# Patient Record
Sex: FEMALE | Race: WHITE | NOT HISPANIC OR LATINO | Employment: OTHER | URBAN - METROPOLITAN AREA
[De-identification: names, ages, dates, MRNs, and addresses within clinical notes are randomized per-mention and may not be internally consistent; named-entity substitution may affect disease eponyms.]

---

## 2023-04-04 LAB
ANION GAP IN SER/PLAS: 13 MMOL/L (ref 10–20)
CALCIUM (MG/DL) IN SER/PLAS: 9.9 MG/DL (ref 8.6–10.6)
CARBON DIOXIDE, TOTAL (MMOL/L) IN SER/PLAS: 28 MMOL/L (ref 21–32)
CHLORIDE (MMOL/L) IN SER/PLAS: 101 MMOL/L (ref 98–107)
CREATININE (MG/DL) IN SER/PLAS: 1.16 MG/DL (ref 0.5–1.05)
ERYTHROCYTE DISTRIBUTION WIDTH (RATIO) BY AUTOMATED COUNT: 12.4 % (ref 11.5–14.5)
ERYTHROCYTE MEAN CORPUSCULAR HEMOGLOBIN CONCENTRATION (G/DL) BY AUTOMATED: 34.9 G/DL (ref 32–36)
ERYTHROCYTE MEAN CORPUSCULAR VOLUME (FL) BY AUTOMATED COUNT: 96 FL (ref 80–100)
ERYTHROCYTES (10*6/UL) IN BLOOD BY AUTOMATED COUNT: 4.14 X10E12/L (ref 4–5.2)
GFR FEMALE: 49 ML/MIN/1.73M2
GLUCOSE (MG/DL) IN SER/PLAS: 100 MG/DL (ref 74–99)
HEMATOCRIT (%) IN BLOOD BY AUTOMATED COUNT: 39.8 % (ref 36–46)
HEMOGLOBIN (G/DL) IN BLOOD: 13.9 G/DL (ref 12–16)
INR IN PPP BY COAGULATION ASSAY: 2 (ref 0.9–1.1)
LEUKOCYTES (10*3/UL) IN BLOOD BY AUTOMATED COUNT: 7.4 X10E9/L (ref 4.4–11.3)
NRBC (PER 100 WBCS) BY AUTOMATED COUNT: 0 /100 WBC (ref 0–0)
PLATELETS (10*3/UL) IN BLOOD AUTOMATED COUNT: 216 X10E9/L (ref 150–450)
POTASSIUM (MMOL/L) IN SER/PLAS: 4.2 MMOL/L (ref 3.5–5.3)
PROTHROMBIN TIME (PT) IN PPP BY COAGULATION ASSAY: 23 SEC (ref 9.8–13.4)
SODIUM (MMOL/L) IN SER/PLAS: 138 MMOL/L (ref 136–145)
UREA NITROGEN (MG/DL) IN SER/PLAS: 27 MG/DL (ref 6–23)

## 2023-04-10 ENCOUNTER — HOSPITAL ENCOUNTER (OUTPATIENT)
Dept: DATA CONVERSION | Facility: HOSPITAL | Age: 78
End: 2023-04-10
Attending: INTERNAL MEDICINE | Admitting: INTERNAL MEDICINE
Payer: MEDICARE

## 2023-04-10 DIAGNOSIS — I48.91 UNSPECIFIED ATRIAL FIBRILLATION (MULTI): ICD-10-CM

## 2023-04-10 DIAGNOSIS — I48.4 ATYPICAL ATRIAL FLUTTER (MULTI): ICD-10-CM

## 2023-04-10 DIAGNOSIS — I10 ESSENTIAL (PRIMARY) HYPERTENSION: ICD-10-CM

## 2023-04-10 DIAGNOSIS — Z79.01 LONG TERM (CURRENT) USE OF ANTICOAGULANTS: ICD-10-CM

## 2023-04-10 DIAGNOSIS — F41.9 ANXIETY DISORDER, UNSPECIFIED: ICD-10-CM

## 2023-05-16 LAB
ANION GAP IN SER/PLAS: 13 MMOL/L (ref 10–20)
CALCIUM (MG/DL) IN SER/PLAS: 9.8 MG/DL (ref 8.6–10.6)
CARBON DIOXIDE, TOTAL (MMOL/L) IN SER/PLAS: 31 MMOL/L (ref 21–32)
CHLORIDE (MMOL/L) IN SER/PLAS: 95 MMOL/L (ref 98–107)
CREATININE (MG/DL) IN SER/PLAS: 1.55 MG/DL (ref 0.5–1.05)
GFR FEMALE: 35 ML/MIN/1.73M2
GLUCOSE (MG/DL) IN SER/PLAS: 105 MG/DL (ref 74–99)
POTASSIUM (MMOL/L) IN SER/PLAS: 3.8 MMOL/L (ref 3.5–5.3)
SODIUM (MMOL/L) IN SER/PLAS: 135 MMOL/L (ref 136–145)
UREA NITROGEN (MG/DL) IN SER/PLAS: 29 MG/DL (ref 6–23)

## 2023-06-01 LAB
ANION GAP IN SER/PLAS: 13 MMOL/L (ref 10–20)
CALCIUM (MG/DL) IN SER/PLAS: 9.5 MG/DL (ref 8.6–10.3)
CARBON DIOXIDE, TOTAL (MMOL/L) IN SER/PLAS: 28 MMOL/L (ref 21–32)
CHLORIDE (MMOL/L) IN SER/PLAS: 96 MMOL/L (ref 98–107)
CREATININE (MG/DL) IN SER/PLAS: 1.12 MG/DL (ref 0.5–1.05)
GFR FEMALE: 51 ML/MIN/1.73M2
GLUCOSE (MG/DL) IN SER/PLAS: 96 MG/DL (ref 74–99)
POTASSIUM (MMOL/L) IN SER/PLAS: 3.9 MMOL/L (ref 3.5–5.3)
SODIUM (MMOL/L) IN SER/PLAS: 133 MMOL/L (ref 136–145)
UREA NITROGEN (MG/DL) IN SER/PLAS: 21 MG/DL (ref 6–23)

## 2023-06-27 LAB
ANION GAP IN SER/PLAS: 14 MMOL/L (ref 10–20)
CALCIUM (MG/DL) IN SER/PLAS: 9.6 MG/DL (ref 8.6–10.6)
CARBON DIOXIDE, TOTAL (MMOL/L) IN SER/PLAS: 27 MMOL/L (ref 21–32)
CHLORIDE (MMOL/L) IN SER/PLAS: 94 MMOL/L (ref 98–107)
CREATININE (MG/DL) IN SER/PLAS: 1.17 MG/DL (ref 0.5–1.05)
ERYTHROCYTE DISTRIBUTION WIDTH (RATIO) BY AUTOMATED COUNT: 12.3 % (ref 11.5–14.5)
ERYTHROCYTE MEAN CORPUSCULAR HEMOGLOBIN CONCENTRATION (G/DL) BY AUTOMATED: 35 G/DL (ref 32–36)
ERYTHROCYTE MEAN CORPUSCULAR VOLUME (FL) BY AUTOMATED COUNT: 95 FL (ref 80–100)
ERYTHROCYTES (10*6/UL) IN BLOOD BY AUTOMATED COUNT: 4.19 X10E12/L (ref 4–5.2)
GFR FEMALE: 49 ML/MIN/1.73M2
GLUCOSE (MG/DL) IN SER/PLAS: 75 MG/DL (ref 74–99)
HEMATOCRIT (%) IN BLOOD BY AUTOMATED COUNT: 40 % (ref 36–46)
HEMOGLOBIN (G/DL) IN BLOOD: 14 G/DL (ref 12–16)
INR IN PPP BY COAGULATION ASSAY: 2.6 (ref 0.9–1.1)
LEUKOCYTES (10*3/UL) IN BLOOD BY AUTOMATED COUNT: 6.8 X10E9/L (ref 4.4–11.3)
NRBC (PER 100 WBCS) BY AUTOMATED COUNT: 0 /100 WBC (ref 0–0)
PLATELETS (10*3/UL) IN BLOOD AUTOMATED COUNT: 185 X10E9/L (ref 150–450)
POTASSIUM (MMOL/L) IN SER/PLAS: 4.3 MMOL/L (ref 3.5–5.3)
PROTHROMBIN TIME (PT) IN PPP BY COAGULATION ASSAY: 29.5 SEC (ref 9.8–12.8)
SODIUM (MMOL/L) IN SER/PLAS: 131 MMOL/L (ref 136–145)
UREA NITROGEN (MG/DL) IN SER/PLAS: 24 MG/DL (ref 6–23)

## 2023-07-07 ENCOUNTER — HOSPITAL ENCOUNTER (OUTPATIENT)
Dept: DATA CONVERSION | Facility: HOSPITAL | Age: 78
End: 2023-07-07
Attending: INTERNAL MEDICINE | Admitting: INTERNAL MEDICINE
Payer: MEDICARE

## 2023-07-07 DIAGNOSIS — Z98.1 ARTHRODESIS STATUS: ICD-10-CM

## 2023-07-07 DIAGNOSIS — Z87.891 PERSONAL HISTORY OF NICOTINE DEPENDENCE: ICD-10-CM

## 2023-07-07 DIAGNOSIS — E78.5 HYPERLIPIDEMIA, UNSPECIFIED: ICD-10-CM

## 2023-07-07 DIAGNOSIS — I48.91 UNSPECIFIED ATRIAL FIBRILLATION (MULTI): ICD-10-CM

## 2023-07-07 DIAGNOSIS — I10 ESSENTIAL (PRIMARY) HYPERTENSION: ICD-10-CM

## 2023-07-07 DIAGNOSIS — I48.20 CHRONIC ATRIAL FIBRILLATION, UNSPECIFIED (MULTI): ICD-10-CM

## 2023-07-07 DIAGNOSIS — Z79.01 LONG TERM (CURRENT) USE OF ANTICOAGULANTS: ICD-10-CM

## 2023-07-07 DIAGNOSIS — I48.92 UNSPECIFIED ATRIAL FLUTTER (MULTI): ICD-10-CM

## 2023-07-12 LAB
ATRIAL RATE: 59 BPM
Q ONSET: 226 MS
QRS COUNT: 10 BEATS
QRS DURATION: 90 MS
QT INTERVAL: 430 MS
QTC CALCULATION(BAZETT): 430 MS
QTC FREDERICIA: 430 MS
R AXIS: 5 DEGREES
T AXIS: -69 DEGREES
T OFFSET: 441 MS
VENTRICULAR RATE: 60 BPM

## 2023-07-21 LAB
POC ACTIVATED CLOTTING TIME HIGH RANGE: 362 SECONDS (ref 96–152)
POC ACTIVATED CLOTTING TIME HIGH RANGE: 404 SECONDS (ref 96–152)
POC ACTIVATED CLOTTING TIME HIGH RANGE: 407 SECONDS (ref 96–152)

## 2023-08-09 ENCOUNTER — HOSPITAL ENCOUNTER (OUTPATIENT)
Dept: DATA CONVERSION | Facility: HOSPITAL | Age: 78
End: 2023-08-09
Attending: OTOLARYNGOLOGY | Admitting: OTOLARYNGOLOGY
Payer: MEDICARE

## 2023-08-09 DIAGNOSIS — R49.0 DYSPHONIA: ICD-10-CM

## 2023-08-09 DIAGNOSIS — J38.1 POLYP OF VOCAL CORD AND LARYNX: ICD-10-CM

## 2023-08-18 LAB
COMPLETE PATHOLOGY REPORT: NORMAL
CONVERTED CLINICAL DIAGNOSIS-HISTORY: NORMAL
CONVERTED FINAL DIAGNOSIS: NORMAL
CONVERTED FINAL REPORT PDF LINK TO COPY AND PASTE: NORMAL
CONVERTED GROSS DESCRIPTION: NORMAL

## 2023-08-30 PROBLEM — J38.1 POLYP OF VOCAL CORD: Status: ACTIVE | Noted: 2023-04-04

## 2023-08-30 PROBLEM — I10 HYPERTENSION: Status: ACTIVE | Noted: 2023-08-30

## 2023-08-30 PROBLEM — F17.210 CIGARETTE NICOTINE DEPENDENCE WITHOUT COMPLICATION: Status: ACTIVE | Noted: 2023-08-30

## 2023-08-30 PROBLEM — R49.0 CHRONIC HOARSENESS: Status: ACTIVE | Noted: 2023-04-04

## 2023-08-30 PROBLEM — L82.1 OTHER SEBORRHEIC KERATOSIS: Status: ACTIVE | Noted: 2023-08-08

## 2023-08-30 PROBLEM — M48.00 SPINAL STENOSIS: Status: ACTIVE | Noted: 2023-08-30

## 2023-08-30 PROBLEM — D22.72 MELANOCYTIC NEVI OF LEFT LOWER LIMB, INCLUDING HIP: Status: ACTIVE | Noted: 2023-08-08

## 2023-08-30 PROBLEM — D22.5 MELANOCYTIC NEVI OF TRUNK: Status: ACTIVE | Noted: 2023-08-08

## 2023-08-30 PROBLEM — E78.00 HYPERCHOLESTEROLEMIA: Status: ACTIVE | Noted: 2023-08-30

## 2023-08-30 PROBLEM — D18.01 HEMANGIOMA OF SKIN AND SUBCUTANEOUS TISSUE: Status: ACTIVE | Noted: 2023-08-08

## 2023-08-30 PROBLEM — L30.9 ECZEMA: Status: ACTIVE | Noted: 2023-08-30

## 2023-08-30 PROBLEM — L91.8 SKIN TAGS, MULTIPLE ACQUIRED: Status: ACTIVE | Noted: 2023-08-30

## 2023-08-30 PROBLEM — R63.4 WEIGHT LOSS, UNINTENTIONAL: Status: ACTIVE | Noted: 2023-08-30

## 2023-08-30 PROBLEM — L81.4 OTHER MELANIN HYPERPIGMENTATION: Status: ACTIVE | Noted: 2023-08-08

## 2023-08-30 PROBLEM — D48.5 NEOPLASM OF UNCERTAIN BEHAVIOR OF SKIN: Status: ACTIVE | Noted: 2023-08-08

## 2023-08-30 PROBLEM — D22.71 MELANOCYTIC NEVI OF RIGHT LOWER LIMB, INCLUDING HIP: Status: ACTIVE | Noted: 2023-08-08

## 2023-08-30 RX ORDER — HYDROXYZINE HYDROCHLORIDE 25 MG/1
1 TABLET, FILM COATED ORAL AS NEEDED
COMMUNITY

## 2023-08-30 RX ORDER — GABAPENTIN 100 MG/1
CAPSULE ORAL
COMMUNITY
Start: 2023-07-23 | End: 2023-10-06 | Stop reason: ALTCHOICE

## 2023-08-30 RX ORDER — LISINOPRIL 20 MG/1
20 TABLET ORAL DAILY
COMMUNITY
End: 2024-01-11

## 2023-08-30 RX ORDER — MELOXICAM 15 MG/1
1 TABLET ORAL DAILY
COMMUNITY
Start: 2023-07-27 | End: 2023-11-27 | Stop reason: SDUPTHER

## 2023-08-30 RX ORDER — FUROSEMIDE 20 MG/1
1 TABLET ORAL DAILY
COMMUNITY
Start: 2023-05-12 | End: 2023-10-06 | Stop reason: ALTCHOICE

## 2023-08-30 RX ORDER — SIMVASTATIN 20 MG/1
TABLET, FILM COATED ORAL
COMMUNITY
End: 2023-10-06 | Stop reason: ALTCHOICE

## 2023-08-30 RX ORDER — PANTOPRAZOLE SODIUM 40 MG/1
1 TABLET, DELAYED RELEASE ORAL DAILY
COMMUNITY
Start: 2023-07-08 | End: 2023-10-06 | Stop reason: ALTCHOICE

## 2023-08-30 RX ORDER — RIVAROXABAN 20 MG/1
1 TABLET, FILM COATED ORAL DAILY
COMMUNITY
End: 2023-10-02 | Stop reason: DRUGHIGH

## 2023-08-30 RX ORDER — GABAPENTIN 300 MG/1
1 CAPSULE ORAL 3 TIMES DAILY
COMMUNITY
Start: 2023-07-27 | End: 2024-04-15 | Stop reason: SDUPTHER

## 2023-08-30 RX ORDER — TRIAMTERENE/HYDROCHLOROTHIAZID 37.5-25 MG
TABLET ORAL
COMMUNITY
End: 2023-12-28

## 2023-08-30 RX ORDER — FUROSEMIDE 40 MG/1
1 TABLET ORAL DAILY
COMMUNITY
Start: 2023-04-14 | End: 2023-10-06 | Stop reason: ALTCHOICE

## 2023-08-30 RX ORDER — PHENYLPROPANOLAMINE/CLEMASTINE 75-1.34MG
TABLET, EXTENDED RELEASE ORAL
COMMUNITY
End: 2023-10-06 | Stop reason: ALTCHOICE

## 2023-08-30 RX ORDER — AMLODIPINE BESYLATE 5 MG/1
1 TABLET ORAL DAILY
COMMUNITY
End: 2023-12-28

## 2023-08-30 RX ORDER — LORATADINE 10 MG/1
TABLET ORAL
COMMUNITY
End: 2023-10-06 | Stop reason: ALTCHOICE

## 2023-08-30 RX ORDER — FLECAINIDE ACETATE 50 MG/1
1 TABLET ORAL 2 TIMES DAILY
COMMUNITY
Start: 2023-06-27 | End: 2023-10-06 | Stop reason: SINTOL

## 2023-08-30 RX ORDER — CLOBETASOL PROPIONATE 0.5 MG/G
1 EMULSION TOPICAL 2 TIMES DAILY
COMMUNITY
End: 2023-10-06 | Stop reason: ALTCHOICE

## 2023-09-14 NOTE — H&P
History of Present Illness:   History Present Illness:  Reason for surgery: AFL   HPI:    1 77 year old with HTN, Anxiety disorder , AFL - (Index dx Feb 23, 2023) found on EKG at PCP office. Pt was started on Xarelto 20mg g. For two months she has  felt fatigued and short of breath. She felt something is not right. Presented for flutter ablation     Allergies:        Allergies:  ·  No Known Allergies :     Home Medication Review:   Home Medications Reviewed: yes     Impression/Procedure:   ·  Impression and Planned Procedure: AFL ablation       ERAS (Enhanced Recovery After Surgery):  ·  ERAS Patient: no       Physical Exam by System:    Respiratory/Thorax: Patent airways, CTAB, normal  breath sounds with good chest expansion, thorax symmetric   Cardiovascular: Regular, rate and rhythm, no murmurs,  2+ equal pulses of the extremities, normal S 1and S 2     Airway/Sedation Assessment:  ·  Emotional Status calm   ·  Neurologic alert & oriented x 3   ·  Respiratory clear to auscultation   · Pulses present: Pedal Left, Pedal Right, Radial Left, Radial Right     ·  Mouth Opening OK yes   ·  Neck Flexibility OK yes   ·  Loose Teeth no   ·  Oropharyngeal Classification Class II   ·  ASA PS Classification ASA III   ·  Sedation Plan moderate sedation     Consent:   COVID-19 Consent:  ·  COVID-19 Risk Consent Surgeon has reviewed key risks related to the risk of madalyn COVID-19 and if they contract COVID-19 what the risks are.     Coronavirus Attestation of Need for Surgery:  ·  COVID-19 Surgery / Procedure Attestation: Select all criteria that apply Risk of metastasis or progression of staging if delayed     Attestation:   Note Completion:  I am a:  Resident/Fellow   Attending Attestation I saw and evaluated the patient.  I personally obtained the key and critical portions of the history and physical exam or was physically present for key and  critical portions performed by the resident/fellow. I reviewed the  resident/fellow?s documentation and discussed the patient with the resident/fellow.  I agree with the resident/fellow?s medical decision making as documented in the note.     I personally evaluated the patient on 10-Apr-2023         Electronic Signatures:  Porter Contreras)  (Signed 10-Apr-2023 15:14)   Authored: Note Completion   Co-Signer: History of Present Illness, Allergies, Home Medication Review, Impression/Procedure, ERAS, Physical Exam, Consent  Karrie De La Rosa (Resident))  (Signed 10-Apr-2023 08:42)   Authored: History of Present Illness, Allergies, Home  Medication Review, Impression/Procedure, ERAS, Physical Exam, Consent      Last Updated: 10-Apr-2023 15:14 by Porter Contreras)

## 2023-09-29 VITALS — WEIGHT: 114.42 LBS | HEIGHT: 63 IN | BODY MASS INDEX: 20.27 KG/M2

## 2023-09-30 NOTE — H&P
History of Present Illness:   History Present Illness:  Reason for surgery: atrial fibrillation   HPI:    Lynsey Forman is a 77 year-old with HTN, Anxiety disorder, AFL - (Index dx Feb 23, 2023) found on EKG at PCP Feb 2023, underwent AFL ablation with CTI 4/10/23. She was subsequently  found to have atrial fibrillation shortly thereafter. She presents today for RFA for her atrial fibrillation. She is currently on flecainide 50mg twice a day and xarelto (C2V 4).     Allergies:        Allergies:  ·  No Known Allergies :     Home Medication Review:   Home Medications Reviewed: yes     Impression/Procedure:   ·  Impression and Planned Procedure: atrial fibrillation - RFA       ERAS (Enhanced Recovery After Surgery):  ·  ERAS Patient: no       Physical Exam by System:    Respiratory/Thorax: Patent airways, CTAB, normal  breath sounds with good chest expansion, thorax symmetric   Cardiovascular: Regular, rate and rhythm, no murmurs,  2+ equal pulses of the extremities, normal S 1and S 2     Consent:   COVID-19 Consent:  ·  COVID-19 Risk Consent Surgeon has reviewed key risks related to the risk of madalyn COVID-19 and if they contract COVID-19 what the risks are.     Attestation:   Note Completion:  I am a:  Resident/Fellow   Attending Attestation I saw and evaluated the patient.  I personally obtained the key and critical portions of the history and physical exam or was physically present for key and  critical portions performed by the resident/fellow. I reviewed the resident/fellow?s documentation and discussed the patient with the resident/fellow.  I agree with the resident/fellow?s medical decision making as documented in the note.   I personally evaluated the patient on 07-Jul-2023         Electronic Signatures:  Porter Contreras)  (Signed 07-Jul-2023 17:44)   Co-Signer: History of Present Illness, Allergies, Home  Medication Review, Impression/Procedure, ERAS, Physical Exam, Consent, Note  Completion  James Bolton (Fellow))  (Signed 07-Jul-2023 07:33)   Authored: History of Present Illness, Allergies, Home  Medication Review, Impression/Procedure, ERAS, Physical Exam, Consent, Note Completion      Last Updated: 07-Jul-2023 17:44 by Porter Contreras)

## 2023-09-30 NOTE — H&P
History of Present Illness:   History Present Illness:  Reason for surgery: Moriah's edema   HPI:    This is a patient with a history of Moriah's edema     Allergies:        Allergies:  ·  No Known Allergies :     Home Medication Review:   Home Medications Reviewed: yes     Impression/Procedure:   ·  Impression and Planned Procedure: MDL, CO2 laser, bronchoscopy, steroid injection       ERAS (Enhanced Recovery After Surgery):  ·  ERAS Patient: no       Physical Exam by System:    Respiratory/Thorax: chest rise symmetric   Cardiovascular: RRR     Consent:   COVID-19 Consent:  ·  COVID-19 Risk Consent Surgeon has reviewed key risks related to the risk of madalyn COVID-19 and if they contract COVID-19 what the risks are.     Attestation:   Note Completion:  I am a:  Resident/Fellow   Attending Attestation I saw and evaluated the patient.  I personally obtained the key and critical portions of the history and physical exam or was physically present for key and  critical portions performed by the resident/fellow. I reviewed the resident/fellow?s documentation and discussed the patient with the resident/fellow.  I agree with the resident/fellow?s medical decision making as documented in the note.     I personally evaluated the patient on 09-Aug-2023   Comments/ Additional Findings    Patient and I discussed the risks, benefits and alternatives to surgery and all questions answered.  Cleared to OR.          Electronic Signatures:  Louie Garcias)  (Signed 10-Aug-2023 17:42)   Authored: Note Completion   Co-Signer: History of Present Illness, Allergies, Home Medication Review, Impression/Procedure, ERAS, Physical Exam, Consent, Note Completion  Sandra Kim (Resident))  (Signed 09-Aug-2023 07:31)   Authored: History of Present Illness, Allergies, Home  Medication Review, Impression/Procedure, ERAS, Physical Exam, Consent, Note Completion      Last Updated: 10-Aug-2023 17:42 by Louie Garcias)

## 2023-10-01 NOTE — OP NOTE
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 Dysphonia  Surgeon: MD Dieter  Resident/Fellow/Other Assistant: MD Julio    Procedure:  1. Microdirect laryngoscopy   2. Bronchoscopy, rigid   3. Microdirect laryngoscopy with microflap excision  4. Microdirect laryngoscopy with CO2 laser resection, bilateral  5. Microdirect laryngoscopy vocal cord injection, bilateral   Estimated Blood Loss: 1cc  Findings: See note   Specimens(s) Collected: yes,  sent         Operative Report:   INDICATIONS:    Moriah's edema     We discussed the risks and benefits to include, but not be limited to, hoarseness which may be permanent, swallow change which may require secondary intervention, bleeding; infection; damage to surrounding structures including the teeth, gums, lips, tongue;  laser fire/burn; taste change; medical complications; and risks of anesthesia.  Their questions are answered and they sign written consent.    Findings and treatment include: Nodular, thickened region along the right vocal cord removed with microflap. Bilateral Moriah's edema addressed with CO2 laser resection. Left anterior mucosal sparing to prevent scar formation/web.     DESCRIPTION OF PROCEDURE:    The patient was taken to the operating room and placed in the supine position on the operating room table.  A time out procedure was performed confirming patient and site. Following satisfactory induction of general anesthesia, the upper teeth were covered  with a moldable tooth guard. The laryngoscope was advanced until the vocal cords were visualized and suspended.  The microscope was moved in position, and utilized for remainder of the procedure.  Appropriate eye and face protections was applied to the  patient and the operating room staff.  The oxygen level was also at or below 30% for the laser portions of the  procedure.      Under microscopic guidance, the right vocal cord was injected with preservative free saline along the scar. Using microscissors the nodular lesion of the right vocal cord was excised with a microflap technique using blunt and sharp instruments to include  microscissors and flap elevators. Next, the CO2 laser in line of sight mode via the microscope was used to treat the affected areas in a linear fashion from the vocal process to the presumed anterior commissure.     Next, the bilateral vocal cords were injected with a total of 1cc of 10mg/cc decadron. The zero-degree bronchoscope was then used to visualize the subglottis and trachea to the level of the marisabel, which was free of any concerning findings.     The patient tolerated this well.  The patient was then turned back to Anesthesia for extubation and returned to the recovery room in satisfactory  condition.  Sponge, needle, instrument counts were reported as correct.  Estimated blood loss was minimal.  I was present and participated in all aspects of procedure.                        Attestation:   Note Completion:  Attending Attestation I was present for the entire procedure    I am a: Resident/Fellow         Electronic Signatures:  Louie Garcias)  (Signed 11-Aug-2023 06:33)   Authored: Post-Operative Note, Chart Review, Note Completion   Co-Signer: Post-Operative Note, Chart Review, Note Completion  Sandra Kim (Resident))  (Signed 09-Aug-2023 17:16)   Authored: Post-Operative Note, Chart Review, Note Completion      Last Updated: 11-Aug-2023 06:33 by Louie Garcias)

## 2023-10-02 ENCOUNTER — PHARMACY VISIT (OUTPATIENT)
Dept: PHARMACY | Facility: CLINIC | Age: 78
End: 2023-10-02
Payer: COMMERCIAL

## 2023-10-02 ENCOUNTER — TELEMEDICINE CLINICAL SUPPORT (OUTPATIENT)
Dept: PHARMACY | Facility: HOSPITAL | Age: 78
End: 2023-10-02
Payer: MEDICARE

## 2023-10-02 DIAGNOSIS — I48.91 ATRIAL FIBRILLATION, UNSPECIFIED TYPE (MULTI): Primary | ICD-10-CM

## 2023-10-02 NOTE — Clinical Note
Fabian Seaman! I talked with Ms. Dickson. Looks like they just needed a PA for the Xarelto. Should be $0. I sent prescription to [facility] to make sure there are no  issues.

## 2023-10-02 NOTE — PROGRESS NOTES
"Pharmacist Clinic: Anticoagulation Management  Lynsey Forman was referred to the Clinical Pharmacy Team for her anticoagulation management.    Referring Provider:  Urszula Lebron   _______________________________________________________________________  PHARMACY ASSESSMENT    Affordability/Accessibility: Patient has been having cost issues with her Xarelto prescription. Recently was changed to 15 mg. According to my test claims, patient needed a PA. PA was approved 9/28/2023. May need updated annually.   Adherence/Organization: Patient is taking medication as appropriate.  Adverse Effects: None    MEDICATION RECONCILIATION  Added:  - None  Changed:  - None  Removed:  - None    RELEVANT LAB RESULTS  Lab Results   Component Value Date    BILITOT 0.6 02/06/2023    CALCIUM 9.6 06/27/2023    CO2 27 06/27/2023    CL 94 (L) 06/27/2023    CREATININE 1.17 (H) 06/27/2023    GLUCOSE 75 06/27/2023    ALKPHOS 65 02/06/2023    K 4.3 06/27/2023    PROT 7.2 02/06/2023     (L) 06/27/2023    AST 24 02/06/2023    ALT 16 02/06/2023    BUN 24 (H) 06/27/2023    ANIONGAP 14 06/27/2023    ALBUMIN 4.5 02/06/2023    GFRF 49 (A) 06/27/2023     Lab Results   Component Value Date    TRIG 106 10/31/2022    CHOL 213 (H) 10/31/2022    .9 10/31/2022     No results found for: \"BMCBC\", \"CBCDIF\"       DRUG INTERACTIONS  - No  _______________________________________________________________________  ANTICOAGULATION ASSESSMENT     CURRENT PHARMACOTHERAPY:   -Xarelto 15 mg (recently decreased due to kindey function)   - 6/2023 -SCR 1.17  - Weight 51.9 kg  - Age 78 yo   - CrCl 34 mL/min    HISTORICAL PHARMACOTHERAPY:   - Patient has a significant cardio history including a flutter, a fib (diagnosed 2/2023), CAD, hypercholesterolemia, HTN, and CKD III. Patient was diagnosed with a fib in 2/2023. Since starting on Xarelto, she has no specific side effects to report. She has been having cost issues. She is hesitant about being a blood thinner " due to the high risk nature of it. We talked in detail about why being on a blood thinner is important and how to prevent any issues.     PERTINANT MEDICAL HISTORY:  a flutter, a fib (diagnosed 2/2023), CAD, hypercholesterolemia, HTN, and CKD III  _______________________________________________________________________  PATIENT EDUCATION/GOALS  - Counseled patient on MOA, expectations, duration of therapy, contraindications, administration, and monitoring parameters  - Counseled patient of side effects that are indicative of bleeding such as dark tarry stool, unexplainable bruising, or vomiting up a coffee ground like substance  ____________________________________________________  RECOMMENDATIONS/PLAN  1. CONTINUE Xarelto 15 mg every day as prescribed.   2. We will try to get financial assistance through  PAP aid. Application was entered 10/2.   3. Continue to work with physical therapy to increase strength and mobility. Keep all providers up to date with your status of being on a blood thinner, if you decide to seek invasive options.     Patient is being screen for  Patient Assistance Program (PAP).    Patient verbally reports monthly or yearly income which is less than 400% federal poverty level    Application for program has been submitted for the following medications: Xarelto 15 QD    Prescription Insurance: Yes  Members of Household: 1  Files Taxes: No    Patient will be email financial information to pharmacist directly at [email or web address].    Patient aware this process may take up to 6 weeks.     If approved medication must be filled through [facility] pharmacy and mailed to patient.        Next Cardiology Appointment: 4/2/2024   Clinical Pharmacist follow up: 1/3/2024 @ 1 pm   Type of Encounter: Nathan Bellamy, Luz    Verbal consent to manage patient's drug therapy was obtained from the patient . They were informed they may decline to participate or withdraw from  participation in pharmacy services at any time.    Continue all meds under the continuation of care with the referring provider and clinical pharmacy team.

## 2023-10-03 ENCOUNTER — TREATMENT (OUTPATIENT)
Dept: PHYSICAL THERAPY | Facility: CLINIC | Age: 78
End: 2023-10-03
Payer: MEDICARE

## 2023-10-03 DIAGNOSIS — M54.41 CHRONIC BILATERAL LOW BACK PAIN WITH BILATERAL SCIATICA: ICD-10-CM

## 2023-10-03 DIAGNOSIS — M48.061 SPINAL STENOSIS OF LUMBAR REGION, UNSPECIFIED WHETHER NEUROGENIC CLAUDICATION PRESENT: Primary | ICD-10-CM

## 2023-10-03 DIAGNOSIS — M54.42 CHRONIC BILATERAL LOW BACK PAIN WITH BILATERAL SCIATICA: ICD-10-CM

## 2023-10-03 DIAGNOSIS — G89.29 CHRONIC BILATERAL LOW BACK PAIN WITH BILATERAL SCIATICA: ICD-10-CM

## 2023-10-03 PROCEDURE — 97110 THERAPEUTIC EXERCISES: CPT | Mod: GP | Performed by: PHYSICAL THERAPIST

## 2023-10-03 NOTE — PROGRESS NOTES
Physical Therapy Daily Treatment Note    Patient Name: Lynsey Forman  MRN Number: [id number]  Initial PT Examination Date: 8/16/23  Referring Clinician: Dr. Bartholomew  Reason for Visit: Low back pain, bilateral LE pain    Today's Date: 10/11/2023  Time Calculation  Start Time: 0217  Stop Time: 0301  Time Calculation (min): 44 min      Insurance  Visit Number: 4  Approved Number of Visits: Medical necessity/ medicare  Certification Period: 8/16 - 11/8/23    Precautions  Low fall risk    Problem List  1. Spinal stenosis of lumbar region, unspecified whether neurogenic claudication present        2. Chronic bilateral low back pain with bilateral sciatica          Subjective  Has a spinal MRI in a couple weeks. She has been more active this week. Doing some gardening yesterday and today, she was sore but able to do it. She says that her legs have actually been better. Strength in her legs is better, she feels difference from the exercises. The pain in her legs has stayed the same, comes and goes, takes gabapentin 3 times as day. Leg pain has not been worse in the last week.   She does not know how long she can walk.     Treatment  Therapeutic Exercise   - attempted lumbar extension with increase in symptoms   - clamshells with theraband resistence   - seated hamstring slump stretch    - standing lumbar flexion   - CKC hip abduction   - lateral side stepping    Assessment  Subjective reduction in referred bilateral pain, despite this she continues to have levels of pain which are limiting her ability to walk longer distances.    Plan  Continue strengthening and flexion exercises.    Home Program  Access Code: [email or web address]  URL: [email or web address]  Date: 09/26/2023  Prepared by: Kenan Alcaraz    Exercises  - Supine Double Knee to Chest  - 1 x daily - 7 x weekly - 2 sets - 10-20 reps  - Supine Lower Trunk Rotation  - 1 x daily - 7 x weekly - 2 sets - 10-20 reps  - Clam with Resistance  - 1 x daily - 7 x  weekly - 2 sets - 10-20 reps  - Seated Hamstring Stretch  - 1 x daily - 7 x weekly - 5 reps - 20 sec hold  - Standing Forward Trunk Flexion  - 3 x daily - 7 x weekly - 2 sets - 20 reps  - Standing Hip Abduction with Counter Support  - 1 x daily - 7 x weekly - 2 sets - 10-20 reps  - Side Stepping with Counter Support  - 1 x daily - 7 x weekly - 2 sets - 1 min hold    Care Plan  Encounter Problems       Encounter Problems (Active)       PT Problem       PT Goal 1       Expected End:  11/08/23       1) Full return to prior level of function to allow continued home independent capability.   2) Reduce low back/ BLE pain 5/10 maximum in the last week for improvements in quality of life and sleep.   3) Reduced CLARA outcome measure score to less than or equal to 18% for meaningful and clinical improvements in patients condition.   4) Pain free spinal extension to denote reduction in spinal dysfunction.  5) Increase bilateral hip strength testing greater than or equal to 4/5 for improvements in low back/ pelvic stability when standing and walking for extended periods of time.

## 2023-10-06 ENCOUNTER — OFFICE VISIT (OUTPATIENT)
Dept: CARDIOLOGY | Facility: CLINIC | Age: 78
End: 2023-10-06
Payer: MEDICARE

## 2023-10-06 ENCOUNTER — TELEPHONE (OUTPATIENT)
Dept: PHARMACY | Facility: HOSPITAL | Age: 78
End: 2023-10-06
Payer: MEDICARE

## 2023-10-06 ENCOUNTER — PHARMACY VISIT (OUTPATIENT)
Dept: PHARMACY | Facility: CLINIC | Age: 78
End: 2023-10-06
Payer: COMMERCIAL

## 2023-10-06 VITALS
OXYGEN SATURATION: 96 % | HEIGHT: 63 IN | BODY MASS INDEX: 20.34 KG/M2 | WEIGHT: 114.8 LBS | SYSTOLIC BLOOD PRESSURE: 113 MMHG | HEART RATE: 57 BPM | DIASTOLIC BLOOD PRESSURE: 62 MMHG

## 2023-10-06 DIAGNOSIS — I48.91 ATRIAL FIBRILLATION, UNSPECIFIED TYPE (MULTI): Primary | ICD-10-CM

## 2023-10-06 PROCEDURE — 3074F SYST BP LT 130 MM HG: CPT | Performed by: NURSE PRACTITIONER

## 2023-10-06 PROCEDURE — 1160F RVW MEDS BY RX/DR IN RCRD: CPT | Performed by: NURSE PRACTITIONER

## 2023-10-06 PROCEDURE — 1125F AMNT PAIN NOTED PAIN PRSNT: CPT | Performed by: NURSE PRACTITIONER

## 2023-10-06 PROCEDURE — 3078F DIAST BP <80 MM HG: CPT | Performed by: NURSE PRACTITIONER

## 2023-10-06 PROCEDURE — 1159F MED LIST DOCD IN RCRD: CPT | Performed by: NURSE PRACTITIONER

## 2023-10-06 PROCEDURE — 99214 OFFICE O/P EST MOD 30 MIN: CPT | Mod: PO | Performed by: NURSE PRACTITIONER

## 2023-10-06 PROCEDURE — RXMED WILLOW AMBULATORY MEDICATION CHARGE

## 2023-10-06 PROCEDURE — 99214 OFFICE O/P EST MOD 30 MIN: CPT | Performed by: NURSE PRACTITIONER

## 2023-10-06 RX ORDER — ATORVASTATIN CALCIUM 40 MG/1
40 TABLET, FILM COATED ORAL DAILY
COMMUNITY
End: 2023-12-19 | Stop reason: SDUPTHER

## 2023-10-06 ASSESSMENT — ENCOUNTER SYMPTOMS: OCCASIONAL FEELINGS OF UNSTEADINESS: 1

## 2023-10-06 ASSESSMENT — PAIN SCALES - GENERAL: PAINLEVEL: 4

## 2023-10-06 NOTE — TELEPHONE ENCOUNTER
Patient Assistance Program Approval:     We are pleased to inform you that your application for assistance has been approved.     This approval is valid through October 6, 2024  as long as the following criteria continue to be satisfied:     Your medication (Xarelto) remains covered under your current insurance plan.   Your prescriber does not discontinue therapy.   You do not seek reimbursement from any other private or government-funded programs for the  medication.    Under this program, the pharmacy will first bill your insurance plan for your indemnified specified medication. The [employer] Assistance Fund will then offset your copay balance, so that your out-of pocket expense for your specialty medication will be $0.00.    Calista Bellamy, PharmD    3 mon follow up scheduled with pharmacy on 1/3 @ 1 pm (virtual)

## 2023-10-06 NOTE — PROGRESS NOTES
Subjective   Lynsey Forman is a 77 y.o. female.    Chief Complaint:  Atrial Fibrillation and Atrial Flutter    Lynsey Forman is a 77 year-old with    1. HTN  2. Anxiety disorder  3. AFL - (Index dx Feb 23, 2023) found on EKG at PCP office. Pt was started on Eliquis 5 mg. For two months she has felt fatigued and short of breath. She felt something is not right.    Feb 2023: She was fairly symptomatic with atrial flutter. CHADS VASC score of 4 and she is anticoagulated. After options discussed, ablation will be done.     April 10, 2023: Successful ablation for typical atrial flutter with confirmed bidirectional block at the CTI, done by Dr. Contreras.     TESTING:      -ECHO: TTE (Feb 2023) LVEF 60-65%. Mildly dilated atria.     Now s/p Afib RFA with Dr. Contreras 7/7/2023  Patient was in junctional rhythm post procedure, she was observed for > 3 hours post procedure and she started having intermittent NSR. Flecainide was stopped  ECG 8/4/2023 SB HR 53 bpm  ECG 10/6/2023 SB HR 58 bpm with occasionally PVCs    TODAY Patient presents for 3 month follow up post afib ablation. She denies any cardiac symptoms.  She has some PVCs on her ECG today, but denies any palpitations.  She has established with Dr. Mcdonald for general cardiology and has some testing coming up.          Review of Systems   Constitutional: Negative for diaphoresis, fever and malaise/fatigue.   HENT:  Negative for congestion and sore throat.    Eyes:  Negative for blurred vision and double vision.   Cardiovascular:  Negative for chest pain, dyspnea on exertion, irregular heartbeat, leg swelling, near-syncope, orthopnea, palpitations, paroxysmal nocturnal dyspnea and syncope.   Respiratory:  Negative for cough, hemoptysis, shortness of breath, snoring and sputum production.    Hematologic/Lymphatic: Negative for bleeding problem.   Skin:  Negative for rash.   Musculoskeletal:  Negative for falls, joint pain and myalgias.   Gastrointestinal:  Negative for  abdominal pain, diarrhea, nausea and vomiting.   Neurological:  Negative for dizziness, headaches, light-headedness and weakness.   All other systems reviewed and are negative.      Objective   Constitutional:       Appearance: Healthy appearance. Not in distress.   Eyes:      Conjunctiva/sclera: Conjunctivae normal.      Pupils: Pupils are equal, round, and reactive to light.   Pulmonary:      Effort: Pulmonary effort is normal.      Breath sounds: Normal breath sounds. No wheezing. No rhonchi. No rales.   Cardiovascular:      PMI at left midclavicular line. Normal rate. Regular rhythm. Normal S1. Normal S2.       Murmurs: There is no murmur.   Pulses:     Intact distal pulses.   Edema:     Peripheral edema absent.   Abdominal:      General: Bowel sounds are normal.      Palpations: Abdomen is soft.      Tenderness: There is no abdominal tenderness.   Musculoskeletal: Normal range of motion.         General: No tenderness. Skin:     General: Skin is warm and dry.   Neurological:      General: No focal deficit present.      Mental Status: Alert and oriented to person, place and time.           Assessment/Plan   The encounter diagnosis was Atrial fibrillation, unspecified type (CMS/HCC).  Patient doing well 3 months post Afib Ablation.  She is noted to have PVCs but denies any symptoms.  We discussed lifestyle modifications that can help maintain normal sinus rhythm such as exercise, weight loss, managing hypertension, treating sleep apnea, and minimizing alcohol intake.  All questions asked and answered.    I offered her a holter monitor to further assess her PVC burden, she declined today.  She is asymptomatic and has some upcoming testing with Dr. Mcdonald.  If her cardiac MRI shows a reduced EF, we will recommend a monitor to make sure it is not from an increased PVC burden.    Continue current medications, CHADSVASC =4, patient would likely benefit from indefinite AC  Follow up with general cardiology as  scheduled  Follow up with EP at needed: I will review her test results when they are back

## 2023-10-09 ASSESSMENT — ENCOUNTER SYMPTOMS
DYSPNEA ON EXERTION: 0
SNORING: 0
SPUTUM PRODUCTION: 0
COUGH: 0
NAUSEA: 0
LIGHT-HEADEDNESS: 0
SYNCOPE: 0
PALPITATIONS: 0
MYALGIAS: 0
VOMITING: 0
BLURRED VISION: 0
SHORTNESS OF BREATH: 0
FEVER: 0
HEADACHES: 0
HEMOPTYSIS: 0
DIARRHEA: 0
ORTHOPNEA: 0
DOUBLE VISION: 0
WEAKNESS: 0
IRREGULAR HEARTBEAT: 0
PND: 0
DIAPHORESIS: 0
ABDOMINAL PAIN: 0
DIZZINESS: 0
FALLS: 0
SORE THROAT: 0
NEAR-SYNCOPE: 0

## 2023-10-11 ENCOUNTER — APPOINTMENT (OUTPATIENT)
Dept: PRIMARY CARE | Facility: CLINIC | Age: 78
End: 2023-10-11
Payer: MEDICARE

## 2023-10-11 PROBLEM — M54.41 CHRONIC BILATERAL LOW BACK PAIN WITH BILATERAL SCIATICA: Status: ACTIVE | Noted: 2023-10-11

## 2023-10-11 PROBLEM — M54.42 CHRONIC BILATERAL LOW BACK PAIN WITH BILATERAL SCIATICA: Status: ACTIVE | Noted: 2023-10-11

## 2023-10-11 PROBLEM — G89.29 CHRONIC BILATERAL LOW BACK PAIN WITH BILATERAL SCIATICA: Status: ACTIVE | Noted: 2023-10-11

## 2023-10-13 ENCOUNTER — APPOINTMENT (OUTPATIENT)
Dept: RADIOLOGY | Facility: HOSPITAL | Age: 78
End: 2023-10-13
Payer: MEDICARE

## 2023-10-19 ENCOUNTER — ANCILLARY PROCEDURE (OUTPATIENT)
Dept: RADIOLOGY | Facility: CLINIC | Age: 78
End: 2023-10-19
Payer: MEDICARE

## 2023-10-19 PROCEDURE — 72148 MRI LUMBAR SPINE W/O DYE: CPT | Mod: MA

## 2023-10-25 ENCOUNTER — DOCUMENTATION (OUTPATIENT)
Dept: ORTHOPEDICS | Facility: HOSPITAL | Age: 78
End: 2023-10-25

## 2023-10-25 ENCOUNTER — OFFICE VISIT (OUTPATIENT)
Dept: ORTHOPEDIC SURGERY | Facility: CLINIC | Age: 78
End: 2023-10-25
Payer: MEDICARE

## 2023-10-25 ENCOUNTER — TREATMENT (OUTPATIENT)
Dept: PHYSICAL THERAPY | Facility: CLINIC | Age: 78
End: 2023-10-25
Payer: MEDICARE

## 2023-10-25 DIAGNOSIS — M54.42 CHRONIC BILATERAL LOW BACK PAIN WITH BILATERAL SCIATICA: Primary | ICD-10-CM

## 2023-10-25 DIAGNOSIS — G89.29 CHRONIC BILATERAL LOW BACK PAIN WITH BILATERAL SCIATICA: Primary | ICD-10-CM

## 2023-10-25 DIAGNOSIS — M48.061 DEGENERATIVE LUMBAR SPINAL STENOSIS: Primary | ICD-10-CM

## 2023-10-25 DIAGNOSIS — M54.41 CHRONIC BILATERAL LOW BACK PAIN WITH BILATERAL SCIATICA: Primary | ICD-10-CM

## 2023-10-25 PROCEDURE — 1125F AMNT PAIN NOTED PAIN PRSNT: CPT | Performed by: ORTHOPAEDIC SURGERY

## 2023-10-25 PROCEDURE — 97110 THERAPEUTIC EXERCISES: CPT | Mod: GP | Performed by: PHYSICAL THERAPIST

## 2023-10-25 PROCEDURE — 1159F MED LIST DOCD IN RCRD: CPT | Performed by: ORTHOPAEDIC SURGERY

## 2023-10-25 PROCEDURE — 99205 OFFICE O/P NEW HI 60 MIN: CPT | Performed by: ORTHOPAEDIC SURGERY

## 2023-10-25 PROCEDURE — 1160F RVW MEDS BY RX/DR IN RCRD: CPT | Performed by: ORTHOPAEDIC SURGERY

## 2023-10-25 NOTE — LETTER
October 25, 2023     Windy Bartholomew MD  [address] [place] [facility], [address]  [facility] [address]    Patient: Lynsey Forman   YOB: 1945   Date of Visit: 10/25/2023       Dear Dr. Windy Bartholomew MD:    Thank you for referring Lynsey Forman to me for evaluation. Below are my notes for this consultation.  If you have questions, please do not hesitate to call me. I look forward to following your patient along with you.       Sincerely,     Alexsander Lugo MD      CC: No Recipients  ______________________________________________________________________________________    Lynsey is a very pleasant 77-year-old woman referred by Dr. Bartholomew.    She has had a 1 year history of low back and rather severe bilateral buttock and leg pain.  Her symptoms are consistent with neurogenic claudication.  Her ability to stand and walk distances is quite compromised.    Over the last year she has had cardiac issues that started with atrial flutter and subsequently she developed atrial fibrillation.  She did undergo a procedure recently that converted her to normal rhythm.  She is on Xarelto.    She smokes half a pack of cigarettes a day.    Family, social, and medical histories are obtained and reviewed.    30-point, patient-recorded Review of Systems is personally obtained and reviewed. Inclusive is no history of weight loss, change in appetite, recent change in activity level, change in bowel or bladder habits, fevers, chills, malaise, or night pain.    On exam very pleasant woman no acute distress.  Slow deliberate gait.  Limited flexion.  Painless motion both hips.    Her strength is intact in the lower extremities with the exception of weakness in the right ankle dorsiflexion, 4/5.  No hyperreflexia.    Plain films show a degenerative spondylolisthesis at L4-5 with a very degenerative disc space.    Her MRI shows quite severe stenosis at L4-5.  No severe stenosis  elsewhere.    Impression: She is quite symptomatic with lumbar radiculopathy and claudication.  We have discussed the nature of her situation at length.  Certainly, the degree of stenosis that she has would warrant consideration of surgery, as would her symptoms.    However, with her smoking, she would not be a candidate for elective surgery.  I would strongly recommend smoking cessation.    As well, with her recent cardiac procedure in July and the use of Xarelto, I would suspect that she would need to be on that anticoagulation uninterrupted.  However to clarify this, I have suggested that she touch base with her cardiologist.    As well, she really has not done any formal therapy at this point so I would suggest in the interim that she begin a physical therapy program and continue it for at least 4 weeks.  I told her of the importance of documenting the physical therapy visits.    If she remains symptomatic, and if she is able to become nicotine free, and if she is able to come off of Xarelto, I do think she would be a candidate for surgery in the form of a posterior decompression and fusion with a TLIF.  I think surgery would help significantly with her radicular symptoms.    I think epidural steroid injections would be of limited benefit and as well with the Xarelto, that too would need to be stopped.    She has a good understanding of this.    She will begin a therapy program and work on smoking cessation.  She will give me an update in 4 to 6 weeks.    ** Dictated with voice recognition software and not immediately reviewed for errors in grammar and/or spelling **

## 2023-10-25 NOTE — PROGRESS NOTES
Physical Therapy Daily Treatment Note    Patient Name: Lynsey Forman  MRN Number: [id number]  Initial PT Examination Date: 8/16/23  Referring Clinician: Dr. Bartholomew  Reason for Visit: Low Back Pain, Bilateral LE Pain    Today's Date: 10/25/2023  Time Calculation  Start Time: 0209  Stop Time: 0247  Time Calculation (min): 38 min      Insurance  Visit Number: 5  Approved Number of Visits: Medical necessity/ medicare  Certification Period: 8/16/23 - 11/8/23    Precautions  Low fall risk    Problem List  1. Chronic bilateral low back pain with bilateral sciatica            Subjective  Good days and not so good days. She has been blowing leaves for a few days. She can do things but just not for very long. She has been doing well with the exercises at home. She has been doing the exercises one time a day. She has been doing the bending forward exercises, avoiding extension. Twisting with a rake seems to aggravate her back. Still having numbness in her toes, numbness in her feet too. She was seen by Dr. Lugo, recommenced spinal fusion.     Treatment  Therapeutic Exercise   - CKC hip abduction x20   - CKC hip abduction x20, red band around knees   - lateral side stepping, red band around knees 2x1 min   - attempted banded trunk rotation, however reproduces bilateral LE symptoms   - side lying trunk rotation 10x10 sec each side   - updated/ printed HEP    Assessment  Mechanical back pain continues, especially with standing trunk rotation, improvements in trunk rotation following side lying trunk rotation.    Plan  Consider supine 90/90 hamstring/ nerve stretching on next session.    Home Program  Access Code: [email or web address]  URL: [email or web address]  Date: 09/26/2023  Prepared by: Kenan Alcaraz    Exercises  - Supine Double Knee to Chest  - 1 x daily - 7 x weekly - 2 sets - 10-20 reps  - Supine Lower Trunk Rotation  - 1 x daily - 7 x weekly - 2 sets - 10-20 reps  - Clam with Resistance  - 1 x daily - 7 x  weekly - 2 sets - 10-20 reps  - Seated Hamstring Stretch  - 1 x daily - 7 x weekly - 5 reps - 20 sec hold  - Standing Forward Trunk Flexion  - 3 x daily - 7 x weekly - 2 sets - 20 reps  - Standing Hip Abduction with Counter Support  - 1 x daily - 7 x weekly - 2 sets - 10-20 reps  - Side Stepping with Counter Support  - 1 x daily - 7 x weekly - 2 sets - 1 min hold  -----------------------------------------------------------------------------------------------------  Access Code: [email or web address]  URL: [email or web address]  Date: 10/25/2023  Prepared by: Kenan Alcaraz    Exercises  - Standing Forward Trunk Flexion  - 3 x daily - 7 x weekly - 2 sets - 20 reps  - Supine Double Knee to Chest  - 1 x daily - 7 x weekly - 2 sets - 10-20 reps  - Supine Lower Trunk Rotation  - 1 x daily - 7 x weekly - 2 sets - 10-20 reps  - Clam with Resistance  - 1 x daily - 7 x weekly - 2 sets - 10-20 reps  - Sidelying Thoracic Lumbar Rotation  - 1 x daily - 7 x weekly - 10 reps - 10 sec hold  - Seated Hamstring Stretch  - 1 x daily - 7 x weekly - 5 reps - 20 sec hold  - Side Stepping with Resistance at Thighs  - 1 x daily - 7 x weekly - 2 sets - 10-20 reps  - Hip Abduction with Resistance Loop  - 1 x daily - 7 x weekly - 2 sets - 10-20 reps    Care Plan  Active       PT Problem       PT Goal 1       Expected End:  11/08/23       1) Full return to prior level of function to allow continued home independent capability.   2) Reduce low back/ BLE pain 5/10 maximum in the last week for improvements in quality of life and sleep.   3) Reduced CLARA outcome measure score to less than or equal to 18% for meaningful and clinical improvements in patients condition.   4) Pain free spinal extension to denote reduction in spinal dysfunction.  5) Increase bilateral hip strength testing greater than or equal to 4/5 for improvements in low back/ pelvic stability when standing and walking for extended periods of time.

## 2023-10-26 NOTE — PROGRESS NOTES
Lynsey is a very pleasant 77-year-old woman referred by Dr. Bartholomew.    She has had a 1 year history of low back and rather severe bilateral buttock and leg pain.  Her symptoms are consistent with neurogenic claudication.  Her ability to stand and walk distances is quite compromised.    Over the last year she has had cardiac issues that started with atrial flutter and subsequently she developed atrial fibrillation.  She did undergo a procedure recently that converted her to normal rhythm.  She is on Xarelto.    She smokes half a pack of cigarettes a day.    Family, social, and medical histories are obtained and reviewed.    30-point, patient-recorded Review of Systems is personally obtained and reviewed. Inclusive is no history of weight loss, change in appetite, recent change in activity level, change in bowel or bladder habits, fevers, chills, malaise, or night pain.    On exam very pleasant woman no acute distress.  Slow deliberate gait.  Limited flexion.  Painless motion both hips.    Her strength is intact in the lower extremities with the exception of weakness in the right ankle dorsiflexion, 4/5.  No hyperreflexia.    Plain films show a degenerative spondylolisthesis at L4-5 with a very degenerative disc space.    Her MRI shows quite severe stenosis at L4-5.  No severe stenosis elsewhere.    Impression: She is quite symptomatic with lumbar radiculopathy and claudication.  We have discussed the nature of her situation at length.  Certainly, the degree of stenosis that she has would warrant consideration of surgery, as would her symptoms.    However, with her smoking, she would not be a candidate for elective surgery.  I would strongly recommend smoking cessation.    As well, with her recent cardiac procedure in July and the use of Xarelto, I would suspect that she would need to be on that anticoagulation uninterrupted.  However to clarify this, I have suggested that she touch base with her cardiologist.    As  well, she really has not done any formal therapy at this point so I would suggest in the interim that she begin a physical therapy program and continue it for at least 4 weeks.  I told her of the importance of documenting the physical therapy visits.    If she remains symptomatic, and if she is able to become nicotine free, and if she is able to come off of Xarelto, I do think she would be a candidate for surgery in the form of a posterior decompression and fusion with a TLIF.  I think surgery would help significantly with her radicular symptoms.    I think epidural steroid injections would be of limited benefit and as well with the Xarelto, that too would need to be stopped.    She has a good understanding of this.    She will begin a therapy program and work on smoking cessation.  She will give me an update in 4 to 6 weeks.    ** Dictated with voice recognition software and not immediately reviewed for errors in grammar and/or spelling **

## 2023-10-26 NOTE — PROGRESS NOTES
Dear Aissatou,    Thank you for the opportunity of seeing Lynsey Forman.    As you know, she has become quite symptomatic with lumbar radiculopathy and neurogenic claudication.  She does have quite severe stenosis at L4-5 along with a spondylolisthesis.    Ultimately, I do think she would be a candidate for surgery.  I would like her to try a short course of physical therapy as well as work on becoming nicotine free.  Ultimately if she becomes nicotine free I think she would be a candidate for surgery in the form of a decompression and instrumented fusion.    She is also going to check to see if she is able to come off of pharmacologic anticoagulation at some point in the near future, should surgery be considered.    Thanks again for the opportunity of seeing this very pleasant woman.    Please don't hesitate to contact me at any time, via e-mail [email or web address] or cell phone [phone].      Sincerely,        Alexsander Lugo M.D.  Chief, Spine Section  Professor & Thomas Barajas M.D. Endowed Chair   Department of Orthopedic Surgery  [place], [place]

## 2023-10-30 ENCOUNTER — TELEPHONE (OUTPATIENT)
Dept: PRIMARY CARE | Facility: CLINIC | Age: 78
End: 2023-10-30
Payer: MEDICARE

## 2023-10-30 DIAGNOSIS — M48.061 SPINAL STENOSIS AT L4-L5 LEVEL: Primary | ICD-10-CM

## 2023-11-01 ENCOUNTER — TREATMENT (OUTPATIENT)
Dept: PHYSICAL THERAPY | Facility: CLINIC | Age: 78
End: 2023-11-01
Payer: MEDICARE

## 2023-11-01 ENCOUNTER — HOSPITAL ENCOUNTER (OUTPATIENT)
Dept: RADIOLOGY | Facility: HOSPITAL | Age: 78
Discharge: HOME | End: 2023-11-01
Payer: MEDICARE

## 2023-11-01 DIAGNOSIS — R06.02 BREATH SHORTNESS: Primary | ICD-10-CM

## 2023-11-01 DIAGNOSIS — I25.10 ATHEROSCLEROSIS OF NATIVE CORONARY ARTERY OF NATIVE HEART WITHOUT ANGINA PECTORIS: ICD-10-CM

## 2023-11-01 DIAGNOSIS — R06.02 SHORTNESS OF BREATH: Primary | ICD-10-CM

## 2023-11-01 DIAGNOSIS — M54.42 CHRONIC BILATERAL LOW BACK PAIN WITH BILATERAL SCIATICA: Primary | ICD-10-CM

## 2023-11-01 DIAGNOSIS — G89.29 CHRONIC BILATERAL LOW BACK PAIN WITH BILATERAL SCIATICA: Primary | ICD-10-CM

## 2023-11-01 DIAGNOSIS — M54.41 CHRONIC BILATERAL LOW BACK PAIN WITH BILATERAL SCIATICA: Primary | ICD-10-CM

## 2023-11-01 DIAGNOSIS — R06.02 SHORTNESS OF BREATH: ICD-10-CM

## 2023-11-01 DIAGNOSIS — I25.10 ATHEROSCLEROTIC HEART DISEASE OF NATIVE CORONARY ARTERY WITHOUT ANGINA PECTORIS: ICD-10-CM

## 2023-11-01 PROCEDURE — 97110 THERAPEUTIC EXERCISES: CPT | Mod: GP | Performed by: PHYSICAL THERAPIST

## 2023-11-01 PROCEDURE — 75563 CARD MRI W/STRESS IMG & DYE: CPT

## 2023-11-01 PROCEDURE — 75563 CARD MRI W/STRESS IMG & DYE: CPT | Performed by: INTERNAL MEDICINE

## 2023-11-01 PROCEDURE — 2500000004 HC RX 250 GENERAL PHARMACY W/ HCPCS (ALT 636 FOR OP/ED): Performed by: INTERNAL MEDICINE

## 2023-11-01 PROCEDURE — A9575 INJ GADOTERATE MEGLUMI 0.1ML: HCPCS | Performed by: INTERNAL MEDICINE

## 2023-11-01 PROCEDURE — 75565 CARD MRI VELOC FLOW MAPPING: CPT | Performed by: INTERNAL MEDICINE

## 2023-11-01 PROCEDURE — 2550000001 HC RX 255 CONTRASTS: Performed by: INTERNAL MEDICINE

## 2023-11-01 PROCEDURE — 93010 ELECTROCARDIOGRAM REPORT: CPT | Performed by: INTERNAL MEDICINE

## 2023-11-01 RX ORDER — AMINOPHYLLINE 25 MG/ML
100 INJECTION, SOLUTION INTRAVENOUS ONCE
Status: COMPLETED | OUTPATIENT
Start: 2023-11-01 | End: 2023-11-01

## 2023-11-01 RX ORDER — GADOTERATE MEGLUMINE 376.9 MG/ML
17 INJECTION INTRAVENOUS
Status: COMPLETED | OUTPATIENT
Start: 2023-11-01 | End: 2023-11-01

## 2023-11-01 RX ORDER — REGADENOSON 0.08 MG/ML
0.4 INJECTION, SOLUTION INTRAVENOUS ONCE
Status: COMPLETED | OUTPATIENT
Start: 2023-11-01 | End: 2023-11-01

## 2023-11-01 RX ADMIN — GADOTERATE MEGLUMINE 17 ML: 376.9 INJECTION INTRAVENOUS at 08:49

## 2023-11-01 RX ADMIN — REGADENOSON 0.4 MG: 0.08 INJECTION, SOLUTION INTRAVENOUS at 08:52

## 2023-11-01 RX ADMIN — AMINOPHYLLINE 100 MG: 25 INJECTION, SOLUTION INTRAVENOUS at 08:55

## 2023-11-01 NOTE — PROGRESS NOTES
Physical Therapy Daily Treatment Note    Patient Name: Lynsey Forman  MRN Number: [id number]  Initial PT Examination Date: 8/16/23  Referring Clinician: Dr. Bartholomew  Reason for Visit: Low Back Pain, Bilateral LE Pain    Today's Date: 11/1/2023  Time Calculation  Start Time: 0253  Stop Time: 0333  Time Calculation (min): 40 min      Insurance  Visit Number: 5  Approved Number of Visits: Medical necessity/ medicare  Certification Period: 8/16/23 - 11/8/23    Precautions  Low fall risk    Problem List  1. Chronic bilateral low back pain with bilateral sciatica          Subjective  She has been up since 5:30 today, she had a stress test this morning. She does no know where she pulled some but the pain in her nerve running down her left leg. She did not do the band exercises, it lasted only a few days but she had too much pain. Does not like to lay on her floor to do the trunk rotation because she gets cold while doing it. Left leg pain started the day after her last session, tried to do them but it did not last.     Treatment  Therapeutic Exercise   - seated open book modification or she can lay on her bed and do the side lying trunk rotation   - supine 90/90 hamstring/ nerve floss 10x10 sec   - CKC hip abduction x10, red band around knees   - lateral side stepping, red band around knees 2x10 seconds    - clamshells x20 each side, red   - updated/ printed HEP   - re-enforced the importance of reducing repetitions to reduce soreness/ pain response following exercise    Assessment  She was performing too many repetitions and too frequently at home, resulting in increase pain, program adjusted to accommodate.     Plan  Plan for progress note on her next session.    Home Program  Access Code: [email or web address]  URL: [email or web address]  Date: 09/26/2023  Prepared by: Kenan Alcaraz    Exercises  - Supine Double Knee to Chest  - 1 x daily - 7 x weekly - 2 sets - 10-20 reps  - Supine Lower Trunk Rotation  - 1 x  daily - 7 x weekly - 2 sets - 10-20 reps  - Clam with Resistance  - 1 x daily - 7 x weekly - 2 sets - 10-20 reps  - Seated Hamstring Stretch  - 1 x daily - 7 x weekly - 5 reps - 20 sec hold  - Standing Forward Trunk Flexion  - 3 x daily - 7 x weekly - 2 sets - 20 reps  - Standing Hip Abduction with Counter Support  - 1 x daily - 7 x weekly - 2 sets - 10-20 reps  - Side Stepping with Counter Support  - 1 x daily - 7 x weekly - 2 sets - 1 min hold  -----------------------------------------------------------------------------------------------------  Access Code: [email or web address]  URL: [email or web address]  Date: 10/25/2023  Prepared by: Kenan Alcaraz    Exercises  - Standing Forward Trunk Flexion  - 3 x daily - 7 x weekly - 2 sets - 20 reps  - Supine Double Knee to Chest  - 1 x daily - 7 x weekly - 2 sets - 10-20 reps  - Supine Lower Trunk Rotation  - 1 x daily - 7 x weekly - 2 sets - 10-20 reps  - Clam with Resistance  - 1 x daily - 7 x weekly - 2 sets - 10-20 reps  - Sidelying Thoracic Lumbar Rotation  - 1 x daily - 7 x weekly - 10 reps - 10 sec hold  - Seated Hamstring Stretch  - 1 x daily - 7 x weekly - 5 reps - 20 sec hold  - Side Stepping with Resistance at Thighs  - 1 x daily - 7 x weekly - 2 sets - 10-20 reps  - Hip Abduction with Resistance Loop  - 1 x daily - 7 x weekly - 2 sets - 10-20 reps    Care Plan  Active       PT Problem       PT Goal 1       Expected End:  11/08/23       1) Full return to prior level of function to allow continued home independent capability.   2) Reduce low back/ BLE pain 5/10 maximum in the last week for improvements in quality of life and sleep.   3) Reduced CLARA outcome measure score to less than or equal to 18% for meaningful and clinical improvements in patients condition.   4) Pain free spinal extension to denote reduction in spinal dysfunction.  5) Increase bilateral hip strength testing greater than or equal to 4/5 for improvements in low back/ pelvic  stability when standing and walking for extended periods of time.

## 2023-11-02 ENCOUNTER — HOSPITAL ENCOUNTER (OUTPATIENT)
Dept: CARDIOLOGY | Facility: HOSPITAL | Age: 78
End: 2023-11-02
Payer: MEDICARE

## 2023-11-02 PROCEDURE — 93005 ELECTROCARDIOGRAM TRACING: CPT

## 2023-11-07 LAB
ATRIAL RATE: 49 BPM
P AXIS: 60 DEGREES
P OFFSET: 201 MS
P ONSET: 129 MS
PR INTERVAL: 202 MS
Q ONSET: 230 MS
QRS COUNT: 8 BEATS
QRS DURATION: 90 MS
QT INTERVAL: 448 MS
QTC CALCULATION(BAZETT): 404 MS
QTC FREDERICIA: 418 MS
R AXIS: -16 DEGREES
T AXIS: 20 DEGREES
T OFFSET: 454 MS
VENTRICULAR RATE: 49 BPM

## 2023-11-08 ENCOUNTER — TREATMENT (OUTPATIENT)
Dept: PHYSICAL THERAPY | Facility: CLINIC | Age: 78
End: 2023-11-08
Payer: MEDICARE

## 2023-11-08 DIAGNOSIS — M54.41 CHRONIC BILATERAL LOW BACK PAIN WITH BILATERAL SCIATICA: Primary | ICD-10-CM

## 2023-11-08 DIAGNOSIS — G89.29 CHRONIC BILATERAL LOW BACK PAIN WITH BILATERAL SCIATICA: Primary | ICD-10-CM

## 2023-11-08 DIAGNOSIS — M54.42 CHRONIC BILATERAL LOW BACK PAIN WITH BILATERAL SCIATICA: Primary | ICD-10-CM

## 2023-11-08 PROCEDURE — 97110 THERAPEUTIC EXERCISES: CPT | Mod: GP | Performed by: PHYSICAL THERAPIST

## 2023-11-08 NOTE — PROGRESS NOTES
Physical Therapy Daily Treatment Note    Patient Name: Lynsey Forman  MRN Number: [id number]  Initial PT Examination Date: 8/16/23  Referring Clinician: Dr. Bartholomew  Reason for Visit: Low Back Pain, Bilateral LE Pain    Today's Date: 11/8/2023  Time Calculation  Start Time: 0115  Stop Time: 0152  Time Calculation (min): 37 min      Insurance  Visit Number: 6  Approved Number of Visits: Medical necessity/ medicare  Certification Period: 8/16/23 - 11/8/23    Precautions  Low fall risk    Problem List  1. Chronic bilateral low back pain with bilateral sciatica            Subjective  Back is pretty good. Her left leg was bothering her earlier in the day. Able to resume all of her band exercises, now that she reduced the repetitions.     Treatment  Therapeutic Exercise   - side lying trunk rotation   - supine 90/90 hamstring/ nerve floss   - tubing trunk rotation 2x10 each side   - issued red tubing   - trunk flexion x10   - double knee to chest   - CKC hip abduction x10, red band around knees   - lateral side stepping, red band around knees    Assessment  Reaching a point in which issued program is successfully controlling back pain and reducing pain once it is present.     Plan  Plan for progress note on her next session and likely discharge.    Home Program  Access Code: [email or web address]  URL: [email or web address]  Date: 09/26/2023  Prepared by: Kenan Alcaraz    Exercises  - Supine Double Knee to Chest  - 1 x daily - 7 x weekly - 2 sets - 10-20 reps  - Supine Lower Trunk Rotation  - 1 x daily - 7 x weekly - 2 sets - 10-20 reps  - Clam with Resistance  - 1 x daily - 7 x weekly - 2 sets - 10-20 reps  - Seated Hamstring Stretch  - 1 x daily - 7 x weekly - 5 reps - 20 sec hold  - Standing Forward Trunk Flexion  - 3 x daily - 7 x weekly - 2 sets - 20 reps  - Standing Hip Abduction with Counter Support  - 1 x daily - 7 x weekly - 2 sets - 10-20 reps  - Side Stepping with Counter Support  - 1 x daily - 7 x  weekly - 2 sets - 1 min hold  -----------------------------------------------------------------------------------------------------  Access Code: [email or web address]  URL: [email or web address]  Date: 10/25/2023  Prepared by: Kenan Alcaraz    Exercises  - Standing Forward Trunk Flexion  - 3 x daily - 7 x weekly - 2 sets - 20 reps  - Supine Double Knee to Chest  - 1 x daily - 7 x weekly - 2 sets - 10-20 reps  - Supine Lower Trunk Rotation  - 1 x daily - 7 x weekly - 2 sets - 10-20 reps  - Clam with Resistance  - 1 x daily - 7 x weekly - 2 sets - 10-20 reps  - Sidelying Thoracic Lumbar Rotation  - 1 x daily - 7 x weekly - 10 reps - 10 sec hold  - Seated Hamstring Stretch  - 1 x daily - 7 x weekly - 5 reps - 20 sec hold  - Side Stepping with Resistance at Thighs  - 1 x daily - 7 x weekly - 2 sets - 10-20 reps  - Hip Abduction with Resistance Loop  - 1 x daily - 7 x weekly - 2 sets - 10-20 reps  ------------------------------------------------------------------------  - verbally added tubing trunk rotation to home program on 11/8/23    Care Plan  Active       PT Problem       PT Goal 1       Expected End:  11/08/23       1) Full return to prior level of function to allow continued home independent capability.   2) Reduce low back/ BLE pain 5/10 maximum in the last week for improvements in quality of life and sleep.   3) Reduced CLARA outcome measure score to less than or equal to 18% for meaningful and clinical improvements in patients condition.   4) Pain free spinal extension to denote reduction in spinal dysfunction.  5) Increase bilateral hip strength testing greater than or equal to 4/5 for improvements in low back/ pelvic stability when standing and walking for extended periods of time.

## 2023-11-27 DIAGNOSIS — D22.71 MELANOCYTIC NEVI OF RIGHT LOWER LIMB, INCLUDING HIP: Primary | ICD-10-CM

## 2023-11-27 RX ORDER — MELOXICAM 15 MG/1
15 TABLET ORAL DAILY
Qty: 30 TABLET | Refills: 2 | Status: SHIPPED | OUTPATIENT
Start: 2023-11-27 | End: 2024-01-29 | Stop reason: SDUPTHER

## 2023-11-28 ENCOUNTER — TELEMEDICINE (OUTPATIENT)
Dept: NEUROSURGERY | Facility: CLINIC | Age: 78
End: 2023-11-28
Payer: MEDICARE

## 2023-11-28 ENCOUNTER — APPOINTMENT (OUTPATIENT)
Dept: NEUROSURGERY | Facility: CLINIC | Age: 78
End: 2023-11-28
Payer: MEDICARE

## 2023-11-28 DIAGNOSIS — M43.16 SPONDYLOLISTHESIS AT L4-L5 LEVEL: Primary | ICD-10-CM

## 2023-11-28 DIAGNOSIS — M48.062 LUMBAR STENOSIS WITH NEUROGENIC CLAUDICATION: ICD-10-CM

## 2023-11-28 PROCEDURE — 99212 OFFICE O/P EST SF 10 MIN: CPT | Mod: 95 | Performed by: NEUROLOGICAL SURGERY

## 2023-11-28 PROCEDURE — 99202 OFFICE O/P NEW SF 15 MIN: CPT | Performed by: NEUROLOGICAL SURGERY

## 2023-11-28 NOTE — PROGRESS NOTES
This is a telehealth visit that was performed with the originating site at PATIENT LOCATION of Home .  The today's televisit was conducted with audio communication only secondary to the inclement weather with hazardous condition for patients to physically come to the clinic.  Consent to participate in audio visit was obtained      Lynsey Forman is a 77 y.o. female who has had prior history of multiple cervical spine surgeries in [place] from which she did very well.  She is seeing me today for her lower back and lower extremity symptoms that are suggestive of neurogenic claudication.  She feels that her legs are weak and she is having extreme difficulty walking.  Her imaging was reviewed by me that consist of an MRI done in October and also some lumbar spine x-rays done few months back both of which shows presence of grade 1 degenerative L4-5 spondylolisthesis with severe spinal canal stenosis.    I discussed with her that if her symptoms are severe enough and she has not been benefiting from physical therapy that she has been trying for 2 months she may be a good candidate for surgery.    I believe she would benefit from seeing me in person and we will see her in the next few days or so to discuss further treatment options.    It was a pleasure to participate in Ms. Forman clinical care. All questions were answered to her satisfaction and she explained understanding of the further treatment plan.

## 2023-11-29 ENCOUNTER — TREATMENT (OUTPATIENT)
Dept: PHYSICAL THERAPY | Facility: CLINIC | Age: 78
End: 2023-11-29
Payer: MEDICARE

## 2023-11-29 DIAGNOSIS — M54.41 CHRONIC BILATERAL LOW BACK PAIN WITH BILATERAL SCIATICA: Primary | ICD-10-CM

## 2023-11-29 DIAGNOSIS — M54.42 CHRONIC BILATERAL LOW BACK PAIN WITH BILATERAL SCIATICA: Primary | ICD-10-CM

## 2023-11-29 DIAGNOSIS — G89.29 CHRONIC BILATERAL LOW BACK PAIN WITH BILATERAL SCIATICA: Primary | ICD-10-CM

## 2023-11-29 PROCEDURE — 97110 THERAPEUTIC EXERCISES: CPT | Mod: GP | Performed by: PHYSICAL THERAPIST

## 2023-11-29 NOTE — PROGRESS NOTES
Physical Therapy Daily Treatment Note    Patient Name: Lynsey Forman  MRN Number: [id number]  Initial PT Examination Date: 8/16/23  Referring Clinician: Dr. Bartholomew  Reason for Visit: Low Back Pain, Bilateral LE Pain    Today's Date: 11/30/2023  Time Calculation  Start Time: 0334  Stop Time: 0402  Time Calculation (min): 28 min      Insurance  Visit Number: 7  Approved Number of Visits: Medical necessity/ medicare  Certification Period: 8/16/23 - 11/8/23    Precautions  Low fall risk    Problem List  1. Chronic bilateral low back pain with bilateral sciatica          Subjective  Got a lot of of therapy. She is still doing all of the exercises but had to take a break last week because of how much pain she had while preparing for thanksgiving. Stronger through her trunk and her legs. She will have a neurosurgery visit next week, to discuss minimally invasive surgery. The pain in her back just affects her quality of life so much that she cannot continue to live with it. She does not want to have spinal fusion surgery.    7/10 maximum low back pain in the last week.    Objective:  CLARA = 28%    Repeated lumbar extension x20, without pain    Hip MMT   Left  Right  Comments  Flexion   4+/5  4+/5  Abduction  4/5  4/5    Treatment  Therapeutic Exercise   - updated objective measures for progress note   - discussed home exercise program progressions and exercises   - reprinted home exercises.    Assessment  Back disability score improved since starting PT, reached a plateau, she has had considerable improvements in bilateral hip strength and pain free lumbar movement. She has been issued home exercise program which she is sound and consistent at performing. Discharge PT to neurosurgery for consult on surgical intervention.     Plan  Discharge to [facility].    Home Program  Access Code: [email or web address]  URL: [email or web address]  Date: 09/26/2023  Prepared by: Kenan Alcaraz    Exercises  - Supine Double Knee to  Chest  - 1 x daily - 7 x weekly - 2 sets - 10-20 reps  - Supine Lower Trunk Rotation  - 1 x daily - 7 x weekly - 2 sets - 10-20 reps  - Clam with Resistance  - 1 x daily - 7 x weekly - 2 sets - 10-20 reps  - Seated Hamstring Stretch  - 1 x daily - 7 x weekly - 5 reps - 20 sec hold  - Standing Forward Trunk Flexion  - 3 x daily - 7 x weekly - 2 sets - 20 reps  - Standing Hip Abduction with Counter Support  - 1 x daily - 7 x weekly - 2 sets - 10-20 reps  - Side Stepping with Counter Support  - 1 x daily - 7 x weekly - 2 sets - 1 min hold  -----------------------------------------------------------------------------------------------------  Access Code: [email or web address]  URL: [email or web address]  Date: 10/25/2023  Prepared by: Kenan Alcaraz    Exercises  - Standing Forward Trunk Flexion  - 3 x daily - 7 x weekly - 2 sets - 20 reps  - Supine Double Knee to Chest  - 1 x daily - 7 x weekly - 2 sets - 10-20 reps  - Supine Lower Trunk Rotation  - 1 x daily - 7 x weekly - 2 sets - 10-20 reps  - Clam with Resistance  - 1 x daily - 7 x weekly - 2 sets - 10-20 reps  - Sidelying Thoracic Lumbar Rotation  - 1 x daily - 7 x weekly - 10 reps - 10 sec hold  - Seated Hamstring Stretch  - 1 x daily - 7 x weekly - 5 reps - 20 sec hold  - Side Stepping with Resistance at Thighs  - 1 x daily - 7 x weekly - 2 sets - 10-20 reps  - Hip Abduction with Resistance Loop  - 1 x daily - 7 x weekly - 2 sets - 10-20 reps  ------------------------------------------------------------------------  - verbally added tubing trunk rotation to home program on 11/8/23    Care Plan  Active       PT Problem       PT Goal 1       Expected End:  11/08/23       1) Full return to prior level of function to allow continued home independent capability.   2) Reduce low back/ BLE pain 5/10 maximum in the last week for improvements in quality of life and sleep.   3) Reduced CLARA outcome measure score to less than or equal to 18% for meaningful and  clinical improvements in patients condition.   4) Pain free spinal extension to denote reduction in spinal dysfunction.  5) Increase bilateral hip strength testing greater than or equal to 4/5 for improvements in low back/ pelvic stability when standing and walking for extended periods of time.

## 2023-12-05 ENCOUNTER — HOSPITAL ENCOUNTER (OUTPATIENT)
Dept: VASCULAR MEDICINE | Facility: HOSPITAL | Age: 78
Discharge: HOME | End: 2023-12-05
Payer: MEDICARE

## 2023-12-05 ENCOUNTER — OFFICE VISIT (OUTPATIENT)
Dept: NEUROSURGERY | Facility: CLINIC | Age: 78
End: 2023-12-05
Payer: MEDICARE

## 2023-12-05 VITALS
BODY MASS INDEX: 20.38 KG/M2 | HEART RATE: 53 BPM | HEIGHT: 63 IN | DIASTOLIC BLOOD PRESSURE: 69 MMHG | SYSTOLIC BLOOD PRESSURE: 158 MMHG | WEIGHT: 115 LBS | TEMPERATURE: 92.5 F

## 2023-12-05 DIAGNOSIS — I65.23 OCCLUSION AND STENOSIS OF BILATERAL CAROTID ARTERIES: ICD-10-CM

## 2023-12-05 DIAGNOSIS — I35.0 NONRHEUMATIC AORTIC (VALVE) STENOSIS: ICD-10-CM

## 2023-12-05 DIAGNOSIS — M43.16 SPONDYLOLISTHESIS AT L4-L5 LEVEL: Primary | ICD-10-CM

## 2023-12-05 DIAGNOSIS — M48.062 LUMBAR STENOSIS WITH NEUROGENIC CLAUDICATION: ICD-10-CM

## 2023-12-05 DIAGNOSIS — M48.061 SPINAL STENOSIS AT L4-L5 LEVEL: ICD-10-CM

## 2023-12-05 PROCEDURE — 93880 EXTRACRANIAL BILAT STUDY: CPT | Performed by: SURGERY

## 2023-12-05 PROCEDURE — 3077F SYST BP >= 140 MM HG: CPT | Performed by: NEUROLOGICAL SURGERY

## 2023-12-05 PROCEDURE — 99215 OFFICE O/P EST HI 40 MIN: CPT | Performed by: NEUROLOGICAL SURGERY

## 2023-12-05 PROCEDURE — 1159F MED LIST DOCD IN RCRD: CPT | Performed by: NEUROLOGICAL SURGERY

## 2023-12-05 PROCEDURE — 99215 OFFICE O/P EST HI 40 MIN: CPT | Mod: GC | Performed by: NEUROLOGICAL SURGERY

## 2023-12-05 PROCEDURE — 93880 EXTRACRANIAL BILAT STUDY: CPT

## 2023-12-05 PROCEDURE — 1160F RVW MEDS BY RX/DR IN RCRD: CPT | Performed by: NEUROLOGICAL SURGERY

## 2023-12-05 PROCEDURE — 3078F DIAST BP <80 MM HG: CPT | Performed by: NEUROLOGICAL SURGERY

## 2023-12-05 PROCEDURE — 1125F AMNT PAIN NOTED PAIN PRSNT: CPT | Performed by: NEUROLOGICAL SURGERY

## 2023-12-05 ASSESSMENT — PAIN SCALES - GENERAL: PAINLEVEL: 7

## 2023-12-05 NOTE — PROGRESS NOTES
It was a pleasure to see Ms. Forman at the Neurosurgery Spine Clinic at [facility].     She is a really nice 77 y.o. female  who presents to us with complaints lower back pain radiates to both legs started about  2  years  ago, and have been gradually worsening since that time.  The symptoms started after spinal stenosis.    The pain is 7 /10. The pain is described as aching and burning and occurs all day.  Symptoms are exacerbated by nothing in particular. Factors which relieve the pain taking gabapentin       Numbness and/or tingling - YES numbness both feet    Weakness : YES both legs    Bladder/Bowel symptoms - YES bowel    The patient has tried medications (eg: gabapentin, NSAIDS and narcotics ) : Yes  PT : Yes    Date: 2 months  Pain Management with ESIs/selective nerve blocks  - NO    she is a NON-SMOKER and NON-DIABETIC    History of Depression : NO    PRIOR SPINE SURGERY: YES    Denies use of Aspirin, Coumadin, or Plavix or any other anticoagulants YES    NARCOTICS for pain : NO    Part of this patient’s history is from personal review of the patient’s previous charts.      No past medical history on file.        Current Outpatient Medications:     amLODIPine (Norvasc) 5 mg tablet, Take 1 tablet (5 mg) by mouth once daily., Disp: , Rfl:     atorvastatin (Lipitor) 40 mg tablet, Take 1 tablet (40 mg) by mouth once daily., Disp: , Rfl:     gabapentin (Neurontin) 300 mg capsule, Take 1 capsule (300 mg) by mouth 2 times a day., Disp: , Rfl:     hydrOXYzine HCL (Atarax) 25 mg tablet, Take 1 tablet (25 mg) by mouth 2 times a day as needed for anxiety., Disp: , Rfl:     lisinopril 20 mg tablet, , Disp: , Rfl:     meloxicam (Mobic) 15 mg tablet, Take 1 tablet (15 mg) by mouth once daily., Disp: 30 tablet, Rfl: 2    rivaroxaban (Xarelto) 15 mg tablet, Take 1 tablet (15 mg) by mouth once daily in the evening. Take with food., Disp: 90 tablet, Rfl: 3    triamterene-hydrochlorothiazid (Maxzide-25) 37.5-25 mg  tablet, , Disp: , Rfl:       Review of Systems :   Constitutional: No fever or chills  Respiratory: No shortness of breath or wheezing  Musculoskeletal: see HPI above   Neurologic: See HPI above        EXAM:   Vitals:    12/05/23 1559   BP: 158/69   Pulse: 53   Temp: 33.6 °C (92.5 °F)   General: Well developed, awake/alert/oriented x3, no distress, alert and cooperative    Skin: Warm and dry, no lesions, no rashes    ENMT: Mucous membranes moist, no apparent injury, no lesions seen    Head/Neck: Neck Supple, no apparent injury    Respiratory/Thorax: Normal breath sounds with good chest expansion, thorax symmetric    Cardiovascular: No pitting edema, no JVD    Motor Strength: 5/5 Throughout all extremities    Muscle Bulk: Normal and symmetric in all extremities    Posture:  -- Cervical: Normal  -- Thoracic: Normal  -- Lumbar : Normal  Paraspinal muscle spasm/tenderness absent.    Sensation: SILT      IMAGING:   MRI of the lumbar spine done on 10/19/2023 was personally reviewed and shows severe L4-5 central canal stenosis with severe left L4-5 foraminal stenosis with degenerative grade 1 spondylolisthesis.    Upright x-ray shows presence of degenerative grade 1 L4-5 spondylolisthesis with evidence of disc degeneration and collapse in about 8 mm of slippage of L4 over L5.    ASSESSMENT AND PLAN:    Lynsey Forman is a really nice 77 y.o. female who presents to us with almost 2 years symptoms of low back with the significant lower extremity symptoms in the form of numbness tingling and pain and difficulty walking suggestive of neurogenic claudication and radiculopathy.    I have reviewed imaging and diagnosis with the patient, discussed the natural history of the disease and both non-operative and operative treatments available and rationale vs risks for both.     The patient's clinical symptoms correlates well with the radiological findings.    She has been having significant functional impairment with decreased  ability to perform her ADL secondary to pain and/or weakness    The pain has been debilitating on a daily basis with a score of more than 5 on scale of 0 - 10.    The patient’s clinical symptoms correlates well with the radiological findings. Patient has been having significant functional impairment with decreased ability to perform her normal activities of daily living. They have tried treatment options including medications (NSAIDs/narcotics/muscle relaxants/membrane stabilizers), formal physical therapy, and injections.    The patient's mFI-5 score is Robust - 0     Considering the degree of pain and disability secondary to nerve root compression from stenosis and spondylolisthesis and scoliosis, surgery at this point is indicated and is medically necessary to improve the quality of life and day to day functioning.    Discussed with the patient the various surgical approaches such as     Given the presence of grade 1 L4-5 spondylolisthesis with about 8 mm of slippage of L4 over L5 along with presence of severe central canal stenosis and severe left L4-5 foraminal stenosis that would require resection of the facet for decompression in the setting of spondylolisthesis I recommended surgery in the form of a minimally invasive L4-5 transforaminal lumbar interbody fusion with a titanium cage placement using local bone autograft and BMP and L4-5 instrumentation.     I have explained the surgical procedure in detail with expected duration and extent of recovery along risks of surgery that include, but is not limited to bleeding, infection, blood vessel injury or damage,loss of sensation, loss of bladder, bowel or sexual function, nerve injury/damage resulting in weakness/paralysis, malunion, nonunion, CSF leak, brachial plexus injury, peripheral vision blindness, injury to the abdominal contents, failure of implants/fusion, failure to relieve symptoms, recurrent disease, adjacent segment disease, need to reoperate for  any reason and general anesthesia reaction such as stroke, coma, heart attack, delirium, confusion, death as well as worsening of preexisted medical conditions and any other unforeseen complication related to unrelated to the spinal procedure per se.    A Shared decision was made to proceed with surgery after involving the patient in the treatment decision-making process.  The patient clearly expressed understanding of possible risks and benefits of surgery and the realistic expectations of surgery along with the fact that the goal of the surgery is to improve the  overall functioning and quality of life by improvement of the current level of function,  possible improvement and/or prevent progression of preexisting neurological deficits and not necessarily 100 % pain relief. There is no guarantee that the prexisiting deficits will improve definitely after surgery. The option of continued non-operative management was very clearly discussed as well with its associated risks and benefits and the patient was clearly provided that option.     It was a pleasure to participate in Ms. Forman clinical care. All questions were answered to her satisfaction and she explained understanding of the further treatment plan.     All questions were answered and the patient left satisfied with the surgical plan moving forward.  We will schedule her for surgery in the next few weeks or so.    Julius Richey MD, [facility]   of Neurological Surgery  [employer]  Attending Surgeon  Director - Minimally Invasive Spine Surgery  [place], [place]      Some of this note was completed using Dragon voice recognition technology and sometimes the software misinterprets words. This may include unintended errors with respect to translation of words, typographical errors or grammar errors which may not have been identified prior to finalization of the chart note.  Please take this into account when reading this note

## 2023-12-07 ENCOUNTER — APPOINTMENT (OUTPATIENT)
Dept: NEUROSURGERY | Facility: CLINIC | Age: 78
End: 2023-12-07
Payer: MEDICARE

## 2023-12-11 ENCOUNTER — LAB (OUTPATIENT)
Dept: LAB | Facility: LAB | Age: 78
End: 2023-12-11
Payer: MEDICARE

## 2023-12-11 DIAGNOSIS — E78.00 PURE HYPERCHOLESTEROLEMIA, UNSPECIFIED: Primary | ICD-10-CM

## 2023-12-11 LAB
CHOLEST SERPL-MCNC: 178 MG/DL (ref 0–199)
CHOLESTEROL/HDL RATIO: 1.7
HDLC SERPL-MCNC: 102.5 MG/DL
LDLC SERPL CALC-MCNC: 65 MG/DL
NON HDL CHOLESTEROL: 76 MG/DL (ref 0–149)
TRIGL SERPL-MCNC: 51 MG/DL (ref 0–149)
VLDL: 10 MG/DL (ref 0–40)

## 2023-12-11 PROCEDURE — 80061 LIPID PANEL: CPT

## 2023-12-11 PROCEDURE — 36415 COLL VENOUS BLD VENIPUNCTURE: CPT

## 2023-12-13 ENCOUNTER — HOSPITAL ENCOUNTER (OUTPATIENT)
Facility: HOSPITAL | Age: 78
Setting detail: SURGERY ADMIT
End: 2023-12-13
Attending: NEUROLOGICAL SURGERY | Admitting: NEUROLOGICAL SURGERY
Payer: MEDICARE

## 2023-12-13 PROBLEM — M48.062 LUMBAR STENOSIS WITH NEUROGENIC CLAUDICATION: Status: ACTIVE | Noted: 2023-12-05

## 2023-12-13 PROBLEM — M43.16 SPONDYLOLISTHESIS AT L4-L5 LEVEL: Status: ACTIVE | Noted: 2023-12-05

## 2023-12-19 DIAGNOSIS — I25.10 CORONARY ARTERY DISEASE INVOLVING NATIVE CORONARY ARTERY OF NATIVE HEART WITHOUT ANGINA PECTORIS: ICD-10-CM

## 2023-12-19 DIAGNOSIS — E78.00 HYPERCHOLESTEROLEMIA: ICD-10-CM

## 2023-12-19 DIAGNOSIS — I65.23 CAROTID STENOSIS, BILATERAL: Primary | ICD-10-CM

## 2023-12-19 RX ORDER — ATORVASTATIN CALCIUM 80 MG/1
80 TABLET, FILM COATED ORAL DAILY
Qty: 90 TABLET | Refills: 3 | Status: SHIPPED | OUTPATIENT
Start: 2023-12-19 | End: 2024-12-18

## 2023-12-27 DIAGNOSIS — I10 PRIMARY HYPERTENSION: ICD-10-CM

## 2023-12-28 RX ORDER — TRIAMTERENE/HYDROCHLOROTHIAZID 37.5-25 MG
1 TABLET ORAL DAILY
Qty: 90 TABLET | Refills: 0 | Status: SHIPPED | OUTPATIENT
Start: 2023-12-28 | End: 2024-02-28

## 2023-12-28 RX ORDER — AMLODIPINE BESYLATE 5 MG/1
5 TABLET ORAL DAILY
Qty: 90 TABLET | Refills: 0 | Status: SHIPPED | OUTPATIENT
Start: 2023-12-28 | End: 2024-02-28

## 2024-01-01 PROCEDURE — RXMED WILLOW AMBULATORY MEDICATION CHARGE

## 2024-01-03 ENCOUNTER — TELEMEDICINE (OUTPATIENT)
Dept: PHARMACY | Facility: HOSPITAL | Age: 79
End: 2024-01-03
Payer: MEDICARE

## 2024-01-03 DIAGNOSIS — I48.91 ATRIAL FIBRILLATION, UNSPECIFIED TYPE (MULTI): Primary | ICD-10-CM

## 2024-01-03 NOTE — PROGRESS NOTES
"Pharmacist Clinic: Anticoagulation Management  Lynsey Forman was referred to the Clinical Pharmacy Team for her anticoagulation management.    Referring Provider:  Urszula Lebron    Last Appointment w/ Pharmacist: 10/2/2023  Pharmacist Name: Calista Bellamy, PharmD  _______________________________________________________________________  PHARMACY ASSESSMENT    Review of Past Appointment:   - Patient's Xarelto recently decreased to 15 mg due to kidney function  - Patient was approved for [facility] financial assistance   - Patient reports she is comliant with Xarelto and never forgets a dose.      RELEVANT LAB RESULTS  Lab Results   Component Value Date    BILITOT 0.6 02/06/2023    CALCIUM 9.6 06/27/2023    CO2 27 06/27/2023    CL 94 (L) 06/27/2023    CREATININE 1.17 (H) 06/27/2023    GLUCOSE 75 06/27/2023    ALKPHOS 65 02/06/2023    K 4.3 06/27/2023    PROT 7.2 02/06/2023     (L) 06/27/2023    AST 24 02/06/2023    ALT 16 02/06/2023    BUN 24 (H) 06/27/2023    ANIONGAP 14 06/27/2023    ALBUMIN 4.5 02/06/2023    GFRF 49 (A) 06/27/2023     Lab Results   Component Value Date    TRIG 51 12/11/2023    CHOL 178 12/11/2023    LDLCALC 65 12/11/2023    .5 12/11/2023     No results found for: \"BMCBC\", \"CBCDIF\"   _______________________________________________________________________  ANTICOAGULATION ASSESSMENT    The ASCVD Risk score (Anat DK, et al., 2019) failed to calculate for the following reasons:    The valid HDL cholesterol range is 20 to 100 mg/dL    The smoking status is invalid    DIAGNOSIS: treatment of nonvalvular atrial fibrilliation stroke and systemic embolism  - Patient is projected to be on anticoagulation Yes, Xarelto  - [id number] Score: [4] (only included if diagnosis is atrial fibrillation)   Age: [<65 (0)] [65-74 (+1)] [> 75 (+2)]: 2  Sex: [Male/Female (+1)]: 1  CHF history: [No/Yes(+1)]: 0  Hypertension history: [No/Yes(+1)]: 1  Stroke/TIA/thromboembolism history: [No/Yes(+2)]: 0  Vascular " disease history (prior MI, peripheral artery disease, aortic plaque): [No/Yes(+1)]: 0  Diabetes history: [No/Yes(+1)]: 0    CURRENT PHARMACOTHERAPY:   - Xarelto 15 mg once daily  - 79 yo  - 52.2 kg  - Scr: 1.17  - eGFR: 49    UPDATE ON PHARMACOTHERAPY:   Affordability/Accessibility: Patient is receiving Xarelto through  [employer] financial assistance program   Adherence/Organization: Patient reports she is compliant with Xarelto  Adverse Effects: None  Recent Hospitalizations: Patient stated she is having surgery on 2/2/2024  Recent Falls/Trauma: None  Changes in Tobacco or Alcohol Intake: N/a    DISCUSSION/NOTES:  - Patient denies side effects associated with Xarelto such as bruising, dark/black stools, abnormal bleeding  - Patient stated she is having surgery on 2/2/2024  - Patient does not miss a dose of her Xarelto  _______________________________________________________________________  PATIENT EDUCATION/GOALS  - Counseled patient on MOA, expectations, duration of therapy, contraindications, administration, and monitoring parameters  - Counseled patient of side effects that are indicative of bleeding such as dark tarry stool, unexplainable bruising, or vomiting up a coffee ground like substance  - Answered all patient questions and concerns  _______________________________________________________________________  RECOMMENDATIONS/PLAN  1. Continue Xarelto 15 mg once daily    Next Cardiology Appointment: 4/2/2024  Clinical Pharmacist follow up: 4/3/2024  VAF/Application Expiration: Yes    Date: 10/6/2024  Type of Encounter: Diana ParishD  PGY-1 Pharmacy Resident    Verbal consent to manage patient's drug therapy was obtained from the patient . They were informed they may decline to participate or withdraw from participation in pharmacy services at any time.    Continue all meds under the continuation of care with the referring provider and clinical pharmacy team.

## 2024-01-03 NOTE — Clinical Note
Ed Chao! My resident completed a 3 month follow up regarding this patient's Xarelto. No issues to report. Will continue to follow.

## 2024-01-04 ENCOUNTER — PHARMACY VISIT (OUTPATIENT)
Dept: PHARMACY | Facility: CLINIC | Age: 79
End: 2024-01-04
Payer: COMMERCIAL

## 2024-01-08 ENCOUNTER — OFFICE VISIT (OUTPATIENT)
Dept: PRIMARY CARE | Facility: CLINIC | Age: 79
End: 2024-01-08
Payer: MEDICARE

## 2024-01-08 VITALS
HEIGHT: 63 IN | WEIGHT: 117.5 LBS | TEMPERATURE: 96.2 F | BODY MASS INDEX: 20.82 KG/M2 | OXYGEN SATURATION: 95 % | HEART RATE: 57 BPM | SYSTOLIC BLOOD PRESSURE: 128 MMHG | DIASTOLIC BLOOD PRESSURE: 56 MMHG | RESPIRATION RATE: 16 BRPM

## 2024-01-08 DIAGNOSIS — K64.4 EXTERNAL HEMORRHOID: ICD-10-CM

## 2024-01-08 DIAGNOSIS — I48.92 ATRIAL FLUTTER, UNSPECIFIED TYPE (MULTI): ICD-10-CM

## 2024-01-08 DIAGNOSIS — I10 BENIGN ESSENTIAL HYPERTENSION: Primary | ICD-10-CM

## 2024-01-08 DIAGNOSIS — L72.0 CYST OF SKIN AND SUBCUTANEOUS TISSUE: ICD-10-CM

## 2024-01-08 DIAGNOSIS — F17.210 CIGARETTE NICOTINE DEPENDENCE WITHOUT COMPLICATION: ICD-10-CM

## 2024-01-08 DIAGNOSIS — Z12.31 VISIT FOR SCREENING MAMMOGRAM: ICD-10-CM

## 2024-01-08 PROBLEM — R00.0 TACHYCARDIA: Status: RESOLVED | Noted: 2024-01-08 | Resolved: 2024-01-08

## 2024-01-08 PROBLEM — I25.10 ARTERIOSCLEROSIS OF CORONARY ARTERY: Status: ACTIVE | Noted: 2024-01-08

## 2024-01-08 PROBLEM — M51.36 DEGENERATION OF INTERVERTEBRAL DISC OF LUMBAR REGION: Status: ACTIVE | Noted: 2023-06-27

## 2024-01-08 PROBLEM — M51.369 DEGENERATION OF INTERVERTEBRAL DISC OF LUMBAR REGION: Status: ACTIVE | Noted: 2023-06-27

## 2024-01-08 PROBLEM — J38.1 POLYPOID CORDITIS: Status: ACTIVE | Noted: 2024-01-08

## 2024-01-08 PROBLEM — R63.4 WEIGHT LOSS, UNINTENTIONAL: Status: RESOLVED | Noted: 2023-08-30 | Resolved: 2024-01-08

## 2024-01-08 PROBLEM — F10.90 ALCOHOL USE, UNSPECIFIED, UNCOMPLICATED: Status: RESOLVED | Noted: 2022-12-20 | Resolved: 2024-01-08

## 2024-01-08 PROBLEM — R94.4 DECREASED GLOMERULAR FILTRATION RATE (GFR): Status: RESOLVED | Noted: 2022-12-20 | Resolved: 2024-01-08

## 2024-01-08 PROBLEM — Z98.1 HISTORY OF ARTHRODESIS: Status: RESOLVED | Noted: 2023-07-07 | Resolved: 2024-01-08

## 2024-01-08 PROBLEM — I65.23 BILATERAL CAROTID ARTERY OCCLUSION: Status: ACTIVE | Noted: 2024-01-08

## 2024-01-08 PROBLEM — N28.9 ABNORMAL KIDNEY FUNCTION: Status: RESOLVED | Noted: 2024-01-08 | Resolved: 2024-01-08

## 2024-01-08 PROBLEM — K63.5 POLYP OF COLON: Status: RESOLVED | Noted: 2024-01-08 | Resolved: 2024-01-08

## 2024-01-08 PROBLEM — I35.0 NONRHEUMATIC AORTIC VALVE STENOSIS: Status: ACTIVE | Noted: 2024-01-08

## 2024-01-08 PROBLEM — K21.9 GASTROESOPHAGEAL REFLUX DISEASE: Status: ACTIVE | Noted: 2024-01-08

## 2024-01-08 PROBLEM — F19.21 HISTORY OF SUBSTANCE DEPENDENCE (MULTI): Status: RESOLVED | Noted: 2023-07-07 | Resolved: 2024-01-08

## 2024-01-08 PROBLEM — R06.02 SHORTNESS OF BREATH: Status: RESOLVED | Noted: 2024-01-08 | Resolved: 2024-01-08

## 2024-01-08 PROBLEM — R49.0 DYSPHONIA: Status: ACTIVE | Noted: 2023-08-30

## 2024-01-08 PROCEDURE — 1159F MED LIST DOCD IN RCRD: CPT | Performed by: INTERNAL MEDICINE

## 2024-01-08 PROCEDURE — 1125F AMNT PAIN NOTED PAIN PRSNT: CPT | Performed by: INTERNAL MEDICINE

## 2024-01-08 PROCEDURE — 3078F DIAST BP <80 MM HG: CPT | Performed by: INTERNAL MEDICINE

## 2024-01-08 PROCEDURE — 1160F RVW MEDS BY RX/DR IN RCRD: CPT | Performed by: INTERNAL MEDICINE

## 2024-01-08 PROCEDURE — 3074F SYST BP LT 130 MM HG: CPT | Performed by: INTERNAL MEDICINE

## 2024-01-08 PROCEDURE — 99214 OFFICE O/P EST MOD 30 MIN: CPT | Performed by: INTERNAL MEDICINE

## 2024-01-08 PROCEDURE — 1036F TOBACCO NON-USER: CPT | Performed by: INTERNAL MEDICINE

## 2024-01-08 ASSESSMENT — ENCOUNTER SYMPTOMS: BACK PAIN: 1

## 2024-01-08 ASSESSMENT — PAIN SCALES - GENERAL: PAINLEVEL: 4

## 2024-01-08 NOTE — ASSESSMENT & PLAN NOTE
Hypertension : controlled blood pressure and continues same medications and educate a low salt diet do not exceed 200 mg per serving. Keep monitor the blood pressure at home.

## 2024-01-08 NOTE — PROGRESS NOTES
"Subjective   Patient ID: Lynsey Forman is a 78 y.o. female who presents for Back Pain.    Follow up visit.  She has hypertension hypercholesterolemia, history of atrial flutter, takes Xarelto.  Former smoker.  Recently found to have bilateral carotid artery stenosis 70% his appointment with vascular surgeon.  Has chronic low back pain has seen neurosurgeon in consult who recommended lumbar spine surgery for lumbar spine canal stenosis radiculopathy.  She takes gabapentin 300 mg 3 times a day, gives her some relief.  Pain scale is about 5.  She feels better if sitting or laying in bed.  Complains of a cyst left posterior arm, she has it for maybe 20 years.  Has seen dermatologist who recommended removal.  Did not follow-up appointment.    Back Pain         Review of Systems   Musculoskeletal:  Positive for back pain.   Skin:         Cyst lt. arm   All other systems reviewed and are negative.      Objective   /56 (BP Location: Left arm, Patient Position: Sitting)   Pulse 57   Temp 35.7 °C (96.2 °F) (Temporal)   Resp 16   Ht 1.61 m (5' 3.39\")   Wt 53.3 kg (117 lb 8.1 oz)   SpO2 95%   BMI 20.56 kg/m²     Physical Exam  Constitutional:       Appearance: Normal appearance.   HENT:      Head: Normocephalic and atraumatic.      Right Ear: External ear normal.      Left Ear: External ear normal.      Mouth/Throat:      Mouth: Mucous membranes are moist.      Pharynx: Oropharynx is clear.   Neck:      Vascular: No carotid bruit.   Cardiovascular:      Rate and Rhythm: Normal rate and regular rhythm.      Heart sounds: No murmur heard.     No gallop.   Pulmonary:      Effort: Pulmonary effort is normal. No respiratory distress.      Breath sounds: No wheezing or rales.   Abdominal:      General: Abdomen is flat.      Palpations: Abdomen is soft.      Hernia: No hernia is present.   Musculoskeletal:         General: No swelling, tenderness, deformity or signs of injury.      Cervical back: No rigidity or " tenderness.      Right lower leg: No edema.      Comments: Positive straight leg raising at 30 degrees bilateral lower extremities   Lymphadenopathy:      Cervical: No cervical adenopathy.   Skin:     Coloration: Skin is not jaundiced or pale.      Findings: No bruising, erythema, lesion or rash.      Comments:  Indurated cyst posterior left arm with  telangiectases, 2 x 2 cm round   Neurological:      General: No focal deficit present.      Mental Status: She is oriented to person, place, and time.      Motor: No weakness.      Coordination: Coordination normal.   Psychiatric:         Mood and Affect: Mood normal.         Behavior: Behavior normal.         Assessment/Plan   Problem List Items Addressed This Visit             ICD-10-CM    Cigarette nicotine dependence without complication F17.210     Have low  intensity CT scan of the chest.         Relevant Orders    CT lung screening low dose    Atrial flutter (CMS/HCC) I48.92     Sinus rhythm today, continue Xarelto.         Benign essential hypertension - Primary I10     Hypertension : controlled blood pressure and continues same medications and educate a low salt diet do not exceed 200 mg per serving. Keep monitor the blood pressure at home.           Cyst of skin and subcutaneous tissue L72.0     See dermatologist  in consult for possible removal.         External hemorrhoid K64.4     Avoid constipation.  Apply Tucks pads 2 or 3 times a day.  Use Preparation H over-the-counter.          Other Visit Diagnoses         Codes    Visit for screening mammogram     Z12.31    Relevant Orders    BI mammo bilateral screening tomosynthesis

## 2024-01-10 DIAGNOSIS — I10 PRIMARY HYPERTENSION: Primary | ICD-10-CM

## 2024-01-11 RX ORDER — LISINOPRIL 20 MG/1
20 TABLET ORAL DAILY
Qty: 90 TABLET | Refills: 1 | Status: SHIPPED | OUTPATIENT
Start: 2024-01-11 | End: 2024-06-04

## 2024-01-16 DIAGNOSIS — M48.061 SPINAL STENOSIS OF LUMBAR REGION, UNSPECIFIED WHETHER NEUROGENIC CLAUDICATION PRESENT: ICD-10-CM

## 2024-01-22 ENCOUNTER — OFFICE VISIT (OUTPATIENT)
Dept: VASCULAR SURGERY | Facility: HOSPITAL | Age: 79
End: 2024-01-22
Payer: MEDICARE

## 2024-01-22 VITALS
HEART RATE: 60 BPM | HEIGHT: 63 IN | WEIGHT: 118.7 LBS | BODY MASS INDEX: 21.03 KG/M2 | DIASTOLIC BLOOD PRESSURE: 50 MMHG | SYSTOLIC BLOOD PRESSURE: 176 MMHG

## 2024-01-22 DIAGNOSIS — I65.23 CAROTID STENOSIS, BILATERAL: ICD-10-CM

## 2024-01-22 PROCEDURE — 3078F DIAST BP <80 MM HG: CPT | Performed by: SURGERY

## 2024-01-22 PROCEDURE — 3077F SYST BP >= 140 MM HG: CPT | Performed by: SURGERY

## 2024-01-22 PROCEDURE — 1160F RVW MEDS BY RX/DR IN RCRD: CPT | Performed by: SURGERY

## 2024-01-22 PROCEDURE — 99214 OFFICE O/P EST MOD 30 MIN: CPT | Performed by: SURGERY

## 2024-01-22 PROCEDURE — 99204 OFFICE O/P NEW MOD 45 MIN: CPT | Performed by: SURGERY

## 2024-01-22 PROCEDURE — 1159F MED LIST DOCD IN RCRD: CPT | Performed by: SURGERY

## 2024-01-22 PROCEDURE — 1036F TOBACCO NON-USER: CPT | Performed by: SURGERY

## 2024-01-22 PROCEDURE — 1126F AMNT PAIN NOTED NONE PRSNT: CPT | Performed by: SURGERY

## 2024-01-22 RX ORDER — ASPIRIN 81 MG/1
81 TABLET ORAL DAILY
Qty: 30 TABLET | Refills: 11 | Status: SHIPPED | OUTPATIENT
Start: 2024-01-22 | End: 2025-01-21

## 2024-01-22 ASSESSMENT — ENCOUNTER SYMPTOMS
DEPRESSION: 0
OCCASIONAL FEELINGS OF UNSTEADINESS: 0
LOSS OF SENSATION IN FEET: 0

## 2024-01-22 ASSESSMENT — PAIN SCALES - GENERAL: PAINLEVEL: 0-NO PAIN

## 2024-01-22 NOTE — PROGRESS NOTES
"Subjective   Lynsey Forman is a pleasant 78 y.o. female presenting to clinic for initial evaluation of bilateral carotid artery stenosis. She has been undergoing a preoperative work-up for a lumbar spinal surgery and got a carotid duplex done during that evaluation. A carotid duplex from 12/5/2023 showed severe bilateral carotid stenosis. She is asymptomatic and reports no history of TIA, amaurosis fugax, or stroke. She is a former smoker, quit 3 months ago.     Ms. Lynsey Forman has a past medical history of Abnormal kidney function (01/08/2024), Alcohol use, unspecified, uncomplicated (12/20/2022), Chronic cough (11/24/2009), Diverticulosis (12/21/2016), History of arthrodesis (07/07/2023), History of substance dependence (CMS/[facility]) (07/07/2023), Shortness of breath (01/08/2024), Sinus bradycardia (01/04/2013), and Tachycardia (01/08/2024).     Ms. Lynsey Forman has a past surgical history that includes Other surgical history (10/31/2022); Other surgical history (10/31/2022); and Other surgical history (10/31/2022).     Social History     Tobacco Use    Smoking status: Former     Types: Cigarettes    Smokeless tobacco: Never   Substance Use Topics    Alcohol use: Not on file             Objective   Last Recorded Vitals  Blood pressure 176/50, pulse 60, height 1.6 m (5' 3\"), weight 53.8 kg (118 lb 11.2 oz).    Physical Exam  Vitals and nursing note reviewed.   Constitutional:       General: She is not in acute distress.     Appearance: Normal appearance.   HENT:      Head: Normocephalic and atraumatic.      Nose: No rhinorrhea.      Mouth/Throat:      Mouth: Mucous membranes are moist.   Eyes:      General: No scleral icterus.     Conjunctiva/sclera: Conjunctivae normal.   Cardiovascular:      Rate and Rhythm: Normal rate.      Pulses:           Carotid pulses are 2+ on the right side and 2+ on the left side.       Radial pulses are 2+ on the right side and 2+ on the left side.   Pulmonary:      Effort: Pulmonary " effort is normal. No respiratory distress.      Breath sounds: No wheezing.   Abdominal:      General: Abdomen is flat. There is no distension.      Palpations: Abdomen is soft.   Musculoskeletal:         General: Normal range of motion.      Cervical back: Normal range of motion and neck supple.   Skin:     General: Skin is warm.      Capillary Refill: Capillary refill takes less than 2 seconds.   Neurological:      General: No focal deficit present.      Mental Status: She is alert and oriented to person, place, and time.   Psychiatric:         Mood and Affect: Mood normal.         Relevant Results  Medications:  Current Outpatient Medications   Medication Instructions    amLODIPine (NORVASC) 5 mg, oral, Daily    atorvastatin (LIPITOR) 80 mg, oral, Daily    gabapentin (Neurontin) 300 mg capsule 1 capsule, oral, 3 times daily    hydrOXYzine HCL (Atarax) 25 mg tablet 1 tablet, oral, 2 times daily PRN    lisinopril 20 mg, oral, Daily    meloxicam (MOBIC) 15 mg, oral, Daily    rivaroxaban (Xarelto) 15 mg tablet Take 1 tablet (15 mg) by mouth once daily in the evening. Take with food.    triamterene-hydrochlorothiazid (Maxzide-25) 37.5-25 mg tablet 1 tablet, oral, Daily        Labs:  Lab Results   Component Value Date     (L) 06/27/2023    K 4.3 06/27/2023    CL 94 (L) 06/27/2023    BUN 24 (H) 06/27/2023    CREATININE 1.17 (H) 06/27/2023      Lab Results   Component Value Date    WBC 6.8 06/27/2023    HGB 14.0 06/27/2023    HCT 40.0 06/27/2023        Imaging:  Bilateral Carotid Duplex 12/5/2023   Right                         Left    PSV      EDV                 PSV      EDV  71 cm/s             CCA P    51 cm/s  52 cm/s             CCA D    32 cm/s  418 cm/s 118 cm/s   ICA P    359 cm/s 106 cm/s  85 cm/s  25 cm/s    ICA M    51 cm/s  14 cm/s  91 cm/s  25 cm/s    ICA D    66 cm/s  22 cm/s  195 cm/s             ECA     30 cm/s  44 cm/s  14 cm/s  Vertebral  58 cm/s  11 cm/s  126 cm/s          Subclavian 135 cm/s                    Right Left  ICA/CCA Ratio  8.0  11.2       Assessment/Plan   Lynsey Forman is a pleasant 78 y.o. female with asymptomatic severe bilateral carotid artery stenosis based on duplex from 12/5/2023.     Plan:  CTA of the neck and then return to clinic to discuss results  Continue statin and Xarelto. Add ASA 81 mg  Return to clinic in 2-3 weeks after CTA neck         Patient seen and discussed with the attending, Dr. Blackman.    Steve Wu MD  Vascular Surgery Fellow  Available on Secure Chat

## 2024-01-29 DIAGNOSIS — D22.71 MELANOCYTIC NEVI OF RIGHT LOWER LIMB, INCLUDING HIP: ICD-10-CM

## 2024-01-29 RX ORDER — MELOXICAM 15 MG/1
15 TABLET ORAL DAILY
Qty: 90 TABLET | Refills: 1 | Status: SHIPPED | OUTPATIENT
Start: 2024-01-29 | End: 2024-02-27

## 2024-02-05 ENCOUNTER — OFFICE VISIT (OUTPATIENT)
Dept: DERMATOLOGY | Facility: CLINIC | Age: 79
End: 2024-02-05
Payer: MEDICARE

## 2024-02-05 DIAGNOSIS — D48.9 NEOPLASM OF UNCERTAIN BEHAVIOR: ICD-10-CM

## 2024-02-05 PROCEDURE — 1159F MED LIST DOCD IN RCRD: CPT | Performed by: STUDENT IN AN ORGANIZED HEALTH CARE EDUCATION/TRAINING PROGRAM

## 2024-02-05 PROCEDURE — 99213 OFFICE O/P EST LOW 20 MIN: CPT | Performed by: STUDENT IN AN ORGANIZED HEALTH CARE EDUCATION/TRAINING PROGRAM

## 2024-02-05 PROCEDURE — 1160F RVW MEDS BY RX/DR IN RCRD: CPT | Performed by: STUDENT IN AN ORGANIZED HEALTH CARE EDUCATION/TRAINING PROGRAM

## 2024-02-05 PROCEDURE — 1126F AMNT PAIN NOTED NONE PRSNT: CPT | Performed by: STUDENT IN AN ORGANIZED HEALTH CARE EDUCATION/TRAINING PROGRAM

## 2024-02-05 PROCEDURE — 1036F TOBACCO NON-USER: CPT | Performed by: STUDENT IN AN ORGANIZED HEALTH CARE EDUCATION/TRAINING PROGRAM

## 2024-02-05 NOTE — PROGRESS NOTES
Excision Consult Note    Date of Surgery: 2/5/2024  Surgeon:  Marcello Hennessy MD  Office Location: [address]   [facility] [address]  [facility] [address]  Dept: [phone]  Dept Fax: [phone]   Referring Provider: Lula Chacon MD  [address] [place] [address]   [facility] [address]  [place] [address]       Subjective   Lynsey Forman is a 78 y.o. female who presents for the following: evaluation for neoplasm of uncertain behavior of skin on the left upper arm posterior.     According to the patient, the lesion has been present for approximately than 20 years but the lesion is now painful.  The lesion has not been treated previously.    The patient does not have a pacemaker / defibrillator.  The patient does not have a heart valve / joint replacement.  The patient is on blood thinners.    The following portions of the chart were reviewed this encounter and updated as appropriate:       Review of Systems:  No other skin or systemic complaints other than what is documented elsewhere in the note.    Medical History:  Clinically relevant history including significant past medical history, review of systems, medications and allergies was reviewed and is documented in Epic.    Objective   Well appearing patient in no apparent distress; mood and affect are within normal limits.  Vital signs: See record.  Noted on the left upper posterior arm is a 5 x 5 cm subcutaneous nodule.   The patient confirmed the identified site.    Discussion:    The possible diagnoses were explained. Although the lesion is likely benign, the patient requests removal of the lesion because of the symptoms it is causing (painful). Excision was discussed with the patient. The risks, benefits and potential adverse effects were reviewed. Discussion included but was not limited to the cure rate, relative cost, wound care requirements, activity restrictions, likely scar outcome and time to heal were reviewed.  Alternative options including monitoring the lesion were discussed. The patient would like to take some time to decide whether to proceed with excision.

## 2024-02-07 ENCOUNTER — HOSPITAL ENCOUNTER (OUTPATIENT)
Dept: RADIOLOGY | Facility: HOSPITAL | Age: 79
Discharge: HOME | End: 2024-02-07
Payer: MEDICARE

## 2024-02-07 ENCOUNTER — HOSPITAL ENCOUNTER (OUTPATIENT)
Dept: RADIOLOGY | Facility: CLINIC | Age: 79
Discharge: HOME | End: 2024-02-07
Payer: MEDICARE

## 2024-02-07 VITALS — HEIGHT: 63 IN | WEIGHT: 118.61 LBS | BODY MASS INDEX: 21.02 KG/M2

## 2024-02-07 DIAGNOSIS — F17.210 CIGARETTE NICOTINE DEPENDENCE WITHOUT COMPLICATION: ICD-10-CM

## 2024-02-07 DIAGNOSIS — Z12.31 VISIT FOR SCREENING MAMMOGRAM: ICD-10-CM

## 2024-02-07 PROCEDURE — 71271 CT THORAX LUNG CANCER SCR C-: CPT

## 2024-02-07 PROCEDURE — 77067 SCR MAMMO BI INCL CAD: CPT

## 2024-02-07 PROCEDURE — 77063 BREAST TOMOSYNTHESIS BI: CPT | Performed by: STUDENT IN AN ORGANIZED HEALTH CARE EDUCATION/TRAINING PROGRAM

## 2024-02-07 PROCEDURE — 77067 SCR MAMMO BI INCL CAD: CPT | Performed by: STUDENT IN AN ORGANIZED HEALTH CARE EDUCATION/TRAINING PROGRAM

## 2024-02-08 ENCOUNTER — HOSPITAL ENCOUNTER (OUTPATIENT)
Dept: RADIOLOGY | Facility: HOSPITAL | Age: 79
Discharge: HOME | End: 2024-02-08
Payer: MEDICARE

## 2024-02-08 DIAGNOSIS — I65.23 CAROTID STENOSIS, BILATERAL: ICD-10-CM

## 2024-02-08 LAB
CREAT SERPL-MCNC: 0.8 MG/DL (ref 0.6–1.3)
GFR SERPL CREATININE-BSD FRML MDRD: 82 ML/MIN/1.73M*2

## 2024-02-08 PROCEDURE — 70498 CT ANGIOGRAPHY NECK: CPT | Performed by: STUDENT IN AN ORGANIZED HEALTH CARE EDUCATION/TRAINING PROGRAM

## 2024-02-08 PROCEDURE — 2550000001 HC RX 255 CONTRASTS: Performed by: SURGERY

## 2024-02-08 PROCEDURE — 70498 CT ANGIOGRAPHY NECK: CPT

## 2024-02-08 PROCEDURE — 82565 ASSAY OF CREATININE: CPT

## 2024-02-08 RX ADMIN — IOHEXOL 75 ML: 350 INJECTION, SOLUTION INTRAVENOUS at 13:45

## 2024-02-12 ENCOUNTER — OFFICE VISIT (OUTPATIENT)
Dept: VASCULAR SURGERY | Facility: HOSPITAL | Age: 79
End: 2024-02-12
Payer: MEDICARE

## 2024-02-12 VITALS
WEIGHT: 117 LBS | HEIGHT: 63 IN | HEART RATE: 63 BPM | BODY MASS INDEX: 20.73 KG/M2 | SYSTOLIC BLOOD PRESSURE: 138 MMHG | OXYGEN SATURATION: 97 % | DIASTOLIC BLOOD PRESSURE: 58 MMHG

## 2024-02-12 DIAGNOSIS — I67.1 CEREBRAL ARTERIAL ANEURYSM (HHS-HCC): Primary | ICD-10-CM

## 2024-02-12 DIAGNOSIS — I67.1 MIDDLE CEREBRAL ARTERY ANEURYSM (HHS-HCC): Primary | ICD-10-CM

## 2024-02-12 PROCEDURE — 1036F TOBACCO NON-USER: CPT | Performed by: SURGERY

## 2024-02-12 PROCEDURE — 3078F DIAST BP <80 MM HG: CPT | Performed by: SURGERY

## 2024-02-12 PROCEDURE — 99215 OFFICE O/P EST HI 40 MIN: CPT | Performed by: SURGERY

## 2024-02-12 PROCEDURE — 1160F RVW MEDS BY RX/DR IN RCRD: CPT | Performed by: SURGERY

## 2024-02-12 PROCEDURE — 1159F MED LIST DOCD IN RCRD: CPT | Performed by: SURGERY

## 2024-02-12 PROCEDURE — 1126F AMNT PAIN NOTED NONE PRSNT: CPT | Performed by: SURGERY

## 2024-02-12 PROCEDURE — 3075F SYST BP GE 130 - 139MM HG: CPT | Performed by: SURGERY

## 2024-02-12 ASSESSMENT — ENCOUNTER SYMPTOMS
DEPRESSION: 1
LOSS OF SENSATION IN FEET: 1
OCCASIONAL FEELINGS OF UNSTEADINESS: 1

## 2024-02-12 ASSESSMENT — PATIENT HEALTH QUESTIONNAIRE - PHQ9
10. IF YOU CHECKED OFF ANY PROBLEMS, HOW DIFFICULT HAVE THESE PROBLEMS MADE IT FOR YOU TO DO YOUR WORK, TAKE CARE OF THINGS AT HOME, OR GET ALONG WITH OTHER PEOPLE: NOT DIFFICULT AT ALL
2. FEELING DOWN, DEPRESSED OR HOPELESS: SEVERAL DAYS
SUM OF ALL RESPONSES TO PHQ9 QUESTIONS 1 & 2: 1
1. LITTLE INTEREST OR PLEASURE IN DOING THINGS: NOT AT ALL

## 2024-02-12 NOTE — PROGRESS NOTES
Vascular Surgery Consult/Clinic Note    CC: Follow up    HPI:  Lynsey Forman is 78 y.o. female with extensive PMH including but not limited to:  Abnormal kidney function (01/08/2024), Alcohol use, unspecified, uncomplicated (12/20/2022), Chronic cough (11/24/2009), Diverticulosis (12/21/2016), History of arthrodesis (07/07/2023), History of substance dependence (CMS/HCC) (07/07/2023), Shortness of breath (01/08/2024), Sinus bradycardia (01/04/2013), and Tachycardia (01/08/2024).      She was referred for B ICA stenosis where she was undergoing a preoperative work-up for a lumbar spinal surgery and got a carotid duplex done during that evaluation. A carotid duplex from 12/5/2023 showed severe bilateral carotid stenosis. She is asymptomatic and reports no history of TIA, amaurosis fugax, or stroke. She is a former smoker, quit 3 months ago.      She had CTA neck for a follow up visit.       Meds:   Current Outpatient Medications on File Prior to Visit   Medication Sig Dispense Refill    amLODIPine (Norvasc) 5 mg tablet TAKE 1 TABLET BY MOUTH DAILY 90 tablet 0    aspirin 81 mg EC tablet Take 1 tablet (81 mg) by mouth once daily. 30 tablet 11    atorvastatin (Lipitor) 80 mg tablet Take 1 tablet (80 mg) by mouth once daily. 90 tablet 3    gabapentin (Neurontin) 300 mg capsule Take 1 capsule (300 mg) by mouth 3 times a day.      hydrOXYzine HCL (Atarax) 25 mg tablet Take 1 tablet (25 mg) by mouth 2 times a day as needed for anxiety.      lisinopril 20 mg tablet TAKE 1 TABLET BY MOUTH DAILY 90 tablet 1    meloxicam (Mobic) 15 mg tablet Take 1 tablet (15 mg) by mouth once daily. 90 tablet 1    rivaroxaban (Xarelto) 15 mg tablet Take 1 tablet (15 mg) by mouth once daily in the evening. Take with food. 90 tablet 3    triamterene-hydrochlorothiazid (Maxzide-25) 37.5-25 mg tablet TAKE 1 TABLET BY MOUTH DAILY 90 tablet 0     No current facility-administered medications on file prior to visit.        Allergies:   Allergies    Allergen Reactions    Codeine Anxiety, Itching, Unknown and Other     Denies itching, hive, shortness of breath       SH:    Social Determinants of Health     Tobacco Use: Medium Risk (1/22/2024)    Patient History     Smoking Tobacco Use: Former     Smokeless Tobacco Use: Never     Passive Exposure: Not on file   Alcohol Use: Not on file   Financial Resource Strain: Not on file   Food Insecurity: Not on file   Transportation Needs: Not on file   Physical Activity: Not on file   Stress: Not on file   Social Connections: Not on file   Intimate Partner Violence: Not on file   Depression: Not at risk (8/4/2023)    PHQ-2     PHQ-2 Score: 0   Housing Stability: Not on file   Utilities: Not on file   Digital Equity: Not on file        FH:  Family History   Problem Relation Name Age of Onset    Heart failure Mother      Dementia Mother      Hypertension Mother            Objective:  Vitals:  Vitals:    02/12/24 1142   BP: 138/58   Pulse: 63   SpO2: 97%        Exam:  Gen: in NAD  GI: Soft, ND/NT  Ext:  BUE and BLE with 5/5 motor str.     Imaging:  CTA neck (2/8/2024) independently reviewed.   B ICA with >90% stenosis.   R MCA aneurysm approximately 7 mm.     Carotid duplex (12/5/2023) independently reviewed.   B ICA with >70% stenosis.   B vert with antegrade flow.       Assessment & Plan:  Lynsey Forman is 78 y.o. female with B ICA stenosis and R MCA aneurysm.    - Discussed at length regarding CTA findings with the patient.    - Urgent referral to neurosurgery placed for R MCA aneurysm.   - Will need to be addressed prior to carotid intervention.    - Return to clinic after evaluation with neurosurgery regarding MCA aneurysm.       Shaquille Blackman MD, PhD  Clinical   [employer] [facility] [employer]  Co-Director, Aortic Center  [facility] [place]

## 2024-02-21 ENCOUNTER — APPOINTMENT (OUTPATIENT)
Dept: NEUROSURGERY | Facility: HOSPITAL | Age: 79
End: 2024-02-21
Payer: MEDICARE

## 2024-02-22 ENCOUNTER — LAB (OUTPATIENT)
Dept: LAB | Facility: LAB | Age: 79
End: 2024-02-22
Payer: MEDICARE

## 2024-02-22 ENCOUNTER — OFFICE VISIT (OUTPATIENT)
Dept: NEUROSURGERY | Facility: CLINIC | Age: 79
End: 2024-02-22
Payer: MEDICARE

## 2024-02-22 VITALS
TEMPERATURE: 96.3 F | BODY MASS INDEX: 20.73 KG/M2 | HEART RATE: 51 BPM | HEIGHT: 63 IN | WEIGHT: 117 LBS | DIASTOLIC BLOOD PRESSURE: 75 MMHG | SYSTOLIC BLOOD PRESSURE: 135 MMHG

## 2024-02-22 DIAGNOSIS — Z51.81 ENCOUNTER FOR MONITORING ASPIRIN THERAPY: ICD-10-CM

## 2024-02-22 DIAGNOSIS — I67.1 CEREBRAL ARTERIAL ANEURYSM (HHS-HCC): ICD-10-CM

## 2024-02-22 DIAGNOSIS — I67.1 MIDDLE CEREBRAL ARTERY ANEURYSM (HHS-HCC): ICD-10-CM

## 2024-02-22 DIAGNOSIS — Z79.82 ENCOUNTER FOR MONITORING ASPIRIN THERAPY: ICD-10-CM

## 2024-02-22 LAB
ALBUMIN SERPL BCP-MCNC: 4.6 G/DL (ref 3.4–5)
ANION GAP SERPL CALC-SCNC: 13 MMOL/L (ref 10–20)
APTT PPP: 44 SECONDS (ref 27–38)
BUN SERPL-MCNC: 25 MG/DL (ref 6–23)
CALCIUM SERPL-MCNC: 9.5 MG/DL (ref 8.6–10.3)
CHLORIDE SERPL-SCNC: 97 MMOL/L (ref 98–107)
CO2 SERPL-SCNC: 29 MMOL/L (ref 21–32)
CREAT SERPL-MCNC: 1.24 MG/DL (ref 0.5–1.05)
EGFRCR SERPLBLD CKD-EPI 2021: 45 ML/MIN/1.73M*2
GLUCOSE SERPL-MCNC: 91 MG/DL (ref 74–99)
INR PPP: 2.4 (ref 0.9–1.1)
PHOSPHATE SERPL-MCNC: 3.7 MG/DL (ref 2.5–4.9)
POTASSIUM SERPL-SCNC: 5.2 MMOL/L (ref 3.5–5.3)
PROTHROMBIN TIME: 27.7 SECONDS (ref 9.8–12.8)
SODIUM SERPL-SCNC: 134 MMOL/L (ref 136–145)

## 2024-02-22 PROCEDURE — 85610 PROTHROMBIN TIME: CPT

## 2024-02-22 PROCEDURE — 3078F DIAST BP <80 MM HG: CPT | Performed by: NEUROLOGICAL SURGERY

## 2024-02-22 PROCEDURE — 99214 OFFICE O/P EST MOD 30 MIN: CPT | Performed by: NEUROLOGICAL SURGERY

## 2024-02-22 PROCEDURE — 1160F RVW MEDS BY RX/DR IN RCRD: CPT | Performed by: NEUROLOGICAL SURGERY

## 2024-02-22 PROCEDURE — 1036F TOBACCO NON-USER: CPT | Performed by: NEUROLOGICAL SURGERY

## 2024-02-22 PROCEDURE — 85730 THROMBOPLASTIN TIME PARTIAL: CPT

## 2024-02-22 PROCEDURE — 1125F AMNT PAIN NOTED PAIN PRSNT: CPT | Performed by: NEUROLOGICAL SURGERY

## 2024-02-22 PROCEDURE — 1159F MED LIST DOCD IN RCRD: CPT | Performed by: NEUROLOGICAL SURGERY

## 2024-02-22 PROCEDURE — 3075F SYST BP GE 130 - 139MM HG: CPT | Performed by: NEUROLOGICAL SURGERY

## 2024-02-22 PROCEDURE — 36415 COLL VENOUS BLD VENIPUNCTURE: CPT

## 2024-02-22 PROCEDURE — 80069 RENAL FUNCTION PANEL: CPT

## 2024-02-22 ASSESSMENT — PAIN SCALES - GENERAL: PAINLEVEL: 7

## 2024-02-22 NOTE — PROGRESS NOTES
"[facility]  Neurosurgery    History of Present Illness      Lynsey Forman is a 78-year-old female with a PMH significant for atrial flutter on Xarelto, HTN, former tobacco use, who was undergoing evaluation for preoperative workup of lumbar spinal surgery. Patient with neurogenic claudication and spondylolisthesis with severe canal stenosis following with Dr. Richey. She underwent carotid duplex on 12/05/23 that was significant for severe bilateral carotid stenosis. Patient was referred to vascular surgery Dr. Hennessy who ordered a CTA of the neck which noted a R MCA aneurysm. Patient was referred to cerebrovascular neurosurgery and presents to clinic for evaluation.   She reports that she has seen her prior neurosurgeon in [place], Dr. Perez, who would entertain a lumbar decompression without fusion, but is awaiting clearance as regards her blood thinners and other vascular findings.      She denies any headaches or other neurological symptoms.  Denies any history of stroke.    No family history of brain aneurysms or stroke.  She was a prior smoker, quit 3 months ago.  She has a prior history of cervical surgery.    She has had 2 ablation procedures for atrial flutter/fib, and is still on blood thinners as noted above.    Her primary difficulty is related to back pain and difficulty ambulating due to her lumbar issues.    H/o HTN, on BP med and statin      Objective      Vitals:   /75   Pulse 51   Temp 35.7 °C (96.3 °F)   Ht 1.6 m (5' 3\")   Wt 53.1 kg (117 lb)   BMI 20.73 kg/m²         Physical Exam:  Patient is awake and alert.  Speech is fluent.  She has full extraocular movements visual fields are grossly intact.  She has no significant drift.  She has mild intention tremor.  She walks with a sl bent posture.      Relevant Results:  I reviewed her neck CTA, which is heavily calcified carotid bifurcations bilaterally.  It is difficult to discern the degree of stenosis given the dense " calcification but it appears to be severe.  Her imaging does not cover all her head, but a right MCA aneurysm measuring approximately 6 mm is evident.  It appears morphologically to be fairly uniform.  No other vascular abnormalities are evident on this limited imaging.  She has had vascular ultrasound indicating greater than 70% bilateral carotid stenosis.      Assessment & Plan      Diagnosis:  Lynsey was seen today for new patient visit.  Diagnoses and all orders for this visit:  Cerebral arterial aneurysm  -     Referral to Neurosurgery  Middle cerebral artery aneurysm  -     Referral to Neurosurgery        Provider Impressions:    This is a 78-year-old presenting with a incidental finding of a right middle cerebral artery aneurysm.  With current information regarding location and size, her phases score is 2 with a approximately 0.4% 5-year projected hemorrhage risk.  However I recommended that she undergo a catheter angiogram to fully characterize its size and morphology, and to evaluate the severity of her carotid stenoses.  Her risk for lumbar surgery may be elevated in the context of severe carotid stenosis.  The aneurysm itself, if findings continue to suggest that it is a relatively low risk category, would not necessarily preclude carotid revascularization if this were felt necessary.  However definitive recommendations cannot be made without further information at this time.     She is amenable to proceeding with catheter angiogram we will plan to schedule this.  Will be discussing her case at our multidisciplinary neurovascular conference thereafter.    Total time for this visit was 45 minutes        Medical History     Past Medical History:   Diagnosis Date    Abnormal kidney function 01/08/2024    Alcohol use, unspecified, uncomplicated 12/20/2022    Chronic cough 11/24/2009    Formatting of this note might be different from the original. Overview: No change with stopping lisinopril, no change with  Advair, consensus of ENT, pulmonary, here is LP reflux, has associated past-nasal drip that does not respond to Mucinex or Flonase, 2012 has large nasal polyp L Formatting of this note might be different from the original. No change with stopping lisinopril, no change with Adv    Diverticulosis 12/21/2016    History of arthrodesis 07/07/2023    History of substance dependence (CMS/[facility]) 07/07/2023    Shortness of breath 01/08/2024    Sinus bradycardia 01/04/2013    Formatting of this note might be different from the original. Overview: Asymptomatic, noticed on exam Formatting of this note might be different from the original. Asymptomatic, noticed on exam    Tachycardia 01/08/2024     Past Surgical History:   Procedure Laterality Date    OTHER SURGICAL HISTORY  10/31/2022    Cervical surgery    OTHER SURGICAL HISTORY  10/31/2022    Oral surgery    OTHER SURGICAL HISTORY  10/31/2022    Anterior cruciate ligament repair     Social History     Tobacco Use    Smoking status: Former     Types: Cigarettes    Smokeless tobacco: Never   Substance Use Topics    Alcohol use: Yes     Alcohol/week: 4.0 standard drinks of alcohol     Types: 1 Glasses of wine, 3 Shots of liquor per week     Comment: daily    Drug use: Yes     Frequency: 7.0 times per week     Types: Marijuana     Comment: smikes marijuana for pain daily     Family History   Problem Relation Name Age of Onset    Heart failure Mother      Dementia Mother      Hypertension Mother       Allergies   Allergen Reactions    Codeine Anxiety, Itching, Unknown and Other     Denies itching, hive, shortness of breath     Current Outpatient Medications   Medication Instructions    amLODIPine (NORVASC) 5 mg, oral, Daily    aspirin 81 mg, oral, Daily    atorvastatin (LIPITOR) 80 mg, oral, Daily    gabapentin (Neurontin) 300 mg capsule 1 capsule, oral, 3 times daily    hydrOXYzine HCL (Atarax) 25 mg tablet 1 tablet, oral, 2 times daily PRN    lisinopril 20 mg, oral, Daily     meloxicam (MOBIC) 15 mg, oral, Daily    rivaroxaban (Xarelto) 15 mg tablet Take 1 tablet (15 mg) by mouth once daily in the evening. Take with food.    triamterene-hydrochlorothiazid (Maxzide-25) 37.5-25 mg tablet 1 tablet, oral, Daily

## 2024-02-27 DIAGNOSIS — D22.71 MELANOCYTIC NEVI OF RIGHT LOWER LIMB, INCLUDING HIP: ICD-10-CM

## 2024-02-27 RX ORDER — MELOXICAM 15 MG/1
15 TABLET ORAL DAILY
Qty: 30 TABLET | Refills: 1 | Status: SHIPPED | OUTPATIENT
Start: 2024-02-27 | End: 2024-04-26 | Stop reason: SDUPTHER

## 2024-02-28 ENCOUNTER — LAB (OUTPATIENT)
Dept: LAB | Facility: LAB | Age: 79
End: 2024-02-28
Payer: MEDICARE

## 2024-02-28 DIAGNOSIS — I67.1 MIDDLE CEREBRAL ARTERY ANEURYSM (HHS-HCC): ICD-10-CM

## 2024-02-28 DIAGNOSIS — I10 PRIMARY HYPERTENSION: ICD-10-CM

## 2024-02-28 DIAGNOSIS — I67.1 CEREBRAL ARTERIAL ANEURYSM (HHS-HCC): ICD-10-CM

## 2024-02-28 LAB
ERYTHROCYTE [DISTWIDTH] IN BLOOD BY AUTOMATED COUNT: 13.8 % (ref 11.5–14.5)
HCT VFR BLD AUTO: 38 % (ref 36–46)
HGB BLD-MCNC: 13 G/DL (ref 12–16)
MCH RBC QN AUTO: 33.4 PG (ref 26–34)
MCHC RBC AUTO-ENTMCNC: 34.2 G/DL (ref 32–36)
MCV RBC AUTO: 98 FL (ref 80–100)
NRBC BLD-RTO: 0 /100 WBCS (ref 0–0)
PLATELET # BLD AUTO: 198 X10*3/UL (ref 150–450)
RBC # BLD AUTO: 3.89 X10*6/UL (ref 4–5.2)
WBC # BLD AUTO: 6.3 X10*3/UL (ref 4.4–11.3)

## 2024-02-28 PROCEDURE — 85027 COMPLETE CBC AUTOMATED: CPT

## 2024-02-28 PROCEDURE — 36415 COLL VENOUS BLD VENIPUNCTURE: CPT

## 2024-02-28 RX ORDER — TRIAMTERENE/HYDROCHLOROTHIAZID 37.5-25 MG
1 TABLET ORAL DAILY
Qty: 90 TABLET | Refills: 1 | Status: SHIPPED | OUTPATIENT
Start: 2024-02-28

## 2024-02-28 RX ORDER — AMLODIPINE BESYLATE 5 MG/1
5 TABLET ORAL DAILY
Qty: 90 TABLET | Refills: 1 | Status: SHIPPED | OUTPATIENT
Start: 2024-02-28

## 2024-03-08 ENCOUNTER — HOSPITAL ENCOUNTER (OUTPATIENT)
Dept: RADIOLOGY | Facility: HOSPITAL | Age: 79
Discharge: HOME | End: 2024-03-08
Payer: MEDICARE

## 2024-03-08 VITALS
TEMPERATURE: 96.8 F | HEART RATE: 45 BPM | BODY MASS INDEX: 20.73 KG/M2 | RESPIRATION RATE: 16 BRPM | DIASTOLIC BLOOD PRESSURE: 60 MMHG | WEIGHT: 117 LBS | OXYGEN SATURATION: 94 % | SYSTOLIC BLOOD PRESSURE: 124 MMHG | HEIGHT: 63 IN

## 2024-03-08 DIAGNOSIS — I67.1 MIDDLE CEREBRAL ARTERY ANEURYSM (HHS-HCC): ICD-10-CM

## 2024-03-08 DIAGNOSIS — I67.1 CEREBRAL ARTERIAL ANEURYSM (HHS-HCC): ICD-10-CM

## 2024-03-08 PROCEDURE — 99152 MOD SED SAME PHYS/QHP 5/>YRS: CPT | Performed by: STUDENT IN AN ORGANIZED HEALTH CARE EDUCATION/TRAINING PROGRAM

## 2024-03-08 PROCEDURE — 36223 PLACE CATH CAROTID/INOM ART: CPT | Mod: 50 | Performed by: RADIOLOGY

## 2024-03-08 PROCEDURE — 99153 MOD SED SAME PHYS/QHP EA: CPT | Performed by: STUDENT IN AN ORGANIZED HEALTH CARE EDUCATION/TRAINING PROGRAM

## 2024-03-08 PROCEDURE — 36226 PLACE CATH VERTEBRAL ART: CPT | Performed by: RADIOLOGY

## 2024-03-08 PROCEDURE — 2780000003 HC OR 278 NO HCPCS: Performed by: STUDENT IN AN ORGANIZED HEALTH CARE EDUCATION/TRAINING PROGRAM

## 2024-03-08 PROCEDURE — 2550000001 HC RX 255 CONTRASTS: Performed by: RADIOLOGY

## 2024-03-08 PROCEDURE — 36224 PLACE CATH CAROTD ART: CPT | Performed by: RADIOLOGY

## 2024-03-08 PROCEDURE — C1769 GUIDE WIRE: HCPCS | Performed by: STUDENT IN AN ORGANIZED HEALTH CARE EDUCATION/TRAINING PROGRAM

## 2024-03-08 PROCEDURE — 2720000007 HC OR 272 NO HCPCS: Performed by: STUDENT IN AN ORGANIZED HEALTH CARE EDUCATION/TRAINING PROGRAM

## 2024-03-08 PROCEDURE — C1760 CLOSURE DEV, VASC: HCPCS | Performed by: STUDENT IN AN ORGANIZED HEALTH CARE EDUCATION/TRAINING PROGRAM

## 2024-03-08 PROCEDURE — 99153 MOD SED SAME PHYS/QHP EA: CPT | Performed by: RADIOLOGY

## 2024-03-08 PROCEDURE — 2500000004 HC RX 250 GENERAL PHARMACY W/ HCPCS (ALT 636 FOR OP/ED): Performed by: RADIOLOGY

## 2024-03-08 PROCEDURE — 99152 MOD SED SAME PHYS/QHP 5/>YRS: CPT | Performed by: RADIOLOGY

## 2024-03-08 PROCEDURE — 2500000004 HC RX 250 GENERAL PHARMACY W/ HCPCS (ALT 636 FOR OP/ED): Performed by: STUDENT IN AN ORGANIZED HEALTH CARE EDUCATION/TRAINING PROGRAM

## 2024-03-08 PROCEDURE — 36223 PLACE CATH CAROTID/INOM ART: CPT | Performed by: RADIOLOGY

## 2024-03-08 PROCEDURE — 76377 3D RENDER W/INTRP POSTPROCES: CPT | Performed by: RADIOLOGY

## 2024-03-08 PROCEDURE — 36222 PLACE CATH CAROTID/INOM ART: CPT | Mod: 50 | Performed by: RADIOLOGY

## 2024-03-08 RX ORDER — MIDAZOLAM HYDROCHLORIDE 1 MG/ML
INJECTION INTRAMUSCULAR; INTRAVENOUS
Status: COMPLETED | OUTPATIENT
Start: 2024-03-08 | End: 2024-03-08

## 2024-03-08 RX ORDER — FENTANYL CITRATE 50 UG/ML
INJECTION, SOLUTION INTRAMUSCULAR; INTRAVENOUS
Status: COMPLETED | OUTPATIENT
Start: 2024-03-08 | End: 2024-03-08

## 2024-03-08 RX ADMIN — MIDAZOLAM HYDROCHLORIDE 1 MG: 1 INJECTION, SOLUTION INTRAMUSCULAR; INTRAVENOUS at 11:05

## 2024-03-08 RX ADMIN — FENTANYL CITRATE 25 MCG: 50 INJECTION, SOLUTION INTRAMUSCULAR; INTRAVENOUS at 11:45

## 2024-03-08 RX ADMIN — FENTANYL CITRATE 25 MCG: 50 INJECTION, SOLUTION INTRAMUSCULAR; INTRAVENOUS at 11:04

## 2024-03-08 RX ADMIN — MIDAZOLAM HYDROCHLORIDE 1 MG: 1 INJECTION, SOLUTION INTRAMUSCULAR; INTRAVENOUS at 10:38

## 2024-03-08 RX ADMIN — IOHEXOL 100 ML: 350 INJECTION, SOLUTION INTRAVENOUS at 10:55

## 2024-03-08 RX ADMIN — MIDAZOLAM HYDROCHLORIDE 0.5 MG: 1 INJECTION, SOLUTION INTRAMUSCULAR; INTRAVENOUS at 11:45

## 2024-03-08 RX ADMIN — FENTANYL CITRATE 25 MCG: 50 INJECTION, SOLUTION INTRAMUSCULAR; INTRAVENOUS at 10:38

## 2024-03-08 ASSESSMENT — PAIN SCALES - GENERAL
PAINLEVEL_OUTOF10: 0 - NO PAIN
PAINLEVEL_OUTOF10: 7
PAINLEVEL_OUTOF10: 0 - NO PAIN
PAINLEVEL_OUTOF10: 7
PAINLEVEL_OUTOF10: 0 - NO PAIN
PAINLEVEL_OUTOF10: 7
PAINLEVEL_OUTOF10: 0 - NO PAIN
PAINLEVEL_OUTOF10: 7

## 2024-03-08 ASSESSMENT — PAIN - FUNCTIONAL ASSESSMENT
PAIN_FUNCTIONAL_ASSESSMENT: 0-10

## 2024-03-08 NOTE — PROCEDURES
Pre-Procedure Verification and Time Out:  Procedure Location: procedure area  HUDDLE - Pre-procedure Verification:  completed  TIME OUT - Final Verification:  completed immediately prior to procedure start  DEBRIEF: completed    Complications:  None; patient tolerated the procedure well.     Disposition: rPCU  Condition: stable  Specimens Collected: No specimens collected    General Information:   Anesthesia/ sedation: Non-Anesthesia  Indication(s)/Pre - Procedure Diagnoses: R MCA aneurysm  Post-Procedure Diagnosis: >80% stenosis of bilateral ICAs, R MCA aneurysm  Procedure Name: Diagnostic Cerebral Angiogram  Procedure performed by: Vale  Assistant(s): Santiago  Estimated Blood Loss (mL): 15  Specimen: no  Informed Consent: consent obtained and in chart    Patient was prepped and draped in sterile fashion  Sterile prep: Chlorhexidine   Positioning: Supine  Medications given during procedure: 8ml topical lidocaine, 1.5mg versed, 75 mcg fentanyl    Access: 5 Fr Sheath in R CFA  Closure: Mynx  Vessels Injected: R CFA, R CCA +3d, L CCA, L vert,   Moderate Sedation Time: 75 mins  Findings: >80% stenosis of bilateral ICAs, 6.6 mm x 3.1 mm x 4.4 mm R MCA aneurysm. Full report to follow in PACS dictation

## 2024-03-08 NOTE — PRE-PROCEDURE NOTE
Pre-Procedure H&P     Provider Assessment:  Diagnosis/Reason for Procedure: R MCA aneurysm, bilateral ICA stenosis  Procedure: Diagnostic Cerebral Angiogram  Medications Reviewed:   yes   Prophylatic Antibiotics Needed:   no    Neuro status: A&Ox3, moving all extremities full strength   Mouth Opening OK: yes   Neck Flexibility OK: yes   Sedation Plan: moderate sedation   COVID-19 Risk Consent:  Surgeon has reviewed key risks related to the risk of madalyn COVID-19 and if they contract COVID-19 what the risks are.

## 2024-03-08 NOTE — DISCHARGE INSTRUCTIONS
Discharge information    Ok to remove dressing on 3/9/24 at 11am.  Ok to shower on 3/9/24, no baths, jacuzzis, or swimming. Do not get submerged in a body of water (leads to increased risk of infection.)  Re-apply a new band-aid daily for 5 days or until scab formed.   Healing is when a scab is created and falls off, usually within 5-10 days.     Look for signs and symptoms of infection:  Including: redness, swelling, discharge such as pus, or odor from site.    Look for bleeding from site:  If bleeding occurs hold pressure for 5 minutes with paper towel, check site if still bleeding hold for 5 more minutes  If site continues to bleed after 10 minutes call 911.    You received moderate sedation:  - Do not drive a car, or operate any machinery or power tools of any kind.  - Do not drink any alcoholic drinks.  - Do not take any over the counter medications that may cause drowsiness.  - Do not make any important decisions or sign any legal documents.  - You need to have a responsible adult accompany you home.  - You may resume your normal diet.  - We strongly suggest that a responsible adult be with you for the rest of the day and also during the night. This is for your protection and safety.     For questions related to your procedure:  Please call [phone] between the hours of 7:00am-5:00pm Monday through Friday.  Please call [phone] for Resident after 5:00pm on weeknights and on weekends and holidays.     In the event of an emergency call 911 or go to your nearest emergency room.

## 2024-03-12 ENCOUNTER — APPOINTMENT (OUTPATIENT)
Dept: NEUROSURGERY | Facility: CLINIC | Age: 79
End: 2024-03-12
Payer: MEDICARE

## 2024-03-13 ENCOUNTER — APPOINTMENT (OUTPATIENT)
Dept: NEUROSURGERY | Facility: HOSPITAL | Age: 79
End: 2024-03-13
Payer: MEDICARE

## 2024-03-20 ENCOUNTER — MULTIDISCIPLINARY MEETING (OUTPATIENT)
Dept: NEUROSURGERY | Facility: HOSPITAL | Age: 79
End: 2024-03-20
Payer: MEDICARE

## 2024-03-21 ENCOUNTER — LAB (OUTPATIENT)
Dept: LAB | Facility: LAB | Age: 79
End: 2024-03-21
Payer: MEDICARE

## 2024-03-21 ENCOUNTER — TELEPHONE (OUTPATIENT)
Dept: NEUROSURGERY | Facility: HOSPITAL | Age: 79
End: 2024-03-21

## 2024-03-21 ENCOUNTER — OFFICE VISIT (OUTPATIENT)
Dept: OTOLARYNGOLOGY | Facility: CLINIC | Age: 79
End: 2024-03-21
Payer: MEDICARE

## 2024-03-21 VITALS — BODY MASS INDEX: 20.75 KG/M2 | WEIGHT: 117.1 LBS | HEIGHT: 63 IN | TEMPERATURE: 96.6 F

## 2024-03-21 DIAGNOSIS — R49.8 OTHER VOICE AND RESONANCE DISORDERS: ICD-10-CM

## 2024-03-21 DIAGNOSIS — J38.1 REINKE'S EDEMA OF VOCAL FOLDS: Primary | ICD-10-CM

## 2024-03-21 DIAGNOSIS — I25.10 CORONARY ARTERY DISEASE INVOLVING NATIVE CORONARY ARTERY OF NATIVE HEART WITHOUT ANGINA PECTORIS: ICD-10-CM

## 2024-03-21 DIAGNOSIS — E78.00 HYPERCHOLESTEROLEMIA: ICD-10-CM

## 2024-03-21 DIAGNOSIS — J38.3: ICD-10-CM

## 2024-03-21 DIAGNOSIS — I65.23 CAROTID STENOSIS, BILATERAL: ICD-10-CM

## 2024-03-21 DIAGNOSIS — R49.8 VOICE FATIGUE: ICD-10-CM

## 2024-03-21 DIAGNOSIS — J38.7 LEUKOPLAKIA OF LARYNX: ICD-10-CM

## 2024-03-21 DIAGNOSIS — J38.4 LARYNX EDEMA: ICD-10-CM

## 2024-03-21 LAB
CHOLEST SERPL-MCNC: 182 MG/DL (ref 0–199)
CHOLESTEROL/HDL RATIO: 1.7
HDLC SERPL-MCNC: 108.7 MG/DL
LDLC SERPL CALC-MCNC: 61 MG/DL
NON HDL CHOLESTEROL: 73 MG/DL (ref 0–149)
TRIGL SERPL-MCNC: 60 MG/DL (ref 0–149)
VLDL: 12 MG/DL (ref 0–40)

## 2024-03-21 PROCEDURE — 36415 COLL VENOUS BLD VENIPUNCTURE: CPT

## 2024-03-21 PROCEDURE — 1160F RVW MEDS BY RX/DR IN RCRD: CPT | Performed by: OTOLARYNGOLOGY

## 2024-03-21 PROCEDURE — 99214 OFFICE O/P EST MOD 30 MIN: CPT | Performed by: OTOLARYNGOLOGY

## 2024-03-21 PROCEDURE — 1159F MED LIST DOCD IN RCRD: CPT | Performed by: OTOLARYNGOLOGY

## 2024-03-21 PROCEDURE — 31579 LARYNGOSCOPY TELESCOPIC: CPT | Performed by: OTOLARYNGOLOGY

## 2024-03-21 PROCEDURE — 80061 LIPID PANEL: CPT

## 2024-03-21 PROCEDURE — 1036F TOBACCO NON-USER: CPT | Performed by: OTOLARYNGOLOGY

## 2024-03-21 ASSESSMENT — PATIENT HEALTH QUESTIONNAIRE - PHQ9
1. LITTLE INTEREST OR PLEASURE IN DOING THINGS: NOT AT ALL
SUM OF ALL RESPONSES TO PHQ9 QUESTIONS 1 AND 2: 0
2. FEELING DOWN, DEPRESSED OR HOPELESS: NOT AT ALL

## 2024-03-21 NOTE — PROGRESS NOTES
ASSESSMENT AND PLAN:   Lynsey Forman is a 78 y.o. female with a history of voice changes that occurred with treatment of Moriah's edema/scarring. She has an excellent quality to her voice and is not bothered by anything. Her friends report her voice is much better.     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 She is smoking marijuana frequently. No cigarettes.     We discussed findings on exam are recommended a follow up in one year, or sooner if she has recurrence of changes prior to that.        Reason For Consult  No chief complaint on file.       HISTORY OF PRESENT ILLNESS:  Lynsey Forman is a 78 y.o. female presenting for a follow up visit with history of dysphonia and Moriah's edema of bilateral vocal cords presenting for a post op visit, s/p Moriah's edema surgery performed on 08/09/23. Pathology showed low grade dysplasia on her pathology specimens that were sent. No invasive cancer seen.    The patient reports improvement with her voice.  No new concerns regarding voice, airway, or swallow. She is still smoking marijuana.       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     Today's exam to include stroboscopy showed leukoplakia on right ventricle not causing disruption in vibratory wave. Her pitch 180-300hz.      We discussed findings and relative risk of this changing to a SCCa. Discussed tobacco cessation.      I would like to see the patient back in 6 months or sooner if her voice changes.      Past Medical History  She has a past medical history of Abnormal kidney function (01/08/2024), Alcohol use, unspecified, uncomplicated (12/20/2022), Chronic cough (11/24/2009), Diverticulosis (12/21/2016), History of arthrodesis (07/07/2023), History of substance dependence (CMS/[facility]) (07/07/2023), Shortness of breath  (01/08/2024), Sinus bradycardia (01/04/2013), and Tachycardia (01/08/2024). Surgical History  She has a past surgical history that includes Other surgical history (10/31/2022); Other surgical history (10/31/2022); and Other surgical history (10/31/2022).   Social History  She reports that she has quit smoking. Her smoking use included cigarettes. She has never used smokeless tobacco. She reports current alcohol use of about 4.0 standard drinks of alcohol per week. She reports current drug use. Frequency: 7.00 times per week. Drug: Marijuana. Allergies  Codeine     Family History  Family History   Problem Relation Name Age of Onset    Heart failure Mother      Dementia Mother      Hypertension Mother         Review of Systems  All 10 systems were reviewed and negative except for above.      Last Recorded Vitals  There were no vitals taken for this visit.    Physical Exam  ENT Physical Exam  Constitutional  Appearance: patient appears well-developed and well-nourished,  Head and Face  Appearance: head appears normal and face appears normal;  Ear  Auricles: right auricle normal; left auricle normal;  Nose  External Nose: nares patent bilaterally;  Oral Cavity/Oropharynx  Lips: normal;  Neck  Neck: neck normal; neck palpation normal;  Respiratory  Inspection: no retractions visible;  Cardiovascular  Inspection: no peripheral edema present;  Neurovestibular  Mental Status: alert and oriented;  Psychiatric: mood normal;  Cranial Nerves: cranial nerves intact;             Procedures     Flexible Laryngoscopy w/ Videostroboscopy    VOICE AND SPEECH CHARACTERISTICS:  Normal spoken speech, (+) mild dysphonia, (+) mild roughness, no breathiness, no asthenia, (+) mild strain.      Intelligibility: normal.   Resonance: balanced.   Vocal Loudness: normal.   Breath Support: normal.    PROCEDURE:    Indications: voice change  PROCEDURE NOTE: FLEXIBLE LARYNGOSCOPY WITH STROBOSCOPY  I recommended a flexible laryngoscopy with  stroboscopy based on PE findings, and/or concern for mucosal wave details based upon history and/or for issues associated with hyperreflexic gag on mirror exam concerning for pathology. Risks, benefits, and alternatives were explained. The patient wishes to proceed and gives verbal consent.   Patient is seated in the exam chair. After adequate topical anesthesia, I advance the flexible endoscope. The examination included evaluation of the su, vallecula, base of tongue, pyriforms, post-cricoid area, larynx and immediate subglottis.  Findings : assessment of the nasopharynx, base of tongue/vallecula, pyriform sinuses, post-cricoid area and pharyngeal walls was without lesion or mass, pharyngeal wall contraction is normal and symmetric, and no pooling of secretions  TVC edema: 1 (mild)  Gross Arytenoid Movement: symmetric.  Arytenoid Height: normal.   Supraglottic Tension: lateral.  Symmetry: asymmetry.   Amplitude: reduced: right.  Phase Closure: in-phase.  Mucosal Wave Lateral Excursion/Secondary Wave: Right Vocal Cord: mild restriction - Wave moved more than ¼, less than ½ the width of the vocal fold.  Periodicity: normal.  Closure: closed.      Time Spent  Prep time on day of patient encounter: 10 minutes  Time spent directly with patient, family or caregiver: 15 minutes  Additional Time Spent on Patient Care Activities/Discussion with SLP re care plan: 5 minutes  Documentation Time: 10 minutes  Other Time Spent: 0 minutes  Total: 40 minutes       Scribe Attestation  By signing my name below, Bibi FITZGERALD , Scribe attest that this documentation has been prepared under the direction and in the presence of Louie Garcias MD.

## 2024-03-21 NOTE — TELEPHONE ENCOUNTER
Patient called the office today because she had an angio done on 3/8/24 and wanted to discuss a plan and pain she is currently experiencing. I let the patient know her case was discussed in CV conference yesterday afternoon and she would need to come in to discuss results with . She is experiencing lumbar pain currently, unrelated to the angio site and is anxious to have spinal surgery in [place] with . The patient had another DrOlamide appointment this afternoon so she is going to call me tomorrow to finish our conversation. I will have the  call her tomorrow as well to schedule her FUV with .    Yadira Heart R.N.

## 2024-03-22 NOTE — PROGRESS NOTES
Cerebrovascular Conference    03/20/2024     Case Review:  h/o a flutter on xarelto, HTN, tobacco use, lumbar spinal stenosis, incidental R MCA aneurysm on carotid stenosis workup, DSA >80% stenosis of BL ICAs, 6.6mm R MCA aneurysm.        Recommendations:   To follow up in clinic with Dr. Gonzalez to discuss possible treatment of carotid stenosis while observing aneurysm; or observation for both cerebral aneurysm and carotid stenosis.       Cerebrovascular conference is a multidisciplinary conferences attended by neurosurgery, neuro-radiology, APPs, and Rns.

## 2024-03-25 ENCOUNTER — OFFICE VISIT (OUTPATIENT)
Dept: VASCULAR SURGERY | Facility: HOSPITAL | Age: 79
End: 2024-03-25
Payer: MEDICARE

## 2024-03-25 VITALS
OXYGEN SATURATION: 93 % | HEART RATE: 57 BPM | DIASTOLIC BLOOD PRESSURE: 67 MMHG | SYSTOLIC BLOOD PRESSURE: 147 MMHG | BODY MASS INDEX: 21.21 KG/M2 | HEIGHT: 63 IN | WEIGHT: 119.7 LBS

## 2024-03-25 DIAGNOSIS — I65.23 CAROTID STENOSIS, BILATERAL: ICD-10-CM

## 2024-03-25 DIAGNOSIS — I67.1 CEREBRAL ARTERIAL ANEURYSM (HHS-HCC): Primary | ICD-10-CM

## 2024-03-25 PROBLEM — J38.7 LEUKOPLAKIA OF LARYNX: Status: ACTIVE | Noted: 2024-03-25

## 2024-03-25 PROBLEM — J38.4 EDEMA OF LARYNX: Status: ACTIVE | Noted: 2024-03-25

## 2024-03-25 PROBLEM — R49.8 VOCAL FATIGUE: Status: ACTIVE | Noted: 2024-03-25

## 2024-03-25 PROCEDURE — 99214 OFFICE O/P EST MOD 30 MIN: CPT | Performed by: SURGERY

## 2024-03-25 PROCEDURE — 1159F MED LIST DOCD IN RCRD: CPT | Performed by: SURGERY

## 2024-03-25 PROCEDURE — 3077F SYST BP >= 140 MM HG: CPT | Performed by: SURGERY

## 2024-03-25 PROCEDURE — 1160F RVW MEDS BY RX/DR IN RCRD: CPT | Performed by: SURGERY

## 2024-03-25 PROCEDURE — 3078F DIAST BP <80 MM HG: CPT | Performed by: SURGERY

## 2024-03-25 PROCEDURE — 1036F TOBACCO NON-USER: CPT | Performed by: SURGERY

## 2024-03-25 ASSESSMENT — PATIENT HEALTH QUESTIONNAIRE - PHQ9
2. FEELING DOWN, DEPRESSED OR HOPELESS: NOT AT ALL
SUM OF ALL RESPONSES TO PHQ9 QUESTIONS 1 AND 2: 0
1. LITTLE INTEREST OR PLEASURE IN DOING THINGS: NOT AT ALL

## 2024-03-25 ASSESSMENT — COLUMBIA-SUICIDE SEVERITY RATING SCALE - C-SSRS
1. IN THE PAST MONTH, HAVE YOU WISHED YOU WERE DEAD OR WISHED YOU COULD GO TO SLEEP AND NOT WAKE UP?: NO
2. HAVE YOU ACTUALLY HAD ANY THOUGHTS OF KILLING YOURSELF?: NO
6. HAVE YOU EVER DONE ANYTHING, STARTED TO DO ANYTHING, OR PREPARED TO DO ANYTHING TO END YOUR LIFE?: NO

## 2024-03-25 ASSESSMENT — ENCOUNTER SYMPTOMS
NEUROLOGICAL NEGATIVE: 1
BACK PAIN: 1
PSYCHIATRIC NEGATIVE: 1
EYES NEGATIVE: 1
RESPIRATORY NEGATIVE: 1
GASTROINTESTINAL NEGATIVE: 1
CONSTITUTIONAL NEGATIVE: 1
CARDIOVASCULAR NEGATIVE: 1

## 2024-03-25 NOTE — PROGRESS NOTES
"Vascular Surgery Consult/Clinic Note    HPI:  78F hx bilaterally asymptomatic >80% (by cerebral angiogram) CA stenosis and 6mm R MCA aneurysm.     Last seen by Dr. Blackman on 2/12/24. In the setting of the newly discovered R MCA aneurysm, plan was to have her seen NSGY regarding it to see if it needed to be fixed prior to carotid intervention. Hasn't seen NSGY yet, but has had cerebral angiogram on 3/8/2024 that demonstrated >80% stenosis of bilaterally ICA and the aforementioned 6mm R MCA aneurysm. She has not yet seen NSGY in clinic to discuss what is to be done regarding the MCA aneurysm.     Having a lot of back pain. Saw surgeon in [place]. They are okay with proceeding to treat back pain surgically if  vascular surgery (for carotids) and NSGY (for R MCA aneurysm) \"are okay with it.\"    She would like to have the back surgery done first before any other intervention because her back pain is severe.    No amaurosis fugax or unilateral weakness/numbness.    No other concerns at this time.    Meds:   Current Outpatient Medications on File Prior to Visit   Medication Sig Dispense Refill    amLODIPine (Norvasc) 5 mg tablet TAKE 1 TABLET BY MOUTH DAILY 90 tablet 1    aspirin 81 mg EC tablet Take 1 tablet (81 mg) by mouth once daily. 30 tablet 11    atorvastatin (Lipitor) 80 mg tablet Take 1 tablet (80 mg) by mouth once daily. 90 tablet 3    gabapentin (Neurontin) 300 mg capsule Take 1 capsule (300 mg) by mouth 3 times a day.      hydrOXYzine HCL (Atarax) 25 mg tablet Take 1 tablet (25 mg) by mouth 2 times a day as needed for anxiety.      lisinopril 20 mg tablet TAKE 1 TABLET BY MOUTH DAILY 90 tablet 1    meloxicam (Mobic) 15 mg tablet TAKE 1 TABLET(15 MG) BY MOUTH EVERY DAY 30 tablet 1    rivaroxaban (Xarelto) 15 mg tablet Take 1 tablet (15 mg) by mouth once daily in the evening. Take with food. 90 tablet 3    triamterene-hydrochlorothiazid (Maxzide-25) 37.5-25 mg tablet TAKE 1 TABLET BY MOUTH DAILY 90 tablet 1 "     No current facility-administered medications on file prior to visit.      Allergies:   Allergies   Allergen Reactions    Codeine Anxiety, Itching, Unknown and Other     Denies itching, hive, shortness of breath     SH:    Social Determinants of Health     Tobacco Use: Medium Risk (3/21/2024)    Patient History     Smoking Tobacco Use: Former     Smokeless Tobacco Use: Never     Passive Exposure: Not on file   Alcohol Use: Not on file   Financial Resource Strain: Not on file   Food Insecurity: Not on file   Transportation Needs: Not on file   Physical Activity: Not on file   Stress: Not on file   Social Connections: Not on file   Intimate Partner Violence: Not on file   Depression: Not at risk (3/25/2024)    PHQ-2     PHQ-2 Score: 0   Housing Stability: Not on file   Utilities: Not on file   Digital Equity: Not on file      FH:  Family History   Problem Relation Name Age of Onset    Heart failure Mother      Dementia Mother      Hypertension Mother        ROS:  Review of Systems   Constitutional: Negative.    HENT: Negative.     Eyes: Negative.    Respiratory: Negative.     Cardiovascular: Negative.    Gastrointestinal: Negative.    Genitourinary: Negative.    Musculoskeletal:  Positive for back pain.   Skin: Negative.    Neurological: Negative.    Psychiatric/Behavioral: Negative.        Objective:  Vitals:  Vitals:    03/25/24 1403   BP: 147/67   Pulse: 57   SpO2: 93%        Exam:  Gen: in NAD. Occasionally tearful.  GI: Soft, ND/NT  Card: RR on palp of radial pulse  Pulm: Nonlabored breathing on room air.  Ext: MAEx4. Motor and sensory intact. Palpable radial pulses.  Neuro: No focal deficits.    Imaging:  3/8/2024 Neuro IR angiogrma  - b/l carotid artery stenosis, >80%  - 6mm R MCA aneurysm    Assessment & Plan:  78F hx bilaterally asymptomatic >80% (by cerebral angiogram) CA stenosis and 6mm R MCA aneurysm.     - Remains asymptomatic.  - Seeing NSGY tomorrow (Dr. Gibbons)  - There is risk associated with her  proceeding with spine surgery w/ b/l CA stenosis; Dr. Gibbons more appropriate to make recommendation.     Plan:  - Will defer to Dr. Gibbons of NSGY regarding carotid intervention and whether okay to undergo spinal surgery given cerebral aneurysm and carotid artery stenosis.  - Recommend continuing Aspirin if able.    Patient seen and discussed with attending, Dr. Blackman.    Joel Craig MD  Vascular Surgery, PGY3

## 2024-03-26 ENCOUNTER — OFFICE VISIT (OUTPATIENT)
Dept: NEUROSURGERY | Facility: HOSPITAL | Age: 79
End: 2024-03-26
Payer: MEDICARE

## 2024-03-26 ENCOUNTER — APPOINTMENT (OUTPATIENT)
Dept: NEUROSURGERY | Facility: HOSPITAL | Age: 79
End: 2024-03-26
Payer: MEDICARE

## 2024-03-26 VITALS
BODY MASS INDEX: 21.21 KG/M2 | DIASTOLIC BLOOD PRESSURE: 78 MMHG | HEART RATE: 51 BPM | TEMPERATURE: 97.5 F | SYSTOLIC BLOOD PRESSURE: 135 MMHG | RESPIRATION RATE: 16 BRPM | HEIGHT: 63 IN | WEIGHT: 119.71 LBS

## 2024-03-26 DIAGNOSIS — I65.23 BILATERAL CAROTID ARTERY OCCLUSION: ICD-10-CM

## 2024-03-26 DIAGNOSIS — I67.1 MIDDLE CEREBRAL ARTERY ANEURYSM (HHS-HCC): Primary | ICD-10-CM

## 2024-03-26 PROCEDURE — 1036F TOBACCO NON-USER: CPT | Performed by: NEUROLOGICAL SURGERY

## 2024-03-26 PROCEDURE — 99215 OFFICE O/P EST HI 40 MIN: CPT | Performed by: NEUROLOGICAL SURGERY

## 2024-03-26 PROCEDURE — 1160F RVW MEDS BY RX/DR IN RCRD: CPT | Performed by: NEUROLOGICAL SURGERY

## 2024-03-26 PROCEDURE — 3075F SYST BP GE 130 - 139MM HG: CPT | Performed by: NEUROLOGICAL SURGERY

## 2024-03-26 PROCEDURE — 3078F DIAST BP <80 MM HG: CPT | Performed by: NEUROLOGICAL SURGERY

## 2024-03-26 PROCEDURE — 1159F MED LIST DOCD IN RCRD: CPT | Performed by: NEUROLOGICAL SURGERY

## 2024-03-26 NOTE — PROGRESS NOTES
"[facility]  Neurosurgery    History of Present Illness      Lynsey Forman is a 78-year-old female with a PMH significant for atrial flutter on Xarelto (s/p ablation x 2), HTN, former tobacco use, lumbar stenosis, who was found to have bilateral carotid stenosis and incidental R MCA aneurysm upon workup for spinal surgery. Patient was evaluate in clinic back in February where she was recommended for formal diagnostic evaluation by angiogram which she completed 03/08/24. DSA with >50% stenosis of bilateral ICAs and a 6.6mm R MCA aneurysm. She was last seen by vascular surgery on 03/35 who recommended her to continue ASA and defer clearance for spinal surgery to neurosurgery. Patient presents to clinic for FUV to discuss angiogram results and clearance for spinal surgery.     She  remains severely debilitated by her back pain.  She was seen by vascular surgery in follow-up, who deferred discussion of the risks of proceeding with her lumbar surgery to our visit.    Her imaging was reviewed at our multidisciplinary neurovascular conference.  Angiogram demonstrated bilateral severe stenosis greater than 80% with calcifications, and a 6.6 mm right ICA aneurysm without any specific high risk features.    Her Errol's history of note includes Xarelto for A-fib, and a history of prior anterior cervical surgeries.          Objective      Vitals:   /78   Pulse 51   Temp 36.4 °C (97.5 °F)   Resp 16   Ht 1.6 m (5' 3\")   Wt 54.3 kg (119 lb 11.4 oz)   BMI 21.21 kg/m²         Physical Exam:    Patient is awake and alert.  Speech fluent.  HARMON         Relevant Results:    Reviewed her angiographic imaging as noted above.        Assessment & Plan    Discussed with patient that the finding of the right MCA aneurysm is felt to represent a low risk overall of future rupture, annually less than 1%.  At this time we would recommend observant management and a 6-month MRA for initial evaluation for any interval " growth.    In regards to her bilateral carotid stenosis although asymptomatic given that they are both of 80% severity, we would entertain intervention.  Typically this would be surgically, but we would have to evaluate her candidacy for this in the setting of prior anterior cervical surgeries. However, her own priority given the severe back pain and its effect on her quality of life, is undergoing her lumbar surgery    We did discuss that in the setting of her carotid disease, her perioperative risk for lumbar surgery is elevated as she would be off antiplatelets, and risk for hypoperfusion with any episodes of hypotension perioperatively. Additionally she would need to be off Xarelto for the surgery and is inquiring about the perioperative risk of being off anticoagulation.  Both for purposes of the discontinuation of Xarelto, and risk assessment of her known carotid disease, we are referring her to our stroke neurology colleagues for further discussion.  The lumbar surgery is a relatively short surgery by her report to be done minimally invasively with plan discharge same day, which may relatively mitigate but not remove the elevated risks.    As regards to her follow-up, we will plan to see her back in 6 months with a follow-up vascular imaging MRA to evaluate her aneurysm.  We can readdress the issue of her carotid stenosis, once she has made decisions regarding proceeding with lumbar surgery in the interim.    Total time for this visit was 45 minutes    Diagnosis:  Diagnoses and all orders for this visit:  Middle cerebral artery aneurysm  -     MR angio head wo IV contrast; Future  Bilateral carotid artery occlusion  -     Referral to Neurology; Future          Provider Impression:         Medical History     Past Medical History:   Diagnosis Date    Abnormal kidney function 01/08/2024    Alcohol use, unspecified, uncomplicated 12/20/2022    Chronic cough 11/24/2009    Formatting of this note might be  different from the original. Overview: No change with stopping lisinopril, no change with Advair, consensus of ENT, pulmonary, here is LP reflux, has associated past-nasal drip that does not respond to Mucinex or Flonase, 2012 has large nasal polyp L Formatting of this note might be different from the original. No change with stopping lisinopril, no change with Adv    Diverticulosis 12/21/2016    History of arthrodesis 07/07/2023    History of substance dependence (CMS/[facility]) 07/07/2023    Shortness of breath 01/08/2024    Sinus bradycardia 01/04/2013    Formatting of this note might be different from the original. Overview: Asymptomatic, noticed on exam Formatting of this note might be different from the original. Asymptomatic, noticed on exam    Tachycardia 01/08/2024     Past Surgical History:   Procedure Laterality Date    OTHER SURGICAL HISTORY  10/31/2022    Cervical surgery    OTHER SURGICAL HISTORY  10/31/2022    Oral surgery    OTHER SURGICAL HISTORY  10/31/2022    Anterior cruciate ligament repair     Social History     Tobacco Use    Smoking status: Former     Types: Cigarettes    Smokeless tobacco: Never   Substance Use Topics    Alcohol use: Yes     Alcohol/week: 4.0 standard drinks of alcohol     Types: 1 Glasses of wine, 3 Shots of liquor per week     Comment: daily    Drug use: Yes     Frequency: 7.0 times per week     Types: Marijuana     Comment: smikes marijuana for pain daily     Family History   Problem Relation Name Age of Onset    Heart failure Mother      Dementia Mother      Hypertension Mother       Allergies   Allergen Reactions    Codeine Anxiety, Itching, Unknown and Other     Denies itching, hive, shortness of breath     Current Outpatient Medications   Medication Instructions    amLODIPine (NORVASC) 5 mg, oral, Daily    aspirin 81 mg, oral, Daily    atorvastatin (LIPITOR) 80 mg, oral, Daily    gabapentin (Neurontin) 300 mg capsule 1 capsule, oral, 3 times daily    hydrOXYzine HCL  (Atarax) 25 mg tablet 1 tablet, oral, 2 times daily PRN    lisinopril 20 mg, oral, Daily    meloxicam (MOBIC) 15 mg, oral, Daily    rivaroxaban (Xarelto) 15 mg tablet Take 1 tablet (15 mg) by mouth once daily in the evening. Take with food.    triamterene-hydrochlorothiazid (Maxzide-25) 37.5-25 mg tablet 1 tablet, oral, Daily

## 2024-03-30 PROCEDURE — RXMED WILLOW AMBULATORY MEDICATION CHARGE

## 2024-04-02 ENCOUNTER — OFFICE VISIT (OUTPATIENT)
Dept: CARDIOLOGY | Facility: HOSPITAL | Age: 79
End: 2024-04-02
Payer: MEDICARE

## 2024-04-02 ENCOUNTER — APPOINTMENT (OUTPATIENT)
Dept: NEUROSURGERY | Facility: CLINIC | Age: 79
End: 2024-04-02
Payer: MEDICARE

## 2024-04-02 DIAGNOSIS — E78.00 HYPERCHOLESTEROLEMIA: ICD-10-CM

## 2024-04-02 DIAGNOSIS — I65.23 BILATERAL CAROTID ARTERY OCCLUSION: ICD-10-CM

## 2024-04-02 DIAGNOSIS — I48.92 ATRIAL FLUTTER, UNSPECIFIED TYPE (MULTI): Primary | ICD-10-CM

## 2024-04-02 DIAGNOSIS — I10 PRIMARY HYPERTENSION: ICD-10-CM

## 2024-04-02 DIAGNOSIS — I25.10 ARTERIOSCLEROSIS OF CORONARY ARTERY: ICD-10-CM

## 2024-04-02 LAB
ATRIAL RATE: 54 BPM
P AXIS: 89 DEGREES
P OFFSET: 202 MS
P ONSET: 129 MS
PR INTERVAL: 190 MS
Q ONSET: 224 MS
QRS COUNT: 9 BEATS
QRS DURATION: 84 MS
QT INTERVAL: 450 MS
QTC CALCULATION(BAZETT): 426 MS
QTC FREDERICIA: 434 MS
R AXIS: 29 DEGREES
T AXIS: -33 DEGREES
T OFFSET: 449 MS
VENTRICULAR RATE: 54 BPM

## 2024-04-02 PROCEDURE — 1160F RVW MEDS BY RX/DR IN RCRD: CPT | Performed by: NURSE PRACTITIONER

## 2024-04-02 PROCEDURE — 99214 OFFICE O/P EST MOD 30 MIN: CPT | Mod: 25 | Performed by: NURSE PRACTITIONER

## 2024-04-02 PROCEDURE — 93005 ELECTROCARDIOGRAM TRACING: CPT | Performed by: NURSE PRACTITIONER

## 2024-04-02 PROCEDURE — 1157F ADVNC CARE PLAN IN RCRD: CPT | Performed by: NURSE PRACTITIONER

## 2024-04-02 PROCEDURE — 99214 OFFICE O/P EST MOD 30 MIN: CPT | Performed by: NURSE PRACTITIONER

## 2024-04-02 PROCEDURE — 1159F MED LIST DOCD IN RCRD: CPT | Performed by: NURSE PRACTITIONER

## 2024-04-02 RX ORDER — FUROSEMIDE 20 MG/1
20 TABLET ORAL AS NEEDED
COMMUNITY
End: 2024-04-02 | Stop reason: SDUPTHER

## 2024-04-02 RX ORDER — EZETIMIBE 10 MG/1
10 TABLET ORAL DAILY
Qty: 30 TABLET | Refills: 11 | Status: SHIPPED | OUTPATIENT
Start: 2024-04-02 | End: 2025-04-02

## 2024-04-02 RX ORDER — FUROSEMIDE 20 MG/1
20 TABLET ORAL AS NEEDED
Qty: 30 TABLET | Refills: 0 | Status: SHIPPED | OUTPATIENT
Start: 2024-04-02 | End: 2024-06-11

## 2024-04-02 NOTE — PROGRESS NOTES
Primary Care Physician: Windy Bartholomew MD  Date of Visit: 04/02/2024  1:00 PM EDT  Location of visit: [facility]     Chief Complaint:   Chief Complaint   Patient presents with    Follow-up        HPI / Summary:   Lynsey Forman is a 78 y.o. female presents for followup. Seen in collaboration with Dr. Mcdonald. She is able to ambulate up 1 flight of stairs without chest pain or dyspnea. Her activity is limited due to chronic back pain. She has severe back pain and is in the process of getting clearance for  lumbar spinal surgery. During this time she got a carotid ultrasound that showed severe bilateral carotid stenosis. She was seen by Dr. Blackman. 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 She was seen by neurosurgery at which time it was recommended she undergo an angiogram for better evaluation of the MCA aneurysm. Angiogram showed >80% stenosis of bilateral ICAs, 6.6 mm x 3.1 mm x 4.4 mm R MCA aneurysm. She was recently seen again by neurosurgery at which time risks were discussed regarding her carotid stenosis and spinal surgery. She has an upcoming appointment with neuro stroke team to further discuss her risk given her severe bilateral carotid disease.  She does take Furosemide 20 mg once a month for lower extremity edema.The patient denies chest pain, shortness of breath, palpitations, lightheadedness, syncope, orthopnea, paroxysmal nocturnal dyspnea, or bleeding problems.              Past Medical History:  Past Medical History:   Diagnosis Date    Abnormal kidney function 01/08/2024    Alcohol use, unspecified, uncomplicated 12/20/2022    Chronic cough 11/24/2009    Formatting of this note might be different from the original. Overview: No change with stopping lisinopril, no change with Advair, consensus of ENT, pulmonary, here is LP reflux, has associated past-nasal drip that does not respond to Mucinex or  Flonase, 2012 has large nasal polyp L Formatting of this note might be different from the original. No change with stopping lisinopril, no change with Adv    Diverticulosis 12/21/2016    History of arthrodesis 07/07/2023    History of substance dependence (CMS/[facility]) 07/07/2023    Shortness of breath 01/08/2024    Sinus bradycardia 01/04/2013    Formatting of this note might be different from the original. Overview: Asymptomatic, noticed on exam Formatting of this note might be different from the original. Asymptomatic, noticed on exam    Tachycardia 01/08/2024        Past Surgical History:  Past Surgical History:   Procedure Laterality Date    OTHER SURGICAL HISTORY  10/31/2022    Cervical surgery    OTHER SURGICAL HISTORY  10/31/2022    Oral surgery    OTHER SURGICAL HISTORY  10/31/2022    Anterior cruciate ligament repair          Social History:   reports that she has quit smoking. Her smoking use included cigarettes. She has never used smokeless tobacco. She reports current alcohol use of about 4.0 standard drinks of alcohol per week. She reports current drug use. Frequency: 7.00 times per week. Drug: Marijuana.     Family History:  family history includes Dementia in her mother; Heart failure in her mother; Hypertension in her mother.      Allergies:  Allergies   Allergen Reactions    Codeine Anxiety, Itching, Unknown and Other     Denies itching, hive, shortness of breath       Outpatient Medications:  Current Outpatient Medications   Medication Instructions    amLODIPine (NORVASC) 5 mg, oral, Daily    aspirin 81 mg, oral, Daily    atorvastatin (LIPITOR) 80 mg, oral, Daily    furosemide (LASIX) 20 mg, oral, As needed    gabapentin (Neurontin) 300 mg capsule 1 capsule, oral, 3 times daily    hydrOXYzine HCL (Atarax) 25 mg tablet 1 tablet, oral, As needed    lisinopril 20 mg, oral, Daily    meloxicam (MOBIC) 15 mg, oral, Daily    rivaroxaban (Xarelto) 15 mg tablet Take 1 tablet (15 mg) by mouth once daily in the  "evening. Take with food.    triamterene-hydrochlorothiazid (Maxzide-25) 37.5-25 mg tablet 1 tablet, oral, Daily       Physical Exam:  There were no vitals filed for this visit.  Wt Readings from Last 5 Encounters:   03/26/24 54.3 kg (119 lb 11.4 oz)   03/25/24 54.3 kg (119 lb 11.2 oz)   03/21/24 53.1 kg (117 lb 1.6 oz)   03/08/24 53.1 kg (117 lb)   02/22/24 53.1 kg (117 lb)     There is no height or weight on file to calculate BMI.     GENERAL: alert, cooperative, pleasant, in no acute distress  SKIN: warm and dry  NECK: Normal JVD, negative HJR  CARDIAC: Regular rate and rhythm with no rubs, murmurs, or gallops  CHEST: Normal respiratory efforts, lungs clear to auscultation bilaterally.  ABDOMEN: soft, nontender, nondistended  EXTREMITIES: no edema, +2 palpable RP bilaterally       Last Labs:  Recent Labs     02/28/24  1216 06/27/23  1148 04/04/23  1419   WBC 6.3 6.8 7.4   HGB 13.0 14.0 13.9   HCT 38.0 40.0 39.8    185 216   MCV 98 95 96     Recent Labs     02/22/24  1213 06/27/23  1148 06/01/23  1447   * 131* 133*   K 5.2 4.3 3.9   CL 97* 94* 96*   BUN 25* 24* 21   CREATININE 1.24* 1.17* 1.12*     CMP -  Lab Results   Component Value Date    CALCIUM 9.5 02/22/2024    PHOS 3.7 02/22/2024    PROT 7.2 02/06/2023    ALBUMIN 4.6 02/22/2024    AST 24 02/06/2023    ALT 16 02/06/2023    ALKPHOS 65 02/06/2023    BILITOT 0.6 02/06/2023       LIPID PANEL -   Lab Results   Component Value Date    CHOL 182 03/21/2024    .7 03/21/2024    LDLF 86 10/31/2022    TRIG 60 03/21/2024       No results found for: \"BNP\", \"HGBA1C\"    Last Cardiology Tests:  ECG:  Obtained and reviewed EKG- Sinus bradycardia HR 54 non specific T abnormality    Echo:  2/16/23  No results found for this or any previous visit from the past 365 days.   CONCLUSIONS:  1. Left ventricular systolic function is normal with a 60-65% estimated ejection fraction.  2. RVSP within normal limits.  3. Mildly dilated atria.    CT Calcium score " 4/24/23  FINDINGS:   The score and distribution of calcium in the coronary arteries is as   follows:      LM           0   LAD           464.12   LCx           300.73   RCA           966.14      Total 1730.99     Cardiac MRI stress test 11/1/23  Impression   1. No evidence of inducible myocardial ischemia with regadenoson  stress perfusion imaging.  2. Normal LV size and hyperdynamic systolic function. Quantitative  LVEF 75%.  3. Trivial MR (RF 10%), trivial AI (RF 7%).  4. Mild biatrial enlargement.  5. Normal RV size and systolic function. Quantitative RVEF 60%,        12/5/23 Carotid artery ultrasound  CONCLUSIONS:  Right Carotid: Findings are consistent with greater than 70% stenosis of the right proximal internal carotid artery. Turbulent flow seen by color Doppler. Right ICA proximal segment degree of stenosis may be underestimated due to calcified shadowing plaque. Right external carotid artery appears patent with no evidence of stenosis. No evidence of hemodynamically significant stenosis of the right common carotid artery. The right vertebral artery is patent with antegrade flow. No evidence of hemodynamically significant stenosis in the right subclavian artery.  Left Carotid: Findings are consistent with greater than 70% stenosis of the left proximal internal carotid artery. Turbulent flow seen by color Doppler. Left external carotid artery appears patent with no evidence of stenosis. Left ECA proximal segment degree of stenosis may be underestimated due to calcified shadowing plaque. No evidence of hemodynamically significant stenosis of the left common carotid artery. The left vertebral artery is patent with antegrade flow. No evidence of hemodynamically significant stenosis in the left subclavian artery.     CTA neck 2//824   IMPRESSION:  1. Atherosclerotic disease with severe narrowing both proximal ICAs.  2. Tortuous distal cervical vessels with a beaded appearance of the  distal right cervical ICA,  suggestive of a connective tissue disorder  such as fibromuscular dysplasia.  3. 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      Cardiac Imaging:  IR intervention NEURO stent  Narrative: Interpreted By:  Kalia Collazo and Duncan Kelsey   STUDY:  IR INTERVENTION NEURO STENT;  3/8/2024 12:36 pm      INDICATION:  Signs/Symptoms:Cerebral aneurysm.      COMPARISON:  CT angiogram 02/08/2024      ACCESSION NUMBER(S):  [id number]      ORDERING CLINICIAN:  MIRACLE CEJA      TECHNIQUE:  Risks including groin site injury, groin hematoma, pseudoaneurysm,  retroperitoneal hematoma, vessel dissection, stroke, paralysis,  contrast induced nephropathy, and death were explained to the  patient. Following discussion of risks, benefits, and alternatives,  patient agreed to proceed with cerebral angiogram and informed  consent was obtained.      The patient was monitored throughout for EKG, blood pressure, and  pulse oximetry.      Moderate sedation services (supervision of administration, induction,  and maintenance) were provided by the physician performing the  procedure with intravenous fentanyl and versed for 75 minutes. The  physician was assisted by an independent trained observer in the  continuous monitoring of patient level of consciousness and  physiologic status. There were no apparent sedation complications.      Total fluoroscopy time was 24.0 minutes. Approximately 120 ML Optiray  320 contrast was employed.      Using Seldinger technique and a right common femoral approach a 5  Luxembourger sheath was placed. Next a MCS catheter was used for selective  injections of the following arteries: Right common carotid artery,  left common carotid artery, left vertebral artery.      The catheter and then the sheath were removed. Hemostasis was  obtained with hand compression following placement of Mynx device.  The patient was taken to a hospital bed for routine post  procedure  observation and care. There were no apparent complications.      FINDINGS:  LEFT COMMON CAROTID ARTERY INJECTIONS OVER THE NECK:  DSA runs were obtained over the neck in PRUDENCE and lateral views. The  distal left common carotid artery opacifies well and appears widely  patent. The left carotid artery bifurcation is notable for irregular  indentation of the vessel lumen with an associated short-segment of  narrowing of the left internal carotid artery near the origin  measuring approximately 80% stenosis. The left external carotid  artery is completely occluded.      LEFT COMMON CAROTID ARTERY INJECTION OVER THE HEAD:  DSA runs were obtained over the head in PA, lateral, and oblique  views. The distal cervical and intracranial left internal carotid  artery opacify well and are notable for significant tortuosity in the  distal cervical segment. The left internal carotid artery bifurcates  normally into widely patent and unremarkable left anterior cerebral  and left middle cerebral arteries. No aneurysm, early draining vein,  or stenosis is seen.      LEFT VERTEBRAL ARTERY INJECTION:  DSA runs of the head were obtained in PA, lateral, and oblique views.  The distal left vertebral artery is within normal limits. A dense  network of muscular branches are visualized near the distal left  vertebral artery, as well as flash filling of left external carotid  artery branches due to anastomosis between the left vertebral artery  and left external carotid artery. Opacification of the left posterior  inferior cerebellar artery and the vertebrobasilar junction is seen  without evidence of aneurysm, vascular malformation, or stenosis. The  basilar artery is widely patent and unremarkable. Bilateral anterior  inferior cerebellar, superior cerebellar, and posterior cerebral  arteries fill within normal limits. Flash filling of the right middle  and anterior cerebral arteries via the right posterior communicating  artery is  visualized on this injection. No aneurysm, early draining  vein, or stenosis is seen.      RIGHT COMMON CAROTID ARTERY INJECTIONS OVER THE NECK:  DSA runs were obtained over the neck in YOUNG and lateral views. The  right carotid artery bifurcation is notable for a mildly irregular  indentation of the vessel lumen near the right internal carotid  artery origin with an associated short-segment of narrowing measuring  approximately 80% stenosis. The right external carotid artery and its  branches opacify well and are unremarkable.      RIGHT COMMON CAROTID ARTERY INJECTIONS OVER THE HEAD:  DSA runs were obtained over the head in PA, lateral, oblique, and 3D  views. The distal cervical and intracranial right internal carotid  artery opacify well and appear unremarkable. Flash filling of the  distal right vertebral, basilar, and bilateral posterior cerebral  arteries are visualized on this injection, likely due to anastomotic  connection between the right external carotid artery and right  vertebral artery. The right internal carotid artery bifurcates  normally into widely patent right anterior cerebral and right middle  cerebral arteries. A laterally projecting outpouching is visualized  at the right middle cerebral artery M1 bifurcation measuring  approximately 4.4 mm x 6.6 mm x 3.1 mm with a 4.2 mm neck. The  visualized branches of the right external carotid artery opacify well  and appear unremarkable. No early draining vein or stenosis is seen.      Impression: 1. 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  2. Left internal carotid artery stenosis measuring approximately 80%,  consistent with atheromatous disease.  3. Complete occlusion of the left external carotid artery.  4. Right internal carotid artery stenosis measuring approximately  80%, consistent with atheromatous disease.  5. Anastomosis between the left  vertebral artery and external carotid  artery, consistent with a normal variant.      I was present for and/or performed the critical portions of the  procedure and immediately available throughout the entire procedure.  I personally reviewed the image(s)/study and interpretation. I agree  with the findings as stated. Performed and dictated at [facility].      MACRO:  None      Signed by: Kalia Collazo 3/15/2024 9:14 AM  Dictation workstation:   [id number]        Assessment/Plan   Problem List Items Addressed This Visit          Cardiac and Vasculature    Hypercholesterolemia    Relevant Medications    ezetimibe (Zetia) 10 mg tablet    Other Relevant Orders    Lipid Panel    Hypertension    Relevant Medications    furosemide (Lasix) 20 mg tablet    Atrial flutter (Multi) - Primary    Relevant Orders    ECG 12 lead (Clinic Performed) (Completed)    Arteriosclerosis of coronary artery    Bilateral carotid artery occlusion    Relevant Medications    ezetimibe (Zetia) 10 mg tablet     In summary Ms. Forman is a pleasant 78-year-old white female with a past medical history significant for coronary artery disease with a CT calcium score 1730 with preserved LV function, hypertension, hyperlipidemia, persistent atrial flutter status post ablation, persistent atrial fibrillation status post ablation, stage III chronic kidney disease, and prior tobacco use. She is seemingly asymptomatic from a cardiac perspective but is most limited by her chronic back pain. She recently had carotid ultrasound that showed severe bilateral carotid artery disease.   She was seen by Dr. Blackman. She was subsequently referred to neurosurgery for right MCA aneurysm noted on CTA. She was seen by neurosurgery at which time it was recommended she undergo an angiogram for better evaluation of the MCA aneurysm. Angiogram showed >80% stenosis of bilateral ICAs, 6.6 mm x 3.1 mm x 4.4 mm R MCA aneurysm. She was  again recently seen by neurosurgery at which time risks were discussed regarding her carotid stenosis and spinal surgery. She has an upcoming appointment with neuro stroke team to further discuss her risk given her severe bilateral carotid disease. I will review case with Dr. Mcdonald as her severe bilateral carotid artery disease should be addressed prior to surgery, however, if this is not addressed prior to surgery she is at high risk of cardiovascular event. I will review case with Dr. Mcdonald as she is very eager to have spinal surgery due to severe pain. Her recent cardiac MRI stress test was negative for ischemia with normal LV function. Her recent lipid panel is not at goal. I did start Ezetimibe 10 mg once a day. Will check lipid panel in 3 months. She does take Furosemide as needed for lower extremity edema.  She should continue her other cardiovascular medications. We will see her back in follow-up in 6 months.          Orders:  No orders of the defined types were placed in this encounter.     Followup Appts:  Future Appointments   Date Time Provider Department Center   4/3/2024  1:30 PM PHARMACY [facility] [id number] [facility]   4/23/2024  1:00 PM Lula Chacon MD [id number] [facility]   4/25/2024  1:00 PM Andria Pope MD [id number] [facility]   5/8/2024  1:00 PM Windy Bartholomew MD [id number] [facility]   3/27/2025  1:00 PM Louie Garcias MD [id number] [facility]           ____________________________________________________________  Urszula Lebron, APRN-CNP  [facility] [place]  [facility]

## 2024-04-02 NOTE — PATIENT INSTRUCTIONS
Start Ezetimibe 10 mg once a day for cholesterol  Continue current cardiovascular medications  Check fasting lipid panel in 3 months  Follow up in 6 months  I will discuss with Dr. Mcdonald your case regarding surgery

## 2024-04-03 ENCOUNTER — PHARMACY VISIT (OUTPATIENT)
Dept: PHARMACY | Facility: CLINIC | Age: 79
End: 2024-04-03
Payer: COMMERCIAL

## 2024-04-03 ENCOUNTER — TELEMEDICINE (OUTPATIENT)
Dept: PHARMACY | Facility: HOSPITAL | Age: 79
End: 2024-04-03
Payer: MEDICARE

## 2024-04-03 DIAGNOSIS — I48.91 ATRIAL FIBRILLATION, UNSPECIFIED TYPE (MULTI): Primary | ICD-10-CM

## 2024-04-03 NOTE — Clinical Note
Ed pearson! Followed up with patient today regarding her Xarelto. She does report bruising from bumping into things, small cuts that bleed, however no large bleeding or bruising, patient takes care with handling sharp objects to reduce risk of injury. No hospitalizations or falls. Gave education to take with large meal. Will continue to follow.

## 2024-04-03 NOTE — PROGRESS NOTES
"Pharmacist Clinic: Anticoagulation Management  Lynsey Forman was referred to the Clinical Pharmacy Team for her anticoagulation management.    Referring Provider:  Urszula Lebron CNP    Last Appointment w/ Pharmacist: 1/3/2024  Pharmacist Name: Marina Gamez, PharmD  _______________________________________________________________________  PHARMACY ASSESSMENT    Review of Past Appointment:   Patient was doing well on reduced dose of Xarelto (reduced due to kidney function) for Afib   Did not have surgery as scheduled   Xarelto PA approved 9/28/2023, PAP/VAF expires 10/6/2024    RELEVANT LAB RESULTS  Lab Results   Component Value Date    BILITOT 0.6 02/06/2023    CALCIUM 9.5 02/22/2024    CO2 29 02/22/2024    CL 97 (L) 02/22/2024    CREATININE 1.24 (H) 02/22/2024    GLUCOSE 91 02/22/2024    ALKPHOS 65 02/06/2023    K 5.2 02/22/2024    PROT 7.2 02/06/2023     (L) 02/22/2024    AST 24 02/06/2023    ALT 16 02/06/2023    BUN 25 (H) 02/22/2024    ANIONGAP 13 02/22/2024    PHOS 3.7 02/22/2024    ALBUMIN 4.6 02/22/2024    GFRF 49 (A) 06/27/2023     Lab Results   Component Value Date    TRIG 60 03/21/2024    CHOL 182 03/21/2024    LDLCALC 61 03/21/2024    .7 03/21/2024     No results found for: \"BMCBC\", \"CBCDIF\"   _______________________________________________________________________  ANTICOAGULATION ASSESSMENT    The ASCVD Risk score (Anat BATES, et al., 2019) failed to calculate for the following reasons:    The valid HDL cholesterol range is 20 to 100 mg/dL    DIAGNOSIS: prevention of nonvalvular atrial fibrilliation stroke and systemic embolism  Patient is projected to be on anticoagulation indefinitely  [id number]-[id number] Score: [4] (only included if diagnosis is atrial fibrillation)   Age: [<65 (0)] [65-74 (+1)] [> 75 (+2)]: 2  Sex: [Male/Female (+1)]: 1  CHF history: [No/Yes(+1)]: 0  Hypertension history: [No/Yes(+1)]: 1  Stroke/TIA/thromboembolism history: [No/Yes(+2)]: 0  Vascular disease history (prior MI, " peripheral artery disease, aortic plaque): [No/Yes(+1)]: 0  Diabetes history: [No/Yes(+1)]: 0    CURRENT PHARMACOTHERAPY:   Xarelto 15mg daily  79 y/o  54.3 kg  Scr 1.24 (2/22/2024)  CrCl 33.09 mL/min  Aspirin 81mg daily    UPDATE ON PHARMACOTHERAPY:   Affordability/Accessibility: [facility] for Xarelto  Adherence/Organization: reports no missed doses, uses pill box   Adverse Effects:   bruising from bumping into things, small cuts that bleed, however no large bleeding or bruising, patient takes care with handling sharp objects to reduce risk of injury  Recent Hospitalizations: none   Recent Falls/Trauma: none   Changes in Tobacco or Alcohol Intake: no changes    DISCUSSION/NOTES:  Aspirin 81mg was added by vascular surgery, Dr. Wu on 1/22/2024  MCA aneurysm and coronary stenosis found in presurgical workup 2/12/2024 (Dr. Blackman)  Surgery rescheduled waiting for cardiology and vascular surgery clearance due to cardiac findings (aneurysm & stenosis)  Started Xarelto about a year ago   Had cardiology appt yesterday (4/2/2024), told to continue Xarelto therapy  Patient unsure if Xarelto needed to be taken with food, or preferred time of day, counseled patient that Xarelto is best taken with dinner (large meal)    _______________________________________________________________________  PATIENT EDUCATION/GOALS  Counseled patient on Xarelto MOA, expectations, duration of therapy, contraindications, administration, and monitoring parameters  Counseled patient on timing of administration for Xarelto and atorvastatin, to take Xarelto with evening meal, and atorvastatin may be taken in the morning or at bedtime, as long as she is consistent with what time of day  Counseled patient of side effects that are indicative of bleeding such as dark tarry stool, unexplainable bruising, or vomiting up a coffee ground like substance  Answered all patient questions and  concerns  _______________________________________________________________________  RECOMMENDATIONS/PLAN  1. Continue Xarelto 15mg nightly with evening meal    Next Cardiology Appointment: 10/2/2024  Clinical Pharmacist follow up: 7/3/2024 1:30pm   VAF/Application Expiration: Yes    Date: 10/6/2024  Type of Encounter: Virtual    Karen Valdes, PharmD , [facility]  PGY1 [facility] Pharmacy Resident     Verbal consent to manage patient's drug therapy was obtained from the patient . They were informed they may decline to participate or withdraw from participation in pharmacy services at any time.    Continue all meds under the continuation of care with the referring provider and clinical pharmacy team.

## 2024-04-15 DIAGNOSIS — M54.42 CHRONIC BILATERAL LOW BACK PAIN WITH BILATERAL SCIATICA: Primary | ICD-10-CM

## 2024-04-15 DIAGNOSIS — G89.29 CHRONIC BILATERAL LOW BACK PAIN WITH BILATERAL SCIATICA: Primary | ICD-10-CM

## 2024-04-15 DIAGNOSIS — M54.41 CHRONIC BILATERAL LOW BACK PAIN WITH BILATERAL SCIATICA: Primary | ICD-10-CM

## 2024-04-15 RX ORDER — GABAPENTIN 300 MG/1
300 CAPSULE ORAL 3 TIMES DAILY
Qty: 270 CAPSULE | Refills: 1 | Status: SHIPPED | OUTPATIENT
Start: 2024-04-15 | End: 2024-10-12

## 2024-04-23 ENCOUNTER — OFFICE VISIT (OUTPATIENT)
Dept: DERMATOLOGY | Facility: CLINIC | Age: 79
End: 2024-04-23
Payer: MEDICARE

## 2024-04-23 DIAGNOSIS — Z12.83 SCREENING EXAM FOR SKIN CANCER: ICD-10-CM

## 2024-04-23 DIAGNOSIS — D22.9 MULTIPLE BENIGN NEVI: Primary | ICD-10-CM

## 2024-04-23 DIAGNOSIS — L81.4 LENTIGO: ICD-10-CM

## 2024-04-23 DIAGNOSIS — L82.1 SEBORRHEIC KERATOSIS: ICD-10-CM

## 2024-04-23 DIAGNOSIS — L57.8 ACTINIC SKIN DAMAGE: ICD-10-CM
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
## 2024-04-23 DIAGNOSIS — D48.5 NEOPLASM OF UNCERTAIN BEHAVIOR OF SKIN: ICD-10-CM

## 2024-04-23 PROBLEM — D22.71 MELANOCYTIC NEVI OF RIGHT LOWER LIMB, INCLUDING HIP: Status: RESOLVED | Noted: 2023-08-08 | Resolved: 2024-04-23

## 2024-04-23 PROBLEM — D18.01 HEMANGIOMA OF SKIN AND SUBCUTANEOUS TISSUE: Status: RESOLVED | Noted: 2023-08-08 | Resolved: 2024-04-23

## 2024-04-23 PROBLEM — L91.8 SKIN TAGS, MULTIPLE ACQUIRED: Status: RESOLVED | Noted: 2023-08-30 | Resolved: 2024-04-23

## 2024-04-23 PROBLEM — D22.5 MELANOCYTIC NEVI OF TRUNK: Status: RESOLVED | Noted: 2023-08-08 | Resolved: 2024-04-23

## 2024-04-23 PROBLEM — D22.72 MELANOCYTIC NEVI OF LEFT LOWER LIMB, INCLUDING HIP: Status: RESOLVED | Noted: 2023-08-08 | Resolved: 2024-04-23

## 2024-04-23 PROCEDURE — 1036F TOBACCO NON-USER: CPT | Performed by: DERMATOLOGY

## 2024-04-23 PROCEDURE — 1160F RVW MEDS BY RX/DR IN RCRD: CPT | Performed by: DERMATOLOGY

## 2024-04-23 PROCEDURE — 1157F ADVNC CARE PLAN IN RCRD: CPT | Performed by: DERMATOLOGY

## 2024-04-23 PROCEDURE — 99213 OFFICE O/P EST LOW 20 MIN: CPT | Performed by: DERMATOLOGY

## 2024-04-23 PROCEDURE — 1159F MED LIST DOCD IN RCRD: CPT | Performed by: DERMATOLOGY

## 2024-04-23 ASSESSMENT — PATIENT GLOBAL ASSESSMENT (PGA): PATIENT GLOBAL ASSESSMENT: PATIENT GLOBAL ASSESSMENT:  1 - CLEAR

## 2024-04-23 ASSESSMENT — ITCH NUMERIC RATING SCALE: HOW SEVERE IS YOUR ITCHING?: 0

## 2024-04-23 ASSESSMENT — DERMATOLOGY QUALITY OF LIFE (QOL) ASSESSMENT
DATE THE QUALITY-OF-LIFE ASSESSMENT WAS COMPLETED: 66953
RATE HOW BOTHERED YOU ARE BY EFFECTS OF YOUR SKIN PROBLEMS ON YOUR ACTIVITIES (EG, GOING OUT, ACCOMPLISHING WHAT YOU WANT, WORK ACTIVITIES OR YOUR RELATIONSHIPS WITH OTHERS): 0 - NEVER BOTHERED
RATE HOW EMOTIONALLY BOTHERED YOU ARE BY YOUR SKIN PROBLEM (FOR EXAMPLE, WORRY, EMBARRASSMENT, FRUSTRATION): 0 - NEVER BOTHERED
RATE HOW BOTHERED YOU ARE BY SYMPTOMS OF YOUR SKIN PROBLEM (EG, ITCHING, STINGING BURNING, HURTING OR SKIN IRRITATION): 0 - NEVER BOTHERED
ARE THERE EXCLUSIONS OR EXCEPTIONS FOR THE QUALITY OF LIFE ASSESSMENT: NO

## 2024-04-23 ASSESSMENT — DERMATOLOGY PATIENT ASSESSMENT
ARE YOU ON BIRTH CONTROL: NO
HAVE YOU HAD OR DO YOU HAVE VASCULAR DISEASE: YES
ARE YOU TRYING TO GET PREGNANT: NO
ARE YOU AN ORGAN TRANSPLANT RECIPIENT: NO
DO YOU HAVE ANY NEW OR CHANGING LESIONS: NO
DO YOU USE SUNSCREEN: OCCASIONALLY
DO YOU HAVE IRREGULAR MENSTRUAL CYCLES: NO
HAVE YOU HAD OR DO YOU HAVE A STAPH INFECTION: NO
DO YOU USE A TANNING BED: NO

## 2024-04-23 NOTE — PROGRESS NOTES
Subjective     Lynsey Forman is a 78 y.o. female who presents for the following: Skin Check.     Last derm visit 4/2023 for Full Skin Exam - 4x5 cm firm mobile SQ nodule with overlyingtelangiectasia of stretched skin, DDx epidermal inclusion cyst vs. pilar cyst vs. other cyst vs. other neoplasm. present 30+ years and slowly growing, causing pain. Referred to derm surg. She met with Dr. Hennessy but has held off on scheduling due to carotid artery disease and spinal surgeries needed      Review of Systems:  No other skin or systemic complaints other than what is documented elsewhere in the note.    The following portions of the chart were reviewed this encounter and updated as appropriate:         Skin Cancer History  No skin cancer on file.      Specialty Problems          Dermatology Problems    Eczema    Cyst of skin and subcutaneous tissue        Objective   Well appearing patient in no apparent distress; mood and affect are within normal limits.    A full examination was performed including scalp, head, eyes, ears, nose, lips, neck, chest, axillae, abdomen, back, buttocks, bilateral upper extremities, bilateral lower extremities, hands, feet, fingers, toes, fingernails, and toenails. All findings within normal limits unless otherwise noted below.    Assessment/Plan   1. Multiple benign nevi  Brown and tan macules and papules with reassuring findings on dermoscopy    -These lesions have benign, reassuring patterns on dermoscopy  -Recommend continued self observation, and to contact the office if any changes in nevi are noticed    2. Lentigo  Tan macules    -Benign appearing on exam  -Reassurance, recommend observation    3. Seborrheic keratosis  Stuck on, waxy macule(s)/papule(s)/plaque(s) with comedo-like openings and milia like cysts    -Discussed the nature of the diagnosis  -Reassurance, recommend continued observation    4. Actinic skin damage  Background of photodamage with hyper- and hypo-pigmented macules on  the skin    5. Screening exam for skin cancer    Full body skin exam  -No lesions concerning for malignancy on the remainder the skin exam today   - The ugly duckling sign was discussed. Monitor for any skin lesions that are different in color, shape, or size than others on body  -Sun protection was discussed. Recommend SPF 30+, hats with brims, sun protective clothing, and avoiding sun exposure between 10 AM and 2 PM whenever possible  -Recommend regular skin exams or sooner if new or changing lesions       Related Procedures  Follow Up In Dermatology - Established Patient    6. Neoplasm of uncertain behavior of skin  Left Upper Arm - Posterior  4x5 cm firm mobile SQ nodule with overlyingtelangiectasia of stretched skin    DDx epidermal inclusion cyst vs. pilar cyst vs. other cyst vs. other neoplasm  - consult with Dr. Hennessy 2/2024, holding off on procedure until after other surgeries, will consider if she wants to proceed with him with local     7. Bitten or stung by nonvenomous insect and other nonvenomous arthropods, initial encounter  Neck - Posterior  Resolving pink inflammation and excoriation around neck and face from yard work    Continue with otc hydrocortisone as needed, declines rx        Follow up 1 year Full Skin Exam or sooner as needed

## 2024-04-25 ENCOUNTER — TELEMEDICINE (OUTPATIENT)
Dept: NEUROLOGY | Facility: HOSPITAL | Age: 79
End: 2024-04-25
Payer: MEDICARE

## 2024-04-25 DIAGNOSIS — I65.23 BILATERAL CAROTID ARTERY OCCLUSION: ICD-10-CM

## 2024-04-25 PROCEDURE — 1157F ADVNC CARE PLAN IN RCRD: CPT | Performed by: PSYCHIATRY & NEUROLOGY

## 2024-04-25 PROCEDURE — 99204 OFFICE O/P NEW MOD 45 MIN: CPT | Performed by: PSYCHIATRY & NEUROLOGY

## 2024-04-25 PROCEDURE — 1036F TOBACCO NON-USER: CPT | Performed by: PSYCHIATRY & NEUROLOGY

## 2024-04-25 PROCEDURE — 99214 OFFICE O/P EST MOD 30 MIN: CPT | Mod: GT,95 | Performed by: PSYCHIATRY & NEUROLOGY

## 2024-04-25 PROCEDURE — 1160F RVW MEDS BY RX/DR IN RCRD: CPT | Performed by: PSYCHIATRY & NEUROLOGY

## 2024-04-25 PROCEDURE — 1159F MED LIST DOCD IN RCRD: CPT | Performed by: PSYCHIATRY & NEUROLOGY

## 2024-04-25 NOTE — PATIENT INSTRUCTIONS
Stroke prevention:  Eat a healthy diet with plenty of fresh fruits and vegetables.  Avoid salt and fried foods.  Lose weight and exercise on a regular basis.  Do not smoke or use drugs.  Be sure to take all medications.  Call 911 for signs of stroke (numbness or weakness on one side, trouble seeing on one side, trouble speaking (either because your speech is slurred or you can't find the words), sudden double vision, spinning sensation or loss of coordination).    To find a stroke support group: https://www.stroke.org/en/stroke-support-group-finder  To learn about Virtual Stroke Educations sessions: contact Rose Condon at: [email or web address]    I can be reached at phone: [phone] or email: [email or web address]

## 2024-04-25 NOTE — PROGRESS NOTES
Assessment/Plan:   Bilateral asymptomatic carotid stenosis: as per neurosurgery, this is asymptomatic.  Medical management with aspirin and atorvastatin/risk factor modification.  Atrial flutter/atrial fibrillation: continue xarelto (lower dose due to renal impairment (CrCl is 32-borderline).  To patient's knowledge she has not been in Atrial fibrillation/flutter since her last ablation.  Agree with dosing  Need for lumbar spine surgery.  There is a risk for stroke given her carotid stenosis, but maintaining blood pressure above 120/ during surgery and post-operatively, will minimize the risk of infarction due to hemodynamic mechanism in the face of the carotid stenosis.  She understands that there is a risk of stroke with stopping aspirin/Xarelto due to the stenosis and atrial fibrillation/flutter (although the later is less given her ablation.  The proposed holding of aspirin for 10 days and Xarelto for 3 days prior to surgery is reasonable.  She should restart these medications as soon as it is safe to do so following surgery.     Orders:  No orders of the defined types were placed in this encounter.      HPI:   Lynsey Forman is a 78 y.o.  female with vascular risk factors of hypertension, hyperlipidemia, and atrial fibrillation s/p ablation x 2 who presents for pre-op clearance for lumbar stenosis.  She was found to have bilateral severe carotid stenosis (80%) and a right MCA aneurysm.  Given that the carotid stenosis is asymptomatic, there are no immediate plans for intervention.  Because of a lack of high risk factors for the aneurysm (6.6mm) risk of rupture estimated to be less than 1% and plan is to observe the aneurysm for growth.  She wants to have surgery for lumbar stenosis/radiculopathy at L4/5.  This is planned for May 2024.  She is here for pre-operative clearance for the surgery.      Medications:   Current Outpatient Medications:     amLODIPine (Norvasc) 5 mg tablet, TAKE 1 TABLET BY MOUTH DAILY,  Disp: 90 tablet, Rfl: 1    aspirin 81 mg EC tablet, Take 1 tablet (81 mg) by mouth once daily., Disp: 30 tablet, Rfl: 11    atorvastatin (Lipitor) 80 mg tablet, Take 1 tablet (80 mg) by mouth once daily., Disp: 90 tablet, Rfl: 3    ezetimibe (Zetia) 10 mg tablet, Take 1 tablet (10 mg) by mouth once daily., Disp: 30 tablet, Rfl: 11    furosemide (Lasix) 20 mg tablet, Take 1 tablet (20 mg) by mouth if needed (for ankle swelling)., Disp: 30 tablet, Rfl: 0    gabapentin (Neurontin) 300 mg capsule, Take 1 capsule (300 mg) by mouth 3 times a day., Disp: 270 capsule, Rfl: 1    hydrOXYzine HCL (Atarax) 25 mg tablet, Take 1 tablet (25 mg) by mouth if needed for anxiety., Disp: , Rfl:     lisinopril 20 mg tablet, TAKE 1 TABLET BY MOUTH DAILY, Disp: 90 tablet, Rfl: 1    meloxicam (Mobic) 15 mg tablet, TAKE 1 TABLET(15 MG) BY MOUTH EVERY DAY, Disp: 30 tablet, Rfl: 1    rivaroxaban (Xarelto) 15 mg tablet, Take 1 tablet (15 mg) by mouth once daily in the evening. Take with food., Disp: 90 tablet, Rfl: 3    triamterene-hydrochlorothiazid (Maxzide-25) 37.5-25 mg tablet, TAKE 1 TABLET BY MOUTH DAILY, Disp: 90 tablet, Rfl: 1     PMH:   Past Medical History:   Diagnosis Date    Abnormal kidney function 01/08/2024    Alcohol use, unspecified, uncomplicated 12/20/2022    Chronic cough 11/24/2009    Formatting of this note might be different from the original. Overview: No change with stopping lisinopril, no change with Advair, consensus of ENT, pulmonary, here is LP reflux, has associated past-nasal drip that does not respond to Mucinex or Flonase, 2012 has large nasal polyp L Formatting of this note might be different from the original. No change with stopping lisinopril, no change with Adv    Diverticulosis 12/21/2016    History of arthrodesis 07/07/2023    History of substance dependence (Multi) 07/07/2023    Shortness of breath 01/08/2024    Sinus bradycardia 01/04/2013    Formatting of this note might be different from the  original. Overview: Asymptomatic, noticed on exam Formatting of this note might be different from the original. Asymptomatic, noticed on exam    Tachycardia 01/08/2024        PSH:   Past Surgical History:   Procedure Laterality Date    OTHER SURGICAL HISTORY  10/31/2022    Cervical surgery    OTHER SURGICAL HISTORY  10/31/2022    Oral surgery    OTHER SURGICAL HISTORY  10/31/2022    Anterior cruciate ligament repair        Allergies:   Allergies   Allergen Reactions    Codeine Anxiety, Itching, Unknown and Other     Denies itching, hive, shortness of breath        FH:   Family History   Problem Relation Name Age of Onset    Heart failure Mother      Dementia Mother      Hypertension Mother          SH:   Social History     Socioeconomic History    Marital status:      Spouse name: Not on file    Number of children: Not on file    Years of education: Not on file    Highest education level: Not on file   Occupational History    Not on file   Tobacco Use    Smoking status: Former     Types: Cigarettes    Smokeless tobacco: Never   Substance and Sexual Activity    Alcohol use: Yes     Alcohol/week: 4.0 standard drinks of alcohol     Types: 1 Glasses of wine, 3 Shots of liquor per week     Comment: daily    Drug use: Yes     Frequency: 7.0 times per week     Types: Marijuana     Comment: smikes marijuana for pain daily    Sexual activity: Not on file   Other Topics Concern    Not on file   Social History Narrative    Not on file     Social Determinants of Health     Financial Resource Strain: Not on file   Food Insecurity: Not on file   Transportation Needs: Not on file   Physical Activity: Not on file   Stress: Not on file   Social Connections: Not on file   Intimate Partner Violence: Not on file   Housing Stability: Not on file        Objective:    There were no vitals taken for this visit.  Virtual visit  /78 on 3/26/24    Neurologic Exam:    Patient is alert, attentive with normal language, orientation  and speech.  Extraocular eye movements are intact without nystagmus.  Sensation is intact to patient light touch V1-3.  Face is symmetric.  Motor: There is no drift, no orbiting and intact fine finger movements. Sensation is intact to light touch on the arms and legs bilaterally.  Finger nose finger shows no ataxia.  Patient is able to rise from a chair and walk normally.    Barthel Index:  Feeding: 10=independent  Dressing: 10=independent  Groomin=independent  Bathin=independent  Transfers: 15=independent  Mobility: 15=independent 50 yards  Stairs: 10=independent  Toileting: 10=independent  Bladder: 10=continent  Bowels: 10=continent    Total Score: 100    Modified Morteza Score:  0=no symptoms    NIHSS:  Level of Consciousness: 0=alert      LOC questions: 0=answers both questions      LOC commands: 0=performs both  Best Gaze: 0=normal  Visual: not tested, virtual visit  Facial Palsy: 0=normal  Motor:      Motor Arm (Left): 0=normal      Motor Arm (Right): 0=normal      Motor Leg (Left): 0=left leg normal      Motor Leg (Right): 0=right leg normal  Limb Ataxia: 0=absent  Sensory: 0=normal  Best Language: 0=no aphasia  Dysarthria: 0=normal  Extinction and Inattention: not tested (virtual visit)     Total Score: 100    Extensive review of notes in EMR, labs, tests, Interpretation of neuroimaging as documented in the HPI or Assessment and Plan.  Patient consented to the following:  This is a telehealth visit which has the benefit of avoiding travel and limiting exposure to Covid-19. Telehealth visits are not being recorded and personal health information is protected.  This visit will be billed to your insurance.  There are limitations to my ability to examine you and it is possible  that I may decide you need an in person appointment.  Is it ok to continue.

## 2024-04-26 DIAGNOSIS — D22.71 MELANOCYTIC NEVI OF RIGHT LOWER LIMB, INCLUDING HIP: ICD-10-CM

## 2024-04-26 RX ORDER — MELOXICAM 15 MG/1
15 TABLET ORAL DAILY
Qty: 30 TABLET | Refills: 3 | Status: SHIPPED | OUTPATIENT
Start: 2024-04-26 | End: 2024-05-08 | Stop reason: ALTCHOICE

## 2024-04-29 ENCOUNTER — LAB (OUTPATIENT)
Dept: LAB | Facility: LAB | Age: 79
End: 2024-04-29
Payer: MEDICARE

## 2024-04-29 ENCOUNTER — HOSPITAL ENCOUNTER (OUTPATIENT)
Dept: CARDIOLOGY | Facility: HOSPITAL | Age: 79
Discharge: HOME | End: 2024-04-29
Payer: MEDICARE

## 2024-04-29 DIAGNOSIS — Z01.818 ENCOUNTER FOR OTHER PREPROCEDURAL EXAMINATION: Primary | ICD-10-CM

## 2024-04-29 DIAGNOSIS — M48.062 SPINAL STENOSIS, LUMBAR REGION WITH NEUROGENIC CLAUDICATION: ICD-10-CM

## 2024-04-29 DIAGNOSIS — Z79.01 LONG TERM (CURRENT) USE OF ANTICOAGULANTS: ICD-10-CM

## 2024-04-29 DIAGNOSIS — Z01.818 ENCOUNTER FOR OTHER PREPROCEDURAL EXAMINATION: ICD-10-CM

## 2024-04-29 LAB
ANION GAP SERPL CALC-SCNC: 14 MMOL/L (ref 10–20)
APTT PPP: 36 SECONDS (ref 27–38)
ATRIAL RATE: 49 BPM
BASOPHILS # BLD AUTO: 0.04 X10*3/UL (ref 0–0.1)
BASOPHILS NFR BLD AUTO: 0.7 %
BUN SERPL-MCNC: 24 MG/DL (ref 6–23)
CALCIUM SERPL-MCNC: 9.2 MG/DL (ref 8.6–10.3)
CHLORIDE SERPL-SCNC: 95 MMOL/L (ref 98–107)
CO2 SERPL-SCNC: 28 MMOL/L (ref 21–32)
CREAT SERPL-MCNC: 1.04 MG/DL (ref 0.5–1.05)
EGFRCR SERPLBLD CKD-EPI 2021: 56 ML/MIN/1.73M*2
EOSINOPHIL # BLD AUTO: 0.13 X10*3/UL (ref 0–0.4)
EOSINOPHIL NFR BLD AUTO: 2.3 %
ERYTHROCYTE [DISTWIDTH] IN BLOOD BY AUTOMATED COUNT: 12.9 % (ref 11.5–14.5)
GLUCOSE SERPL-MCNC: 92 MG/DL (ref 74–99)
HCT VFR BLD AUTO: 36.6 % (ref 36–46)
HGB BLD-MCNC: 12.9 G/DL (ref 12–16)
IMM GRANULOCYTES # BLD AUTO: 0.01 X10*3/UL (ref 0–0.5)
IMM GRANULOCYTES NFR BLD AUTO: 0.2 % (ref 0–0.9)
INR PPP: 1.3 (ref 0.9–1.1)
LYMPHOCYTES # BLD AUTO: 1.79 X10*3/UL (ref 0.8–3)
LYMPHOCYTES NFR BLD AUTO: 31.5 %
MCH RBC QN AUTO: 33.3 PG (ref 26–34)
MCHC RBC AUTO-ENTMCNC: 35.2 G/DL (ref 32–36)
MCV RBC AUTO: 95 FL (ref 80–100)
MONOCYTES # BLD AUTO: 0.55 X10*3/UL (ref 0.05–0.8)
MONOCYTES NFR BLD AUTO: 9.7 %
NEUTROPHILS # BLD AUTO: 3.17 X10*3/UL (ref 1.6–5.5)
NEUTROPHILS NFR BLD AUTO: 55.6 %
NRBC BLD-RTO: 0 /100 WBCS (ref 0–0)
P AXIS: 33 DEGREES
P OFFSET: 194 MS
P ONSET: 126 MS
PLATELET # BLD AUTO: 189 X10*3/UL (ref 150–450)
POTASSIUM SERPL-SCNC: 4.5 MMOL/L (ref 3.5–5.3)
PR INTERVAL: 206 MS
PROTHROMBIN TIME: 14.3 SECONDS (ref 9.8–12.8)
Q ONSET: 229 MS
QRS COUNT: 8 BEATS
QRS DURATION: 78 MS
QT INTERVAL: 446 MS
QTC CALCULATION(BAZETT): 402 MS
QTC FREDERICIA: 417 MS
R AXIS: -19 DEGREES
RBC # BLD AUTO: 3.87 X10*6/UL (ref 4–5.2)
SODIUM SERPL-SCNC: 132 MMOL/L (ref 136–145)
T AXIS: -6 DEGREES
T OFFSET: 452 MS
VENTRICULAR RATE: 49 BPM
WBC # BLD AUTO: 5.7 X10*3/UL (ref 4.4–11.3)

## 2024-04-29 PROCEDURE — 80048 BASIC METABOLIC PNL TOTAL CA: CPT

## 2024-04-29 PROCEDURE — 36415 COLL VENOUS BLD VENIPUNCTURE: CPT

## 2024-04-29 PROCEDURE — 85610 PROTHROMBIN TIME: CPT

## 2024-04-29 PROCEDURE — 85025 COMPLETE CBC W/AUTO DIFF WBC: CPT

## 2024-04-29 PROCEDURE — 93005 ELECTROCARDIOGRAM TRACING: CPT

## 2024-04-29 PROCEDURE — 85730 THROMBOPLASTIN TIME PARTIAL: CPT

## 2024-05-02 NOTE — PROGRESS NOTES
Patient is at moderate to high risk for perioperative cardiovascular event. She requires no further cardiac testing. She can hold Xarelto 3 days prior to procedure and resume when safe to do so as soon as possible post procedure as determined by performing surgeon. We would recommend she remain on Aspirin without interruption. Reviewed case with cardiologist Dr. Mcdonald. Please feel free to reach out to our office for further questions or concerns at [phone].

## 2024-05-08 ENCOUNTER — OFFICE VISIT (OUTPATIENT)
Dept: PRIMARY CARE | Facility: CLINIC | Age: 79
End: 2024-05-08
Payer: MEDICARE

## 2024-05-08 VITALS
SYSTOLIC BLOOD PRESSURE: 120 MMHG | DIASTOLIC BLOOD PRESSURE: 80 MMHG | WEIGHT: 119.05 LBS | RESPIRATION RATE: 16 BRPM | TEMPERATURE: 96.8 F | HEART RATE: 62 BPM | OXYGEN SATURATION: 96 % | BODY MASS INDEX: 21.09 KG/M2

## 2024-05-08 DIAGNOSIS — M54.42 CHRONIC BILATERAL LOW BACK PAIN WITH BILATERAL SCIATICA: ICD-10-CM

## 2024-05-08 DIAGNOSIS — I65.23 BILATERAL CAROTID ARTERY OCCLUSION: ICD-10-CM

## 2024-05-08 DIAGNOSIS — Z01.818 PRE-OP EXAM: Primary | ICD-10-CM

## 2024-05-08 DIAGNOSIS — M54.41 CHRONIC BILATERAL LOW BACK PAIN WITH BILATERAL SCIATICA: ICD-10-CM

## 2024-05-08 DIAGNOSIS — I10 BENIGN ESSENTIAL HYPERTENSION: ICD-10-CM

## 2024-05-08 DIAGNOSIS — G89.29 CHRONIC BILATERAL LOW BACK PAIN WITH BILATERAL SCIATICA: ICD-10-CM

## 2024-05-08 DIAGNOSIS — D17.22 LIPOMA OF LEFT UPPER EXTREMITY: ICD-10-CM

## 2024-05-08 PROBLEM — F17.210 CIGARETTE NICOTINE DEPENDENCE WITHOUT COMPLICATION: Status: RESOLVED | Noted: 2023-08-30 | Resolved: 2024-05-08

## 2024-05-08 PROCEDURE — 1036F TOBACCO NON-USER: CPT | Performed by: INTERNAL MEDICINE

## 2024-05-08 PROCEDURE — 3074F SYST BP LT 130 MM HG: CPT | Performed by: INTERNAL MEDICINE

## 2024-05-08 PROCEDURE — 1160F RVW MEDS BY RX/DR IN RCRD: CPT | Performed by: INTERNAL MEDICINE

## 2024-05-08 PROCEDURE — 1170F FXNL STATUS ASSESSED: CPT | Performed by: INTERNAL MEDICINE

## 2024-05-08 PROCEDURE — 1125F AMNT PAIN NOTED PAIN PRSNT: CPT | Performed by: INTERNAL MEDICINE

## 2024-05-08 PROCEDURE — 99214 OFFICE O/P EST MOD 30 MIN: CPT | Performed by: INTERNAL MEDICINE

## 2024-05-08 PROCEDURE — 3079F DIAST BP 80-89 MM HG: CPT | Performed by: INTERNAL MEDICINE

## 2024-05-08 PROCEDURE — 1159F MED LIST DOCD IN RCRD: CPT | Performed by: INTERNAL MEDICINE

## 2024-05-08 PROCEDURE — 1157F ADVNC CARE PLAN IN RCRD: CPT | Performed by: INTERNAL MEDICINE

## 2024-05-08 ASSESSMENT — PAIN SCALES - GENERAL: PAINLEVEL: 7

## 2024-05-08 ASSESSMENT — ACTIVITIES OF DAILY LIVING (ADL)
MANAGING_FINANCES: INDEPENDENT
TAKING_MEDICATION: INDEPENDENT
DOING_HOUSEWORK: INDEPENDENT
BATHING: INDEPENDENT
GROCERY_SHOPPING: INDEPENDENT
DRESSING: INDEPENDENT

## 2024-05-08 ASSESSMENT — ENCOUNTER SYMPTOMS
OCCASIONAL FEELINGS OF UNSTEADINESS: 1
DEPRESSION: 1
LOSS OF SENSATION IN FEET: 1

## 2024-05-09 ENCOUNTER — APPOINTMENT (OUTPATIENT)
Dept: NEUROLOGY | Facility: HOSPITAL | Age: 79
End: 2024-05-09
Payer: MEDICARE

## 2024-05-09 NOTE — PROGRESS NOTES
Subjective   Patient ID: Lynsey Forman is a 78 y.o. female who presents for Pre-op Exam.    Visit for medical clearance for lumbar decompression and general anesthesia.  She has low back pain for almost 3 years now, pain is severe daily, radiates to both lower extremities, numbness in her feet.  Has had MRI lumbar spine September 2023 which showed L4-L5 central canal stenosis.  She has hypertension hypercholesterolemia, history of atrial flutter atrial fibrillation status post ablation procedure, takes Xarelto daily.         Review of Systems    Objective   Pulse 54   Temp 36 °C (96.8 °F) (Temporal)   Resp 16   Wt 54 kg (119 lb 0.8 oz)   SpO2 96%   BMI 21.09 kg/m²     Physical Exam  Skin:     Comments: Lt. Arm large cyst.         Assessment/Plan   Problem List Items Addressed This Visit             ICD-10-CM    Chronic bilateral low back pain with bilateral sciatica M54.42, M54.41, G89.29     Scheduled to have lumbar decompression with general anesthesia May 16 in  [place].         Benign essential hypertension I10    Bilateral carotid artery occlusion I65.23    Pre-op exam - Primary Z01.818     Patient represents high risk for surgery due to high grade (80%) bilateral internal carotid arteries stenosis and MCA aneurysm requiring anticoagulation (Xarelto) which needs to be stopped 3 days before surgery and restart 24 hours after surgery (if possible) -   Recommend to avoid hypotension during the surgical procedure and maintain systolic blood pressure between  120 mmHg and 160 mm HG -   It would be advisable to get a pre-op clearance form cardiology as well.          Lipoma of left upper extremity D17.22     Referral made to see surgeon in consult.  She would like to have it removed.         Relevant Orders    Referral to General Surgery

## 2024-05-09 NOTE — ASSESSMENT & PLAN NOTE
Patient represents high risk for surgery due to high grade (80%) bilateral internal carotid arteries stenosis and MCA aneurysm requiring anticoagulation (Xarelto) which needs to be stopped 3 days before surgery and restart 24 hours after surgery (if possible) -   Recommend to avoid hypotension during the surgical procedure and maintain systolic blood pressure between  120 mmHg and 160 mm HG -   It would be advisable to get a pre-op clearance form cardiology as well.

## 2024-06-03 ENCOUNTER — OFFICE VISIT (OUTPATIENT)
Dept: SURGERY | Facility: CLINIC | Age: 79
End: 2024-06-03
Payer: MEDICARE

## 2024-06-03 VITALS
TEMPERATURE: 97 F | HEART RATE: 52 BPM | WEIGHT: 118.6 LBS | SYSTOLIC BLOOD PRESSURE: 154 MMHG | DIASTOLIC BLOOD PRESSURE: 64 MMHG | BODY MASS INDEX: 21.01 KG/M2

## 2024-06-03 DIAGNOSIS — D49.2 NEOPLASM OF UNSPECIFIED BEHAVIOR OF BONE, SOFT TISSUE, AND SKIN: ICD-10-CM

## 2024-06-03 DIAGNOSIS — I10 PRIMARY HYPERTENSION: ICD-10-CM

## 2024-06-03 DIAGNOSIS — D17.22 LIPOMA OF LEFT UPPER EXTREMITY: Primary | ICD-10-CM

## 2024-06-03 PROCEDURE — 3077F SYST BP >= 140 MM HG: CPT | Performed by: SURGERY

## 2024-06-03 PROCEDURE — 1036F TOBACCO NON-USER: CPT | Performed by: SURGERY

## 2024-06-03 PROCEDURE — 3078F DIAST BP <80 MM HG: CPT | Performed by: SURGERY

## 2024-06-03 PROCEDURE — 99213 OFFICE O/P EST LOW 20 MIN: CPT | Performed by: SURGERY

## 2024-06-03 PROCEDURE — 99203 OFFICE O/P NEW LOW 30 MIN: CPT | Performed by: SURGERY

## 2024-06-03 PROCEDURE — 1157F ADVNC CARE PLAN IN RCRD: CPT | Performed by: SURGERY

## 2024-06-03 PROCEDURE — 1159F MED LIST DOCD IN RCRD: CPT | Performed by: SURGERY

## 2024-06-03 ASSESSMENT — ENCOUNTER SYMPTOMS: DEPRESSION: 0

## 2024-06-03 NOTE — PROGRESS NOTES
"Subjective   Patient ID: Lynsey Forman is a 78 y.o. female who presents for Cyst.  HPI  Patient is a very pleasant 78-year-old female was referred for evaluation of treatment of a large soft tissue mass involving her left upper arm.  This has been slow growing over the past \"20 years\".  Lately has become more symptomatic with pain and tenderness      Review of Systems  On review of systems patient denies any weight loss, fever, change in bowel habits, melena, hematochezia, coronary artery disease, hypertension, TIA/CVA, bleeding, jaundice, pancreatitis, hepatitis.    Former smoker.  Quit 6 months ago.  Patient does drink several glasses of wine per day      She is retired  Past Medical History:   Diagnosis Date    Abnormal kidney function 01/08/2024    Alcohol use, unspecified, uncomplicated 12/20/2022    Chronic cough 11/24/2009    Formatting of this note might be different from the original. Overview: No change with stopping lisinopril, no change with Advair, consensus of ENT, pulmonary, here is LP reflux, has associated past-nasal drip that does not respond to Mucinex or Flonase, 2012 has large nasal polyp L Formatting of this note might be different from the original. No change with stopping lisinopril, no change with Adv    Diverticulosis 12/21/2016    History of arthrodesis 07/07/2023    History of substance dependence (Multi) 07/07/2023    Shortness of breath 01/08/2024    Sinus bradycardia 01/04/2013    Formatting of this note might be different from the original. Overview: Asymptomatic, noticed on exam Formatting of this note might be different from the original. Asymptomatic, noticed on exam    Tachycardia 01/08/2024                Past Medical History:   Diagnosis Date    Hyperlipidemia      Hypertension              Surgical History             Past Surgical History:   Procedure Laterality Date    BLADDER SURGERY        CERVICAL SPINE SURGERY Right 2/17/2017     Procedure: RIGHT C4-5, C5-6 ENDOSCOPIC " ASSISTED ANTERIOR FORAMINOTOMY;  Surgeon: Jerardo Sandhu MD;  Location: [facility];  Service: Neurosurgery;  Laterality: Right;    ORTHOPEDIC SURGERY         ACL    SINUS SURGERY        SPINE SURGERY   2007     Left C7-T1 anterior foraminotomy    SPINE SURGERY   2007     Left C6-7 anterior foraminotomy    SPINE SURGERY   2007     Left C4-5, C5-6 anterior foraminotomy    TUBAL LIGATION                 Objective     Physical Exam    Lean,  In no distress  Alert and oriented x 3  Lungs are clear to auscultation bilaterally.  Cardiac exam is regular rhythm and rate.  Abdomen is soft nontender nondistended.  Neurologic exam is without focal deficit.   In the posterior aspect of the distal left upper arm there is a 4 cm subcutaneous mass.  It is somewhat firm.  Assessment/Plan   Diagnoses and all orders for this visit:  Lipoma of left upper extremity  -     Referral to General Surgery  -     Case Request Operating Room: Excision Lesion Skin Upper Extremity; Standing  -     Request for Pre-Admission Testing Visit; Future  Neoplasm of unspecified behavior of bone, soft tissue, and skin  -     Case Request Operating Room: Excision Lesion Skin Upper Extremity; Standing  Other orders  -     Place in outpatient/hospital ambulatory surgery; Standing  -     Full code; Standing  -     Vital Signs; Standing  -     Insert and maintain peripheral IV; Standing  -     Saline lock IV; Standing    Left upper arm mass.  Recommend excisional biopsy in the operating room.  We discussed the procedure to include risk, benefits and alternatives.  She agrees to the operation.  Tentatively planned for 6/18/2024    She will need to go to presurgical testing    Will need to stop Xarelto 3 days prior to the operation

## 2024-06-03 NOTE — LETTER
"Krissy 3, 2024     Windy Bartholomew MD  [address] [facility]  [facility] [address]    Patient: Lynsey Forman   YOB: 1945   Date of Visit: 6/3/2024       Dear Dr. Windy Batrholomew MD:    Thank you for referring Lynsey Forman to me for evaluation. Below are my notes for this consultation.  If you have questions, please do not hesitate to call me. I look forward to following your patient along with you.       Sincerely,     Prabhakar Franz MD      CC: No Recipients  ______________________________________________________________________________________    Subjective   Patient ID: Lynsey Forman is a 78 y.o. female who presents for Cyst.  HPI  Patient is a very pleasant 78-year-old female was referred for evaluation of treatment of a large soft tissue mass involving her left upper arm.  This has been slow growing over the past \"20 years\".  Lately has become more symptomatic with pain and tenderness      Review of Systems  On review of systems patient denies any weight loss, fever, change in bowel habits, melena, hematochezia, coronary artery disease, hypertension, TIA/CVA, bleeding, jaundice, pancreatitis, hepatitis.    Former smoker.  Quit 6 months ago.  Patient does drink several glasses of wine per day      She is retired  Past Medical History:   Diagnosis Date   • Abnormal kidney function 01/08/2024   • Alcohol use, unspecified, uncomplicated 12/20/2022   • Chronic cough 11/24/2009    Formatting of this note might be different from the original. Overview: No change with stopping lisinopril, no change with Advair, consensus of ENT, pulmonary, here is LP reflux, has associated past-nasal drip that does not respond to Mucinex or Flonase, 2012 has large nasal polyp L Formatting of this note might be different from the original. No change with stopping lisinopril, no change with Adv   • Diverticulosis 12/21/2016   • History of arthrodesis 07/07/2023   • History " of substance dependence (Multi) 07/07/2023   • Shortness of breath 01/08/2024   • Sinus bradycardia 01/04/2013    Formatting of this note might be different from the original. Overview: Asymptomatic, noticed on exam Formatting of this note might be different from the original. Asymptomatic, noticed on exam   • Tachycardia 01/08/2024                Past Medical History:   Diagnosis Date   • Hyperlipidemia     • Hypertension              Surgical History             Past Surgical History:   Procedure Laterality Date   • BLADDER SURGERY       • CERVICAL SPINE SURGERY Right 2/17/2017     Procedure: RIGHT C4-5, C5-6 ENDOSCOPIC ASSISTED ANTERIOR FORAMINOTOMY;  Surgeon: Jerardo Sandhu MD;  Location: [facility];  Service: Neurosurgery;  Laterality: Right;   • ORTHOPEDIC SURGERY         ACL   • SINUS SURGERY       • SPINE SURGERY   2007     Left C7-T1 anterior foraminotomy   • SPINE SURGERY   2007     Left C6-7 anterior foraminotomy   • SPINE SURGERY   2007     Left C4-5, C5-6 anterior foraminotomy   • TUBAL LIGATION                 Objective     Physical Exam    Lean,  In no distress  Alert and oriented x 3  Lungs are clear to auscultation bilaterally.  Cardiac exam is regular rhythm and rate.  Abdomen is soft nontender nondistended.  Neurologic exam is without focal deficit.   In the posterior aspect of the distal left upper arm there is a 4 cm subcutaneous mass.  It is somewhat firm.  Assessment/Plan   Diagnoses and all orders for this visit:  Lipoma of left upper extremity  -     Referral to General Surgery  -     Case Request Operating Room: Excision Lesion Skin Upper Extremity; Standing  -     Request for Pre-Admission Testing Visit; Future  Neoplasm of unspecified behavior of bone, soft tissue, and skin  -     Case Request Operating Room: Excision Lesion Skin Upper Extremity; Standing  Other orders  -     Place in outpatient/hospital ambulatory surgery; Standing  -     Full code; Standing  -     Vital Signs;  Standing  -     Insert and maintain peripheral IV; Standing  -     Saline lock IV; Standing    Left upper arm mass.  Recommend excisional biopsy in the operating room.  We discussed the procedure to include risk, benefits and alternatives.  She agrees to the operation.  Tentatively planned for 6/18/2024    She will need to go to presurgical testing    Will need to stop Xarelto 3 days prior to the operation

## 2024-06-04 RX ORDER — LISINOPRIL 20 MG/1
20 TABLET ORAL DAILY
Qty: 90 TABLET | Refills: 1 | Status: SHIPPED | OUTPATIENT
Start: 2024-06-04

## 2024-06-05 PROBLEM — D49.2 NEOPLASM OF UNSPECIFIED BEHAVIOR OF BONE, SOFT TISSUE, AND SKIN: Status: ACTIVE | Noted: 2024-06-03

## 2024-06-10 ENCOUNTER — CLINICAL SUPPORT (OUTPATIENT)
Dept: PREADMISSION TESTING | Facility: HOSPITAL | Age: 79
End: 2024-06-10
Payer: MEDICARE

## 2024-06-10 DIAGNOSIS — D17.22 LIPOMA OF LEFT UPPER EXTREMITY: ICD-10-CM

## 2024-06-10 RX ORDER — MELOXICAM 15 MG/1
15 TABLET ORAL DAILY
COMMUNITY

## 2024-06-11 ENCOUNTER — DOCUMENTATION (OUTPATIENT)
Dept: PREADMISSION TESTING | Facility: HOSPITAL | Age: 79
End: 2024-06-11

## 2024-06-11 ENCOUNTER — PRE-ADMISSION TESTING (OUTPATIENT)
Dept: PREADMISSION TESTING | Facility: HOSPITAL | Age: 79
End: 2024-06-11
Payer: MEDICARE

## 2024-06-11 ENCOUNTER — LAB (OUTPATIENT)
Dept: LAB | Facility: LAB | Age: 79
End: 2024-06-11
Payer: MEDICARE

## 2024-06-11 VITALS
TEMPERATURE: 98.4 F | RESPIRATION RATE: 16 BRPM | OXYGEN SATURATION: 95 % | SYSTOLIC BLOOD PRESSURE: 129 MMHG | BODY MASS INDEX: 19.26 KG/M2 | WEIGHT: 108.69 LBS | DIASTOLIC BLOOD PRESSURE: 64 MMHG | HEIGHT: 63 IN | HEART RATE: 62 BPM

## 2024-06-11 DIAGNOSIS — Z01.818 PREOP TESTING: ICD-10-CM

## 2024-06-11 DIAGNOSIS — Z01.818 PREOP TESTING: Primary | ICD-10-CM

## 2024-06-11 LAB
ANION GAP SERPL CALC-SCNC: 14 MMOL/L (ref 10–20)
BASOPHILS # BLD AUTO: 0.02 X10*3/UL (ref 0–0.1)
BASOPHILS NFR BLD AUTO: 0.4 %
BUN SERPL-MCNC: 19 MG/DL (ref 6–23)
CALCIUM SERPL-MCNC: 9.6 MG/DL (ref 8.6–10.3)
CHLORIDE SERPL-SCNC: 90 MMOL/L (ref 98–107)
CO2 SERPL-SCNC: 31 MMOL/L (ref 21–32)
CREAT SERPL-MCNC: 1.1 MG/DL (ref 0.5–1.05)
EGFRCR SERPLBLD CKD-EPI 2021: 52 ML/MIN/1.73M*2
EOSINOPHIL # BLD AUTO: 0.04 X10*3/UL (ref 0–0.4)
EOSINOPHIL NFR BLD AUTO: 0.8 %
ERYTHROCYTE [DISTWIDTH] IN BLOOD BY AUTOMATED COUNT: 13.6 % (ref 11.5–14.5)
GLUCOSE SERPL-MCNC: 103 MG/DL (ref 74–99)
HCT VFR BLD AUTO: 41.8 % (ref 36–46)
HGB BLD-MCNC: 14.3 G/DL (ref 12–16)
IMM GRANULOCYTES # BLD AUTO: 0.01 X10*3/UL (ref 0–0.5)
IMM GRANULOCYTES NFR BLD AUTO: 0.2 % (ref 0–0.9)
LYMPHOCYTES # BLD AUTO: 1.67 X10*3/UL (ref 0.8–3)
LYMPHOCYTES NFR BLD AUTO: 32.3 %
MCH RBC QN AUTO: 32.1 PG (ref 26–34)
MCHC RBC AUTO-ENTMCNC: 34.2 G/DL (ref 32–36)
MCV RBC AUTO: 94 FL (ref 80–100)
MONOCYTES # BLD AUTO: 0.6 X10*3/UL (ref 0.05–0.8)
MONOCYTES NFR BLD AUTO: 11.6 %
NEUTROPHILS # BLD AUTO: 2.83 X10*3/UL (ref 1.6–5.5)
NEUTROPHILS NFR BLD AUTO: 54.7 %
NRBC BLD-RTO: 0 /100 WBCS (ref 0–0)
PLATELET # BLD AUTO: 196 X10*3/UL (ref 150–450)
POTASSIUM SERPL-SCNC: 3.8 MMOL/L (ref 3.5–5.3)
RBC # BLD AUTO: 4.45 X10*6/UL (ref 4–5.2)
SODIUM SERPL-SCNC: 131 MMOL/L (ref 136–145)
WBC # BLD AUTO: 5.2 X10*3/UL (ref 4.4–11.3)

## 2024-06-11 PROCEDURE — 36415 COLL VENOUS BLD VENIPUNCTURE: CPT

## 2024-06-11 PROCEDURE — 85025 COMPLETE CBC W/AUTO DIFF WBC: CPT

## 2024-06-11 PROCEDURE — 80048 BASIC METABOLIC PNL TOTAL CA: CPT

## 2024-06-11 ASSESSMENT — ENCOUNTER SYMPTOMS
EYE DISCHARGE: 0
CONFUSION: 0
SINUS CONGESTION: 0
DIFFICULTY URINATING: 0
ABDOMINAL PAIN: 0
DIARRHEA: 0
NECK STIFFNESS: 0
COUGH: 0
SKIN CHANGES: 0
NUMBNESS: 0
ARTHRALGIAS: 1
HEMOPTYSIS: 0
EXCESSIVE BLEEDING: 0
DYSPNEA WITH EXERTION: 0
BLOOD IN STOOL: 0
WOUND: 1
FEVER: 0
TROUBLE SWALLOWING: 0
DYSURIA: 0
VISUAL CHANGE: 0
DYSPNEA AT REST: 0
PALPITATIONS: 0
EYE PAIN: 0
LIMITED RANGE OF MOTION: 0
MYALGIAS: 0
ABDOMINAL DISTENTION: 0
UNEXPECTED WEIGHT CHANGE: 0
BRUISES/BLEEDS EASILY: 1
CHILLS: 0
CONSTIPATION: 0
SHORTNESS OF BREATH: 0
LIGHT-HEADEDNESS: 0
NAUSEA: 0
WHEEZING: 0
VOMITING: 0
WEAKNESS: 0
DOUBLE VISION: 0
RHINORRHEA: 0
NECK PAIN: 0

## 2024-06-11 NOTE — CPM/PAT H&P
[facility]/PAT Evaluation       Name: Lynsey Forman (Lynsey Forman)  /Age: 1945/78 y.o.       Date of Consult: 24     Referring Provider: Dr. Franz    Surgery, Date, and Length: Excision Lesion Skin of Left Upper Extremity - Left , 24, 60MIN    Lynsey Forman is a 78 year-old female who presents to the [facility] for perioperative risk assessment prior to surgery.    Patient presents with a primary diagnosis of left upper arm lesion. Pt refers the area of concern has been present for at least 30 years. It started out as pimple sized and over the years has become larger.  It is now about the size of a large prune.  It is soft and mobile. It does cause some discomfort when it brushes against her clothes or the wall / chair.  She wishes to have this removed.  Of note, pt underwent lumbar decompression surgery in [place] in May 2024 and did quite well with anesthesia.  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  Plan for MRA in 2024 for follow up of aneurysm. She was cleared by both specialties for prior lumbar spine surgery in May 2024.     This note was created in part upon personal review of patient's medical records.      Patient is scheduled to have Excision Lesion Skin of Left Upper Extremity - Left      Pt denies any past history of anesthetic complications such as PONV, awareness, prolonged sedation, dental damage, aspiration, cardiac arrest, difficult intubation, difficult I.V. access or unexpected hospital admissions.  NO malignant hyperthermia and or pseudocholinesterase deficiency.  No history of blood transfusions     The patient is not a [employer] and will accept blood and blood products if medically indicated.   Type and screen sent.     Past Medical History:   Diagnosis Date    Abnormal kidney function 2024    BUN-24, GFR-56 (24)    Alcohol use, unspecified, uncomplicated  12/20/2022    Aneurysm of carotid artery (CMS-HCC)     Right MCA, followed by neurology    Atrial fibrillation (Multi)     no reccurance s/p ablation 4/2023 and 7/2023    Carotid stenosis, asymptomatic, bilateral     on ASA and statin    Chronic cough 11/24/2009    Formatting of this note might be different from the original. Overview: No change with stopping lisinopril, no change with Advair, consensus of ENT, pulmonary, here is LP reflux, has associated past-nasal drip that does not respond to Mucinex or Flonase, 2012 has large nasal polyp L Formatting of this note might be different from the original. No change with stopping lisinopril, no change with Adv    Diverticulosis 12/21/2016    GERD (gastroesophageal reflux disease)     History of arthrodesis 07/07/2023    Hyperlipidemia     Hypertension     Lipoma of arm     Left upper arm    Lumbar disc disease     Moriah's edema of vocal folds     Sinus bradycardia 01/04/2013    Formatting of this note might be different from the original. Overview: Asymptomatic, noticed on exam Formatting of this note might be different from the original. Asymptomatic, noticed on exam    Tachycardia 01/08/2024    Tobacco use disorder, mild, in early remission     Quit 1/1/2024, 1/4 PPD x 20 years    Vocal cord polyp        Past Surgical History:   Procedure Laterality Date    ANTERIOR CRUCIATE LIGAMENT REPAIR Left     CARDIAC ELECTROPHYSIOLOGY STUDY AND ABLATION      4/2023, 7/2023    CERVICAL LAMINECTOMY  2017    Cervical surgery    LUMBAR LAMINECTOMY  05/2024    L4-5    MOUTH SURGERY  2018    Oral surgery-peridontal       Patient  has no history on file for sexual activity.    Family History   Problem Relation Name Age of Onset    Heart failure Mother      Dementia Mother      Hypertension Mother      Dementia Father      No Known Problems Sister      No Known Problems Sister      Heart disease Brother      Other (back pain) Brother      No Known Problems Brother      No Known  Problems Brother       Social History     Socioeconomic History    Marital status:      Spouse name: Not on file    Number of children: Not on file    Years of education: Not on file    Highest education level: Not on file   Occupational History    Not on file   Tobacco Use    Smoking status: Former     Current packs/day: 0.00     Average packs/day: 0.3 packs/day for 30.0 years (7.5 ttl pk-yrs)     Types: Cigarettes     Start date:      Quit date: 2024     Years since quittin.4    Smokeless tobacco: Never   Vaping Use    Vaping status: Never Used   Substance and Sexual Activity    Alcohol use: Yes     Alcohol/week: 4.0 standard drinks of alcohol     Types: 1 Glasses of wine, 3 Shots of liquor per week     Comment: daily    Drug use: Yes     Frequency: 7.0 times per week     Types: Marijuana     Comment: marijuana gummies for pain daily    Sexual activity: Not on file   Other Topics Concern    Not on file   Social History Narrative    Not on file     Social Determinants of Health     Financial Resource Strain: Not on file   Food Insecurity: Not on file   Transportation Needs: Not on file   Physical Activity: Not on file   Stress: Not on file   Social Connections: Not on file   Intimate Partner Violence: Not on file   Housing Stability: Not on file        Allergies   Allergen Reactions    Codeine Anxiety, Itching, Unknown and Other     Denies itching, hive, shortness of breath       Current Outpatient Medications:     amLODIPine (Norvasc) 5 mg tablet, TAKE 1 TABLET BY MOUTH DAILY, Disp: 90 tablet, Rfl: 1    aspirin 81 mg EC tablet, Take 1 tablet (81 mg) by mouth once daily., Disp: 30 tablet, Rfl: 11    atorvastatin (Lipitor) 80 mg tablet, Take 1 tablet (80 mg) by mouth once daily., Disp: 90 tablet, Rfl: 3    ezetimibe (Zetia) 10 mg tablet, Take 1 tablet (10 mg) by mouth once daily., Disp: 30 tablet, Rfl: 11    furosemide (Lasix) 20 mg tablet, Take 1 tablet (20 mg) by mouth if needed (for ankle  swelling)., Disp: 30 tablet, Rfl: 0    gabapentin (Neurontin) 300 mg capsule, Take 1 capsule (300 mg) by mouth 3 times a day., Disp: 270 capsule, Rfl: 1    hydrOXYzine HCL (Atarax) 25 mg tablet, Take 1 tablet (25 mg) by mouth if needed for anxiety., Disp: , Rfl:     lisinopril 20 mg tablet, TAKE 1 TABLET BY MOUTH DAILY, Disp: 90 tablet, Rfl: 1    meloxicam (Mobic) 15 mg tablet, Take 1 tablet (15 mg) by mouth once daily., Disp: , Rfl:     rivaroxaban (Xarelto) 15 mg tablet, Take 1 tablet (15 mg) by mouth once daily in the evening. Take with food., Disp: 90 tablet, Rfl: 3    triamterene-hydrochlorothiazid (Maxzide-25) 37.5-25 mg tablet, TAKE 1 TABLET BY MOUTH DAILY, Disp: 90 tablet, Rfl: 1      PAT ROS:   Constitutional:    no fever   no chills   no unexpected weight change  Neuro/Psych:    no numbness   no weakness   no light-headedness   no confusion  Eyes:    no discharge   no pain   no vision loss   no diplopia   no visual disturbance   use of corrective lenses  Ears:    no ear pain   no hearing loss   no tinnitus  Nose:    no nasal discharge   no sinus congestion   no epistaxis  Mouth:    no dental issues   no mouth pain   no oral bleeding   no mouth lesions  Throat:    no throat pain   no dysphagia  Neck:    no neck pain   no neck stiffness  Cardio:    Functional 4 Mets. Patient denies SOB walking up 2 flights of stairs   Walking 20 minutes daily; cooking, cleaning, grocery shopping    no chest pain   no palpitations   no peripheral edema   no dyspnea   no CLARK  Respiratory:    no cough   no wheezing   no hemoptysis   no shortness of breath  Endocrine:    no cold intolerance   no heat intolerance  GI:    no abdominal distention   no abdominal pain   no constipation   no diarrhea   no nausea   no vomiting   no blood in stool  :    no difficulty urinating   no dysuria   no oliguria  Musculoskeletal:    arthralgias (LBP)   no myalgias   no decreased ROM  Hematologic:    bruises/bleeds easily (Xarelto + ASA 81mg)    no excessive bleeding   no history of blood transfusion   no blood clots  Skin:   no skin changes   sores/wound (lumbar decompression surgical incision healing well)   no rash      Physical Exam  Constitutional:       General: She is not in acute distress.     Appearance: Normal appearance. She is not ill-appearing, toxic-appearing or diaphoretic.   HENT:      Head: Normocephalic and atraumatic.      Nose: Nose normal. No rhinorrhea.      Mouth/Throat:      Pharynx: No oropharyngeal exudate.   Eyes:      Extraocular Movements: Extraocular movements intact.      Conjunctiva/sclera: Conjunctivae normal.   Cardiovascular:      Rate and Rhythm: Normal rate and regular rhythm.      Heart sounds: No murmur heard.     No friction rub. No gallop.      Comments: Functional 4 Mets. Patient denies SOB walking up 2 flights of stairs     Pulmonary:      Effort: Pulmonary effort is normal. No respiratory distress.      Breath sounds: Normal breath sounds. No stridor. No wheezing or rhonchi.   Abdominal:      General: Bowel sounds are normal. There is no distension.      Palpations: Abdomen is soft. There is no mass.      Tenderness: There is no abdominal tenderness. There is no guarding or rebound.      Hernia: No hernia is present.   Musculoskeletal:         General: No swelling, tenderness, deformity or signs of injury. Normal range of motion.      Cervical back: Normal range of motion and neck supple. No rigidity or tenderness.      Comments: Soft, mobile, spherical protrusion on posterior left upper arm about the size of large prune; + discomfort   Skin:     General: Skin is warm and dry.      Coloration: Skin is not jaundiced or pale.      Findings: No bruising, erythema, lesion or rash.   Neurological:      General: No focal deficit present.      Mental Status: She is alert and oriented to person, place, and time.      Cranial Nerves: No cranial nerve deficit.      Sensory: No sensory deficit.      Motor: No weakness.       "Coordination: Coordination normal.   Psychiatric:         Mood and Affect: Mood normal.         Behavior: Behavior normal.          PAT AIRWAY:   Airway:     Mallampati::  II    Neck ROM::  Full   No broken teeth, no dentures and no missing teeth          Visit Vitals  /64   Pulse 62   Temp 36.9 °C (98.4 °F)   Resp 16   Ht 1.6 m (5' 3\")   Wt 49.3 kg (108 lb 11 oz)   SpO2 95%   BMI 19.25 kg/m²   OB Status Postmenopausal   Smoking Status Former   BSA 1.48 m²      LABS:  Lab Results   Component Value Date    WBC 5.2 06/11/2024    HGB 14.3 06/11/2024    HCT 41.8 06/11/2024    MCV 94 06/11/2024     06/11/2024      Lab Results   Component Value Date    GLUCOSE 103 (H) 06/11/2024    CALCIUM 9.6 06/11/2024     (L) 06/11/2024    K 3.8 06/11/2024    CO2 31 06/11/2024    CL 90 (L) 06/11/2024    BUN 19 06/11/2024    CREATININE 1.10 (H) 06/11/2024      EKG 4/29/24  Marked sinus bradycardia  Low voltage QRS  Septal infarct , age undetermined  Abnormal ECG  When compared with ECG of 02-APR-2024 12:05, (unconfirmed)  Septal infarct is now Present  Nonspecific T wave abnormality now evident in Lateral leads    CT angio neck 2/13/24    US carotid artery 12/5/23  **CRITICAL RESULT**  Critical Result: Findings are consistent with greater than 70% stenosis in bilateral proximal internal carotid arteries. Turbulent flow seen by color Doppler.  Notification called to Urszula Lebron CNP with results. on 12/5/2023 at 2:00:43 PM.     CONCLUSIONS:  Right Carotid: Findings are consistent with greater than 70% stenosis of the right proximal internal carotid artery. Turbulent flow seen by color Doppler. Right ICA proximal segment degree of stenosis may be underestimated due to calcified shadowing plaque. Right external carotid artery appears patent with no evidence of stenosis. No evidence of hemodynamically significant stenosis of the right common carotid artery. The right vertebral artery is patent with antegrade flow. No " "evidence of hemodynamically significant stenosis in the right subclavian artery.  Left Carotid: Findings are consistent with greater than 70% stenosis of the left proximal internal carotid artery. Turbulent flow seen by color Doppler. Left external carotid artery appears patent with no evidence of stenosis. Left ECA proximal segment degree of stenosis may be underestimated due to calcified shadowing plaque. No evidence of hemodynamically significant stenosis of the left common carotid artery. The left vertebral artery is patent with antegrade flow. No evidence of hemodynamically significant stenosis in the left subclavian artery.    ECHO 2/16/23  CONCLUSIONS:  1. Left ventricular systolic function is normal with a 60-65% estimated ejection fraction.  2. RVSP within normal limits.  3. Mildly dilated atria.      Assessment and Plan:     Patient is a 78-year-old female scheduled for a Excision Lesion Skin of Left Upper Extremity - Left with Dr. Franz on 6/18/24.    Patient has no active cardiac symptoms.   Patient denies any chest pain, tightness, heaviness, pressure, radiating pain, palpitations, irregular heartbeats, lightheadedness, cough, congestion, shortness of breath, CLARK, PND, near syncope, weight loss or gain.     RCRI  0  , 3.9 % Risk of MACE    Cardiac:  HTN - cont amlodipine and triamterene/ hydrochlorothiazide on dos ; hold lisinopril 24 hours before surgery  Encouraged lifestyle modifications, low-sodium diet, and increase activity as tolerated.  Monitor BP and follow up with managing physician for readings sustaining >140/90.    HLD - cont statin on dos     A flutter / A fib - s/p ablation x 2 (4/2023 and 7/2023)- hold Xarelto 3 days prior to surgery    Leg edema - hold furosemide dos    Urszula Lebron CNP states:  \"Pt is at moderate to high risk for periop cardiovascular event.  She requires no further cardiac testing.  She can hold Xarelto 3 days prior to procedure and resume when safe to do so as " "soon as possible post procedure as determined by performing surgeon.  We would recommend she remain on aspirin without interruption. Reviewd case with cardiologist Dr. Mcdonald. Call with questions\"    ENT:  Moriah's edema / laryngeal edema - pt follows with Dr. Garcias; LOV 3/21/24; s/p microflap excision, vocal cord injection, CO2 laser resection 8/9/23.     Renal:  CKDIIIA  Recommendations to avoid nephrotoxic drugs and carefully monitor fluid status to maintain euvolemia. Use dose adjusted medications as needed for the underlying level of renal function.     6/11/24 Crt 1.10; GFR 52    Vascular:  Carotid stenosis - pt saw Dr. Andria Pope (neuro stroke specialist) 4/25/24 notes states:    \"Bilateral asymptomatic carotid stenosis: as per neurosurgery, this is asymptomatic. Medical management with aspirin and atorvastatin/risk factor modification. \"    6mm R MCA aneurysm - will repeat MRA in September 2024    Per Dr. Ju Gonzalez progress note 3/26/24:  \"we will plan to see her back in 6 months with a follow-up vascular imaging MRA to evaluate her aneurysm. We can readdress the issue of her carotid stenosis, once she has made decisions regarding proceeding with lumbar surgery in the interim. \"    (**of note, pt underwent lumbar decompression spine surgery in [place], [place] in May 2024 without issues**)    Hematology:  Hyponatremia  6/11/24 Na 131    Patient instructed to ambulate as soon as possible postoperatively to decrease thromboembolic risk.   Initiate mechanical DVT prophylaxis as soon as possible and initiate chemical prophylaxis when deemed safe from a bleeding standpoint post surgery.     LABS: CBC , BMP ordered. EKG reviewed from 4/29/24. Lab results reviewed and show hyponatremia and stable CKD.      STOP BANG: >50, HTN = 2    Caprini: 5    Risk assessment complete.  Patient is scheduled for a low surgical risk procedure.        Preoperative medication instructions were provided and reviewed " with the patient.  Any additional testing or evaluation was explained to the patient.  Nothing by mouth instructions were discussed and patient's questions were answered prior to conclusion to this encounter.  Patient verbalized understanding of preoperative instructions given in preadmission testing; discharge instructions available in EMR.    This note was dictated by a speech recognition.  Minor errors may have been detected in a speech recognition.

## 2024-06-11 NOTE — H&P (VIEW-ONLY)
[facility]/PAT Evaluation       Name: Lynsey Forman (Lynsey Forman)  /Age: 1945/78 y.o.       Date of Consult: 24     Referring Provider: Dr. Franz    Surgery, Date, and Length: Excision Lesion Skin of Left Upper Extremity - Left , 24, 60MIN    Lynsey Forman is a 78 year-old female who presents to the [facility] for perioperative risk assessment prior to surgery.    Patient presents with a primary diagnosis of left upper arm lesion. Pt refers the area of concern has been present for at least 30 years. It started out as pimple sized and over the years has become larger.  It is now about the size of a large prune.  It is soft and mobile. It does cause some discomfort when it brushes against her clothes or the wall / chair.  She wishes to have this removed.  Of note, pt underwent lumbar decompression surgery in [place] in May 2024 and did quite well with anesthesia.  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  Plan for MRA in 2024 for follow up of aneurysm. She was cleared by both specialties for prior lumbar spine surgery in May 2024.     This note was created in part upon personal review of patient's medical records.      Patient is scheduled to have Excision Lesion Skin of Left Upper Extremity - Left      Pt denies any past history of anesthetic complications such as PONV, awareness, prolonged sedation, dental damage, aspiration, cardiac arrest, difficult intubation, difficult I.V. access or unexpected hospital admissions.  NO malignant hyperthermia and or pseudocholinesterase deficiency.  No history of blood transfusions     The patient is not a Yazidi and will accept blood and blood products if medically indicated.   Type and screen sent.     Past Medical History:   Diagnosis Date    Abnormal kidney function 2024    BUN-24, GFR-56 (24)    Alcohol use, unspecified, uncomplicated  12/20/2022    Aneurysm of carotid artery (CMS-HCC)     Right MCA, followed by neurology    Atrial fibrillation (Multi)     no reccurance s/p ablation 4/2023 and 7/2023    Carotid stenosis, asymptomatic, bilateral     on ASA and statin    Chronic cough 11/24/2009    Formatting of this note might be different from the original. Overview: No change with stopping lisinopril, no change with Advair, consensus of ENT, pulmonary, here is LP reflux, has associated past-nasal drip that does not respond to Mucinex or Flonase, 2012 has large nasal polyp L Formatting of this note might be different from the original. No change with stopping lisinopril, no change with Adv    Diverticulosis 12/21/2016    GERD (gastroesophageal reflux disease)     History of arthrodesis 07/07/2023    Hyperlipidemia     Hypertension     Lipoma of arm     Left upper arm    Lumbar disc disease     Moriah's edema of vocal folds     Sinus bradycardia 01/04/2013    Formatting of this note might be different from the original. Overview: Asymptomatic, noticed on exam Formatting of this note might be different from the original. Asymptomatic, noticed on exam    Tachycardia 01/08/2024    Tobacco use disorder, mild, in early remission     Quit 1/1/2024, 1/4 PPD x 20 years    Vocal cord polyp        Past Surgical History:   Procedure Laterality Date    ANTERIOR CRUCIATE LIGAMENT REPAIR Left     CARDIAC ELECTROPHYSIOLOGY STUDY AND ABLATION      4/2023, 7/2023    CERVICAL LAMINECTOMY  2017    Cervical surgery    LUMBAR LAMINECTOMY  05/2024    L4-5    MOUTH SURGERY  2018    Oral surgery-peridontal       Patient  has no history on file for sexual activity.    Family History   Problem Relation Name Age of Onset    Heart failure Mother      Dementia Mother      Hypertension Mother      Dementia Father      No Known Problems Sister      No Known Problems Sister      Heart disease Brother      Other (back pain) Brother      No Known Problems Brother      No Known  Problems Brother       Social History     Socioeconomic History    Marital status:      Spouse name: Not on file    Number of children: Not on file    Years of education: Not on file    Highest education level: Not on file   Occupational History    Not on file   Tobacco Use    Smoking status: Former     Current packs/day: 0.00     Average packs/day: 0.3 packs/day for 30.0 years (7.5 ttl pk-yrs)     Types: Cigarettes     Start date:      Quit date: 2024     Years since quittin.4    Smokeless tobacco: Never   Vaping Use    Vaping status: Never Used   Substance and Sexual Activity    Alcohol use: Yes     Alcohol/week: 4.0 standard drinks of alcohol     Types: 1 Glasses of wine, 3 Shots of liquor per week     Comment: daily    Drug use: Yes     Frequency: 7.0 times per week     Types: Marijuana     Comment: marijuana gummies for pain daily    Sexual activity: Not on file   Other Topics Concern    Not on file   Social History Narrative    Not on file     Social Determinants of Health     Financial Resource Strain: Not on file   Food Insecurity: Not on file   Transportation Needs: Not on file   Physical Activity: Not on file   Stress: Not on file   Social Connections: Not on file   Intimate Partner Violence: Not on file   Housing Stability: Not on file        Allergies   Allergen Reactions    Codeine Anxiety, Itching, Unknown and Other     Denies itching, hive, shortness of breath       Current Outpatient Medications:     amLODIPine (Norvasc) 5 mg tablet, TAKE 1 TABLET BY MOUTH DAILY, Disp: 90 tablet, Rfl: 1    aspirin 81 mg EC tablet, Take 1 tablet (81 mg) by mouth once daily., Disp: 30 tablet, Rfl: 11    atorvastatin (Lipitor) 80 mg tablet, Take 1 tablet (80 mg) by mouth once daily., Disp: 90 tablet, Rfl: 3    ezetimibe (Zetia) 10 mg tablet, Take 1 tablet (10 mg) by mouth once daily., Disp: 30 tablet, Rfl: 11    furosemide (Lasix) 20 mg tablet, Take 1 tablet (20 mg) by mouth if needed (for ankle  swelling)., Disp: 30 tablet, Rfl: 0    gabapentin (Neurontin) 300 mg capsule, Take 1 capsule (300 mg) by mouth 3 times a day., Disp: 270 capsule, Rfl: 1    hydrOXYzine HCL (Atarax) 25 mg tablet, Take 1 tablet (25 mg) by mouth if needed for anxiety., Disp: , Rfl:     lisinopril 20 mg tablet, TAKE 1 TABLET BY MOUTH DAILY, Disp: 90 tablet, Rfl: 1    meloxicam (Mobic) 15 mg tablet, Take 1 tablet (15 mg) by mouth once daily., Disp: , Rfl:     rivaroxaban (Xarelto) 15 mg tablet, Take 1 tablet (15 mg) by mouth once daily in the evening. Take with food., Disp: 90 tablet, Rfl: 3    triamterene-hydrochlorothiazid (Maxzide-25) 37.5-25 mg tablet, TAKE 1 TABLET BY MOUTH DAILY, Disp: 90 tablet, Rfl: 1      PAT ROS:   Constitutional:    no fever   no chills   no unexpected weight change  Neuro/Psych:    no numbness   no weakness   no light-headedness   no confusion  Eyes:    no discharge   no pain   no vision loss   no diplopia   no visual disturbance   use of corrective lenses  Ears:    no ear pain   no hearing loss   no tinnitus  Nose:    no nasal discharge   no sinus congestion   no epistaxis  Mouth:    no dental issues   no mouth pain   no oral bleeding   no mouth lesions  Throat:    no throat pain   no dysphagia  Neck:    no neck pain   no neck stiffness  Cardio:    Functional 4 Mets. Patient denies SOB walking up 2 flights of stairs   Walking 20 minutes daily; cooking, cleaning, grocery shopping    no chest pain   no palpitations   no peripheral edema   no dyspnea   no CLARK  Respiratory:    no cough   no wheezing   no hemoptysis   no shortness of breath  Endocrine:    no cold intolerance   no heat intolerance  GI:    no abdominal distention   no abdominal pain   no constipation   no diarrhea   no nausea   no vomiting   no blood in stool  :    no difficulty urinating   no dysuria   no oliguria  Musculoskeletal:    arthralgias (LBP)   no myalgias   no decreased ROM  Hematologic:    bruises/bleeds easily (Xarelto + ASA 81mg)    no excessive bleeding   no history of blood transfusion   no blood clots  Skin:   no skin changes   sores/wound (lumbar decompression surgical incision healing well)   no rash      Physical Exam  Constitutional:       General: She is not in acute distress.     Appearance: Normal appearance. She is not ill-appearing, toxic-appearing or diaphoretic.   HENT:      Head: Normocephalic and atraumatic.      Nose: Nose normal. No rhinorrhea.      Mouth/Throat:      Pharynx: No oropharyngeal exudate.   Eyes:      Extraocular Movements: Extraocular movements intact.      Conjunctiva/sclera: Conjunctivae normal.   Cardiovascular:      Rate and Rhythm: Normal rate and regular rhythm.      Heart sounds: No murmur heard.     No friction rub. No gallop.      Comments: Functional 4 Mets. Patient denies SOB walking up 2 flights of stairs     Pulmonary:      Effort: Pulmonary effort is normal. No respiratory distress.      Breath sounds: Normal breath sounds. No stridor. No wheezing or rhonchi.   Abdominal:      General: Bowel sounds are normal. There is no distension.      Palpations: Abdomen is soft. There is no mass.      Tenderness: There is no abdominal tenderness. There is no guarding or rebound.      Hernia: No hernia is present.   Musculoskeletal:         General: No swelling, tenderness, deformity or signs of injury. Normal range of motion.      Cervical back: Normal range of motion and neck supple. No rigidity or tenderness.      Comments: Soft, mobile, spherical protrusion on posterior left upper arm about the size of large prune; + discomfort   Skin:     General: Skin is warm and dry.      Coloration: Skin is not jaundiced or pale.      Findings: No bruising, erythema, lesion or rash.   Neurological:      General: No focal deficit present.      Mental Status: She is alert and oriented to person, place, and time.      Cranial Nerves: No cranial nerve deficit.      Sensory: No sensory deficit.      Motor: No weakness.       "Coordination: Coordination normal.   Psychiatric:         Mood and Affect: Mood normal.         Behavior: Behavior normal.          PAT AIRWAY:   Airway:     Mallampati::  II    Neck ROM::  Full   No broken teeth, no dentures and no missing teeth          Visit Vitals  /64   Pulse 62   Temp 36.9 °C (98.4 °F)   Resp 16   Ht 1.6 m (5' 3\")   Wt 49.3 kg (108 lb 11 oz)   SpO2 95%   BMI 19.25 kg/m²   OB Status Postmenopausal   Smoking Status Former   BSA 1.48 m²      LABS:  Lab Results   Component Value Date    WBC 5.2 06/11/2024    HGB 14.3 06/11/2024    HCT 41.8 06/11/2024    MCV 94 06/11/2024     06/11/2024      Lab Results   Component Value Date    GLUCOSE 103 (H) 06/11/2024    CALCIUM 9.6 06/11/2024     (L) 06/11/2024    K 3.8 06/11/2024    CO2 31 06/11/2024    CL 90 (L) 06/11/2024    BUN 19 06/11/2024    CREATININE 1.10 (H) 06/11/2024      EKG 4/29/24  Marked sinus bradycardia  Low voltage QRS  Septal infarct , age undetermined  Abnormal ECG  When compared with ECG of 02-APR-2024 12:05, (unconfirmed)  Septal infarct is now Present  Nonspecific T wave abnormality now evident in Lateral leads    CT angio neck 2/13/24    US carotid artery 12/5/23  **CRITICAL RESULT**  Critical Result: Findings are consistent with greater than 70% stenosis in bilateral proximal internal carotid arteries. Turbulent flow seen by color Doppler.  Notification called to Urszula Lebron CNP with results. on 12/5/2023 at 2:00:43 PM.     CONCLUSIONS:  Right Carotid: Findings are consistent with greater than 70% stenosis of the right proximal internal carotid artery. Turbulent flow seen by color Doppler. Right ICA proximal segment degree of stenosis may be underestimated due to calcified shadowing plaque. Right external carotid artery appears patent with no evidence of stenosis. No evidence of hemodynamically significant stenosis of the right common carotid artery. The right vertebral artery is patent with antegrade flow. No " "evidence of hemodynamically significant stenosis in the right subclavian artery.  Left Carotid: Findings are consistent with greater than 70% stenosis of the left proximal internal carotid artery. Turbulent flow seen by color Doppler. Left external carotid artery appears patent with no evidence of stenosis. Left ECA proximal segment degree of stenosis may be underestimated due to calcified shadowing plaque. No evidence of hemodynamically significant stenosis of the left common carotid artery. The left vertebral artery is patent with antegrade flow. No evidence of hemodynamically significant stenosis in the left subclavian artery.    ECHO 2/16/23  CONCLUSIONS:  1. Left ventricular systolic function is normal with a 60-65% estimated ejection fraction.  2. RVSP within normal limits.  3. Mildly dilated atria.      Assessment and Plan:     Patient is a 78-year-old female scheduled for a Excision Lesion Skin of Left Upper Extremity - Left with Dr. Franz on 6/18/24.    Patient has no active cardiac symptoms.   Patient denies any chest pain, tightness, heaviness, pressure, radiating pain, palpitations, irregular heartbeats, lightheadedness, cough, congestion, shortness of breath, CLARK, PND, near syncope, weight loss or gain.     RCRI  0  , 3.9 % Risk of MACE    Cardiac:  HTN - cont amlodipine and triamterene/ hydrochlorothiazide on dos ; hold lisinopril 24 hours before surgery  Encouraged lifestyle modifications, low-sodium diet, and increase activity as tolerated.  Monitor BP and follow up with managing physician for readings sustaining >140/90.    HLD - cont statin on dos     A flutter / A fib - s/p ablation x 2 (4/2023 and 7/2023)- hold Xarelto 3 days prior to surgery    Leg edema - hold furosemide dos    Urszula Lebron CNP states:  \"Pt is at moderate to high risk for periop cardiovascular event.  She requires no further cardiac testing.  She can hold Xarelto 3 days prior to procedure and resume when safe to do so as " "soon as possible post procedure as determined by performing surgeon.  We would recommend she remain on aspirin without interruption. Reviewd case with cardiologist Dr. Mcdonald. Call with questions\"    ENT:  Moriah's edema / laryngeal edema - pt follows with Dr. Garcias; LOV 3/21/24; s/p microflap excision, vocal cord injection, CO2 laser resection 8/9/23.     Renal:  CKDIIIA  Recommendations to avoid nephrotoxic drugs and carefully monitor fluid status to maintain euvolemia. Use dose adjusted medications as needed for the underlying level of renal function.     6/11/24 Crt 1.10; GFR 52    Vascular:  Carotid stenosis - pt saw Dr. Andria Pope (neuro stroke specialist) 4/25/24 notes states:    \"Bilateral asymptomatic carotid stenosis: as per neurosurgery, this is asymptomatic. Medical management with aspirin and atorvastatin/risk factor modification. \"    6mm R MCA aneurysm - will repeat MRA in September 2024    Per Dr. Ju Gonzalez progress note 3/26/24:  \"we will plan to see her back in 6 months with a follow-up vascular imaging MRA to evaluate her aneurysm. We can readdress the issue of her carotid stenosis, once she has made decisions regarding proceeding with lumbar surgery in the interim. \"    (**of note, pt underwent lumbar decompression spine surgery in [place], [place] in May 2024 without issues**)    Hematology:  Hyponatremia  6/11/24 Na 131    Patient instructed to ambulate as soon as possible postoperatively to decrease thromboembolic risk.   Initiate mechanical DVT prophylaxis as soon as possible and initiate chemical prophylaxis when deemed safe from a bleeding standpoint post surgery.     LABS: CBC , BMP ordered. EKG reviewed from 4/29/24. Lab results reviewed and show hyponatremia and stable CKD.      STOP BANG: >50, HTN = 2    Caprini: 5    Risk assessment complete.  Patient is scheduled for a low surgical risk procedure.        Preoperative medication instructions were provided and reviewed " with the patient.  Any additional testing or evaluation was explained to the patient.  Nothing by mouth instructions were discussed and patient's questions were answered prior to conclusion to this encounter.  Patient verbalized understanding of preoperative instructions given in preadmission testing; discharge instructions available in EMR.    This note was dictated by a speech recognition.  Minor errors may have been detected in a speech recognition.

## 2024-06-11 NOTE — CPM/PAT NURSE NOTE
CPM/PAT Nurse Note      Name: Lynsey Forman (Lynsey Forman)  /Age: 1945/78 y.o.       Past Medical History:   Diagnosis Date    Abnormal kidney function 2024    BUN-24, GFR-56 (24)    Alcohol use, unspecified, uncomplicated 2022    Aneurysm of carotid artery (CMS-HCC)     Right MCA, followed by neurology    Atrial fibrillation (Multi)     no reccurance s/p ablation 2023 and 2023    Carotid stenosis, asymptomatic, bilateral     on ASA and statin    Chronic cough 2009    Formatting of this note might be different from the original. Overview: No change with stopping lisinopril, no change with Advair, consensus of ENT, pulmonary, here is LP reflux, has associated past-nasal drip that does not respond to Mucinex or Flonase, 2012 has large nasal polyp L Formatting of this note might be different from the original. No change with stopping lisinopril, no change with Adv    Diverticulosis 2016    GERD (gastroesophageal reflux disease)     History of arthrodesis 2023    Hyperlipidemia     Hypertension     Lipoma of arm     Left upper arm    Lumbar disc disease     Moriah's edema of vocal folds     Sinus bradycardia 2013    Formatting of this note might be different from the original. Overview: Asymptomatic, noticed on exam Formatting of this note might be different from the original. Asymptomatic, noticed on exam    Tachycardia 2024    Tobacco use disorder, mild, in early remission     Quit 2024,  PPD x 20 years    Vocal cord polyp        Past Surgical History:   Procedure Laterality Date    ANTERIOR CRUCIATE LIGAMENT REPAIR Left     CARDIAC ELECTROPHYSIOLOGY STUDY AND ABLATION      2023, 2023    CERVICAL LAMINECTOMY  2017    Cervical surgery    LUMBAR LAMINECTOMY  2024    L4-5    MOUTH SURGERY  2018    Oral surgery-peridontal       Patient  has no history on file for sexual activity.    Family History   Problem Relation Name Age of Onset    Heart  failure Mother      Dementia Mother      Hypertension Mother      Dementia Father      No Known Problems Sister      No Known Problems Sister      Heart disease Brother      Other (back pain) Brother      No Known Problems Brother      No Known Problems Brother         Allergies   Allergen Reactions    Codeine Anxiety, Itching, Unknown and Other     Denies itching, hive, shortness of breath       Prior to Admission medications    Medication Sig Start Date End Date Taking? Authorizing Provider   amLODIPine (Norvasc) 5 mg tablet TAKE 1 TABLET BY MOUTH DAILY 2/28/24   Windy Bartholomew MD   aspirin 81 mg EC tablet Take 1 tablet (81 mg) by mouth once daily. 1/22/24 1/21/25  Shaquille Blackman MD PhD   atorvastatin (Lipitor) 80 mg tablet Take 1 tablet (80 mg) by mouth once daily. 12/19/23 12/18/24  LILY Tan   ezetimibe (Zetia) 10 mg tablet Take 1 tablet (10 mg) by mouth once daily. 4/2/24 4/2/25  LILY Tan   furosemide (Lasix) 20 mg tablet Take 1 tablet (20 mg) by mouth if needed (for ankle swelling). 4/2/24 6/11/24  LILY Tan   gabapentin (Neurontin) 300 mg capsule Take 1 capsule (300 mg) by mouth 3 times a day. 4/15/24 10/12/24  Windy Bartholomew MD   hydrOXYzine HCL (Atarax) 25 mg tablet Take 1 tablet (25 mg) by mouth if needed for anxiety.    Historical Provider, MD   lisinopril 20 mg tablet TAKE 1 TABLET BY MOUTH DAILY 6/4/24   Windy Bartholomew MD   meloxicam (Mobic) 15 mg tablet Take 1 tablet (15 mg) by mouth once daily.    Historical Provider, MD   rivaroxaban (Xarelto) 15 mg tablet Take 1 tablet (15 mg) by mouth once daily in the evening. Take with food. 10/2/23 10/1/24  LILY Tan   triamterene-hydrochlorothiazid (Maxzide-25) 37.5-25 mg tablet TAKE 1 TABLET BY MOUTH DAILY 2/28/24   Windy Bartholomew MD        PAT ROS     DASI Risk Score    No data to display       Caprini DVT Assessment    No data to display       Modified Frailty Index    No  data to display       CHADS2 Stroke Risk  Current as of today        4% 3 to 100%: High Risk   2 to < 3%: Medium Risk   0 to < 2%: Low Risk     Last Change: N/A          This score determines the patient's risk of having a stroke if the patient has atrial fibrillation.          Points Metrics   0 Has Congestive Heart Failure:  No     Patients with congestive heart failure get 1 point.    Current as of today   1 Has Hypertension:  Yes     Patients with hypertension get 1 point.    Current as of today   1 Age:  78     Patients who are 75 years of age or older get 1 point.    Current as of today   0 Has Diabetes:  No     Patients with diabetes get 1 point.    Current as of today   0 Had Stroke:  No  Had TIA:  No  Had Thromboembolism:  No     Patients who have had a stroke, TIA, or thromboembolism get 2 points.    Current as of today             Revised Cardiac Risk Index    No data to display       Apfel Simplified Score    No data to display       Risk Analysis Index Results This Encounter    No data found in the last 1 encounters.         Nurse Plan of Action:       After Visit Summary (AVS) reviewed and patient verbalized good understanding of medications and NPO instructions.

## 2024-06-11 NOTE — PREPROCEDURE INSTRUCTIONS
Medication List            Accurate as of June 11, 2024 12:27 PM. Always use your most recent med list.                amLODIPine 5 mg tablet  Commonly known as: Norvasc  TAKE 1 TABLET BY MOUTH DAILY  Medication Adjustments for Surgery: Take morning of surgery with sip of water, no other fluids     aspirin 81 mg EC tablet  Take 1 tablet (81 mg) by mouth once daily.  Medication Adjustments for Surgery: Take morning of surgery with sip of water, no other fluids     atorvastatin 80 mg tablet  Commonly known as: Lipitor  Take 1 tablet (80 mg) by mouth once daily.  Medication Adjustments for Surgery: Take morning of surgery with sip of water, no other fluids     ezetimibe 10 mg tablet  Commonly known as: Zetia  Take 1 tablet (10 mg) by mouth once daily.  Medication Adjustments for Surgery: Stop 1 day before surgery     furosemide 20 mg tablet  Commonly known as: Lasix  Take 1 tablet (20 mg) by mouth if needed (for ankle swelling).  Medication Adjustments for Surgery: Continue until night before surgery     gabapentin 300 mg capsule  Commonly known as: Neurontin  Take 1 capsule (300 mg) by mouth 3 times a day.  Medication Adjustments for Surgery: Take morning of surgery with sip of water, no other fluids     hydrOXYzine HCL 25 mg tablet  Commonly known as: Atarax  Medication Adjustments for Surgery: Take morning of surgery with sip of water, no other fluids     lisinopril 20 mg tablet  TAKE 1 TABLET BY MOUTH DAILY  Medication Adjustments for Surgery: Stop 1 day before surgery     meloxicam 15 mg tablet  Commonly known as: Mobic  Medication Adjustments for Surgery: Stop 7 days before surgery     triamterene-hydrochlorothiazid 37.5-25 mg tablet  Commonly known as: Maxzide-25  TAKE 1 TABLET BY MOUTH DAILY  Medication Adjustments for Surgery: Take morning of surgery with sip of water, no other fluids     Xarelto 15 mg tablet  Generic drug: rivaroxaban  Take 1 tablet (15 mg) by mouth once daily in the evening. Take with  food.  Medication Adjustments for Surgery: Stop 3 days before surgery                            **Concerning above medication instructions, if medication is normally taken at night, continue normal schedule.**  **DO NOT TAKE NIGHT PRIOR AND MORNING OF SURGERY**    CONTACT SURGEON'S OFFICE IF YOU DEVELOP:  * Fever = 100.4 F   * New respiratory symptoms (e.g. cough, shortness of breath, respiratory distress, sore throat)  * Recent loss of taste or smell  *Flu like symptoms such as headache, fatigue or gastrointestinal symptoms  * You develop any open sores, shingles, burning or painful urination   AND/OR:  * You no longer wish to have the surgery.  * Any other personal circumstances change that may lead to the need to cancel or defer this surgery.  *You were admitted to any hospital within one week of your planned procedure.    SMOKING:  *Quitting smoking can make a huge difference to your health and recovery from surgery.    *If you need help with quitting, call 5-479-QUIT-NOW.    THE DAY BEFORE SURGERY:  *Do not eat any food after midnight the night before surgery.   *You are permitted to drink clear liquids (i.e. water, black coffee (no milk or cream), tea, apple juice and electrolyte drinks (gatorade)) up to 13.5 ounces, up to 2 hours before your arrival time.  *You may chew gum until 2 hours before your surgery    SURGICAL TIME  *You will be contacted between 2 p.m. and 6 p.m. the business day before your surgery with your arrival time.  *If you haven't received a call by 6pm, call [phone].  *Scheduled surgery times may change and you will be notified if this occurs-check your personal voicemail for any updates.    ON THE MORNING OF SURGERY:  *Wear comfortable, loose fitting clothing.   *Do not use moisturizers, creams, lotions or perfume.  *All jewelry and valuables should be left at home.  *Prosthetic devices such as contact lenses, hearing aids, dentures, eyelash extensions, hairpins and body piercing  must be removed before surgery.    BRING WITH YOU:  *Photo ID and insurance card  *Current list of medicines and allergies  *Pacemaker/Defibrillator/Heart stent cards  *CPAP machine and mask  *Slings/splints/crutches  *Copy of your complete Advanced Directive/DHPOA-if applicable  *Neurostimulator implant remote    PARKING AND ARRIVAL:  *Check in at the Main Entrance desk and let them know you are here for surgery.  *You will be directed to the 2nd floor surgical waiting area.    AFTER OUTPATIENT SURGERY:  *A responsible adult MUST accompany you at the time of discharge and stay with you for 24 hours after your surgery.  *You may NOT drive yourself home after surgery.  *You may use a taxi or ride sharing service ([employer], [employer]) to return home ONLY if you are accompanied by a friend or family member.  *Instructions for resuming your medications will be provided by your surgeon.

## 2024-06-17 ENCOUNTER — ANESTHESIA EVENT (OUTPATIENT)
Dept: OPERATING ROOM | Facility: HOSPITAL | Age: 79
End: 2024-06-17
Payer: MEDICARE

## 2024-06-18 ENCOUNTER — ANESTHESIA (OUTPATIENT)
Dept: OPERATING ROOM | Facility: HOSPITAL | Age: 79
End: 2024-06-18
Payer: MEDICARE

## 2024-06-18 ENCOUNTER — HOSPITAL ENCOUNTER (OUTPATIENT)
Facility: HOSPITAL | Age: 79
Setting detail: OUTPATIENT SURGERY
Discharge: HOME | End: 2024-06-18
Attending: SURGERY | Admitting: SURGERY
Payer: MEDICARE

## 2024-06-18 VITALS
TEMPERATURE: 96.8 F | HEIGHT: 63 IN | HEART RATE: 46 BPM | OXYGEN SATURATION: 92 % | BODY MASS INDEX: 18.87 KG/M2 | SYSTOLIC BLOOD PRESSURE: 142 MMHG | WEIGHT: 106.48 LBS | DIASTOLIC BLOOD PRESSURE: 60 MMHG | RESPIRATION RATE: 17 BRPM

## 2024-06-18 DIAGNOSIS — D17.22 LIPOMA OF LEFT UPPER EXTREMITY: Primary | ICD-10-CM

## 2024-06-18 DIAGNOSIS — I48.91 ATRIAL FIBRILLATION, UNSPECIFIED TYPE (MULTI): ICD-10-CM

## 2024-06-18 DIAGNOSIS — D49.2 NEOPLASM OF UNSPECIFIED BEHAVIOR OF BONE, SOFT TISSUE, AND SKIN: ICD-10-CM

## 2024-06-18 PROBLEM — Z79.01 ANTICOAGULANT LONG-TERM USE: Status: ACTIVE | Noted: 2024-06-18

## 2024-06-18 PROCEDURE — 2500000004 HC RX 250 GENERAL PHARMACY W/ HCPCS (ALT 636 FOR OP/ED): Performed by: NURSE ANESTHETIST, CERTIFIED REGISTERED

## 2024-06-18 PROCEDURE — 7100000002 HC RECOVERY ROOM TIME - EACH INCREMENTAL 1 MINUTE: Performed by: SURGERY

## 2024-06-18 PROCEDURE — 3600000003 HC OR TIME - INITIAL BASE CHARGE - PROCEDURE LEVEL THREE: Performed by: SURGERY

## 2024-06-18 PROCEDURE — 2720000007 HC OR 272 NO HCPCS: Performed by: SURGERY

## 2024-06-18 PROCEDURE — 11406 EXC TR-EXT B9+MARG >4.0 CM: CPT | Performed by: SURGERY

## 2024-06-18 PROCEDURE — 3700000001 HC GENERAL ANESTHESIA TIME - INITIAL BASE CHARGE: Performed by: SURGERY

## 2024-06-18 PROCEDURE — 0752T DGTZ GLS MCRSCP SLD LVL III: CPT | Mod: TC,AHULAB | Performed by: SURGERY

## 2024-06-18 PROCEDURE — 3600000008 HC OR TIME - EACH INCREMENTAL 1 MINUTE - PROCEDURE LEVEL THREE: Performed by: SURGERY

## 2024-06-18 PROCEDURE — 2500000005 HC RX 250 GENERAL PHARMACY W/O HCPCS

## 2024-06-18 PROCEDURE — 3700000002 HC GENERAL ANESTHESIA TIME - EACH INCREMENTAL 1 MINUTE: Performed by: SURGERY

## 2024-06-18 PROCEDURE — 2500000004 HC RX 250 GENERAL PHARMACY W/ HCPCS (ALT 636 FOR OP/ED)

## 2024-06-18 PROCEDURE — 2500000004 HC RX 250 GENERAL PHARMACY W/ HCPCS (ALT 636 FOR OP/ED): Performed by: ANESTHESIOLOGY

## 2024-06-18 PROCEDURE — A4217 STERILE WATER/SALINE, 500 ML: HCPCS | Performed by: SURGERY

## 2024-06-18 PROCEDURE — 7100000001 HC RECOVERY ROOM TIME - INITIAL BASE CHARGE: Performed by: SURGERY

## 2024-06-18 PROCEDURE — 2500000005 HC RX 250 GENERAL PHARMACY W/O HCPCS: Performed by: SURGERY

## 2024-06-18 PROCEDURE — 2500000004 HC RX 250 GENERAL PHARMACY W/ HCPCS (ALT 636 FOR OP/ED): Performed by: SURGERY

## 2024-06-18 RX ORDER — OXYCODONE HYDROCHLORIDE 5 MG/1
5 TABLET ORAL EVERY 4 HOURS PRN
Status: DISCONTINUED | OUTPATIENT
Start: 2024-06-18 | End: 2024-06-18 | Stop reason: HOSPADM

## 2024-06-18 RX ORDER — CEFAZOLIN 1 G/1
INJECTION, POWDER, FOR SOLUTION INTRAVENOUS AS NEEDED
Status: DISCONTINUED | OUTPATIENT
Start: 2024-06-18 | End: 2024-06-18

## 2024-06-18 RX ORDER — ONDANSETRON HYDROCHLORIDE 2 MG/ML
4 INJECTION, SOLUTION INTRAVENOUS ONCE AS NEEDED
Status: DISCONTINUED | OUTPATIENT
Start: 2024-06-18 | End: 2024-06-18 | Stop reason: HOSPADM

## 2024-06-18 RX ORDER — SODIUM CHLORIDE, SODIUM LACTATE, POTASSIUM CHLORIDE, CALCIUM CHLORIDE 600; 310; 30; 20 MG/100ML; MG/100ML; MG/100ML; MG/100ML
100 INJECTION, SOLUTION INTRAVENOUS CONTINUOUS
Status: DISCONTINUED | OUTPATIENT
Start: 2024-06-18 | End: 2024-06-18 | Stop reason: HOSPADM

## 2024-06-18 RX ORDER — MEPERIDINE HYDROCHLORIDE 25 MG/ML
12.5 INJECTION INTRAMUSCULAR; INTRAVENOUS; SUBCUTANEOUS EVERY 10 MIN PRN
Status: DISCONTINUED | OUTPATIENT
Start: 2024-06-18 | End: 2024-06-18 | Stop reason: HOSPADM

## 2024-06-18 RX ORDER — ONDANSETRON HYDROCHLORIDE 2 MG/ML
INJECTION, SOLUTION INTRAVENOUS AS NEEDED
Status: DISCONTINUED | OUTPATIENT
Start: 2024-06-18 | End: 2024-06-18

## 2024-06-18 RX ORDER — MIDAZOLAM HYDROCHLORIDE 1 MG/ML
INJECTION INTRAMUSCULAR; INTRAVENOUS CONTINUOUS PRN
Status: DISCONTINUED | OUTPATIENT
Start: 2024-06-18 | End: 2024-06-18

## 2024-06-18 RX ORDER — SODIUM CHLORIDE 0.9 G/100ML
IRRIGANT IRRIGATION AS NEEDED
Status: DISCONTINUED | OUTPATIENT
Start: 2024-06-18 | End: 2024-06-18 | Stop reason: HOSPADM

## 2024-06-18 RX ORDER — FENTANYL CITRATE 50 UG/ML
INJECTION, SOLUTION INTRAMUSCULAR; INTRAVENOUS CONTINUOUS PRN
Status: DISCONTINUED | OUTPATIENT
Start: 2024-06-18 | End: 2024-06-18

## 2024-06-18 RX ORDER — TRAMADOL HYDROCHLORIDE 50 MG/1
50 TABLET ORAL EVERY 8 HOURS PRN
Qty: 10 TABLET | Refills: 0 | Status: SHIPPED | OUTPATIENT
Start: 2024-06-18

## 2024-06-18 RX ORDER — PROPOFOL 10 MG/ML
INJECTION, EMULSION INTRAVENOUS CONTINUOUS PRN
Status: DISCONTINUED | OUTPATIENT
Start: 2024-06-18 | End: 2024-06-18

## 2024-06-18 RX ORDER — LIDOCAINE HYDROCHLORIDE 20 MG/ML
INJECTION, SOLUTION EPIDURAL; INFILTRATION; INTRACAUDAL; PERINEURAL AS NEEDED
Status: DISCONTINUED | OUTPATIENT
Start: 2024-06-18 | End: 2024-06-18

## 2024-06-18 RX ORDER — ACETAMINOPHEN 325 MG/1
650 TABLET ORAL EVERY 6 HOURS PRN
Qty: 30 TABLET | Refills: 0 | Status: SHIPPED | OUTPATIENT
Start: 2024-06-18

## 2024-06-18 RX ORDER — LIDOCAINE HYDROCHLORIDE 10 MG/ML
0.1 INJECTION, SOLUTION EPIDURAL; INFILTRATION; INTRACAUDAL; PERINEURAL ONCE
Status: DISCONTINUED | OUTPATIENT
Start: 2024-06-18 | End: 2024-06-18 | Stop reason: HOSPADM

## 2024-06-18 ASSESSMENT — PAIN SCALES - GENERAL
PAINLEVEL_OUTOF10: 0 - NO PAIN

## 2024-06-18 ASSESSMENT — PAIN - FUNCTIONAL ASSESSMENT: PAIN_FUNCTIONAL_ASSESSMENT: 0-10

## 2024-06-18 ASSESSMENT — ENCOUNTER SYMPTOMS
DEPRESSION: 0
OCCASIONAL FEELINGS OF UNSTEADINESS: 0
LOSS OF SENSATION IN FEET: 0

## 2024-06-18 NOTE — ANESTHESIA PREPROCEDURE EVALUATION
Patient: Lynsey Forman    Procedure Information       Date/Time: 06/18/24 1230    Procedure: Excision Lesion Skin of Left Upper Extremity (Left)    Location: [facility] / [facility]    Surgeons: Prabhakar Franz MD            Relevant Problems   Anesthesia (within normal limits)      Cardiac   (+) Arteriosclerosis of coronary artery   (+) Atrial fibrillation (Multi)   (+) Atrial flutter (Multi)   (+) Benign essential hypertension   (+) Hypercholesterolemia   (+) Hypertension   (+) Nonrheumatic aortic valve stenosis      Pulmonary  VC polyp, excised  Cough  Env allergies      Neuro   (+) Aneurysm of middle cerebral artery (HHS-HCC)   (+) Bilateral carotid artery occlusion   (+) Mixed anxiety depressive disorder      GI  Diverticular disease     (+) Gastroesophageal reflux disease   (+) Gastroesophageal reflux disease with esophagitis      /Renal (within normal limits)      Liver (within normal limits)      Endocrine (within normal limits)      Hematology   (+) Anticoagulant long-term use      Musculoskeletal   (+) Chronic bilateral low back pain with bilateral sciatica   (+) Degeneration of intervertebral disc of lumbar region   (+) Lumbar stenosis with neurogenic claudication   (+) Spinal stenosis      Skin   (+) Eczema       Clinical information reviewed:   Tobacco  Allergies    Med Hx  Surg Hx   Fam Hx  Soc Hx        NPO Detail:  NPO/Void Status  Carbohydrate Drink Given Prior to Surgery? : Y  Date of Last Liquid: 06/18/24  Time of Last Liquid: 0830  Date of Last Solid: 06/17/24  Time of Last Solid: 1900  Last Intake Type: Clear fluids  Time of Last Void: 1103         Physical Exam    Airway  Mallampati: III     Cardiovascular    Dental    Pulmonary    Abdominal            Anesthesia Plan    History of general anesthesia?: yes  History of complications of general anesthesia?: no    ASA 3     MAC     intravenous induction   Anesthetic plan and risks discussed with patient.

## 2024-06-18 NOTE — ANESTHESIA POSTPROCEDURE EVALUATION
Patient: Lynsey Forman    Procedure Summary       Date: 06/18/24 Room / Location: [facility] / [facility]    Anesthesia Start: 1444 Anesthesia Stop: 1531    Procedure: Excision Lesion Skin of Left Upper Extremity (Left) Diagnosis:       Lipoma of left upper extremity      Neoplasm of unspecified behavior of bone, soft tissue, and skin      (Lipoma of left upper extremity [D17.22])      (Neoplasm of unspecified behavior of bone, soft tissue, and skin [D49.2])    Surgeons: Prabhakar Franz MD Responsible Provider: William Lang MD    Anesthesia Type: MAC ASA Status: 3            Anesthesia Type: MAC    Vitals Value Taken Time   /63 06/18/24 1548   Temp 36 °C (96.8 °F) 06/18/24 1528   Pulse 50 06/18/24 1559   Resp 17 06/18/24 1528   SpO2 94 % 06/18/24 1559   Vitals shown include unfiled device data.    Anesthesia Post Evaluation    Patient location during evaluation: PACU  Patient participation: complete - patient participated  Level of consciousness: awake  Pain management: adequate  Multimodal analgesia pain management approach  Airway patency: patent  Cardiovascular status: acceptable  Respiratory status: acceptable  Hydration status: acceptable  Postoperative Nausea and Vomiting: none  Comments: Will continue to assess PONV status.        No notable events documented.

## 2024-06-18 NOTE — OP NOTE
Excision Lesion Skin of Left Upper Extremity (L) Operative Note     Date: 2024  OR Location: [facility]    Name: Lynsey Forman, : 1945, Age: 78 y.o., MRN: [id number], Sex: female    Diagnosis  Pre-op Diagnosis     * Left upper arm cyst  Post-op Diagnosis       * Left upper arm cyst      Procedures  Excision Lesion Skin of Left Upper Extremity  80845 - LA EXC B9 LESION MRGN XCP SK TG T/A/L >4.0 CM      Surgeons      * Prabhakar Franz - Primary    Resident/Fellow/Other Assistant:  Surgeons and Role:  * No surgeons found with a matching role *    Procedure Summary  Anesthesia: Monitor Anesthesia Care  ASA: III  Anesthesia Staff: Anesthesiologist: William Lang MD  C-AA: LOIVER Jewell  Estimated Blood Loss: 2mL  Intra-op Medications:   Administrations occurring from 1405 to 1505 on 24:   Medication Name Total Dose   lactated Ringer's infusion Cannot be calculated              Anesthesia Record               Intraprocedure I/O Totals       None           Specimen:   ID Type Source Tests Collected by Time   1 : Left Upper Arm Cyst Tissue SKIN CYST SURGICAL PATHOLOGY EXAM Prabhakar Franz MD 2024 1509        Staff:   Circulator: Radha  Scrub Person: Bigg Witt Scrub: Karen  Relief Circulator: Lyssa Witt Scrub: Gilma  Relief Scrub: Mariel          Findings: 4.1 cm cyst     Indications: Lynsey Forman is an 78 y.o. female who is having surgery for Lipoma of left upper extremity [D17.22]  Neoplasm of unspecified behavior of bone, soft tissue, and skin [D49.2].     The patient was seen in the preoperative area. The risks, benefits, complications, treatment options, non-operative alternatives, expected recovery and outcomes were discussed with the patient. The possibilities of reaction to medication, pulmonary aspiration, injury to surrounding structures, bleeding, recurrent infection, the need for additional procedures, failure to diagnose a condition, and creating a complication requiring  transfusion or operation were discussed with the patient. The patient concurred with the proposed plan, giving informed consent.  The site of surgery was properly noted/marked if necessary per policy. The patient has been actively warmed in preoperative area. Preoperative antibiotics have been ordered and given within 1 hours of incision. Venous thrombosis prophylaxis have been ordered including bilateral sequential compression devices     Procedure Details: Patient is a very pleasant 78-year-old female comes in for excisional biopsy of a large soft tissue mass involving the lateral aspect of her left upper arm.  Risk benefits alternatives were discussed preoperatively and she agrees to the operation.    Description of procedure:  Patient taken the operating room, placed supine on the operating table.  Timeout was established, IV analgesia was induced.  She did receive antibiotics.  The left arm was positioned draped over her chest.  The left upper arm was sterilely prepared.  We akanksha out an elliptical incision as to excise some of the redundant skin in the longitudinal axis.  We infiltrated with solution 1% lidocaine, quarter percent Marcaine.  We readily encountered well-circumscribed epidermoid cyst.  It was excised and handed off the field as a specimen.  It measured 4.1 cm in greatest dimension.  We irrigated the wound and then after ensuring hemostasis we closed the incision using 3 oh subdermal interrupted 3-0 Vicryl stitches followed by running 4-0 Biosyn stitch and then Dermabond glue.  Patient tolerated the procedure without difficulty and was returned to the recovery room in stable condition      Complications:  None; patient tolerated the procedure well.    Disposition: PACU - hemodynamically stable.  Condition: stable           Attending Attestation: I was present and scrubbed for the entire procedure.    Prabhakar Franz  Phone Number: [phone]

## 2024-06-18 NOTE — DISCHARGE INSTRUCTIONS
Incision is covered with gauze and ACE wrap which can be removed tomorrow. Underneath, incision is closed with surgical glue which will fall off on its own.   Okay to shower on 6/20. No soaking or submerging for 2 weeks.   Take tylenol for pain control. May take tramadol only as needed for severe pain.   May drive when pain is well controlled and no longer taking narcotics.   No strenuous exercise for 1 week  Please call the office if you experience fevers, chills, redness around your incision, drainage, or swelling.   Please call the office to schedule your postoperative follow up visit for 2 weeks from now.

## 2024-07-02 LAB
LABORATORY COMMENT REPORT: NORMAL
PATH REPORT.FINAL DX SPEC: NORMAL
PATH REPORT.GROSS SPEC: NORMAL
PATH REPORT.RELEVANT HX SPEC: NORMAL
PATH REPORT.TOTAL CANCER: NORMAL

## 2024-07-03 ENCOUNTER — OFFICE VISIT (OUTPATIENT)
Dept: SURGERY | Facility: CLINIC | Age: 79
End: 2024-07-03
Payer: MEDICARE

## 2024-07-03 ENCOUNTER — APPOINTMENT (OUTPATIENT)
Dept: PHARMACY | Facility: HOSPITAL | Age: 79
End: 2024-07-03
Payer: MEDICARE

## 2024-07-03 ENCOUNTER — LAB (OUTPATIENT)
Dept: LAB | Facility: LAB | Age: 79
End: 2024-07-03
Payer: MEDICARE

## 2024-07-03 VITALS
TEMPERATURE: 97 F | DIASTOLIC BLOOD PRESSURE: 66 MMHG | WEIGHT: 11 LBS | SYSTOLIC BLOOD PRESSURE: 156 MMHG | BODY MASS INDEX: 1.95 KG/M2 | HEART RATE: 50 BPM

## 2024-07-03 DIAGNOSIS — Z09 POSTOPERATIVE EXAMINATION: Primary | ICD-10-CM

## 2024-07-03 DIAGNOSIS — I48.91 ATRIAL FIBRILLATION, UNSPECIFIED TYPE (MULTI): ICD-10-CM

## 2024-07-03 DIAGNOSIS — I65.23 CAROTID STENOSIS, BILATERAL: ICD-10-CM

## 2024-07-03 DIAGNOSIS — E78.00 HYPERCHOLESTEROLEMIA: ICD-10-CM

## 2024-07-03 LAB
CHOLEST SERPL-MCNC: 158 MG/DL (ref 0–199)
CHOLESTEROL/HDL RATIO: 1.7
CREAT SERPL-MCNC: 0.83 MG/DL (ref 0.5–1.05)
EGFRCR SERPLBLD CKD-EPI 2021: 73 ML/MIN/1.73M*2
HDLC SERPL-MCNC: 93.1 MG/DL
LDLC SERPL CALC-MCNC: 51 MG/DL
NON HDL CHOLESTEROL: 65 MG/DL (ref 0–149)
TRIGL SERPL-MCNC: 72 MG/DL (ref 0–149)
VLDL: 14 MG/DL (ref 0–40)

## 2024-07-03 PROCEDURE — 1157F ADVNC CARE PLAN IN RCRD: CPT | Performed by: SURGERY

## 2024-07-03 PROCEDURE — 80061 LIPID PANEL: CPT

## 2024-07-03 PROCEDURE — 3078F DIAST BP <80 MM HG: CPT | Performed by: SURGERY

## 2024-07-03 PROCEDURE — 1126F AMNT PAIN NOTED NONE PRSNT: CPT | Performed by: SURGERY

## 2024-07-03 PROCEDURE — 36415 COLL VENOUS BLD VENIPUNCTURE: CPT

## 2024-07-03 PROCEDURE — 1036F TOBACCO NON-USER: CPT | Performed by: SURGERY

## 2024-07-03 PROCEDURE — 99211 OFF/OP EST MAY X REQ PHY/QHP: CPT | Performed by: SURGERY

## 2024-07-03 PROCEDURE — 3077F SYST BP >= 140 MM HG: CPT | Performed by: SURGERY

## 2024-07-03 PROCEDURE — 1159F MED LIST DOCD IN RCRD: CPT | Performed by: SURGERY

## 2024-07-03 PROCEDURE — 82565 ASSAY OF CREATININE: CPT

## 2024-07-03 ASSESSMENT — ENCOUNTER SYMPTOMS: DEPRESSION: 0

## 2024-07-03 ASSESSMENT — PAIN SCALES - GENERAL: PAINLEVEL: 0-NO PAIN

## 2024-07-03 NOTE — PROGRESS NOTES
Mrs. Lynsey Forman is a 78 y.o. year old female who comes in today for follow-up after undergoing excisional biopsy of a large epidermoid inclusion cyst involving her left upper arm.  This procedure was performed on 6/18/2024.    Patient is doing very well and is pleased with the outcome of the surgery.    Tolerating a regular diet, bowel movements are normal    On exam patient looks well    Incisions are healing very nicely    Surgical Pathology Exam: [id number]  Order: [id number]   Collected 6/18/2024 15:09       Status: Final result       Visible to patient: Yes (seen)       Dx: Lipoma of left upper extremity; Neopl...    0 Result Notes       Component  Resulting Agency   FINAL DIAGNOSIS   A. SKIN, LEFT UPPER ARM, EXCISION:  --EPIDERMAL INCLUSION CYST, RUPTURED AND INFLAMED               Impression: Doing well following excisional biopsy of a large epidermoid inclusion cyst left upper arm.    She is reassured of the benign nature of the result of biopsy     Discussed increasing activity level    Follow-up with me as needed            Prabhakar Franz MD 07/03/24 5:20 PM

## 2024-07-03 NOTE — LETTER
July 3, 2024     Windy Bartholomew MD  [address] [place] [facility], [address]  [place] [address]    Patient: Lynsey Forman   YOB: 1945   Date of Visit: 7/3/2024       Dear Dr. Windy Bartholomew MD:    Thank you for referring Lynsey Forman to me for evaluation. Below are my notes for this consultation.  If you have questions, please do not hesitate to call me. I look forward to following your patient along with you.       Sincerely,     Prabhakar Franz MD      CC: No Recipients  ______________________________________________________________________________________       Mrs. Lynsey Forman is a 78 y.o. year old female who comes in today for follow-up after undergoing excisional biopsy of a large epidermoid inclusion cyst involving her left upper arm.  This procedure was performed on 6/18/2024.    Patient is doing very well and is pleased with the outcome of the surgery.    Tolerating a regular diet, bowel movements are normal    On exam patient looks well    Incisions are healing very nicely    Surgical Pathology Exam: [id number]  Order: [id number]   Collected 6/18/2024 15:09       Status: Final result       Visible to patient: Yes (seen)       Dx: Lipoma of left upper extremity; Neopl...    0 Result Notes       Component  Resulting Agency   FINAL DIAGNOSIS   A. SKIN, LEFT UPPER ARM, EXCISION:  --EPIDERMAL INCLUSION CYST, RUPTURED AND INFLAMED               Impression: Doing well following excisional biopsy of a large epidermoid inclusion cyst left upper arm.    She is reassured of the benign nature of the result of biopsy     Discussed increasing activity level    Follow-up with me as needed            Prabhakar Franz MD 07/03/24 5:20 PM   
Yes - the patient is able to be screened

## 2024-07-03 NOTE — Clinical Note
Rosanna Seaman! I spoke with this patient regarding her Xarelto today. She is doing well, with no adherence issues or adverse effects to report. We did discuss the increased risk of bleeding with alcohol use (reports 2 drinks per day), I advised her to limit this. We will follow up with her in 2 months to renew her patient assistance for Xarelto. Thank you!

## 2024-07-03 NOTE — PROGRESS NOTES
Pharmacist Clinic: Cardiology Management    Lynsey Forman is a 78 y.o. female was referred to Clinical Pharmacy Team for anticoagulation management.     Referring Provider: Urszula Lebron APRN*    THIS IS A FOLLOW UP PATIENT APPOINTMENT. AT LAST VISIT ON 4/3/2024 WITH PHARMACIST (Karen Valdes).    Appointment was completed by the patient who was reached at .    REVIEW OF PAST APPNT (IF APPLICABLE):   At last pharmacy appointment, patient was doing well on Xarelto 15 mg, stated no issues with adverse effects or adherence at that time. Was unsure if she should take Xarelto with or without food, was counseled to take with dinner.      Allergies   Allergen Reactions    Codeine Anxiety, Itching, Unknown and Other     Denies itching, hive, shortness of breath       Past Medical History:   Diagnosis Date    Abnormal kidney function 01/08/2024    BUN-24, GFR-56 (4/29/24)    Alcohol use, unspecified, uncomplicated 12/20/2022    Aneurysm of carotid artery (CMS-HCC)     Right MCA, followed by neurology    Atrial fibrillation (Multi)     no reccurance s/p ablation 4/2023 and 7/2023    Carotid stenosis, asymptomatic, bilateral     on ASA and statin    Chronic cough 11/24/2009    Formatting of this note might be different from the original. Overview: No change with stopping lisinopril, no change with Advair, consensus of ENT, pulmonary, here is LP reflux, has associated past-nasal drip that does not respond to Mucinex or Flonase, 2012 has large nasal polyp L Formatting of this note might be different from the original. No change with stopping lisinopril, no change with Adv    Diverticulosis 12/21/2016    GERD (gastroesophageal reflux disease)     History of arthrodesis 07/07/2023    Hyperlipidemia     Hypertension     Lipoma of arm     Left upper arm    Lumbar disc disease     Moriah's edema of vocal folds     Sinus bradycardia 01/04/2013    Formatting of this note might be different from the original.  Overview: Asymptomatic, noticed on exam Formatting of this note might be different from the original. Asymptomatic, noticed on exam    Tachycardia 01/08/2024    Tobacco use disorder, mild, in early remission     Quit 1/1/2024, 1/4 PPD x 20 years    Vocal cord polyp        Current Outpatient Medications on File Prior to Visit   Medication Sig Dispense Refill    acetaminophen (Tylenol) 325 mg tablet Take 2 tablets (650 mg) by mouth every 6 hours if needed for mild pain (1 - 3). 30 tablet 0    amLODIPine (Norvasc) 5 mg tablet TAKE 1 TABLET BY MOUTH DAILY 90 tablet 1    aspirin 81 mg EC tablet Take 1 tablet (81 mg) by mouth once daily. 30 tablet 11    atorvastatin (Lipitor) 80 mg tablet Take 1 tablet (80 mg) by mouth once daily. 90 tablet 3    ezetimibe (Zetia) 10 mg tablet Take 1 tablet (10 mg) by mouth once daily. 30 tablet 11    furosemide (Lasix) 20 mg tablet Take 1 tablet (20 mg) by mouth if needed (for ankle swelling). 30 tablet 0    gabapentin (Neurontin) 300 mg capsule Take 1 capsule (300 mg) by mouth 3 times a day. 270 capsule 1    hydrOXYzine HCL (Atarax) 25 mg tablet Take 1 tablet (25 mg) by mouth if needed for anxiety.      lisinopril 20 mg tablet TAKE 1 TABLET BY MOUTH DAILY 90 tablet 1    meloxicam (Mobic) 15 mg tablet Take 1 tablet (15 mg) by mouth once daily.      rivaroxaban (Xarelto) 15 mg tablet Take 1 tablet (15 mg) by mouth once daily in the evening. Take with food. 30 tablet 0    traMADol (Ultram) 50 mg tablet Take 1 tablet (50 mg) by mouth every 8 hours if needed for severe pain (7 - 10). 10 tablet 0    triamterene-hydrochlorothiazid (Maxzide-25) 37.5-25 mg tablet TAKE 1 TABLET BY MOUTH DAILY 90 tablet 1     No current facility-administered medications on file prior to visit.         RELEVANT LAB RESULTS  Lab Results   Component Value Date    BILITOT 0.6 02/06/2023    CALCIUM 9.6 06/11/2024    CO2 31 06/11/2024    CL 90 (L) 06/11/2024    CREATININE 1.10 (H) 06/11/2024    GLUCOSE 103 (H) 06/11/2024  "   ALKPHOS 65 2023    K 3.8 2024    PROT 7.2 2023     (L) 2024    AST 24 2023    ALT 16 2023    BUN 19 2024    ANIONGAP 14 2024    PHOS 3.7 2024    ALBUMIN 4.6 2024    GFRF 49 (A) 2023     Lab Results   Component Value Date    TRIG 60 2024    CHOL 182 2024    LDLCALC 61 2024    .7 2024     No results found for: \"BMCBC\", \"CBCDIF\"     PHARMACEUTICAL ASSESSMENT    Medication Documentation Review Audit       Reviewed by Hodan Titus RN (Registered Nurse) on 24 at 1152      Medication Order Taking? Sig Documenting Provider Last Dose Status   amLODIPine (Norvasc) 5 mg tablet [id number] Yes TAKE 1 TABLET BY MOUTH DAILY Windy Bartholomew MD 2024 Active   aspirin 81 mg EC tablet [id number] Yes Take 1 tablet (81 mg) by mouth once daily. Shaquille Blackman MD PhD 2024 Active   atorvastatin (Lipitor) 80 mg tablet [id number] Yes Take 1 tablet (80 mg) by mouth once daily. Urszula Lebron APRN-CNP 2024 Active   ezetimibe (Zetia) 10 mg tablet [id number] Yes Take 1 tablet (10 mg) by mouth once daily. Urszula Lebron APRN-CNP Past Week Active   furosemide (Lasix) 20 mg tablet [id number]  Take 1 tablet (20 mg) by mouth if needed (for ankle swelling). Urszula Lebron APRN-CNP   24 2359   gabapentin (Neurontin) 300 mg capsule [id number] Yes Take 1 capsule (300 mg) by mouth 3 times a day. Windy Bartholomew MD 2024 Active   hydrOXYzine HCL (Atarax) 25 mg tablet [id number] No Take 1 tablet (25 mg) by mouth if needed for anxiety. Historical Provider, MD Not Taking Active   lisinopril 20 mg tablet [id number] Yes TAKE 1 TABLET BY MOUTH DAILY Windy Bartholomew MD Past Week Active   meloxicam (Mobic) 15 mg tablet [id number] No Take 1 tablet (15 mg) by mouth once daily. Historical Provider, MD Not Taking Active   rivaroxaban (Xarelto) 15 mg tablet [id number] Yes Take 1 tablet (15 mg) by mouth once daily in " the evening. Take with food. SANTY Tan-CNP Past Week Active   triamterene-hydrochlorothiazid (Maxzide-25) 37.5-25 mg tablet [id number] Yes TAKE 1 TABLET BY MOUTH DAILY Windy Bartholomew MD 6/18/2024 Active                    DISEASE MANAGEMENT ASSESSMENT:     ANTICOAGULATION ASSESSMENT    The ASCVD Risk score (Anat DK, et al., 2019) failed to calculate for the following reasons:    The valid HDL cholesterol range is 20 to 100 mg/dL    DIAGNOSIS: prevention of nonvalvular atrial fibrilliation stroke and systemic embolism  - Patient is projected to be on anticoagulation indefinitely  - [id number] Score: [4] (only included if diagnosis is atrial fibrillation)   Age: [<65 (0)] [65-74 (+1)] [> 75 (+2)]: 2  Sex: [Male/Female (+1)]: 1  CHF history: [No/Yes(+1)]: 0  Hypertension history: [No/Yes(+1)]: 1  Stroke/TIA/thromboembolism history: [No/Yes(+2)]: 0  Vascular disease history (prior MI, peripheral artery disease, aortic plaque): [No/Yes(+1)]: 0  Diabetes history: [No/Yes(+1)]: 0    CURRENT PHARMACOTHERAPY:    Xarelto 15 mg daily  78 years old  48.3 kg  Scr 1.10 mg/dL  CrCl 36 mg/dL  Aspirin 81 mg daily    RELEVANT PAST MEDICAL HISTORY:   HLD, HTN, A-fib    Affordability/Accessibility: [facility] for Xarelto  Adherence/Organization: no issues per patient  Adverse Reactions: bruising and bleeding more easily, nothing abnormal (always attributed to known cause, bleeding stops quickly)  Recent Hospitalizations: Yes, admitted for back surgery in May, lipoma removal 6/18/2024, no issues with anticoagulation at that time.  Recent Falls/Trauma: none  Changes in Tobacco or Alcohol Intake:   Tobacco: no  Alcohol: yes, drinks about 2 drinks per day.     EDUCATION/COUNSELING:   - Counseled patient on MOA, expectations, duration of therapy, contraindications, administration, and monitoring parameters  - Counseled patient of side effects that are indicative of bleeding such as dark tarry stool, unexplainable bruising,  or vomiting up a coffee ground like substance      DISCUSSION/NOTES:   Overall, patient is doing well on anticoagulation therapy. Discussed increased risk of bleeding with alcohol use while taking Xarelto, advised patient to limit intake and seek medical attention if any signs of bleeding or after any falls where she hits her head. Will follow up in 2 months for  PAP renewal, which will be due 10/6/2024.    RECOMMENDATIONS/PLAN    Continue Xarelto 15 mg daily. Follow up in 2 months for  PAP renewal.    Next Cardiology Appointment: 10/2/2024  Clinical Pharmacist follow up: 9/6/2024  VAF/Application Expiration: Yes    Date: 10/6/2024  Type of Encounter: Nathan Patel PharmD    Verbal consent to manage patient's drug therapy was obtained from the patient . They were informed they may decline to participate or withdraw from participation in pharmacy services at any time.    Continue all meds under the continuation of care with the referring provider and clinical pharmacy team.

## 2024-08-08 ENCOUNTER — TELEPHONE (OUTPATIENT)
Dept: PRIMARY CARE | Facility: CLINIC | Age: 79
End: 2024-08-08

## 2024-08-08 ENCOUNTER — APPOINTMENT (OUTPATIENT)
Dept: PRIMARY CARE | Facility: CLINIC | Age: 79
End: 2024-08-08
Payer: MEDICARE

## 2024-08-08 VITALS
WEIGHT: 111.99 LBS | HEART RATE: 97 BPM | BODY MASS INDEX: 19.84 KG/M2 | TEMPERATURE: 97.2 F | DIASTOLIC BLOOD PRESSURE: 67 MMHG | OXYGEN SATURATION: 99 % | SYSTOLIC BLOOD PRESSURE: 129 MMHG | RESPIRATION RATE: 16 BRPM

## 2024-08-08 DIAGNOSIS — Z12.31 ENCOUNTER FOR SCREENING MAMMOGRAM FOR BREAST CANCER: ICD-10-CM

## 2024-08-08 DIAGNOSIS — Z00.00 MEDICARE ANNUAL WELLNESS VISIT, SUBSEQUENT: Primary | ICD-10-CM

## 2024-08-08 DIAGNOSIS — Z12.11 ENCOUNTER FOR SCREENING COLONOSCOPY: ICD-10-CM

## 2024-08-08 DIAGNOSIS — I73.9 CLAUDICATION (CMS-HCC): ICD-10-CM

## 2024-08-08 DIAGNOSIS — I10 PRIMARY HYPERTENSION: ICD-10-CM

## 2024-08-08 DIAGNOSIS — Z78.0 ASYMPTOMATIC MENOPAUSAL STATE: ICD-10-CM

## 2024-08-08 PROCEDURE — 1123F ACP DISCUSS/DSCN MKR DOCD: CPT | Performed by: INTERNAL MEDICINE

## 2024-08-08 PROCEDURE — 1036F TOBACCO NON-USER: CPT | Performed by: INTERNAL MEDICINE

## 2024-08-08 PROCEDURE — 1160F RVW MEDS BY RX/DR IN RCRD: CPT | Performed by: INTERNAL MEDICINE

## 2024-08-08 PROCEDURE — 1159F MED LIST DOCD IN RCRD: CPT | Performed by: INTERNAL MEDICINE

## 2024-08-08 PROCEDURE — G0439 PPPS, SUBSEQ VISIT: HCPCS | Performed by: INTERNAL MEDICINE

## 2024-08-08 PROCEDURE — 99213 OFFICE O/P EST LOW 20 MIN: CPT | Performed by: INTERNAL MEDICINE

## 2024-08-08 PROCEDURE — 1125F AMNT PAIN NOTED PAIN PRSNT: CPT | Performed by: INTERNAL MEDICINE

## 2024-08-08 PROCEDURE — 1170F FXNL STATUS ASSESSED: CPT | Performed by: INTERNAL MEDICINE

## 2024-08-08 PROCEDURE — 1158F ADVNC CARE PLAN TLK DOCD: CPT | Performed by: INTERNAL MEDICINE

## 2024-08-08 PROCEDURE — 1157F ADVNC CARE PLAN IN RCRD: CPT | Performed by: INTERNAL MEDICINE

## 2024-08-08 PROCEDURE — 3078F DIAST BP <80 MM HG: CPT | Performed by: INTERNAL MEDICINE

## 2024-08-08 PROCEDURE — 3074F SYST BP LT 130 MM HG: CPT | Performed by: INTERNAL MEDICINE

## 2024-08-08 ASSESSMENT — ENCOUNTER SYMPTOMS
LOSS OF SENSATION IN FEET: 0
BACK PAIN: 1
OCCASIONAL FEELINGS OF UNSTEADINESS: 1
DEPRESSION: 0

## 2024-08-08 ASSESSMENT — ACTIVITIES OF DAILY LIVING (ADL)
TAKING_MEDICATION: INDEPENDENT
GROCERY_SHOPPING: INDEPENDENT
DOING_HOUSEWORK: INDEPENDENT
MANAGING_FINANCES: INDEPENDENT
DRESSING: INDEPENDENT
BATHING: INDEPENDENT

## 2024-08-08 ASSESSMENT — PAIN SCALES - GENERAL: PAINLEVEL: 3

## 2024-08-08 NOTE — PROGRESS NOTES
Subjective   Patient ID: Lynsey Forman is a 78 y.o. female who presents for Medicare Annual Wellness Visit Subsequent.    Subsequent Medicare wellness visit.  For she has hypertension.,  Hypercholesterolemia, history of atrial fibrillation status post ablation, chronic low back pain with radiculopathy . Has  had lumbar spine surgery this year.   Back pain improved, takes Gabapentin only once/day. She also has had in the past cervical spine surgery.  Recently she has had a skin lipoma surgically removed.  Has a spontaneous bruise dorsum of the left foot.  Former smoker.         Review of Systems   Musculoskeletal:  Positive for back pain.   Skin:         Bruise lt. foot   All other systems reviewed and are negative.      Objective   /67 (BP Location: Left arm, Patient Position: Sitting)   Pulse 97   Temp 36.2 °C (97.2 °F) (Temporal)   Resp 16   Wt 50.8 kg (111 lb 15.9 oz)   SpO2 99%   BMI 19.84 kg/m²     Physical Exam  Constitutional:       Appearance: Normal appearance.   HENT:      Head: Normocephalic and atraumatic.      Right Ear: External ear normal.      Left Ear: External ear normal.      Mouth/Throat:      Mouth: Mucous membranes are moist.      Pharynx: Oropharynx is clear.   Neck:      Vascular: Carotid bruit present.   Cardiovascular:      Rate and Rhythm: Normal rate and regular rhythm.      Pulses:           Dorsalis pedis pulses are 1+ on the right side and 1+ on the left side.        Posterior tibial pulses are 1+ on the right side and 1+ on the left side.      Heart sounds: No murmur heard.     No gallop.      Comments: Decrease pulses bilateral feet.  Pulmonary:      Effort: Pulmonary effort is normal. No respiratory distress.      Breath sounds: No wheezing or rales.   Abdominal:      General: Abdomen is flat.      Palpations: Abdomen is soft.      Hernia: No hernia is present.   Musculoskeletal:         General: No swelling, tenderness, deformity or signs of injury.      Cervical back:  No rigidity or tenderness.      Right lower leg: No edema.   Lymphadenopathy:      Cervical: No cervical adenopathy.   Skin:     Coloration: Skin is not jaundiced or pale.      Findings: No bruising, erythema, lesion or rash.   Neurological:      General: No focal deficit present.      Mental Status: She is oriented to person, place, and time.      Motor: No weakness.      Coordination: Coordination normal.   Psychiatric:         Mood and Affect: Mood normal.         Behavior: Behavior normal.         Assessment/Plan   Problem List Items Addressed This Visit             ICD-10-CM    Hypertension I10     Hypertension : controlled blood pressure and continues same medications and educate a low salt diet do not exceed 200 mg per serving. Keep monitor the blood pressure at home.            Medicare annual wellness visit, subsequent - Primary Z00.00     Recommend Shingrix vaccine, Adacel vaccine at the local pharmacy.  Recommend influenza vaccine RSV vaccine in fall.  Her last mammogram was February 2024.  Last bone density scan was December 2022.  Referral made to have bone density scan this year in December.  Referral made to schedule colonoscopy.  She has a living will.    Follow healthy diet rich in fruits and vegetable, exercise regularly.         Claudication (CMS-HCC) I73.9     Recommend PVR studies without exercise         Relevant Orders    Vascular US PVR without exercise     Other Visit Diagnoses         Codes    Asymptomatic menopausal state     Z78.0    Relevant Orders    XR DEXA bone density    Encounter for screening mammogram for breast cancer     Z12.31    Relevant Orders    BI mammo bilateral screening tomosynthesis    Encounter for screening colonoscopy     Z12.11    Relevant Orders    Colonoscopy Screening; Average Risk Patient

## 2024-08-09 ENCOUNTER — TELEPHONE (OUTPATIENT)
Dept: PHARMACY | Facility: HOSPITAL | Age: 79
End: 2024-08-09
Payer: MEDICARE

## 2024-08-09 DIAGNOSIS — I48.91 ATRIAL FIBRILLATION, UNSPECIFIED TYPE (MULTI): ICD-10-CM

## 2024-08-09 PROCEDURE — RXMED WILLOW AMBULATORY MEDICATION CHARGE

## 2024-08-09 NOTE — TELEPHONE ENCOUNTER
Spoke with patient today regarding refills for her Xarelto. Discussed refills must be sent to [facility] for her VAF to applied in order to have the medication for $0 copay. Patient states understanding and has no further questions at this time.    Monica Patel, PharmD

## 2024-08-09 NOTE — ASSESSMENT & PLAN NOTE
Recommend Shingrix vaccine, Adacel vaccine at the local pharmacy.  Recommend influenza vaccine RSV vaccine in fall.  Her last mammogram was February 2024.  Last bone density scan was December 2022.  Referral made to have bone density scan this year in December.  Referral made to schedule colonoscopy.  She has a living will.    Follow healthy diet rich in fruits and vegetable, exercise regularly.

## 2024-08-14 ENCOUNTER — PHARMACY VISIT (OUTPATIENT)
Dept: PHARMACY | Facility: CLINIC | Age: 79
End: 2024-08-14
Payer: COMMERCIAL

## 2024-08-19 DIAGNOSIS — I10 PRIMARY HYPERTENSION: ICD-10-CM

## 2024-08-20 DIAGNOSIS — I10 PRIMARY HYPERTENSION: ICD-10-CM

## 2024-08-20 RX ORDER — AMLODIPINE BESYLATE 5 MG/1
5 TABLET ORAL DAILY
Qty: 90 TABLET | Refills: 1 | Status: SHIPPED | OUTPATIENT
Start: 2024-08-20

## 2024-08-20 RX ORDER — TRIAMTERENE/HYDROCHLOROTHIAZID 37.5-25 MG
1 TABLET ORAL DAILY
Qty: 90 TABLET | Refills: 1 | Status: SHIPPED | OUTPATIENT
Start: 2024-08-20

## 2024-08-21 ENCOUNTER — HOSPITAL ENCOUNTER (OUTPATIENT)
Dept: VASCULAR MEDICINE | Facility: HOSPITAL | Age: 79
Discharge: HOME | End: 2024-08-21
Payer: MEDICARE

## 2024-08-21 DIAGNOSIS — I73.9 CLAUDICATION (CMS-HCC): ICD-10-CM

## 2024-08-21 PROCEDURE — 93922 UPR/L XTREMITY ART 2 LEVELS: CPT | Performed by: INTERNAL MEDICINE

## 2024-08-21 PROCEDURE — 93922 UPR/L XTREMITY ART 2 LEVELS: CPT

## 2024-08-30 DIAGNOSIS — Z12.11 COLON CANCER SCREENING: ICD-10-CM

## 2024-08-30 RX ORDER — POLYETHYLENE GLYCOL 3350, SODIUM SULFATE ANHYDROUS, SODIUM BICARBONATE, SODIUM CHLORIDE, POTASSIUM CHLORIDE 236; 22.74; 6.74; 5.86; 2.97 G/4L; G/4L; G/4L; G/4L; G/4L
POWDER, FOR SOLUTION ORAL
Qty: 4000 ML | Refills: 0 | Status: SHIPPED | OUTPATIENT
Start: 2024-08-30

## 2024-09-06 ENCOUNTER — APPOINTMENT (OUTPATIENT)
Dept: PHARMACY | Facility: HOSPITAL | Age: 79
End: 2024-09-06
Payer: MEDICARE

## 2024-09-06 DIAGNOSIS — I48.91 ATRIAL FIBRILLATION, UNSPECIFIED TYPE (MULTI): ICD-10-CM

## 2024-09-06 NOTE — Clinical Note
Patient reports she has had 2 large bruises since last talking with the pharmacy team. One on her hand and another on her foot. Reports both have healed without concern. Discussed when to go to the ED for evaluation in the future. No dose adjustment needed for Xarelto at this time.

## 2024-09-06 NOTE — PROGRESS NOTES
Pharmacist Clinic: Cardiology Management    Lynsey Forman is a 78 y.o. female was referred to Clinical Pharmacy Team for Anticoagulation management.     Referring Provider: Urszula Lebron APRN*    THIS IS A FOLLOW UP PATIENT APPOINTMENT. AT LAST VISIT ON 7/3/24 WITH PHARMACIST (Monica Patel).    Appointment was completed by Lynsey who was reached at primary number.    REVIEW OF PAST APPNT (IF APPLICABLE):   Lynsey is currently receiving Xarelto through  PAP with a renewal date of 10/6/24.  During last appointment  she was reporting bruising and bleeding easily with none of concern. Discussed interaction between alcohol and Xarelto use.    Allergies   Allergen Reactions    Codeine Anxiety, Itching, Unknown and Other     Denies itching, hive, shortness of breath       Past Medical History:   Diagnosis Date    Abnormal kidney function 01/08/2024    BUN-24, GFR-56 (4/29/24)    Alcohol use, unspecified, uncomplicated 12/20/2022    Aneurysm of carotid artery (CMS-HCC)     Right MCA, followed by neurology    Atrial fibrillation (Multi)     no reccurance s/p ablation 4/2023 and 7/2023    Carotid stenosis, asymptomatic, bilateral     on ASA and statin    Chronic cough 11/24/2009    Formatting of this note might be different from the original. Overview: No change with stopping lisinopril, no change with Advair, consensus of ENT, pulmonary, here is LP reflux, has associated past-nasal drip that does not respond to Mucinex or Flonase, 2012 has large nasal polyp L Formatting of this note might be different from the original. No change with stopping lisinopril, no change with Adv    Diverticulosis 12/21/2016    GERD (gastroesophageal reflux disease)     History of arthrodesis 07/07/2023    Hyperlipidemia     Hypertension     Lipoma of arm     Left upper arm    Lumbar disc disease     Moriah's edema of vocal folds     Sinus bradycardia 01/04/2013    Formatting of this note might be different from the original. Overview:  Asymptomatic, noticed on exam Formatting of this note might be different from the original. Asymptomatic, noticed on exam    Tachycardia 01/08/2024    Tobacco use disorder, mild, in early remission     Quit 1/1/2024, 1/4 PPD x 20 years    Vocal cord polyp        Current Outpatient Medications on File Prior to Visit   Medication Sig Dispense Refill    acetaminophen (Tylenol) 325 mg tablet Take 2 tablets (650 mg) by mouth every 6 hours if needed for mild pain (1 - 3). 30 tablet 0    amLODIPine (Norvasc) 5 mg tablet TAKE 1 TABLET BY MOUTH DAILY 90 tablet 1    aspirin 81 mg EC tablet Take 1 tablet (81 mg) by mouth once daily. 30 tablet 11    atorvastatin (Lipitor) 80 mg tablet Take 1 tablet (80 mg) by mouth once daily. 90 tablet 3    ezetimibe (Zetia) 10 mg tablet Take 1 tablet (10 mg) by mouth once daily. 30 tablet 11    furosemide (Lasix) 20 mg tablet Take 1 tablet (20 mg) by mouth if needed (for ankle swelling). 30 tablet 0    gabapentin (Neurontin) 300 mg capsule Take 1 capsule (300 mg) by mouth 3 times a day. (Patient taking differently: Take 1 capsule (300 mg) by mouth once daily.) 270 capsule 1    hydrOXYzine HCL (Atarax) 25 mg tablet Take 1 tablet (25 mg) by mouth if needed for anxiety.      lisinopril 20 mg tablet TAKE 1 TABLET BY MOUTH DAILY 90 tablet 1    polyethylene glycol (Golytely) 236-22.74-6.74 -5.86 gram solution STARTING AT 6PM DRINK HALF OF THE BOTTLE, DRINK THE OTHER HALF 5 HRS BEFORE PROCEDURE TIME 4000 mL 0    rivaroxaban (Xarelto) 15 mg tablet Take 1 tablet (15 mg) by mouth once daily in the evening. Take with food. 90 tablet 3    traMADol (Ultram) 50 mg tablet Take 1 tablet (50 mg) by mouth every 8 hours if needed for severe pain (7 - 10). (Patient not taking: Reported on 8/8/2024) 10 tablet 0    triamterene-hydrochlorothiazid (Maxzide-25) 37.5-25 mg tablet TAKE 1 TABLET BY MOUTH DAILY 90 tablet 1     No current facility-administered medications on file prior to visit.         RELEVANT LAB  "RESULTS  Lab Results   Component Value Date    BILITOT 0.6 2023    CALCIUM 9.6 2024    CO2 31 2024    CL 90 (L) 2024    CREATININE 0.83 2024    GLUCOSE 103 (H) 2024    ALKPHOS 65 2023    K 3.8 2024    PROT 7.2 2023     (L) 2024    AST 24 2023    ALT 16 2023    BUN 19 2024    ANIONGAP 14 2024    PHOS 3.7 2024    ALBUMIN 4.6 2024    GFRF 49 (A) 2023     Lab Results   Component Value Date    TRIG 72 2024    CHOL 158 2024    LDLCALC 51 2024    HDL 93.1 2024     No results found for: \"BMCBC\", \"CBCDIF\"     PHARMACEUTICAL ASSESSMENT    Medication Documentation Review Audit       Reviewed by Windy Bartholomew MD (Physician) on 24 at 1344      Medication Order Taking? Sig Documenting Provider Last Dose Status   acetaminophen (Tylenol) 325 mg tablet [id number] Yes Take 2 tablets (650 mg) by mouth every 6 hours if needed for mild pain (1 - 3). Lynnette Pearce MD Taking Active   amLODIPine (Norvasc) 5 mg tablet [id number] Yes TAKE 1 TABLET BY MOUTH DAILY Windy Bartholomew MD Taking Active   aspirin 81 mg EC tablet [id number] Yes Take 1 tablet (81 mg) by mouth once daily. Shaquille Blackman MD PhD Taking Active   atorvastatin (Lipitor) 80 mg tablet [id number] Yes Take 1 tablet (80 mg) by mouth once daily. SANTY Tan-CNP Taking Active   ezetimibe (Zetia) 10 mg tablet [id number] Yes Take 1 tablet (10 mg) by mouth once daily. SANTY Tan-CNP Taking Active   furosemide (Lasix) 20 mg tablet [id number]  Take 1 tablet (20 mg) by mouth if needed (for ankle swelling). LILY Tan   24 2359   gabapentin (Neurontin) 300 mg capsule [id number] Yes Take 1 capsule (300 mg) by mouth 3 times a day.   Patient taking differently: Take 1 capsule (300 mg) by mouth once daily.    Windy Bartholomew MD Taking Differently Active   hydrOXYzine HCL (Atarax) 25 mg tablet " [id number] Yes Take 1 tablet (25 mg) by mouth if needed for anxiety. Historical Provider, MD Taking Active   lisinopril 20 mg tablet [id number] Yes TAKE 1 TABLET BY MOUTH DAILY Windy Bartholomew MD Taking Active   Discontinued 08/08/24 1316   rivaroxaban (Xarelto) 15 mg tablet [id number]  Take 1 tablet (15 mg) by mouth once daily in the evening. Take with food. Lynnette Pearce MD  Active   traMADol (Ultram) 50 mg tablet [id number] No Take 1 tablet (50 mg) by mouth every 8 hours if needed for severe pain (7 - 10).   Patient not taking: Reported on 8/8/2024    Lynnette Pearce MD Not Taking Active   triamterene-hydrochlorothiazid (Maxzide-25) 37.5-25 mg tablet [id number] Yes TAKE 1 TABLET BY MOUTH DAILY Windy Bartholomew MD Taking Active                    DISEASE MANAGEMENT ASSESSMENT:     ANTICOAGULATION ASSESSMENT    The 10-year ASCVD risk score (Anat BATES, et al., 2019) is: 24%    Values used to calculate the score:      Age: 78 years      Sex: Female      Is Non- : No      Diabetic: No      Tobacco smoker: No      Systolic Blood Pressure: 129 mmHg      Is BP treated: Yes      HDL Cholesterol: 93.1 mg/dL      Total Cholesterol: 158 mg/dL    DIAGNOSIS: prevention of nonvalvular atrial fibrilliation stroke and systemic embolism  - Patient is projected to be on anticoagulation indefinitely  - [id number] Score: [4] (only included if diagnosis is atrial fibrillation)   Age: [<65 (0)] [65-74 (+1)] [> 75 (+2)]: 2  Sex: [Male/Female (+1)]: 1  CHF history: [No/Yes(+1)]: 0  Hypertension history: [No/Yes(+1)]: 1  Stroke/TIA/thromboembolism history: [No/Yes(+2)]: 0  Vascular disease history (prior MI, peripheral artery disease, aortic plaque): [No/Yes(+1)]: 0  Diabetes history: [No/Yes(+1)]: 0    CURRENT PHARMACOTHERAPY:    Xarelto 15mg daily which is appropriate for a patient whose CrCl is 46ml/min.    Affordability/Accessibility: [facility]  Adherence/Organization: reports adherence  Adverse  "Reactions: some bruising and bleeding in the past. Recently had a blood vessel \"burst\" in her foot.  Recent Hospitalizations: none noted  Recent Falls/Trauma: none reported  Changes in Tobacco or Alcohol Intake:   Tobacco: does not use  Alcohol: usually 2 drinks a day    EDUCATION/COUNSELING:   - Counseled patient on MOA, expectations, duration of therapy, contraindications, administration, and monitoring parameters  - Counseled patient of side effects that are indicative of bleeding such as dark tarry stool, unexplainable bruising, or vomiting up a coffee ground like substance      DISCUSSION/NOTES:   Lynsey has recently had two bad bruises since last appointment. Reports she had a blood vessel break in her hand as well as her foot. Both have started healing without concern.  Discussed watching size, shape, and color of bruises and signs for when to go to the emergency department due to a bleed.    ASSESSMENT AND PLAN:    Assessment/Plan   Problem List Items Addressed This Visit       Atrial fibrillation (Multi)     No dose adjustments needed to Xarelto at today's visit.  CrCl 15-50ml/min            Patient Assistance Program (PAP) - RENEWAL     Per regulation, patient is due for their annual renewal for their  PAP application. Patient's application is due for renewal by 10/6/24. Patient understands that if proper documentation is not received before renewal date, they will no longer be able to receive approved medication(s) free of charge.     Application for program to be submitted for the following medications: [facility] Permanent Address: [facility]   Prescription Insurance:   Yes   Members of Household: 1   Files Taxes: No       Patient will be email financial information to pharmacist directly at [email or web address].    Patient verbally reports monthly or yearly income which is less than 400% federal poverty level    Patient aware this process may take up to 6 weeks.     If approved " medication must be filled through [facility] and MEDICATION WILL BE MAILED TO PATIENT.     RECOMMENDATIONS/PLAN    CONTINUE  Xarelto 15 mg daily    Last Appnt with Referring Provider: 4/2/24  Next Appnt with Referring Provider: 10/2/24  Clinical Pharmacist follow up: 3/7/25  VAF/Application Expiration: Yes    Date: 10/6/24  Type of Encounter: Nathan Martinez PharmD    Verbal consent to manage patient's drug therapy was obtained from the patient . They were informed they may decline to participate or withdraw from participation in pharmacy services at any time.    Continue all meds under the continuation of care with the referring provider and clinical pharmacy team.

## 2024-09-11 ENCOUNTER — TELEPHONE (OUTPATIENT)
Dept: PHARMACY | Facility: HOSPITAL | Age: 79
End: 2024-09-11
Payer: MEDICARE

## 2024-09-11 NOTE — TELEPHONE ENCOUNTER
Patient Assistance Program Approval:     We are pleased to inform you that your application for assistance has been approved.     This approval is valid through  9/11/25  as long as the following criteria continue to be satisfied:     Your medication (Xarelto) remains covered under your current insurance plan.   Your prescriber does not discontinue therapy.   You do not seek reimbursement from any other private or government-funded programs for the  medication.    Under this program, the pharmacy will first bill your insurance plan for your indemnified specified medication. The [employer] Assistance Fund will then offset your copay balance, so that your out-of pocket expense for your specialty medication will be $0.00.    Gerson Martinez, PharmD

## 2024-09-26 ENCOUNTER — HOSPITAL ENCOUNTER (OUTPATIENT)
Dept: RADIOLOGY | Facility: HOSPITAL | Age: 79
Discharge: HOME | End: 2024-09-26
Payer: MEDICARE

## 2024-09-26 DIAGNOSIS — I67.1 MIDDLE CEREBRAL ARTERY ANEURYSM (HHS-HCC): ICD-10-CM

## 2024-09-26 PROCEDURE — 70544 MR ANGIOGRAPHY HEAD W/O DYE: CPT

## 2024-10-02 ENCOUNTER — MULTIDISCIPLINARY MEETING (OUTPATIENT)
Dept: NEUROSURGERY | Facility: HOSPITAL | Age: 79
End: 2024-10-02

## 2024-10-02 ENCOUNTER — OFFICE VISIT (OUTPATIENT)
Dept: CARDIOLOGY | Facility: HOSPITAL | Age: 79
End: 2024-10-02
Payer: MEDICARE

## 2024-10-02 ENCOUNTER — LAB (OUTPATIENT)
Dept: LAB | Facility: LAB | Age: 79
End: 2024-10-02
Payer: MEDICARE

## 2024-10-02 VITALS
HEART RATE: 56 BPM | HEIGHT: 63 IN | OXYGEN SATURATION: 97 % | SYSTOLIC BLOOD PRESSURE: 126 MMHG | WEIGHT: 112.8 LBS | DIASTOLIC BLOOD PRESSURE: 67 MMHG | BODY MASS INDEX: 19.99 KG/M2

## 2024-10-02 DIAGNOSIS — I48.91 ATRIAL FIBRILLATION, UNSPECIFIED TYPE (MULTI): ICD-10-CM

## 2024-10-02 DIAGNOSIS — I65.23 BILATERAL CAROTID ARTERY OCCLUSION: Primary | ICD-10-CM

## 2024-10-02 DIAGNOSIS — I25.10 ARTERIOSCLEROSIS OF CORONARY ARTERY: ICD-10-CM

## 2024-10-02 DIAGNOSIS — I10 HYPERTENSION, UNSPECIFIED TYPE: Primary | ICD-10-CM

## 2024-10-02 LAB
ALBUMIN SERPL BCP-MCNC: 4.5 G/DL (ref 3.4–5)
ALP SERPL-CCNC: 53 U/L (ref 33–136)
ALT SERPL W P-5'-P-CCNC: 16 U/L (ref 7–45)
ANION GAP SERPL CALC-SCNC: 11 MMOL/L (ref 10–20)
AST SERPL W P-5'-P-CCNC: 25 U/L (ref 9–39)
ATRIAL RATE: 56 BPM
BILIRUB SERPL-MCNC: 0.8 MG/DL (ref 0–1.2)
BUN SERPL-MCNC: 22 MG/DL (ref 6–23)
CALCIUM SERPL-MCNC: 9.3 MG/DL (ref 8.6–10.3)
CHLORIDE SERPL-SCNC: 91 MMOL/L (ref 98–107)
CO2 SERPL-SCNC: 29 MMOL/L (ref 21–32)
CREAT SERPL-MCNC: 0.89 MG/DL (ref 0.5–1.05)
EGFRCR SERPLBLD CKD-EPI 2021: 67 ML/MIN/1.73M*2
ERYTHROCYTE [DISTWIDTH] IN BLOOD BY AUTOMATED COUNT: 13 % (ref 11.5–14.5)
GLUCOSE SERPL-MCNC: 94 MG/DL (ref 74–99)
HCT VFR BLD AUTO: 36.6 % (ref 36–46)
HGB BLD-MCNC: 12.5 G/DL (ref 12–16)
MCH RBC QN AUTO: 32.6 PG (ref 26–34)
MCHC RBC AUTO-ENTMCNC: 34.2 G/DL (ref 32–36)
MCV RBC AUTO: 95 FL (ref 80–100)
NRBC BLD-RTO: 0 /100 WBCS (ref 0–0)
P AXIS: -38 DEGREES
P OFFSET: 209 MS
P ONSET: 136 MS
PLATELET # BLD AUTO: 196 X10*3/UL (ref 150–450)
POTASSIUM SERPL-SCNC: 4.3 MMOL/L (ref 3.5–5.3)
PR INTERVAL: 190 MS
PROT SERPL-MCNC: 6.9 G/DL (ref 6.4–8.2)
Q ONSET: 231 MS
QRS COUNT: 10 BEATS
QRS DURATION: 74 MS
QT INTERVAL: 412 MS
QTC CALCULATION(BAZETT): 397 MS
QTC FREDERICIA: 403 MS
R AXIS: 62 DEGREES
RBC # BLD AUTO: 3.84 X10*6/UL (ref 4–5.2)
SODIUM SERPL-SCNC: 127 MMOL/L (ref 136–145)
T AXIS: 11 DEGREES
T OFFSET: 437 MS
VENTRICULAR RATE: 56 BPM
WBC # BLD AUTO: 6.3 X10*3/UL (ref 4.4–11.3)

## 2024-10-02 PROCEDURE — 1157F ADVNC CARE PLAN IN RCRD: CPT | Performed by: NURSE PRACTITIONER

## 2024-10-02 PROCEDURE — 1036F TOBACCO NON-USER: CPT | Performed by: NURSE PRACTITIONER

## 2024-10-02 PROCEDURE — 3078F DIAST BP <80 MM HG: CPT | Performed by: NURSE PRACTITIONER

## 2024-10-02 PROCEDURE — 80053 COMPREHEN METABOLIC PANEL: CPT

## 2024-10-02 PROCEDURE — 3074F SYST BP LT 130 MM HG: CPT | Performed by: NURSE PRACTITIONER

## 2024-10-02 PROCEDURE — G2211 COMPLEX E/M VISIT ADD ON: HCPCS | Performed by: NURSE PRACTITIONER

## 2024-10-02 PROCEDURE — 1123F ACP DISCUSS/DSCN MKR DOCD: CPT | Performed by: NURSE PRACTITIONER

## 2024-10-02 PROCEDURE — 93005 ELECTROCARDIOGRAM TRACING: CPT | Performed by: NURSE PRACTITIONER

## 2024-10-02 PROCEDURE — 85027 COMPLETE CBC AUTOMATED: CPT

## 2024-10-02 PROCEDURE — 99214 OFFICE O/P EST MOD 30 MIN: CPT | Performed by: NURSE PRACTITIONER

## 2024-10-02 PROCEDURE — 36415 COLL VENOUS BLD VENIPUNCTURE: CPT

## 2024-10-02 PROCEDURE — 1159F MED LIST DOCD IN RCRD: CPT | Performed by: NURSE PRACTITIONER

## 2024-10-02 ASSESSMENT — ENCOUNTER SYMPTOMS
DEPRESSION: 0
OCCASIONAL FEELINGS OF UNSTEADINESS: 1
LOSS OF SENSATION IN FEET: 0

## 2024-10-02 ASSESSMENT — COLUMBIA-SUICIDE SEVERITY RATING SCALE - C-SSRS
2. HAVE YOU ACTUALLY HAD ANY THOUGHTS OF KILLING YOURSELF?: NO
1. IN THE PAST MONTH, HAVE YOU WISHED YOU WERE DEAD OR WISHED YOU COULD GO TO SLEEP AND NOT WAKE UP?: NO
6. HAVE YOU EVER DONE ANYTHING, STARTED TO DO ANYTHING, OR PREPARED TO DO ANYTHING TO END YOUR LIFE?: NO

## 2024-10-02 ASSESSMENT — PATIENT HEALTH QUESTIONNAIRE - PHQ9
2. FEELING DOWN, DEPRESSED OR HOPELESS: NOT AT ALL
1. LITTLE INTEREST OR PLEASURE IN DOING THINGS: NOT AT ALL
SUM OF ALL RESPONSES TO PHQ9 QUESTIONS 1 AND 2: 0

## 2024-10-02 NOTE — PROGRESS NOTES
Primary Care Physician: Windy Bartholomew MD  Date of Visit: 10/02/2024  1:00 PM EDT  Location of visit: [facility]     Chief Complaint:   No chief complaint on file.       HPI / Summary:   Lynsey Forman is a 78 y.o. female presents for followup. Seen in collaboration with Dr. Mcdonald. She underwent back surgery in May. She has been able to walk the grocery store without chest pain or dyspnea. She does feel at times her gait is unsteady. Over the past 3 months she has noted spontaneous bruising/hematoma that have occurred without bumping her hand or foot. She currently has a bruise on her right upper arm. She does take Furosemide 20 mg once every two weeks or less for lower extremity edema.The patient denies chest pain, shortness of breath, palpitations, lightheadedness, syncope, orthopnea, paroxysmal nocturnal dyspnea, or bleeding problems.              Past Medical History:  Past Medical History:   Diagnosis Date    Abnormal kidney function 01/08/2024    BUN-24, GFR-56 (4/29/24)    Alcohol use, unspecified, uncomplicated 12/20/2022    Aneurysm of carotid artery (CMS-HCC)     Right MCA, followed by neurology    Atrial fibrillation (Multi)     no reccurance s/p ablation 4/2023 and 7/2023    Carotid stenosis, asymptomatic, bilateral     on ASA and statin    Chronic cough 11/24/2009    Formatting of this note might be different from the original. Overview: No change with stopping lisinopril, no change with Advair, consensus of ENT, pulmonary, here is LP reflux, has associated past-nasal drip that does not respond to Mucinex or Flonase, 2012 has large nasal polyp L Formatting of this note might be different from the original. No change with stopping lisinopril, no change with Adv    Diverticulosis 12/21/2016    GERD (gastroesophageal reflux disease)     History of arthrodesis 07/07/2023    Hyperlipidemia     Hypertension     Lipoma of arm     Left upper arm    Lumbar disc disease     Moriah's edema of vocal  folds     Sinus bradycardia 01/04/2013    Formatting of this note might be different from the original. Overview: Asymptomatic, noticed on exam Formatting of this note might be different from the original. Asymptomatic, noticed on exam    Tachycardia 01/08/2024    Tobacco use disorder, mild, in early remission     Quit 1/1/2024, 1/4 PPD x 20 years    Vocal cord polyp         Past Surgical History:  Past Surgical History:   Procedure Laterality Date    ANTERIOR CRUCIATE LIGAMENT REPAIR Left     CARDIAC ELECTROPHYSIOLOGY STUDY AND ABLATION      4/2023, 7/2023    CERVICAL LAMINECTOMY  2017    Cervical surgery    LUMBAR LAMINECTOMY  05/2024    L4-5    MOUTH SURGERY  2018    Oral surgery-peridontal          Social History:   reports that she quit smoking about 9 months ago. Her smoking use included cigarettes. She started smoking about 30 years ago. She has a 7.5 pack-year smoking history. She has never used smokeless tobacco. She reports current alcohol use of about 4.0 standard drinks of alcohol per week. She reports current drug use. Frequency: 7.00 times per week. Drug: Marijuana.     Family History:  family history includes Dementia in her father and mother; Heart disease in her brother; Heart failure in her mother; Hypertension in her mother; No Known Problems in her brother, brother, sister, and sister; back pain in her brother.      Allergies:  Allergies   Allergen Reactions    Codeine Anxiety, Itching, Unknown and Other     Denies itching, hive, shortness of breath       Outpatient Medications:  Current Outpatient Medications   Medication Instructions    acetaminophen (TYLENOL) 650 mg, oral, Every 6 hours PRN    amLODIPine (NORVASC) 5 mg, oral, Daily    aspirin 81 mg, oral, Daily    atorvastatin (LIPITOR) 80 mg, oral, Daily    ezetimibe (ZETIA) 10 mg, oral, Daily    furosemide (LASIX) 20 mg, oral, As needed    gabapentin (NEURONTIN) 300 mg, oral, 3 times daily    hydrOXYzine HCL (Atarax) 25 mg tablet 1 tablet,  "oral, As needed    lisinopril 20 mg, oral, Daily    polyethylene glycol (Golytely) 236-22.74-6.74 -5.86 gram solution STARTING AT 6PM DRINK HALF OF THE BOTTLE, DRINK THE OTHER HALF 5 HRS BEFORE PROCEDURE TIME    rivaroxaban (Xarelto) 15 mg tablet Take 1 tablet (15 mg) by mouth once daily in the evening. Take with food.    traMADol (ULTRAM) 50 mg, oral, Every 8 hours PRN    triamterene-hydrochlorothiazid (Maxzide-25) 37.5-25 mg tablet 1 tablet, oral, Daily       Physical Exam:  Vitals:    10/02/24 1305   BP: 126/67   BP Location: Left arm   Patient Position: Sitting   BP Cuff Size: Adult   Pulse: 56   SpO2: 97%   Weight: 51.2 kg (112 lb 12.8 oz)   Height: 1.6 m (5' 3\")     Wt Readings from Last 5 Encounters:   10/02/24 51.2 kg (112 lb 12.8 oz)   08/08/24 50.8 kg (111 lb 15.9 oz)   07/03/24 (!) 4.99 kg (11 lb)   06/18/24 48.3 kg (106 lb 7.7 oz)   06/11/24 49.3 kg (108 lb 11 oz)     Body mass index is 19.98 kg/m².     GENERAL: alert, cooperative, pleasant, in no acute distress  SKIN: warm and dry  NECK: Normal JVD, negative HJR  CARDIAC: Regular rate and rhythm with no rubs, murmurs, or gallops  CHEST: Normal respiratory efforts, lungs clear to auscultation bilaterally.  ABDOMEN: soft, nontender, nondistended  EXTREMITIES: no edema, +2 palpable RP bilaterally and +2 DP pulses bilaterally       Last Labs:  Recent Labs     06/11/24  1304 04/29/24  1439 02/28/24  1216   WBC 5.2 5.7 6.3   HGB 14.3 12.9 13.0   HCT 41.8 36.6 38.0    189 198   MCV 94 95 98     Recent Labs     07/03/24  1559 06/11/24  1304 04/29/24  1439 02/22/24  1213   NA  --  131* 132* 134*   K  --  3.8 4.5 5.2   CL  --  90* 95* 97*   BUN  --  19 24* 25*   CREATININE 0.83 1.10* 1.04 1.24*     CMP -  Lab Results   Component Value Date    CALCIUM 9.6 06/11/2024    PHOS 3.7 02/22/2024    PROT 7.2 02/06/2023    ALBUMIN 4.6 02/22/2024    AST 24 02/06/2023    ALT 16 02/06/2023    ALKPHOS 65 02/06/2023    BILITOT 0.6 02/06/2023       LIPID PANEL -   Lab " "Results   Component Value Date    CHOL 158 07/03/2024    HDL 93.1 07/03/2024    LDLF 86 10/31/2022    TRIG 72 07/03/2024   LDL 51 7/3/24    No results found for: \"BNP\", \"HGBA1C\"    Last Cardiology Tests:  ECG:  Obtained and reviewed EKG- Atrial ectopic bradycardia HR 56, prior septal infarct     Echo:  2/16/23  No results found for this or any previous visit from the past 365 days.   CONCLUSIONS:  1. Left ventricular systolic function is normal with a 60-65% estimated ejection fraction.  2. RVSP within normal limits.  3. Mildly dilated atria.    CT Calcium score 4/24/23  FINDINGS:   The score and distribution of calcium in the coronary arteries is as   follows:      LM           0   LAD           464.12   LCx           300.73   RCA           966.14      Total 1730.99     Cardiac MRI stress test 11/1/23  Impression   1. No evidence of inducible myocardial ischemia with regadenoson  stress perfusion imaging.  2. Normal LV size and hyperdynamic systolic function. Quantitative  LVEF 75%.  3. Trivial MR (RF 10%), trivial AI (RF 7%).  4. Mild biatrial enlargement.  5. Normal RV size and systolic function. Quantitative RVEF 60%,        12/5/23 Carotid artery ultrasound  CONCLUSIONS:  Right Carotid: Findings are consistent with greater than 70% stenosis of the right proximal internal carotid artery. Turbulent flow seen by color Doppler. Right ICA proximal segment degree of stenosis may be underestimated due to calcified shadowing plaque. Right external carotid artery appears patent with no evidence of stenosis. No evidence of hemodynamically significant stenosis of the right common carotid artery. The right vertebral artery is patent with antegrade flow. No evidence of hemodynamically significant stenosis in the right subclavian artery.  Left Carotid: Findings are consistent with greater than 70% stenosis of the left proximal internal carotid artery. Turbulent flow seen by color Doppler. Left external carotid artery appears " patent with no evidence of stenosis. Left ECA proximal segment degree of stenosis may be underestimated due to calcified shadowing plaque. No evidence of hemodynamically significant stenosis of the left common carotid artery. The left vertebral artery is patent with antegrade flow. No evidence of hemodynamically significant stenosis in the left subclavian artery.     CTA neck 2//824   IMPRESSION:  1. Atherosclerotic disease with severe narrowing both proximal ICAs.  2. Tortuous distal cervical vessels with a beaded appearance of the  distal right cervical ICA, suggestive of a connective tissue disorder  such as fibromuscular dysplasia.  3. 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      Cardiac Imaging:  MR angio head wo IV contrast  Narrative: Interpreted By:  Taj Costa,   STUDY:  MR ANGIO HEAD WO IV CONTRAST;  9/26/2024 1:53 pm      INDICATION:  Signs/Symptoms:Cerebral aneurysm.      ,I67.1 Cerebral aneurysm, nonruptured ([facility])      COMPARISON:  Cerebral angiogram performed 03/08/2024. CTA head and neck dated  02/08/2024.      ACCESSION NUMBER(S):  [id number]      ORDERING CLINICIAN:  MIRACLE CEJA      TECHNIQUE:  3D Time-of-flight noncontrast MRA of the head was performed. The  images were reviewed as source images and maximum intensity  projections.      FINDINGS:  Intracranial internal carotid arteries: Tortuosity of the visualized  distal cervical internal carotid arteries bilaterally. Intracranial  segments appear patent without flow significant stenosis or definite  aneurysm.      Anterior cerebral arteries: Normal appearing flow signal.      Middle cerebral arteries: Patent appearing bilaterally. 6 mm aneurysm  associated with the right MCA bifurcation (series 201, image 61).  MCAs appear patent bilaterally without flow significant proximal  stenosis.      Intracranial vertebral arteries: Tortuosity of the distal cervical  vertebral artery segments  bilaterally. Intracranial vertebral  arteries appear patent with the right mildly dominant. Tortuosity of  the vertebrobasilar system.      Basilar artery: Normal appearing flow signal.      Posterior communicating arteries: Visualized on the right, diminutive  versus absent on the left.      Posterior cerebral arteries: Normal appearing flow signal.      Impression: No evidence of intracranial source vessel arterial occlusion or flow  significant proximal stenosis.      6 mm aneurysm associated with the right MCA bifurcation, similar in  appearance to comparison CTA and angiogram evaluations. No new  aneurysms identified.      Tortuosity of the distal cervical ICAs and vertebral arteries.      MACRO:  None      Signed by: Taj Costa 9/27/2024 2:51 PM  Dictation workstation:   [id number]        Assessment/Plan   Problem List Items Addressed This Visit          Cardiac and Vasculature    Hypertension - Primary    Arteriosclerosis of coronary artery    Atrial fibrillation (Multi)    Relevant Orders    ECG 12 lead (Clinic Performed)    CBC    Comprehensive metabolic panel     In summary Ms. Forman is a pleasant 78-year-old white female with a past medical history significant for coronary artery disease with a CT calcium score 1730 with preserved LV function, hypertension, hyperlipidemia, persistent atrial flutter status post ablation, persistent atrial fibrillation status post ablation, stage III chronic kidney disease, and prior tobacco use. She is seemingly asymptomatic from a cardiac perspective.  Her blood pressure is controlled. Her recent lipid panel is at goal. I have instructed her to follow up with vascular surgery regarding severe carotid stenosis. She will follow up with neurosurgery regarding MCA aneurysm, appears stable on recent scan. Given her coronary disease and severe carotid disease she should continue Aspirin. I did explain to her she will bruise easily due to both Aspirin and Xarelto. She does  take Furosemide as needed for lower extremity edema.  She should continue her other cardiovascular medications. We will see her back in follow-up in 6 months.          Orders:  Orders Placed This Encounter   Procedures    CBC    Comprehensive metabolic panel    ECG 12 lead (Clinic Performed)      Followup Appts:  Future Appointments   Date Time Provider Department Center   11/7/2024  9:00 AM Marvel Song MD [id number] [facility]   1/6/2025  1:00 PM [facility] [id number] DEXA [id number] [facility]   2/10/2025  1:00 PM [facility] AHU SARAH 6 CMCAHUMAM [facility] Rad   2/10/2025  2:40 PM Windy Bartholomew MD [id number] [facility]   3/7/2025  1:30 PM PHARMACY WEAR CARDIO RESOURCE [id number] [facility]   3/27/2025  1:00 PM Louie Garcias MD [id number] [facility]   4/23/2025  2:00 PM Lula Chacon MD [id number] [facility]           ____________________________________________________________  Urszula Lebron APRN-CNP  [facility] [place]  [facility]

## 2024-10-02 NOTE — PATIENT INSTRUCTIONS
Continue current meds  Check blood work CBC and CMP  Follow up with vascular surgery Dr. Blackman  Follow up with neurosurgery   Follow up in May with Dr. Harry Beasley to stop Xarelto 2 days prior to colonoscopy and resume post procedure as instructed by performing physician

## 2024-10-04 DIAGNOSIS — E87.1 HYPONATREMIA: Primary | ICD-10-CM

## 2024-10-04 DIAGNOSIS — I10 PRIMARY HYPERTENSION: ICD-10-CM

## 2024-10-04 RX ORDER — TRIAMTERENE/HYDROCHLOROTHIAZID 37.5-25 MG
0.5 TABLET ORAL DAILY
Start: 2024-10-04

## 2024-10-04 NOTE — RESULT ENCOUNTER NOTE
Hyponatremia. Patient leaving for [place] tomorrow. Instructed her to cut Triamterene-HCTZ in half. Repeat BMP in a week. She is going to print requisition and get blood work done in [place] next week. She will monitor blood pressure and call me if blood pressures are elevated. Patient verbalized understanding of plan. CBC stable.

## 2024-10-22 NOTE — PROGRESS NOTES
Cerebrovascular Conference 10/02/24    Case Review: PMH Aflutter on xarelto (s/p ablation x2), HTN, tobacco use, lumbar spinal stenosis, on w/u for spinal surgery was found to hvae incidential R MCA aneurysm and bilateral carotid stenosis, 3/8 angio >80% stenosis of bilateral ICAs last CUS in 12/2023. MRA 09/26/24 with stable 6mm R MCA bifurcation aneurysm.             Recommendations:  Continue with imaging surveillance. Repeat MRA head in 1 year. Need to update carotid US and will plan for follow up in December to be completed in the same location as her prior ultrasound.       Cerebrovascular conference is a multidisciplinary conferences attended by neurosurgery, neuro-radiology, APPs, and Rns.

## 2024-10-25 PROCEDURE — RXMED WILLOW AMBULATORY MEDICATION CHARGE

## 2024-10-26 ENCOUNTER — PHARMACY VISIT (OUTPATIENT)
Dept: PHARMACY | Facility: CLINIC | Age: 79
End: 2024-10-26
Payer: COMMERCIAL

## 2024-11-04 ENCOUNTER — APPOINTMENT (OUTPATIENT)
Dept: DERMATOLOGY | Facility: CLINIC | Age: 79
End: 2024-11-04
Payer: MEDICARE

## 2024-11-07 ENCOUNTER — APPOINTMENT (OUTPATIENT)
Dept: GASTROENTEROLOGY | Facility: EXTERNAL LOCATION | Age: 79
End: 2024-11-07
Payer: MEDICARE

## 2024-11-07 DIAGNOSIS — Z12.11 ENCOUNTER FOR SCREENING COLONOSCOPY: ICD-10-CM

## 2024-11-07 DIAGNOSIS — Z86.0101 HISTORY OF ADENOMATOUS POLYP OF COLON: Primary | ICD-10-CM

## 2024-11-07 PROCEDURE — 1157F ADVNC CARE PLAN IN RCRD: CPT | Performed by: INTERNAL MEDICINE

## 2024-11-07 PROCEDURE — G0105 COLORECTAL SCRN; HI RISK IND: HCPCS | Performed by: INTERNAL MEDICINE

## 2024-11-07 PROCEDURE — 1123F ACP DISCUSS/DSCN MKR DOCD: CPT | Performed by: INTERNAL MEDICINE

## 2024-11-18 ENCOUNTER — OFFICE VISIT (OUTPATIENT)
Dept: VASCULAR SURGERY | Facility: HOSPITAL | Age: 79
End: 2024-11-18
Payer: MEDICARE

## 2024-11-18 VITALS
WEIGHT: 110 LBS | SYSTOLIC BLOOD PRESSURE: 142 MMHG | HEIGHT: 63 IN | DIASTOLIC BLOOD PRESSURE: 61 MMHG | BODY MASS INDEX: 19.49 KG/M2 | HEART RATE: 58 BPM

## 2024-11-18 DIAGNOSIS — I65.23 BILATERAL CAROTID ARTERY STENOSIS: Primary | ICD-10-CM

## 2024-11-18 PROCEDURE — 1159F MED LIST DOCD IN RCRD: CPT | Performed by: SURGERY

## 2024-11-18 PROCEDURE — 99215 OFFICE O/P EST HI 40 MIN: CPT | Performed by: SURGERY

## 2024-11-18 PROCEDURE — 3078F DIAST BP <80 MM HG: CPT | Performed by: SURGERY

## 2024-11-18 PROCEDURE — 99215 OFFICE O/P EST HI 40 MIN: CPT | Mod: 57 | Performed by: SURGERY

## 2024-11-18 PROCEDURE — 1157F ADVNC CARE PLAN IN RCRD: CPT | Performed by: SURGERY

## 2024-11-18 PROCEDURE — 1123F ACP DISCUSS/DSCN MKR DOCD: CPT | Performed by: SURGERY

## 2024-11-18 PROCEDURE — 3077F SYST BP >= 140 MM HG: CPT | Performed by: SURGERY

## 2024-11-18 ASSESSMENT — ENCOUNTER SYMPTOMS
SPEECH DIFFICULTY: 0
WEAKNESS: 0
HEADACHES: 0
UNEXPECTED WEIGHT CHANGE: 0
ACTIVITY CHANGE: 0
LIGHT-HEADEDNESS: 0
NUMBNESS: 0
DIZZINESS: 0
APPETITE CHANGE: 0

## 2024-11-18 NOTE — PROGRESS NOTES
Vascular Surgery Consult/Clinic Note    CC:    HPI:  Lynsey Forman is 78 y.o. female with history of bilaterally asymptomatic >80% (by cerebral angiogram) CA stenosis and 6mm R MCA aneurysm.   As of last visit patient had been awaiting cervical spine surgery for pain, which she desired to pursue prior to carotid surgery. She is now recovered from that spine surgery and without pain. She presents back to clinic to discuss carotid surgery. She denies stroke or TIA symptoms.    Meds:   Current Outpatient Medications on File Prior to Visit   Medication Sig Dispense Refill    amLODIPine (Norvasc) 5 mg tablet TAKE 1 TABLET BY MOUTH DAILY 90 tablet 1    aspirin 81 mg EC tablet Take 1 tablet (81 mg) by mouth once daily. 30 tablet 11    atorvastatin (Lipitor) 80 mg tablet Take 1 tablet (80 mg) by mouth once daily. 90 tablet 3    ezetimibe (Zetia) 10 mg tablet Take 1 tablet (10 mg) by mouth once daily. 30 tablet 11    hydrOXYzine HCL (Atarax) 25 mg tablet Take 1 tablet (25 mg) by mouth if needed for anxiety.      lisinopril 20 mg tablet TAKE 1 TABLET BY MOUTH DAILY 90 tablet 1    rivaroxaban (Xarelto) 15 mg tablet Take 1 tablet (15 mg) by mouth once daily in the evening. Take with food. 90 tablet 3    triamterene-hydrochlorothiazid (Maxzide-25) 37.5-25 mg tablet Take 0.5 tablets by mouth once daily.      acetaminophen (Tylenol) 325 mg tablet Take 2 tablets (650 mg) by mouth every 6 hours if needed for mild pain (1 - 3). (Patient not taking: Reported on 11/18/2024) 30 tablet 0    furosemide (Lasix) 20 mg tablet Take 1 tablet (20 mg) by mouth if needed (for ankle swelling). 30 tablet 0    gabapentin (Neurontin) 300 mg capsule Take 1 capsule (300 mg) by mouth 3 times a day. (Patient taking differently: Take 1 capsule (300 mg) by mouth once daily.) 270 capsule 1    polyethylene glycol (Golytely) 236-22.74-6.74 -5.86 gram solution STARTING AT 6PM DRINK HALF OF THE BOTTLE, DRINK THE OTHER HALF 5 HRS BEFORE PROCEDURE TIME (Patient  not taking: Reported on 11/18/2024) 4000 mL 0    traMADol (Ultram) 50 mg tablet Take 1 tablet (50 mg) by mouth every 8 hours if needed for severe pain (7 - 10). (Patient not taking: Reported on 11/18/2024) 10 tablet 0     No current facility-administered medications on file prior to visit.        Allergies:   Allergies   Allergen Reactions    Codeine Anxiety, Itching, Unknown and Other     Denies itching, hive, shortness of breath       SH:    Social Drivers of Health     Tobacco Use: Medium Risk (10/2/2024)    Patient History     Smoking Tobacco Use: Former     Smokeless Tobacco Use: Never     Passive Exposure: Not on file   Alcohol Use: Not on file   Financial Resource Strain: Not on file   Food Insecurity: Not on file   Transportation Needs: Not on file   Physical Activity: Not on file   Stress: Not on file   Social Connections: Not on file   Intimate Partner Violence: Not on file   Depression: Not at risk (10/2/2024)    PHQ-2     PHQ-2 Score: 0   Housing Stability: Not on file   Utilities: Not At Risk (12/22/2023)    Received from [facility] ([facility]), [facility] ([facility])    [employer]     In the past 12 months has the electric, gas, oil, or water company threatened to shut off services in your home?: Not on file   Digital Equity: Not on file   Health Literacy: Not on file        FH:  Family History   Problem Relation Name Age of Onset    Heart failure Mother      Dementia Mother      Hypertension Mother      Dementia Father      No Known Problems Sister      No Known Problems Sister      Heart disease Brother      Other (back pain) Brother      No Known Problems Brother      No Known Problems Brother          ROS:  Review of Systems   Constitutional:  Negative for activity change, appetite change and unexpected weight change.   Cardiovascular:  Negative for chest pain and leg swelling.   Neurological:  Negative for dizziness, syncope, speech difficulty, weakness, light-headedness, numbness  and headaches.        Objective:  Vitals:  Vitals:    24 1359   BP: 142/61   Pulse: 58        Exam:  Gen: in NAD  Neuro: CN II-XII grossly normal, motor strength 5/5 x4 extr  GI: Soft, ND/NT  Ext: FROM, WWP x4    Imagin2023 Carotid duplex  - R ICA  cm/s,  cm/s, ratio 8.0  - L ICA  cm/s,  cm/s, ratio 11    3/8/2024 Neuro IR angiogrma  - b/l carotid artery stenosis, >80%  - 6mm R MCA aneurysm    2024 CTA neck  - significant calcific disease  - >80% stenosis BL ICA    Assessment & Plan:  Lynsey Forman is 78 y.o. female with history of asymptomatic BL ICA stenosis who is presents for operative scheduling.  - Plan to start with L CEA first, then R CEA later in staged fashion  - Continue ASA/ statin  - Will need to hold Xarelto 3 days prior to OR (indication: Afib)  - Per multidisciplinary neuro note, currently planning for annual follow up of the R MCA aneurysm    Shaquille Blackman MD, PhD  Clinical   [employer] [facility] [employer]  Co-Director, Aortic Center  [facility]

## 2024-12-05 ENCOUNTER — TELEPHONE (OUTPATIENT)
Dept: CARDIOLOGY | Facility: HOSPITAL | Age: 79
End: 2024-12-05
Payer: MEDICARE

## 2024-12-05 DIAGNOSIS — I65.23 CAROTID STENOSIS, BILATERAL: ICD-10-CM

## 2024-12-05 DIAGNOSIS — E78.00 HYPERCHOLESTEROLEMIA: ICD-10-CM

## 2024-12-05 RX ORDER — ATORVASTATIN CALCIUM 80 MG/1
80 TABLET, FILM COATED ORAL DAILY
Qty: 90 TABLET | Refills: 3 | Status: SHIPPED | OUTPATIENT
Start: 2024-12-05 | End: 2025-12-05

## 2024-12-06 ENCOUNTER — HOSPITAL ENCOUNTER (OUTPATIENT)
Dept: VASCULAR MEDICINE | Facility: HOSPITAL | Age: 79
Discharge: HOME | End: 2024-12-06
Payer: MEDICARE

## 2024-12-06 ENCOUNTER — CLINICAL SUPPORT (OUTPATIENT)
Dept: PREADMISSION TESTING | Facility: HOSPITAL | Age: 79
End: 2024-12-06
Payer: MEDICARE

## 2024-12-06 DIAGNOSIS — I65.23 BILATERAL CAROTID ARTERY OCCLUSION: ICD-10-CM

## 2024-12-06 ASSESSMENT — ENCOUNTER SYMPTOMS
NECK NEGATIVE: 1
CONSTITUTIONAL NEGATIVE: 1
BRUISES/BLEEDS EASILY: 1
EYES NEGATIVE: 1
MUSCULOSKELETAL NEGATIVE: 1
NEUROLOGICAL NEGATIVE: 1
CARDIOVASCULAR NEGATIVE: 1
SINUS CONGESTION: 1
GASTROINTESTINAL NEGATIVE: 1
ENDOCRINE NEGATIVE: 1
RESPIRATORY NEGATIVE: 1

## 2024-12-06 NOTE — CPM/PAT H&P
CPM/PAT Evaluation       Name: Lynsey Forman (Lynsey Forman)  /Age: 1945/78 y.o.     { PAT Visit Type:75819}      Chief Complaint: ***    HPI  Lynsey Forman is scheduled for endarterectomy carotid artery- left with Dr. Blackman on 24.  Past Medical History:   Diagnosis Date    Abnormal kidney function 2024    BUN-24, GFR-56 (24)    Alcohol use, unspecified, uncomplicated 2022    Aneurysm of carotid artery (CMS-HCC)     Right MCA, followed by neurology    Anxiety     Atrial fibrillation (Multi)     no reccurance s/p ablation 2023 and 2023    Atrial flutter (Multi)     Carotid stenosis, asymptomatic, bilateral     on ASA and statin    Chronic cough 2009    Formatting of this note might be different from the original. Overview: No change with stopping lisinopril, no change with Advair, consensus of ENT, pulmonary, here is LP reflux, has associated past-nasal drip that does not respond to Mucinex or Flonase, 2012 has large nasal polyp L Formatting of this note might be different from the original. No change with stopping lisinopril, no change with Adv    Chronic pain disorder     CKD (chronic kidney disease)     stage III    Coronary artery disease     Diverticulosis 2016    Dysphonia     Easy bruising     Elevated coronary artery calcium score     CT calcium score 1730    GERD (gastroesophageal reflux disease)     History of arthrodesis 2023    Hyperlipidemia     Hypertension     Lipoma of arm     Left upper arm    Lumbar disc disease     Moriah's edema of vocal folds     s/p Moriah's edema surgery performed on 23.    Sinus bradycardia 2013    Formatting of this note might be different from the original. Overview: Asymptomatic, noticed on exam Formatting of this note might be different from the original. Asymptomatic, noticed on exam    Tachycardia 2024    Tobacco use disorder, mild, in early remission     Quit 2024,  PPD x 20 years    Vocal  cord polyp        Past Surgical History:   Procedure Laterality Date    ANTERIOR CRUCIATE LIGAMENT REPAIR Left     CARDIAC ELECTROPHYSIOLOGY STUDY AND ABLATION      4/2023, 7/2023    CERVICAL LAMINECTOMY  2017    Cervical surgery    COLONOSCOPY      COSMETIC SURGERY  2018    face lift    LIPOMA RESECTION  06/18/2024    SKIN, LEFT UPPER ARM, EXCISION: --EPIDERMAL INCLUSION CYST, RUPTURED AND INFLAMED    LUMBAR LAMINECTOMY  05/2024    L4-5    MOUTH SURGERY  2018    Oral surgery-peridontal    THROAT SURGERY  2023    polyp removed       Patient  has no history on file for sexual activity.    Family History   Problem Relation Name Age of Onset    Heart failure Mother      Dementia Mother      Hypertension Mother      Dementia Father      No Known Problems Sister      No Known Problems Sister      Heart disease Brother      Other (back pain) Brother      No Known Problems Brother      No Known Problems Brother         Allergies   Allergen Reactions    Codeine Anxiety, Itching, Unknown and Other     Denies itching, hive, shortness of breath       Prior to Admission medications    Medication Sig Start Date End Date Taking? Authorizing Provider   acetaminophen (Tylenol) 325 mg tablet Take 2 tablets (650 mg) by mouth every 6 hours if needed for mild pain (1 - 3).  Patient not taking: Reported on 11/18/2024 6/18/24   Lynnette Pearce MD   amLODIPine (Norvasc) 5 mg tablet TAKE 1 TABLET BY MOUTH DAILY 8/20/24   Windy Bartholomew MD   aspirin 81 mg EC tablet Take 1 tablet (81 mg) by mouth once daily. 1/22/24 1/21/25  Shaquille Blackman MD PhD   atorvastatin (Lipitor) 80 mg tablet Take 1 tablet (80 mg) by mouth once daily. 12/5/24 12/5/25  SANTY Tan-CNP   ezetimibe (Zetia) 10 mg tablet Take 1 tablet (10 mg) by mouth once daily. 4/2/24 4/2/25  LILY Tan   furosemide (Lasix) 20 mg tablet Take 1 tablet (20 mg) by mouth if needed (for ankle swelling). 4/2/24 6/11/24  LILY Tan   gabapentin  (Neurontin) 300 mg capsule Take 1 capsule (300 mg) by mouth 3 times a day.  Patient taking differently: Take 1 capsule (300 mg) by mouth once daily. 4/15/24 10/12/24  Windy Bartholomew MD   hydrOXYzine HCL (Atarax) 25 mg tablet Take 1 tablet (25 mg) by mouth if needed for anxiety.    Historical Provider, MD   lisinopril 20 mg tablet TAKE 1 TABLET BY MOUTH DAILY 6/4/24   Windy Bartholomew MD   polyethylene glycol (Golytely) 236-22.74-6.74 -5.86 gram solution STARTING AT 6PM DRINK HALF OF THE BOTTLE, DRINK THE OTHER HALF 5 HRS BEFORE PROCEDURE TIME  Patient not taking: Reported on 11/18/2024 8/30/24   Marvel Song MD   rivaroxaban (Xarelto) 15 mg tablet Take 1 tablet (15 mg) by mouth once daily in the evening. Take with food. 8/9/24   LILY Tan   traMADol (Ultram) 50 mg tablet Take 1 tablet (50 mg) by mouth every 8 hours if needed for severe pain (7 - 10).  Patient not taking: Reported on 11/18/2024 6/18/24   Lynnette Pearce MD   triamterene-hydrochlorothiazid (Maxzide-25) 37.5-25 mg tablet Take 0.5 tablets by mouth once daily. 10/4/24   LILY Tan   atorvastatin (Lipitor) 80 mg tablet Take 1 tablet (80 mg) by mouth once daily. 12/19/23 12/5/24  LILY Tan        PAT ROS:   Constitutional:   neg    Neuro/Psych:   neg    Eyes:   neg    Ears:   neg    Nose:    sinus congestion  Mouth:   neg    Throat:   neg    Neck:   neg    Cardio:   neg    Respiratory:   neg    Endocrine:   neg    GI:   neg    :   Musculoskeletal:   neg    Hematologic:    bruises/bleeds easily  Skin:      PAT Physical Exam     Airway    Testing/Diagnostic:   -ECG: 10/2/24  Unusual P axis, possible ectopic atrial bradycardia  Septal infarct (cited on or before 29-APR-2024)  Abnormal ECG  When compared with ECG of 29-APR-2024 14:17,  Ectopic atrial rhythm has replaced Sinus rhythm  Questionable change in initial forces of Septal leads    - VASC US CAROTID ARTERY DUPLEX BILATERAL   CRITICAL  RESULT  Critical Result: Findings are consistent with greater than 70% stenosis in bilateral proximal internal carotid arteries. Turbulent flow seen by color Doppler.  Notification called to Urszula Lebron CNP with results. on 12/5/2023 at 2:00:43 PM.    -Transthoracic Echo; 2/16/23  CONCLUSIONS:  1. Left ventricular systolic function is normal with a 60-65% estimated ejection fraction.  2. RVSP within normal limits.  3. Mildly dilated atria.    -CT CARDIAC SCORING; 4/24/2023   Impression:  Coronary artery calcium score of 1730.99.       Patient Specialist/PCP:   PCP: Windy Bartholomew MD LOV 8/8/24  Vascular Surgery: Adalid Abbasi MD LOV 11/18/24  Cardiology: LILY Tan LOV 10/2/24 presents for followup. Seen in collaboration with Dr. Mcdonald.   General Surgery: Prabhakar Franz MD LOV 7/3/24 seen for follow-up after undergoing excisional biopsy of a large epidermoid inclusion cyst involving her left upper arm.  This procedure was performed on 6/18/2024.   Otolaryngology: Louie Garcias MD LOV 3/21/24 presenting for a follow up visit with history of dysphonia and Moriah's edema of bilateral vocal cords presenting for a post op visit, s/p Moriah's edema surgery performed on 08/09/23.   There were no vitals taken for this visit.    DASI Risk Score      Flowsheet Row Questionnaire Series Submission from 11/27/2024 in [facility] with Generic Provider Terry   Can you take care of yourself (eat, dress, bathe, or use toilet)?  2.75  filed at 11/27/2024 1600   Can you walk indoors, such as around your house? 1.75  filed at 11/27/2024 1600   Can you walk a block or two on level ground?  2.75  filed at 11/27/2024 1600   Can you climb a flight of stairs or walk up a hill? 5.5  filed at 11/27/2024 1600   Can you run a short distance? 0  filed at 11/27/2024 1600   Can you do light work around the house like dusting or washing dishes? 2.7  filed at 11/27/2024 1600   Can you do moderate work around the house  like vacuuming, sweeping floors or carrying groceries? 3.5  filed at 11/27/2024 1600   Can you do heavy work around the house like scrubbing floors or lifting and moving heavy furniture?  0  filed at 11/27/2024 1600   Can you do yard work like raking leaves, weeding or pushing a mower? 0  filed at 11/27/2024 1600   Can you have sexual relations? 5.25  filed at 11/27/2024 1600   Can you participate in moderate recreational activities like golf, bowling, dancing, doubles tennis or throwing a baseball or football? 0  filed at 11/27/2024 1600   Can you participate in strenous sports like swimming, singles tennis, football, basketball, or skiing? 0  filed at 11/27/2024 1600   DASI SCORE 24.2  filed at 11/27/2024 1600   METS Score (Will be calculated only when all the questions are answered) 5.7  filed at 11/27/2024 1600          Caprini DVT Assessment    No data to display       Modified Frailty Index    No data to display       CHADS2 Stroke Risk  Current as of 13 minutes ago        4% 3 to 100%: High Risk   2 to < 3%: Medium Risk   0 to < 2%: Low Risk     Last Change: N/A          This score determines the patient's risk of having a stroke if the patient has atrial fibrillation.          Points Metrics   0 Has Congestive Heart Failure:  No     Patients with congestive heart failure get 1 point.    Current as of 13 minutes ago   1 Has Hypertension:  Yes     Patients with hypertension get 1 point.    Current as of 13 minutes ago   1 Age:  78     Patients who are 75 years of age or older get 1 point.    Current as of 13 minutes ago   0 Has Diabetes Excluding Gestational Diabetes:  No     Patients with diabetes get 1 point.    Current as of 13 minutes ago   0 Had Stroke:  No  Had TIA:  No  Had Thromboembolism:  No     Patients who have had a stroke, TIA, or thromboembolism get 2 points.    Current as of 13 minutes ago             Revised Cardiac Risk Index    No data to display       Apfel Simplified Score    No data to  display       Risk Analysis Index Results This Encounter    No data found in the last 10 encounters.       Stop Bang Score      Flowsheet Row Questionnaire Series Submission from 11/27/2024 in [facility] with Generic Provider Terry   Do you snore loudly? 0  filed at 11/27/2024 1600   Do you often feel tired or fatigued after your sleep? 0  filed at 11/27/2024 1600   Has anyone ever observed you stop breathing in your sleep? 0  filed at 11/27/2024 1600   Do you have or are you being treated for high blood pressure? 1  filed at 11/27/2024 1600   Recent BMI (Calculated) 19.5  filed at 11/27/2024 1600   Is BMI greater than 35 kg/m2? 0=No  filed at 11/27/2024 1600   Age older than 50 years old? 1=Yes  filed at 11/27/2024 1600   Gender - Male 0=No  filed at 11/27/2024 1600          Prodigy: High Risk  Total Score: 15              Prodigy Age Score      Prodigy Previous Opioid Use Score           ARISCAT Score for Postoperative Pulmonary Complications    No data to display       Cooley Perioperative Risk for Myocardial Infarction or Cardiac Arrest (ANA MARIA)    No data to display         Assessment and Plan:   Faxed risk stratification to cardiology office on 12/6/24.  Medication Instructions:  Instructed to hold vitamins, supplements, and NSAIDs 7 days prior to surgery.    Patricia Ortega RN  Pre Admission Testing   {[facility] EMBEDDED ASSESSMENT AND PLAN:[id number]}

## 2024-12-13 ENCOUNTER — PRE-ADMISSION TESTING (OUTPATIENT)
Dept: PREADMISSION TESTING | Facility: HOSPITAL | Age: 79
End: 2024-12-13
Payer: MEDICARE

## 2024-12-13 ENCOUNTER — DOCUMENTATION (OUTPATIENT)
Dept: CARDIOLOGY | Facility: HOSPITAL | Age: 79
End: 2024-12-13

## 2024-12-13 VITALS
WEIGHT: 112.8 LBS | HEIGHT: 63 IN | HEART RATE: 60 BPM | OXYGEN SATURATION: 98 % | BODY MASS INDEX: 19.99 KG/M2 | TEMPERATURE: 97.9 F | SYSTOLIC BLOOD PRESSURE: 123 MMHG | DIASTOLIC BLOOD PRESSURE: 67 MMHG

## 2024-12-13 DIAGNOSIS — Z01.811 PREOPERATIVE RESPIRATORY EXAMINATION: Primary | ICD-10-CM

## 2024-12-13 DIAGNOSIS — I65.23 BILATERAL CAROTID ARTERY STENOSIS: ICD-10-CM

## 2024-12-13 DIAGNOSIS — Z01.810 PREOPERATIVE CARDIOVASCULAR EXAMINATION: ICD-10-CM

## 2024-12-13 DIAGNOSIS — Z01.818 PREOPERATIVE CLEARANCE: ICD-10-CM

## 2024-12-13 DIAGNOSIS — Z41.9 SURGERY, ELECTIVE: ICD-10-CM

## 2024-12-13 LAB
ABO GROUP (TYPE) IN BLOOD: NORMAL
ANION GAP SERPL CALC-SCNC: 15 MMOL/L (ref 10–20)
ANTIBODY SCREEN: NORMAL
APTT PPP: 36 SECONDS (ref 27–38)
BUN SERPL-MCNC: 23 MG/DL (ref 6–23)
CALCIUM SERPL-MCNC: 9.5 MG/DL (ref 8.6–10.6)
CHLORIDE SERPL-SCNC: 92 MMOL/L (ref 98–107)
CO2 SERPL-SCNC: 25 MMOL/L (ref 21–32)
CREAT SERPL-MCNC: 1.07 MG/DL (ref 0.5–1.05)
EGFRCR SERPLBLD CKD-EPI 2021: 54 ML/MIN/1.73M*2
ERYTHROCYTE [DISTWIDTH] IN BLOOD BY AUTOMATED COUNT: 13 % (ref 11.5–14.5)
GLUCOSE SERPL-MCNC: 95 MG/DL (ref 74–99)
HCT VFR BLD AUTO: 38.1 % (ref 36–46)
HGB BLD-MCNC: 13 G/DL (ref 12–16)
INR PPP: 2.1 (ref 0.9–1.1)
MCH RBC QN AUTO: 32.2 PG (ref 26–34)
MCHC RBC AUTO-ENTMCNC: 34.1 G/DL (ref 32–36)
MCV RBC AUTO: 94 FL (ref 80–100)
NRBC BLD-RTO: 0 /100 WBCS (ref 0–0)
PLATELET # BLD AUTO: 259 X10*3/UL (ref 150–450)
POTASSIUM SERPL-SCNC: 4.4 MMOL/L (ref 3.5–5.3)
PROTHROMBIN TIME: 24.1 SECONDS (ref 9.8–12.8)
RBC # BLD AUTO: 4.04 X10*6/UL (ref 4–5.2)
RH FACTOR (ANTIGEN D): NORMAL
SODIUM SERPL-SCNC: 128 MMOL/L (ref 136–145)
WBC # BLD AUTO: 7.2 X10*3/UL (ref 4.4–11.3)

## 2024-12-13 PROCEDURE — 87081 CULTURE SCREEN ONLY: CPT

## 2024-12-13 PROCEDURE — 80048 BASIC METABOLIC PNL TOTAL CA: CPT

## 2024-12-13 PROCEDURE — 36415 COLL VENOUS BLD VENIPUNCTURE: CPT

## 2024-12-13 PROCEDURE — 85027 COMPLETE CBC AUTOMATED: CPT

## 2024-12-13 PROCEDURE — 99205 OFFICE O/P NEW HI 60 MIN: CPT | Performed by: ANESTHESIOLOGY

## 2024-12-13 PROCEDURE — 85610 PROTHROMBIN TIME: CPT

## 2024-12-13 PROCEDURE — 86900 BLOOD TYPING SEROLOGIC ABO: CPT

## 2024-12-13 RX ORDER — CHLORHEXIDINE GLUCONATE ORAL RINSE 1.2 MG/ML
15 SOLUTION DENTAL DAILY
Qty: 30 ML | Refills: 0 | Status: SHIPPED | OUTPATIENT
Start: 2024-12-13 | End: 2024-12-20 | Stop reason: HOSPADM

## 2024-12-13 RX ORDER — CHLORHEXIDINE GLUCONATE 40 MG/ML
1 SOLUTION TOPICAL DAILY
Qty: 25 ML | Refills: 0 | Status: SHIPPED | OUTPATIENT
Start: 2024-12-13 | End: 2024-12-20 | Stop reason: HOSPADM

## 2024-12-13 ASSESSMENT — DUKE ACTIVITY SCORE INDEX (DASI)
CAN YOU WALK INDOORS, SUCH AS AROUND YOUR HOUSE: YES
CAN YOU DO LIGHT WORK AROUND THE HOUSE LIKE DUSTING OR WASHING DISHES: YES
CAN YOU PARTICIPATE IN MODERATE RECREATIONAL ACTIVITIES LIKE GOLF, BOWLING, DANCING, DOUBLES TENNIS OR THROWING A BASEBALL OR FOOTBALL: NO
CAN YOU DO YARD WORK LIKE RAKING LEAVES, WEEDING OR PUSHING A MOWER: NO
CAN YOU HAVE SEXUAL RELATIONS: YES
CAN YOU RUN A SHORT DISTANCE: NO
CAN YOU WALK A BLOCK OR TWO ON LEVEL GROUND: YES
CAN YOU DO MODERATE WORK AROUND THE HOUSE LIKE VACUUMING, SWEEPING FLOORS OR CARRYING GROCERIES: YES
CAN YOU CLIMB A FLIGHT OF STAIRS OR WALK UP A HILL: YES
CAN YOU PARTICIPATE IN STRENOUS SPORTS LIKE SWIMMING, SINGLES TENNIS, FOOTBALL, BASKETBALL, OR SKIING: NO
CAN YOU TAKE CARE OF YOURSELF (EAT, DRESS, BATHE, OR USE TOILET): YES
CAN YOU DO HEAVY WORK AROUND THE HOUSE LIKE SCRUBBING FLOORS OR LIFTING AND MOVING HEAVY FURNITURE: NO
TOTAL_SCORE: 24.2
DASI METS SCORE: 5.7

## 2024-12-13 ASSESSMENT — PAIN SCALES - GENERAL: PAINLEVEL_OUTOF10: 0 - NO PAIN

## 2024-12-13 ASSESSMENT — ENCOUNTER SYMPTOMS
CARDIOVASCULAR NEGATIVE: 1
ENDOCRINE NEGATIVE: 1
NECK NEGATIVE: 1
NEUROLOGICAL NEGATIVE: 1
CONSTITUTIONAL NEGATIVE: 1
ARTHRALGIAS: 1
RESPIRATORY NEGATIVE: 1

## 2024-12-13 ASSESSMENT — LIFESTYLE VARIABLES: SMOKING_STATUS: NONSMOKER

## 2024-12-13 ASSESSMENT — PAIN - FUNCTIONAL ASSESSMENT: PAIN_FUNCTIONAL_ASSESSMENT: 0-10

## 2024-12-13 NOTE — PREPROCEDURE INSTRUCTIONS
Medication List            Accurate as of December 13, 2024  2:33 PM. Always use your most recent med list.                acetaminophen 325 mg tablet  Commonly known as: Tylenol  Take 2 tablets (650 mg) by mouth every 6 hours if needed for mild pain (1 - 3).  Medication Adjustments for Surgery: Take/Use as prescribed     amLODIPine 5 mg tablet  Commonly known as: Norvasc  TAKE 1 TABLET BY MOUTH DAILY  Medication Adjustments for Surgery: Take/Use as prescribed     aspirin 81 mg EC tablet  Take 1 tablet (81 mg) by mouth once daily.  Medication Adjustments for Surgery: Take/Use as prescribed     atorvastatin 80 mg tablet  Commonly known as: Lipitor  Take 1 tablet (80 mg) by mouth once daily.  Medication Adjustments for Surgery: Take/Use as prescribed     * chlorhexidine 4 % external liquid  Commonly known as: Hibiclens  Apply 1 Application topically once daily for 5 days. Use per [facility]/PAT provided instructions     * chlorhexidine 0.12 % solution  Commonly known as: Peridex  Use 15 mL in the mouth or throat once daily for 2 doses.     ezetimibe 10 mg tablet  Commonly known as: Zetia  Take 1 tablet (10 mg) by mouth once daily.  Medication Adjustments for Surgery: Take last dose 1 day (24 hours) before surgery     furosemide 20 mg tablet  Commonly known as: Lasix  Take 1 tablet (20 mg) by mouth if needed (for ankle swelling).  Medication Adjustments for Surgery: Do Not take on the morning of surgery     gabapentin 300 mg capsule  Commonly known as: Neurontin  Take 1 capsule (300 mg) by mouth 3 times a day.  Medication Adjustments for Surgery: Take/Use as prescribed     hydrOXYzine HCL 25 mg tablet  Commonly known as: Atarax  Medication Adjustments for Surgery: Do Not take on the morning of surgery     lisinopril 20 mg tablet  TAKE 1 TABLET BY MOUTH DAILY  Medication Adjustments for Surgery: Take last dose 1 day (24 hours) before surgery     polyethylene glycol [id number] -5.86 gram solution  Commonly known as:  Golytely  STARTING AT 6PM DRINK HALF OF THE BOTTLE, DRINK THE OTHER HALF 5 HRS BEFORE PROCEDURE TIME  Medication Adjustments for Surgery: Do Not take on the morning of surgery     traMADol 50 mg tablet  Commonly known as: Ultram  Take 1 tablet (50 mg) by mouth every 8 hours if needed for severe pain (7 - 10).  Medication Adjustments for Surgery: Take/Use as prescribed     triamterene-hydrochlorothiazid 37.5-25 mg tablet  Commonly known as: Maxzide-25  Take 0.5 tablets by mouth once daily.  Medication Adjustments for Surgery: Take/Use as prescribed     Xarelto 15 mg tablet  Generic drug: rivaroxaban  Take 1 tablet (15 mg) by mouth once daily in the evening. Take with food.  Medication Adjustments for Surgery: Take last dose 3 days before surgery  Additional Medication Adjustments for Surgery: Other (Comment)  Notes to patient: DO NOT TAKE AFTER 12/15           * This list has 2 medication(s) that are the same as other medications prescribed for you. Read the directions carefully, and ask your doctor or other care provider to review them with you.                                Thank you for visiting [facility] today for your pre-procedure evaluation, you were seen by Dr. Saravia [phone]    This summary includes instructions and information to aid you during your perioperative period.  Please read carefully. If you have any questions about your visit today, please call the number listed above.  If you become ill or have any changes to your health before your surgery, please contact your primary care provider and alert your surgeon.    Preparing for your Surgery       Exercises  Preoperative Brain Exercises    What are brain exercises?  A brain exercise is any activity that engages your thinking (cognitive) skills.    What types of activities are considered brain exercises?  Jigsaw puzzles, crossword puzzles, word jumble, memory games, word search, and many more.  Many can be  found free online or on your phone via a mobile jeff.    Why should I do brain exercises before my surgery?  More recent research has shown brain exercise before surgery can lower the risk of postoperative delirium (confusion) which can be especially important for older adults.  Patients who did brain exercises for 5 to 10 hours the days before surgery, cut their risk of postoperative delirium in half up to 1 week after surgery.    Sit-to-Stand Exercise    What is the sit-to-stand exercise?  The sit-to-stand exercise strengthens the muscles of your lower body and muscles in the center of your body (core muscles for stability) helping to maintain and improve your strength and mobility.  How do I do the sit-to-stand exercise?  The goal is to do this exercise without using your arms or hands.  If this is too difficult, use your arms and hands or a chair with armrests to help slowly push yourself to the standing position and lower yourself back to the sitting position. As the movement becomes easier use your arms and hands less.    Steps to the sit-to-stand exercise  Sit up tall in a sturdy chair, knees bent, feet flat on the floor shoulder-width apart.  Shift your hips/pelvis forward in the chair to correctly position yourself for the next movement.  Lean forward at your hips.  Stand up straight putting equal weight on both feet.  Check to be sure you are properly aligned with the chair, in a slow controlled movement sit back down.  Repeat this exercise 10-15 times.  If needed you can do it fewer times until your strength improves.  Rest for 1 minute.  Do another 10-15 sit-to-stand exercises.  Try to do this in the morning and evening.        Instructions    Preoperative Fasting Guidelines    Why must I stop eating and drinking near surgery time?  With sedation, food or liquid in your stomach can enter your lungs causing serious complications  Food can increase nausea and vomiting  When do I need to stop eating and  drinking before my surgery?      Do not eat any food after midnight the night before your surgery/procedure. You may have up to 13.5 ounces of clear liquid until TWO hours before your instructed arrival time to the hospital.  This includes water, black tea/coffee, (no milk or cream) apple juice, and electrolyte drinks (Gatorade). You may chew gum until TWO hours before your surgery/procedure            Simple things you can do to help prevent blood clots     Blood clots are blockages that can form in the body's veins. When a blood clot forms in your deep veins, it may be called a deep vein thrombosis, or DVT for short. Blood clots can happen in any part of the body where blood flows, but they are most common in the arms and legs. If a piece of a blood clot breaks free and travels to the lungs, it is called a pulmonary embolus (PE). A PE can be a very serious problem.         Being in the hospital or having surgery can raise your chances of getting a blood clot because you may not be well enough to move around as much as you normally do.         Ways you can help prevent blood clots in the hospital       Wearing SCDs  SCDs stands for Sequential Compression Devices.   SCDs are special sleeves that wrap around your legs. They attach to a pump that fills them with air to gently squeeze your legs every few minutes.  This helps return the blood in your legs to your heart.   SCDs should only be taken off when walking or bathing. SCDs may not be comfortable, but they can help save your life.              Pump SCD leg sleeves  Wearing compression stockings - if your doctor orders them. These special snug-fitting stockings gently squeeze your legs to help blood flow.       Walking. Walking helps move the blood in your legs.   If your doctor says it is ok, try walking the halls at least   5 times a day. Ask us to help you get up, so you don't fall.      Taking any blood-thinning medicines your doctor orders.              Ways  you can help prevent blood clots at home         Wearing compression stockings - if your doctor orders them.   Walking - to help move the blood in your legs.    Taking any blood-thinning medicines your doctor orders.      Signs of a blood clot or PE    Tell your doctor or nurse right away if you have any of the problems listed below.         If you are at home, seek medical care right away. Call 911 for chest pain or problems breathing.            Signs of a blood clot (DVT) - such as pain, swelling, redness, or warmth in your arm or legs.  Signs of a pulmonary embolism (PE) - such as chest pain or feeling short of breath      Tobacco and Alcohol;  Do not drink alcohol or smoke within 24 hours of surgery.  It is best to quit smoking for as long as possible before any surgery or procedure.     Other Instructions  Body Wash; What is a home preoperative antibacterial shower? This shower is a way of cleaning the skin with a germ-killing solution before surgery.  The solution contains chlorhexidine, commonly known as CHG.  CHG is a skin cleanser with germ-killing ability.  Let your doctor know if you are allergic to chlorhexidine. Why do I need to take a preoperative antibacterial shower? Skin is not sterile.  It is best to try to make your skin as free of germs as possible before surgery.  Proper cleansing with a germ-killing soap before surgery can lower the number of germs on your skin.  This helps to reduce the risk of infection at the surgical site.  Following the instructions listed below will help you prepare your skin for surgery.   How do I use the solution? Steps:  Begin using your CHG soap 5 days before your scheduled surgery on ___________.   First, wash and rinse your hair using the CHG soap. Keep CHG soap away from ear canals and eyes.  Rinse completely, do not condition.  Hair extensions should be removed. , Oral/Dental Rinse: What is oral/dental rinse?  It is a mouthwash. It is a way of cleaning the mouth  with a germ-killing solution before your surgery.  The solution contains chlorhexidine, commonly known as CHG. It is used inside the mouth to kill a bacteria known as Staphylococcus aureus.  Let your doctor know if you are allergic to Chlorhexidine. Why do I need to use CHG oral/dental rinse? The CHG oral/dental rinse helps to kill a bacteria in your mouth known as Staphylococcus aureus.  This reduces the risk of infection at the surgical site.  Using your CHG oral/dental rinse STEPS: Use your CHG oral/dental rinse after you brush your teeth the night before (at bedtime) and the morning of your surgery.  Follow all directions on your prescription label.  Use the cap on the container to measure 15 ml.  Swish (gargle if you can) the mouthwash in your mouth for at least 30 seconds, (do not swallow) and spit out.  After you use your CHG rinse, do not rinse your mouth with water, drink or eat.  Please refer to the prescription label for the appropriate time to resume oral intake What side effects might I have using the CHG oral/dental rinse? CHG rinse will stick to plaque on the teeth.  Brush and floss just before use.  Teeth brushing will help avoid staining of plaque during use.       The Week before Surgery        Seven days before Surgery  Check your [facility] medication instructions  Do the exercises provided to you by [facility]   Arrange for a responsible, adult licensed  to take you home after surgery and stay with you for 24 hours.  You will not be permitted to drive yourself home if you have received any anesthetic/sedation  Six days before surgery  Check your [facility] medication instructions  Do the exercises provided to you by [facility]   Start using Chlorhexidene (CHG) body wash if prescribed  Five days before surgery  Check your [facility] medication instructions  Do the exercises provided to you by [facility]   Continue to use CHG body wash if prescribed  Three days before surgery  Check your [facility] medication instructions  Do the exercises  provided to you by [facility]   Continue to use CHG body wash if prescribed  Two days before surgery  Check your [facility] medication instructions  Do the exercises provided to you by [facility]   Continue to use CHG body wash if prescribed    The Day before Surgery       Check your [facility] medication and all other [facility] instructions including when to stop eating and drinking  You will be called with your arrival time for surgery in the late afternoon.  If you do not receive a call please reach out to your surgeon's office.  Do not smoke or drink 24 hours before surgery  Prepare items to bring with you to the hospital  Shower with your chlorhexidine wash if prescribed  Brush your teeth and use your chlorhexidine dental rinse if prescribed    The Day of Surgery       Check your [facility] medication instructions  Ensure you follow the instructions for when to stop eating and drinking  Shower, if prescribed use CHG.  Do not apply any lotions, creams, moisturizers, perfume or deodorant  Brush your teeth and use your CHG dental rinse if prescribed  Wear loose comfortable clothing  Avoid make-up  Remove  jewelry and piercings, consider professional piercing removal with a plastic spacer if needed  Bring photo ID and Insurance card  Bring an accurate medication list that includes medication dose, frequency and allergies  Bring a copy of your advanced directives (will, health care power of )  Bring any devices and controllers as well as medical devices you have been provided with for surgery (CPAP, slings, braces, etc.)  Dentures, eyeglasses, and contacts will be removed before surgery, please bring cases for contacts or glasses

## 2024-12-13 NOTE — PROGRESS NOTES
The patient is scheduled to have a left carotid endarterectomy.  She is at intermediate risk for perioperative cardiac events.  No further cardiac testing is required at this time.  She may hold Xarelto 3 days prior to her surgery and restart it afterwards when safe.  She should remain on aspirin without interruption.

## 2024-12-13 NOTE — H&P (VIEW-ONLY)
CPM/PAT Evaluation       Name: Lynsey Forman (Lynsey Forman)  /Age: 1945/78 y.o.     In-Person       Chief Complaint: L CEA with Dr. Blackman on      HPI  Ms. Forman is a 79 y/o female with PMHx of HTN, HLD, R MCA aneurysm, bilateral >80% carotid stenosis, Aflutter & Afib (on Xarelto), CAD (no PCI), sinus bradycardia, lumbar and cervical disc disease, Moriah's edema of vocal folds and anxiety. CKDIII mentioned on chart review but Cr has been normal for some time.     There is no hx of MI, DVT, PE, COPD or CVA or TIA. Patient denies any cardiac or respiratory symptoms including recent URI or dyspnea. Patient reports the carotid stenosis has been known for sometime and was supposed to be addressed initially but she insisted on having her spine procedures (cervical & lumbar) first due to severe pain and poor quality of life. It was during work-up for spinal surgery that R MCA aneurysm and bilateral carotid stenosis was incidentally found [3/8 angio >80% stenosis of bilateral ICAs last CUS in 2023. MRA 24 with stable 6mm R MCA bifurcation aneurysm].    Patient does not engage in strenuous regular exercise due to back pain (although this has subsided since surgery). She does keep up with light-moderate house chores, does her groceries, vacuums etc. She does not go for frequent walks, rake leaves or do any regular sports. She denies any changes to her level activity, shortness of breath or increased tiredness with her usual routine.       Past Surgical History:   Procedure Laterality Date    ANTERIOR CRUCIATE LIGAMENT REPAIR Left     CARDIAC ELECTROPHYSIOLOGY STUDY AND ABLATION      2023, 2023    CERVICAL LAMINECTOMY  2017    Cervical surgery    COLONOSCOPY      COSMETIC SURGERY  2018    face lift    LIPOMA RESECTION  2024    SKIN, LEFT UPPER ARM, EXCISION: --EPIDERMAL INCLUSION CYST, RUPTURED AND INFLAMED    LUMBAR LAMINECTOMY  2024    L4-5    MOUTH SURGERY  2018    Oral  surgery-peridontal    THROAT SURGERY  2023    polyp removed       Social Hx: Pt lives with sister. She's a former smoker, denies alcohol use or recreational drug use. Does use dispensary grade marijuana (smoke & edible gummies).     Family History   Problem Relation Name Age of Onset    Heart failure Mother      Dementia Mother      Hypertension Mother      Dementia Father      No Known Problems Sister      No Known Problems Sister      Heart disease Brother      Other (back pain) Brother      No Known Problems Brother      No Known Problems Brother         Allergies   Allergen Reactions    Codeine Anxiety, Itching, Unknown and Other     Denies itching, hive, shortness of breath       Prior to Admission medications    Medication Sig Start Date End Date Taking? Authorizing Provider   amLODIPine (Norvasc) 5 mg tablet TAKE 1 TABLET BY MOUTH DAILY 8/20/24  Yes Windy Bartholomew MD   aspirin 81 mg EC tablet Take 1 tablet (81 mg) by mouth once daily. 1/22/24 1/21/25 Yes Shaquille Blackman MD PhD   atorvastatin (Lipitor) 80 mg tablet Take 1 tablet (80 mg) by mouth once daily. 12/5/24 12/5/25 Yes LILY Tan   ezetimibe (Zetia) 10 mg tablet Take 1 tablet (10 mg) by mouth once daily. 4/2/24 4/2/25 Yes LILY Tan   hydrOXYzine HCL (Atarax) 25 mg tablet Take 1 tablet (25 mg) by mouth if needed for anxiety.   Yes Historical Provider, MD   lisinopril 20 mg tablet TAKE 1 TABLET BY MOUTH DAILY 6/4/24  Yes Windy Bartholomew MD   rivaroxaban (Xarelto) 15 mg tablet Take 1 tablet (15 mg) by mouth once daily in the evening. Take with food. 8/9/24  Yes LILY Tan   triamterene-hydrochlorothiazid (Maxzide-25) 37.5-25 mg tablet Take 0.5 tablets by mouth once daily. 10/4/24  Yes LILY Tan   acetaminophen (Tylenol) 325 mg tablet Take 2 tablets (650 mg) by mouth every 6 hours if needed for mild pain (1 - 3).  Patient not taking: Reported on 11/18/2024 6/18/24   Lynnette Pearce,  MD   chlorhexidine (Hibiclens) 4 % external liquid Apply 1 Application topically once daily for 5 days. Use per CPM/PAT provided instructions 12/13/24 12/18/24  Pierre Saravia MD   chlorhexidine (Peridex) 0.12 % solution Use 15 mL in the mouth or throat once daily for 2 doses. 12/13/24 12/15/24  Pierre Saravia MD   furosemide (Lasix) 20 mg tablet Take 1 tablet (20 mg) by mouth if needed (for ankle swelling). 4/2/24 6/11/24  Urszula Lebron, APRN-CNP   gabapentin (Neurontin) 300 mg capsule Take 1 capsule (300 mg) by mouth 3 times a day.  Patient taking differently: Take 1 capsule (300 mg) by mouth once daily. 4/15/24 10/12/24  Windy Bartholomew MD   polyethylene glycol (Golytely) 236-22.74-6.74 -5.86 gram solution STARTING AT 6PM DRINK HALF OF THE BOTTLE, DRINK THE OTHER HALF 5 HRS BEFORE PROCEDURE TIME  Patient not taking: Reported on 12/13/2024 8/30/24   Marvel Song MD   traMADol (Ultram) 50 mg tablet Take 1 tablet (50 mg) by mouth every 8 hours if needed for severe pain (7 - 10).  Patient not taking: Reported on 8/8/2024 6/18/24   Lynnette Pearce MD        [facility] ROS:   Constitutional:   neg    Neuro/Psych:   neg    Eyes:   Ears:   Nose:   neg    Mouth:   neg    Throat:   neg    Neck:   neg    Cardio:   neg    Respiratory:   neg    Endocrine:   neg    GI:    Mild GERD occasionally   :   neg    Musculoskeletal:    arthralgias  Hematologic:   neg    Skin:      Physical Exam  Constitutional:       Appearance: Normal appearance.   HENT:      Head: Normocephalic and atraumatic.      Mouth/Throat:      Mouth: Mucous membranes are moist.   Eyes:      Extraocular Movements: Extraocular movements intact.      Conjunctiva/sclera: Conjunctivae normal.   Cardiovascular:      Rate and Rhythm: Normal rate. Rhythm irregular.      Heart sounds: Normal heart sounds.   Pulmonary:      Effort: Pulmonary effort is normal.      Breath sounds: Normal breath sounds.   Abdominal:      Palpations: Abdomen is soft.    Musculoskeletal:         General: No swelling. Normal range of motion.      Cervical back: Normal range of motion and neck supple.   Skin:     General: Skin is warm and dry.   Neurological:      General: No focal deficit present.      Mental Status: She is alert and oriented to person, place, and time. Mental status is at baseline.   Psychiatric:         Mood and Affect: Mood normal.         Behavior: Behavior normal.         Thought Content: Thought content normal.         Judgment: Judgment normal.        PAT AIRWAY:   Airway:     Mallampati::  I    TM distance::  >3 FB    Neck ROM::  Full  normal      Testing/Diagnostic:   ECG 10/2/24:    Bradycardia noted on several ecgs    Echo 2/16/23:  PHYSICIAN INTERPRETATION:  Left Ventricle: The left ventricular systolic function is normal, with an estimated ejection fraction of 60-65%. There are no regional wall motion abnormalities. The left ventricular cavity size is normal. Spectral Doppler shows a normal pattern of left ventricular diastolic filling.  Left Atrium: The left atrium is mildly dilated.  Right Ventricle: The right ventricle is normal in size. There is normal right ventricular global systolic function.  Right Atrium: The right atrium is mildly dilated.  Aortic Valve: The aortic valve appears structurally normal. There is no evidence of aortic valve regurgitation. The peak instantaneous gradient of the aortic valve is 7.1 mmHg. The mean gradient of the aortic valve is 3.9 mmHg.  Mitral Valve: The mitral valve is normal in structure. There is trace mitral valve regurgitation.  Tricuspid Valve: The tricuspid valve is structurally normal. There is trace to mild tricuspid regurgitation. The Doppler estimated RVSP is within normal limits at 22.2 mmHg.  Pulmonic Valve: The pulmonic valve is structurally normal. There is trace pulmonic valve regurgitation.  Pericardium: There is a trivial pericardial effusion.  Aorta: The aortic root is normal.  Systemic Veins:  "The inferior vena cava appears to be of normal size. There is IVC inspiratory collapse greater than 50%.  In comparison to the previous echocardiogram(s): There are no prior studies on this patient for comparison purposes.        CONCLUSIONS:  1. Left ventricular systolic function is normal with a 60-65% estimated ejection fraction.  2. RVSP within normal limits.  3. Mildly dilated atria.    Carotid Duplex Bilateral 12/5/23:  **CRITICAL RESULT**  Critical Result: Findings are consistent with greater than 70% stenosis in bilateral proximal internal carotid arteries. Turbulent flow seen by color Doppler    CT Calcium score 4/24/23  FINDINGS:   The score and distribution of calcium in the coronary arteries is as   follows:      LM           0   LAD           464.12   LCx           300.73   RCA           966.14      Total 1730.99      Cardiac MRI stress test 11/1/23  1. No evidence of inducible myocardial ischemia with regadenoson  stress perfusion imaging.  2. Normal LV size and hyperdynamic systolic function. Quantitative  LVEF 75%.  3. Trivial MR (RF 10%), trivial AI (RF 7%).  4. Mild biatrial enlargement.  5. Normal RV size and systolic function. Quantitative RVEF 60%,    Patient Specialist/PCP: Cardiology Urszula Lebron; Dr. Bartholomew PCP    Visit Vitals  /67   Pulse 60   Temp 36.6 °C (97.9 °F) (Oral)   Ht 1.6 m (5' 3\")   Wt 51.2 kg (112 lb 12.8 oz)   SpO2 98%   BMI 19.98 kg/m²   OB Status Postmenopausal   Smoking Status Former   BSA 1.51 m²       DASI Risk Score      Flowsheet Row Pre-Admission Testing from 12/13/2024 in [facility] Questionnaire Series Submission from 11/27/2024 in [facility] with Generic Provider Terry   Can you take care of yourself (eat, dress, bathe, or use toilet)?  2.75 filed at 12/13/2024 1412 2.75  filed at 11/27/2024 1600   Can you walk indoors, such as around your house? 1.75 filed at 12/13/2024 1412 1.75  filed at 11/27/2024 1600   Can you walk a block or two " on level ground?  2.75 filed at 12/13/2024 1412 2.75  filed at 11/27/2024 1600   Can you climb a flight of stairs or walk up a hill? 5.5 filed at 12/13/2024 1412 5.5  filed at 11/27/2024 1600   Can you run a short distance? 0 filed at 12/13/2024 1412 0  filed at 11/27/2024 1600   Can you do light work around the house like dusting or washing dishes? 2.7 filed at 12/13/2024 1412 2.7  filed at 11/27/2024 1600   Can you do moderate work around the house like vacuuming, sweeping floors or carrying groceries? 3.5 filed at 12/13/2024 1412 3.5  filed at 11/27/2024 1600   Can you do heavy work around the house like scrubbing floors or lifting and moving heavy furniture?  0 filed at 12/13/2024 1412 0  filed at 11/27/2024 1600   Can you do yard work like raking leaves, weeding or pushing a mower? 0 filed at 12/13/2024 1412 0  filed at 11/27/2024 1600   Can you have sexual relations? 5.25 filed at 12/13/2024 1412 5.25  filed at 11/27/2024 1600   Can you participate in moderate recreational activities like golf, bowling, dancing, doubles tennis or throwing a baseball or football? 0 filed at 12/13/2024 1412 0  filed at 11/27/2024 1600   Can you participate in strenous sports like swimming, singles tennis, football, basketball, or skiing? 0 filed at 12/13/2024 1412 0  filed at 11/27/2024 1600   DASI SCORE 24.2 filed at 12/13/2024 1412 24.2  filed at 11/27/2024 1600   METS Score (Will be calculated only when all the questions are answered) 5.7 filed at 12/13/2024 1412 5.7  filed at 11/27/2024 1600          Caprini DVT Assessment      Flowsheet Row Pre-Admission Testing from 12/13/2024 in [facility]   DVT Score 7 filed at 12/13/2024 1506   Surgical Factors Major surgery planned, lasting 2-3 hours filed at 12/13/2024 1506   BMI 30 or less filed at 12/13/2024 1506          Modified Frailty Index    No data to display       CHADS2 Stroke Risk  Current as of 46 minutes ago        4% 3 to 100%: High Risk   2 to < 3%:  Medium Risk   0 to < 2%: Low Risk     Last Change: N/A          This score determines the patient's risk of having a stroke if the patient has atrial fibrillation.          Points Metrics   0 Has Congestive Heart Failure:  No     Patients with congestive heart failure get 1 point.    Current as of 46 minutes ago   1 Has Hypertension:  Yes     Patients with hypertension get 1 point.    Current as of 46 minutes ago   1 Age:  78     Patients who are 75 years of age or older get 1 point.    Current as of 46 minutes ago   0 Has Diabetes Excluding Gestational Diabetes:  No     Patients with diabetes get 1 point.    Current as of 46 minutes ago   0 Had Stroke:  No  Had TIA:  No  Had Thromboembolism:  No     Patients who have had a stroke, TIA, or thromboembolism get 2 points.    Current as of 46 minutes ago             Revised Cardiac Risk Index      Flowsheet Row Pre-Admission Testing from 12/13/2024 in [facility]   High-Risk Surgery (Intraperitoneal, Intrathoracic,Suprainguinal vascular) 1 filed at 12/13/2024 1507   History of ischemic heart disease (History of MI, History of positive exercuse test, Current chest paint considered due to myocardial ischemia, Use of nitrate therapy, ECG with pathological Q Waves) 0 filed at 12/13/2024 1507   History of congestive heart failure (pulmonary edemia, bilateral rales or S3 gallop, Paroxysmal nocturnal dyspnea, CXR showing pulmonary vascular redistribution) 0 filed at 12/13/2024 1507   History of cerebrovascular disease (Prior TIA or stroke) 0 filed at 12/13/2024 1507   Pre-operative insulin treatment 0 filed at 12/13/2024 1507   Pre-operative creatinine>2 mg/dl 0 filed at 12/13/2024 1507   Revised Cardiac Risk Calculator 1 filed at 12/13/2024 1507          Apfel Simplified Score      Flowsheet Row Pre-Admission Testing from 12/13/2024 in [facility]   Smoking status 1 filed at 12/13/2024 1506   History of motion sickness or PONV  0 filed at  12/13/2024 1506   Use of postoperative opioids 0 filed at 12/13/2024 1506   Gender - Female 1=Yes filed at 12/13/2024 1506   Apfel Simplified Score Calculator 2 filed at 12/13/2024 1506          Risk Analysis Index Results This Encounter    No data found in the last 10 encounters.       Stop Bang Score      Flowsheet Row Pre-Admission Testing from 12/13/2024 in [facility] Questionnaire Series Submission from 11/27/2024 in [facility] with Generic Provider Terry   Do you snore loudly? 0 filed at 12/13/2024 1412 0  filed at 11/27/2024 1600   Do you often feel tired or fatigued after your sleep? 0 filed at 12/13/2024 1412 0  filed at 11/27/2024 1600   Has anyone ever observed you stop breathing in your sleep? 0 filed at 12/13/2024 1412 0  filed at 11/27/2024 1600   Do you have or are you being treated for high blood pressure? 1 filed at 12/13/2024 1412 1  filed at 11/27/2024 1600   Recent BMI (Calculated) 20 filed at 12/13/2024 1412 19.5  filed at 11/27/2024 1600   Is BMI greater than 35 kg/m2? 0=No filed at 12/13/2024 1412 0=No  filed at 11/27/2024 1600   Age older than 50 years old? 1=Yes filed at 12/13/2024 1412 1=Yes  filed at 11/27/2024 1600   Is your neck circumference greater than 17 inches (Male) or 16 inches (Female)? 0 filed at 12/13/2024 1412 --   Gender - Male 0=No filed at 12/13/2024 1412 0=No  filed at 11/27/2024 1600   STOP-BANG Total Score 2 filed at 12/13/2024 1412 --          Prodigy: High Risk  Total Score: 15              Prodigy Age Score      Prodigy Previous Opioid Use Score           ARISCAT Score for Postoperative Pulmonary Complications      Flowsheet Row Pre-Admission Testing from 12/13/2024 in [facility]   Age, years  3 filed at 12/13/2024 1505   Preoperative SpO2 0 filed at 12/13/2024 1505   Respiratory infection in the last month Either upper or lower (i.e., URI, bronchitis, pneumonia), with fever and antibiotic treatment 0 filed at 12/13/2024 1505    Preoperative anemoa (Hgb less than 10 g/dl) 0 filed at 12/13/2024 1505   Surgical incision  0 filed at 12/13/2024 1505   Duration of surgery  16 filed at 12/13/2024 1505   Emergency Procedure  0 filed at 12/13/2024 1505   ARISCAT Total Score  19 filed at 12/13/2024 1505          Yasir Perioperative Risk for Myocardial Infarction or Cardiac Arrest (ANA MARIA)      Flowsheet Row Pre-Admission Testing from 12/13/2024 in [facility]   Age 1.52 filed at 12/13/2024 1507   Functional Status  0 filed at 12/13/2024 1507   ASA Class  -1.92 filed at 12/13/2024 1507   Creatinine 0 filed at 12/13/2024 1507   Type of Procedure  0.86 filed at 12/13/2024 1507   ANA MARIA Total Score  -4.79 filed at 12/13/2024 1507   ANA MARIA % 0.82 filed at 12/13/2024 1507            No results found for this or any previous visit (from the past 24 hours).     Assessment and Plan:  78 y.o.  female  scheduled for : L CEA with Dr. Blackman on 12/19. PMHx of HTN, HLD, R MCA aneurysm, bilateral >80% carotid stenosis, Aflutter & Afib (on Xarelto), CAD (no PCI), sinus bradycardia, lumbar and cervical disc disease, Moriah's edema of vocal folds and anxiety. CKDIII mentioned on chart review but Cr has been normal for some time. Presents to CPM today for perioperative risk stratification and optimization    Neuro:  No hx of TIA, stroke or stroke-like symptoms. R MCA aneurysm found incidentally on recent work-up that per vasc note, neurology is following. Patient says it is to be addressed she is told once the carotid blockages are take care of.   No neurologic diagnosis, however, the patient is at increased risk for perioperative delirium secondary to  Patient has the following risk factors for perioperative delirium: age, polypharmacy, type and duration of surgery  Patient is at increased risk for perioperative CVA secondary to  Afib, carotid stenosis, HTN, increased age, operative time > 2.5 hours    HEENT:   No HEENT diagnosis or significant findings on  chart review or clinical presentation and evaluation. No further preoperative testing/intervention indicated at this time.    Cardiovascular:  Hx includes HTN, HLD, EF 60-65% with no significant valvular abnormalities; CAD (based on Ca score); Cardiac stress test 2023 was normal.   No further preoperative testing or intervention is indicated at this time.  METS: 5.7  RCRI: 1 point, 6.0% risk for postoperative MACE   ANA MARIA: .82% risk for 30 day postoperative MACE  EKG - sinus bradycardia on several previous ecgs   -Per vasc note, instructed patient to hold Xarelto 3 days prior to surgery     Pulmonary:  No significant pulmonary history.   No pulmonary diagnosis or significant findings on chart review or clinical presentation.  No further preoperative testing is indicated at this time.  age > 60, site of surgery, duration of surgery > 2 hours  Stop Bang score is 2 placing patient at low risk for ARMOND  ARISCAT: <26 points, 1.6% risk of in-hospital postoperative pulmonary complication  PRODIGY: Low risk for opioid induced respiratory depression      Renal:   No renal diagnosis or significant findings on chart review, clinical presentation or evaluation. No further preoperative testing/intervention is indicated at this time. CKDIII in chart, but this seems to be resolved.   Age equal to or greater than 56, HTN, PAD are the patient's risk factors for perioperative ISAIAH.   Pt at risk for perioperative ISAIAH    Endocrine:  No endocrine diagnosis or significant findings on chart review or clinical presentation and evaluation. No further testing or intervention is indicated at this time.    Hematologic:   No significant hematologic history. Afib on Xarelto; per vasc note, hold 3 days before surgery   Caprini Score 7, patient at Moderate risk for perioperative DVT.  Patient provided with VTE education/handout.      Gastrointestinal:   Hx of mild GERD  Eat-10 score 0  Apfel 2    Infectious disease:   No infectious diagnosis or  significant findings on chart review or clinical presentation and evaluation.   Prescription provided for CHG body wash and dental rinse. CHG use instructions reviewed and provided to patient.  Staph screen collected      Musculoskeletal:   No diagnosis or significant findings on chart review or clinical presentation and evaluation.     Anesthesia/Airway:  No anesthesia complications    Medication instructions and NPO guidelines reviewed with the patient.  All questions or concerns discussed and addressed.      Patient seen and staffed with Dr. Cedrick Saravia MD   Anesthesiology, CA-2

## 2024-12-13 NOTE — CPM/PAT H&P
CPM/PAT Evaluation       Name: Lynsey Forman (Lynsey Forman)  /Age: 1945/78 y.o.     In-Person       Chief Complaint: L CEA with Dr. Blackman on      HPI  Ms. Forman is a 77 y/o female with PMHx of HTN, HLD, R MCA aneurysm, bilateral >80% carotid stenosis, Aflutter & Afib (on Xarelto), CAD (no PCI), sinus bradycardia, lumbar and cervical disc disease, Moriah's edema of vocal folds and anxiety. CKDIII mentioned on chart review but Cr has been normal for some time.     There is no hx of MI, DVT, PE, COPD or CVA or TIA. Patient denies any cardiac or respiratory symptoms including recent URI or dyspnea. Patient reports the carotid stenosis has been known for sometime and was supposed to be addressed initially but she insisted on having her spine procedures (cervical & lumbar) first due to severe pain and poor quality of life. It was during work-up for spinal surgery that R MCA aneurysm and bilateral carotid stenosis was incidentally found [3/8 angio >80% stenosis of bilateral ICAs last CUS in 2023. MRA 24 with stable 6mm R MCA bifurcation aneurysm].    Patient does not engage in strenuous regular exercise due to back pain (although this has subsided since surgery). She does keep up with light-moderate house chores, does her groceries, vacuums etc. She does not go for frequent walks, rake leaves or do any regular sports. She denies any changes to her level activity, shortness of breath or increased tiredness with her usual routine.       Past Surgical History:   Procedure Laterality Date    ANTERIOR CRUCIATE LIGAMENT REPAIR Left     CARDIAC ELECTROPHYSIOLOGY STUDY AND ABLATION      2023, 2023    CERVICAL LAMINECTOMY  2017    Cervical surgery    COLONOSCOPY      COSMETIC SURGERY  2018    face lift    LIPOMA RESECTION  2024    SKIN, LEFT UPPER ARM, EXCISION: --EPIDERMAL INCLUSION CYST, RUPTURED AND INFLAMED    LUMBAR LAMINECTOMY  2024    L4-5    MOUTH SURGERY  2018    Oral  surgery-peridontal    THROAT SURGERY  2023    polyp removed       Social Hx: Pt lives with sister. She's a former smoker, denies alcohol use or recreational drug use. Does use dispensary grade marijuana (smoke & edible gummies).     Family History   Problem Relation Name Age of Onset    Heart failure Mother      Dementia Mother      Hypertension Mother      Dementia Father      No Known Problems Sister      No Known Problems Sister      Heart disease Brother      Other (back pain) Brother      No Known Problems Brother      No Known Problems Brother         Allergies   Allergen Reactions    Codeine Anxiety, Itching, Unknown and Other     Denies itching, hive, shortness of breath       Prior to Admission medications    Medication Sig Start Date End Date Taking? Authorizing Provider   amLODIPine (Norvasc) 5 mg tablet TAKE 1 TABLET BY MOUTH DAILY 8/20/24  Yes Windy Bartholomew MD   aspirin 81 mg EC tablet Take 1 tablet (81 mg) by mouth once daily. 1/22/24 1/21/25 Yes Shaquille Blackman MD PhD   atorvastatin (Lipitor) 80 mg tablet Take 1 tablet (80 mg) by mouth once daily. 12/5/24 12/5/25 Yes LILY Tan   ezetimibe (Zetia) 10 mg tablet Take 1 tablet (10 mg) by mouth once daily. 4/2/24 4/2/25 Yes LILY Tan   hydrOXYzine HCL (Atarax) 25 mg tablet Take 1 tablet (25 mg) by mouth if needed for anxiety.   Yes Historical Provider, MD   lisinopril 20 mg tablet TAKE 1 TABLET BY MOUTH DAILY 6/4/24  Yes Windy Bartholomew MD   rivaroxaban (Xarelto) 15 mg tablet Take 1 tablet (15 mg) by mouth once daily in the evening. Take with food. 8/9/24  Yes LILY Tan   triamterene-hydrochlorothiazid (Maxzide-25) 37.5-25 mg tablet Take 0.5 tablets by mouth once daily. 10/4/24  Yes LILY Tan   acetaminophen (Tylenol) 325 mg tablet Take 2 tablets (650 mg) by mouth every 6 hours if needed for mild pain (1 - 3).  Patient not taking: Reported on 11/18/2024 6/18/24   Lynnette Pearce,  MD   chlorhexidine (Hibiclens) 4 % external liquid Apply 1 Application topically once daily for 5 days. Use per CPM/PAT provided instructions 12/13/24 12/18/24  Pierre Saravia MD   chlorhexidine (Peridex) 0.12 % solution Use 15 mL in the mouth or throat once daily for 2 doses. 12/13/24 12/15/24  Pierre Saravia MD   furosemide (Lasix) 20 mg tablet Take 1 tablet (20 mg) by mouth if needed (for ankle swelling). 4/2/24 6/11/24  Urszula Lebron, APRN-CNP   gabapentin (Neurontin) 300 mg capsule Take 1 capsule (300 mg) by mouth 3 times a day.  Patient taking differently: Take 1 capsule (300 mg) by mouth once daily. 4/15/24 10/12/24  Windy Bartholomew MD   polyethylene glycol (Golytely) 236-22.74-6.74 -5.86 gram solution STARTING AT 6PM DRINK HALF OF THE BOTTLE, DRINK THE OTHER HALF 5 HRS BEFORE PROCEDURE TIME  Patient not taking: Reported on 12/13/2024 8/30/24   Marvel Song MD   traMADol (Ultram) 50 mg tablet Take 1 tablet (50 mg) by mouth every 8 hours if needed for severe pain (7 - 10).  Patient not taking: Reported on 8/8/2024 6/18/24   Lynnette Pearce MD        [facility] ROS:   Constitutional:   neg    Neuro/Psych:   neg    Eyes:   Ears:   Nose:   neg    Mouth:   neg    Throat:   neg    Neck:   neg    Cardio:   neg    Respiratory:   neg    Endocrine:   neg    GI:    Mild GERD occasionally   :   neg    Musculoskeletal:    arthralgias  Hematologic:   neg    Skin:      Physical Exam  Constitutional:       Appearance: Normal appearance.   HENT:      Head: Normocephalic and atraumatic.      Mouth/Throat:      Mouth: Mucous membranes are moist.   Eyes:      Extraocular Movements: Extraocular movements intact.      Conjunctiva/sclera: Conjunctivae normal.   Cardiovascular:      Rate and Rhythm: Normal rate. Rhythm irregular.      Heart sounds: Normal heart sounds.   Pulmonary:      Effort: Pulmonary effort is normal.      Breath sounds: Normal breath sounds.   Abdominal:      Palpations: Abdomen is soft.    Musculoskeletal:         General: No swelling. Normal range of motion.      Cervical back: Normal range of motion and neck supple.   Skin:     General: Skin is warm and dry.   Neurological:      General: No focal deficit present.      Mental Status: She is alert and oriented to person, place, and time. Mental status is at baseline.   Psychiatric:         Mood and Affect: Mood normal.         Behavior: Behavior normal.         Thought Content: Thought content normal.         Judgment: Judgment normal.        PAT AIRWAY:   Airway:     Mallampati::  I    TM distance::  >3 FB    Neck ROM::  Full  normal      Testing/Diagnostic:   ECG 10/2/24:    Bradycardia noted on several ecgs    Echo 2/16/23:  PHYSICIAN INTERPRETATION:  Left Ventricle: The left ventricular systolic function is normal, with an estimated ejection fraction of 60-65%. There are no regional wall motion abnormalities. The left ventricular cavity size is normal. Spectral Doppler shows a normal pattern of left ventricular diastolic filling.  Left Atrium: The left atrium is mildly dilated.  Right Ventricle: The right ventricle is normal in size. There is normal right ventricular global systolic function.  Right Atrium: The right atrium is mildly dilated.  Aortic Valve: The aortic valve appears structurally normal. There is no evidence of aortic valve regurgitation. The peak instantaneous gradient of the aortic valve is 7.1 mmHg. The mean gradient of the aortic valve is 3.9 mmHg.  Mitral Valve: The mitral valve is normal in structure. There is trace mitral valve regurgitation.  Tricuspid Valve: The tricuspid valve is structurally normal. There is trace to mild tricuspid regurgitation. The Doppler estimated RVSP is within normal limits at 22.2 mmHg.  Pulmonic Valve: The pulmonic valve is structurally normal. There is trace pulmonic valve regurgitation.  Pericardium: There is a trivial pericardial effusion.  Aorta: The aortic root is normal.  Systemic Veins:  "The inferior vena cava appears to be of normal size. There is IVC inspiratory collapse greater than 50%.  In comparison to the previous echocardiogram(s): There are no prior studies on this patient for comparison purposes.        CONCLUSIONS:  1. Left ventricular systolic function is normal with a 60-65% estimated ejection fraction.  2. RVSP within normal limits.  3. Mildly dilated atria.    Carotid Duplex Bilateral 12/5/23:  **CRITICAL RESULT**  Critical Result: Findings are consistent with greater than 70% stenosis in bilateral proximal internal carotid arteries. Turbulent flow seen by color Doppler    CT Calcium score 4/24/23  FINDINGS:   The score and distribution of calcium in the coronary arteries is as   follows:      LM           0   LAD           464.12   LCx           300.73   RCA           966.14      Total 1730.99      Cardiac MRI stress test 11/1/23  1. No evidence of inducible myocardial ischemia with regadenoson  stress perfusion imaging.  2. Normal LV size and hyperdynamic systolic function. Quantitative  LVEF 75%.  3. Trivial MR (RF 10%), trivial AI (RF 7%).  4. Mild biatrial enlargement.  5. Normal RV size and systolic function. Quantitative RVEF 60%,    Patient Specialist/PCP: Cardiology Urszula Lebron; Dr. Bartholomew PCP    Visit Vitals  /67   Pulse 60   Temp 36.6 °C (97.9 °F) (Oral)   Ht 1.6 m (5' 3\")   Wt 51.2 kg (112 lb 12.8 oz)   SpO2 98%   BMI 19.98 kg/m²   OB Status Postmenopausal   Smoking Status Former   BSA 1.51 m²       DASI Risk Score      Flowsheet Row Pre-Admission Testing from 12/13/2024 in [facility] Questionnaire Series Submission from 11/27/2024 in [facility] with Generic Provider Terry   Can you take care of yourself (eat, dress, bathe, or use toilet)?  2.75 filed at 12/13/2024 1412 2.75  filed at 11/27/2024 1600   Can you walk indoors, such as around your house? 1.75 filed at 12/13/2024 1412 1.75  filed at 11/27/2024 1600   Can you walk a block or two " on level ground?  2.75 filed at 12/13/2024 1412 2.75  filed at 11/27/2024 1600   Can you climb a flight of stairs or walk up a hill? 5.5 filed at 12/13/2024 1412 5.5  filed at 11/27/2024 1600   Can you run a short distance? 0 filed at 12/13/2024 1412 0  filed at 11/27/2024 1600   Can you do light work around the house like dusting or washing dishes? 2.7 filed at 12/13/2024 1412 2.7  filed at 11/27/2024 1600   Can you do moderate work around the house like vacuuming, sweeping floors or carrying groceries? 3.5 filed at 12/13/2024 1412 3.5  filed at 11/27/2024 1600   Can you do heavy work around the house like scrubbing floors or lifting and moving heavy furniture?  0 filed at 12/13/2024 1412 0  filed at 11/27/2024 1600   Can you do yard work like raking leaves, weeding or pushing a mower? 0 filed at 12/13/2024 1412 0  filed at 11/27/2024 1600   Can you have sexual relations? 5.25 filed at 12/13/2024 1412 5.25  filed at 11/27/2024 1600   Can you participate in moderate recreational activities like golf, bowling, dancing, doubles tennis or throwing a baseball or football? 0 filed at 12/13/2024 1412 0  filed at 11/27/2024 1600   Can you participate in strenous sports like swimming, singles tennis, football, basketball, or skiing? 0 filed at 12/13/2024 1412 0  filed at 11/27/2024 1600   DASI SCORE 24.2 filed at 12/13/2024 1412 24.2  filed at 11/27/2024 1600   METS Score (Will be calculated only when all the questions are answered) 5.7 filed at 12/13/2024 1412 5.7  filed at 11/27/2024 1600          Caprini DVT Assessment      Flowsheet Row Pre-Admission Testing from 12/13/2024 in [facility]   DVT Score 7 filed at 12/13/2024 1506   Surgical Factors Major surgery planned, lasting 2-3 hours filed at 12/13/2024 1506   BMI 30 or less filed at 12/13/2024 1506          Modified Frailty Index    No data to display       CHADS2 Stroke Risk  Current as of 46 minutes ago        4% 3 to 100%: High Risk   2 to < 3%:  Medium Risk   0 to < 2%: Low Risk     Last Change: N/A          This score determines the patient's risk of having a stroke if the patient has atrial fibrillation.          Points Metrics   0 Has Congestive Heart Failure:  No     Patients with congestive heart failure get 1 point.    Current as of 46 minutes ago   1 Has Hypertension:  Yes     Patients with hypertension get 1 point.    Current as of 46 minutes ago   1 Age:  78     Patients who are 75 years of age or older get 1 point.    Current as of 46 minutes ago   0 Has Diabetes Excluding Gestational Diabetes:  No     Patients with diabetes get 1 point.    Current as of 46 minutes ago   0 Had Stroke:  No  Had TIA:  No  Had Thromboembolism:  No     Patients who have had a stroke, TIA, or thromboembolism get 2 points.    Current as of 46 minutes ago             Revised Cardiac Risk Index      Flowsheet Row Pre-Admission Testing from 12/13/2024 in [facility]   High-Risk Surgery (Intraperitoneal, Intrathoracic,Suprainguinal vascular) 1 filed at 12/13/2024 1507   History of ischemic heart disease (History of MI, History of positive exercuse test, Current chest paint considered due to myocardial ischemia, Use of nitrate therapy, ECG with pathological Q Waves) 0 filed at 12/13/2024 1507   History of congestive heart failure (pulmonary edemia, bilateral rales or S3 gallop, Paroxysmal nocturnal dyspnea, CXR showing pulmonary vascular redistribution) 0 filed at 12/13/2024 1507   History of cerebrovascular disease (Prior TIA or stroke) 0 filed at 12/13/2024 1507   Pre-operative insulin treatment 0 filed at 12/13/2024 1507   Pre-operative creatinine>2 mg/dl 0 filed at 12/13/2024 1507   Revised Cardiac Risk Calculator 1 filed at 12/13/2024 1507          Apfel Simplified Score      Flowsheet Row Pre-Admission Testing from 12/13/2024 in [facility]   Smoking status 1 filed at 12/13/2024 1506   History of motion sickness or PONV  0 filed at  12/13/2024 1506   Use of postoperative opioids 0 filed at 12/13/2024 1506   Gender - Female 1=Yes filed at 12/13/2024 1506   Apfel Simplified Score Calculator 2 filed at 12/13/2024 1506          Risk Analysis Index Results This Encounter    No data found in the last 10 encounters.       Stop Bang Score      Flowsheet Row Pre-Admission Testing from 12/13/2024 in [facility] Questionnaire Series Submission from 11/27/2024 in [facility] with Generic Provider Terry   Do you snore loudly? 0 filed at 12/13/2024 1412 0  filed at 11/27/2024 1600   Do you often feel tired or fatigued after your sleep? 0 filed at 12/13/2024 1412 0  filed at 11/27/2024 1600   Has anyone ever observed you stop breathing in your sleep? 0 filed at 12/13/2024 1412 0  filed at 11/27/2024 1600   Do you have or are you being treated for high blood pressure? 1 filed at 12/13/2024 1412 1  filed at 11/27/2024 1600   Recent BMI (Calculated) 20 filed at 12/13/2024 1412 19.5  filed at 11/27/2024 1600   Is BMI greater than 35 kg/m2? 0=No filed at 12/13/2024 1412 0=No  filed at 11/27/2024 1600   Age older than 50 years old? 1=Yes filed at 12/13/2024 1412 1=Yes  filed at 11/27/2024 1600   Is your neck circumference greater than 17 inches (Male) or 16 inches (Female)? 0 filed at 12/13/2024 1412 --   Gender - Male 0=No filed at 12/13/2024 1412 0=No  filed at 11/27/2024 1600   STOP-BANG Total Score 2 filed at 12/13/2024 1412 --          Prodigy: High Risk  Total Score: 15              Prodigy Age Score      Prodigy Previous Opioid Use Score           ARISCAT Score for Postoperative Pulmonary Complications      Flowsheet Row Pre-Admission Testing from 12/13/2024 in [facility]   Age, years  3 filed at 12/13/2024 1505   Preoperative SpO2 0 filed at 12/13/2024 1505   Respiratory infection in the last month Either upper or lower (i.e., URI, bronchitis, pneumonia), with fever and antibiotic treatment 0 filed at 12/13/2024 1505    Preoperative anemoa (Hgb less than 10 g/dl) 0 filed at 12/13/2024 1505   Surgical incision  0 filed at 12/13/2024 1505   Duration of surgery  16 filed at 12/13/2024 1505   Emergency Procedure  0 filed at 12/13/2024 1505   ARISCAT Total Score  19 filed at 12/13/2024 1505          Yasir Perioperative Risk for Myocardial Infarction or Cardiac Arrest (ANA MARIA)      Flowsheet Row Pre-Admission Testing from 12/13/2024 in [facility]   Age 1.52 filed at 12/13/2024 1507   Functional Status  0 filed at 12/13/2024 1507   ASA Class  -1.92 filed at 12/13/2024 1507   Creatinine 0 filed at 12/13/2024 1507   Type of Procedure  0.86 filed at 12/13/2024 1507   ANA MARIA Total Score  -4.79 filed at 12/13/2024 1507   ANA MARIA % 0.82 filed at 12/13/2024 1507            No results found for this or any previous visit (from the past 24 hours).     Assessment and Plan:  78 y.o.  female  scheduled for : L CEA with Dr. Blackman on 12/19. PMHx of HTN, HLD, R MCA aneurysm, bilateral >80% carotid stenosis, Aflutter & Afib (on Xarelto), CAD (no PCI), sinus bradycardia, lumbar and cervical disc disease, Moriah's edema of vocal folds and anxiety. CKDIII mentioned on chart review but Cr has been normal for some time. Presents to CPM today for perioperative risk stratification and optimization    Neuro:  No hx of TIA, stroke or stroke-like symptoms. R MCA aneurysm found incidentally on recent work-up that per vasc note, neurology is following. Patient says it is to be addressed she is told once the carotid blockages are take care of.   No neurologic diagnosis, however, the patient is at increased risk for perioperative delirium secondary to  Patient has the following risk factors for perioperative delirium: age, polypharmacy, type and duration of surgery  Patient is at increased risk for perioperative CVA secondary to  Afib, carotid stenosis, HTN, increased age, operative time > 2.5 hours    HEENT:   No HEENT diagnosis or significant findings on  chart review or clinical presentation and evaluation. No further preoperative testing/intervention indicated at this time.    Cardiovascular:  Hx includes HTN, HLD, EF 60-65% with no significant valvular abnormalities; CAD (based on Ca score); Cardiac stress test 2023 was normal.   No further preoperative testing or intervention is indicated at this time.  METS: 5.7  RCRI: 1 point, 6.0% risk for postoperative MACE   ANA MARIA: .82% risk for 30 day postoperative MACE  EKG - sinus bradycardia on several previous ecgs   -Per vasc note, instructed patient to hold Xarelto 3 days prior to surgery     Pulmonary:  No significant pulmonary history.   No pulmonary diagnosis or significant findings on chart review or clinical presentation.  No further preoperative testing is indicated at this time.  age > 60, site of surgery, duration of surgery > 2 hours  Stop Bang score is 2 placing patient at low risk for ARMOND  ARISCAT: <26 points, 1.6% risk of in-hospital postoperative pulmonary complication  PRODIGY: Low risk for opioid induced respiratory depression      Renal:   No renal diagnosis or significant findings on chart review, clinical presentation or evaluation. No further preoperative testing/intervention is indicated at this time. CKDIII in chart, but this seems to be resolved.   Age equal to or greater than 56, HTN, PAD are the patient's risk factors for perioperative ISAIAH.   Pt at risk for perioperative ISAIAH    Endocrine:  No endocrine diagnosis or significant findings on chart review or clinical presentation and evaluation. No further testing or intervention is indicated at this time.    Hematologic:   No significant hematologic history. Afib on Xarelto; per vasc note, hold 3 days before surgery   Caprini Score 7, patient at Moderate risk for perioperative DVT.  Patient provided with VTE education/handout.      Gastrointestinal:   Hx of mild GERD  Eat-10 score 0  Apfel 2    Infectious disease:   No infectious diagnosis or  significant findings on chart review or clinical presentation and evaluation.   Prescription provided for CHG body wash and dental rinse. CHG use instructions reviewed and provided to patient.  Staph screen collected      Musculoskeletal:   No diagnosis or significant findings on chart review or clinical presentation and evaluation.     Anesthesia/Airway:  No anesthesia complications    Medication instructions and NPO guidelines reviewed with the patient.  All questions or concerns discussed and addressed.      Patient seen and staffed with Dr. Cedrick Saravia MD   Anesthesiology, CA-2

## 2024-12-15 LAB — STAPHYLOCOCCUS SPEC CULT: NORMAL

## 2024-12-18 ENCOUNTER — ANESTHESIA EVENT (OUTPATIENT)
Dept: OPERATING ROOM | Facility: HOSPITAL | Age: 79
End: 2024-12-18
Payer: MEDICARE

## 2024-12-18 DIAGNOSIS — M54.42 CHRONIC BILATERAL LOW BACK PAIN WITH BILATERAL SCIATICA: ICD-10-CM

## 2024-12-18 DIAGNOSIS — M54.41 CHRONIC BILATERAL LOW BACK PAIN WITH BILATERAL SCIATICA: ICD-10-CM

## 2024-12-18 DIAGNOSIS — G89.29 CHRONIC BILATERAL LOW BACK PAIN WITH BILATERAL SCIATICA: ICD-10-CM

## 2024-12-18 RX ORDER — GABAPENTIN 300 MG/1
300 CAPSULE ORAL 3 TIMES DAILY
Qty: 270 CAPSULE | Refills: 1 | Status: SHIPPED | OUTPATIENT
Start: 2024-12-18 | End: 2025-06-16

## 2024-12-18 NOTE — PROGRESS NOTES
Pharmacy Medication History Review    Lynsey Forman is a 78 y.o. female who is planned to be admitted for Bilateral carotid artery stenosis. Pharmacy called the patient prior to their scheduled procedure and reviewed the patient's [id number] medications for accuracy.    Medications ADDED:  none  Medications CHANGED:  none  Medications REMOVED:   none    Please review updated prior to admission medication list and comments regarding how patient may be taking medications differently by going to Admission tab --> Admission Orders --> Admit Orders / Review prior to admission medications.     Preferred pharmacy, last doses of medications, and allergies to be confirmed with patient by nursing the day of procedure.     Sources used to complete the med history include:  [facility]  Pharmacy dispense history  Patient interview  Chart Review  Care Everywhere     Below are additional concerns with the patient's PTA list.  Patient states they take furosemide PRN. L.F. 04/02/24 #30/30d  Patient states they take hydroxyzine 25mg PRN. There is no fill history to verify      Bee Manley   [facility]  Please reach out via Secure Chat for questions

## 2024-12-19 ENCOUNTER — HOSPITAL ENCOUNTER (INPATIENT)
Facility: HOSPITAL | Age: 79
LOS: 1 days | Discharge: HOME | DRG: 039 | End: 2024-12-20
Attending: SURGERY | Admitting: SURGERY
Payer: MEDICARE

## 2024-12-19 ENCOUNTER — ANESTHESIA (OUTPATIENT)
Dept: OPERATING ROOM | Facility: HOSPITAL | Age: 79
End: 2024-12-19
Payer: MEDICARE

## 2024-12-19 ENCOUNTER — HOSPITAL ENCOUNTER (OUTPATIENT)
Dept: NEUROLOGY | Facility: HOSPITAL | Age: 79
Setting detail: SURGERY ADMIT
Discharge: HOME | End: 2024-12-19
Payer: MEDICARE

## 2024-12-19 DIAGNOSIS — Z98.890 S/P CAROTID ENDARTERECTOMY: ICD-10-CM

## 2024-12-19 DIAGNOSIS — I65.23 CAROTID STENOSIS, BILATERAL: ICD-10-CM

## 2024-12-19 DIAGNOSIS — Z79.01 ANTICOAGULANT LONG-TERM USE: ICD-10-CM

## 2024-12-19 DIAGNOSIS — I65.22 STENOSIS OF LEFT CAROTID ARTERY: Primary | ICD-10-CM

## 2024-12-19 DIAGNOSIS — I65.23 BILATERAL CAROTID ARTERY STENOSIS: Primary | ICD-10-CM

## 2024-12-19 DIAGNOSIS — I48.91 ATRIAL FIBRILLATION, UNSPECIFIED TYPE (MULTI): ICD-10-CM

## 2024-12-19 LAB
ABO GROUP (TYPE) IN BLOOD: NORMAL
ACT BLD: 123 SEC (ref 83–199)
ACT BLD: 129 SEC (ref 83–199)
ACT BLD: 269 SEC (ref 83–199)
ACT BLD: 295 SEC (ref 83–199)
ANION GAP BLDA CALCULATED.4IONS-SCNC: 9 MMO/L (ref 10–25)
BASE EXCESS BLDA CALC-SCNC: -0.1 MMOL/L (ref -2–3)
BODY TEMPERATURE: 37 DEGREES CELSIUS
CA-I BLDA-SCNC: 1.22 MMOL/L (ref 1.1–1.33)
CHLORIDE BLDA-SCNC: 97 MMOL/L (ref 98–107)
GLUCOSE BLDA-MCNC: 92 MG/DL (ref 74–99)
HCO3 BLDA-SCNC: 25.9 MMOL/L (ref 22–26)
HCT VFR BLD EST: 34 % (ref 36–46)
HGB BLDA-MCNC: 11.3 G/DL (ref 12–16)
LACTATE BLDA-SCNC: 0.5 MMOL/L (ref 0.4–2)
OXYHGB MFR BLDA: 97.3 % (ref 94–98)
PCO2 BLDA: 47 MM HG (ref 38–42)
PH BLDA: 7.35 PH (ref 7.38–7.42)
PO2 BLDA: 453 MM HG (ref 85–95)
POTASSIUM BLDA-SCNC: 4.1 MMOL/L (ref 3.5–5.3)
RH FACTOR (ANTIGEN D): NORMAL
SAO2 % BLDA: 100 % (ref 94–100)
SODIUM BLDA-SCNC: 128 MMOL/L (ref 136–145)

## 2024-12-19 PROCEDURE — 7100000001 HC RECOVERY ROOM TIME - INITIAL BASE CHARGE: Performed by: SURGERY

## 2024-12-19 PROCEDURE — 2720000007 HC OR 272 NO HCPCS: Performed by: SURGERY

## 2024-12-19 PROCEDURE — 3700000002 HC GENERAL ANESTHESIA TIME - EACH INCREMENTAL 1 MINUTE: Performed by: SURGERY

## 2024-12-19 PROCEDURE — 2500000005 HC RX 250 GENERAL PHARMACY W/O HCPCS: Performed by: SURGERY

## 2024-12-19 PROCEDURE — 03CL0ZZ EXTIRPATION OF MATTER FROM LEFT INTERNAL CAROTID ARTERY, OPEN APPROACH: ICD-10-PCS | Performed by: SURGERY

## 2024-12-19 PROCEDURE — 3700000001 HC GENERAL ANESTHESIA TIME - INITIAL BASE CHARGE: Performed by: SURGERY

## 2024-12-19 PROCEDURE — 2500000004 HC RX 250 GENERAL PHARMACY W/ HCPCS (ALT 636 FOR OP/ED): Performed by: STUDENT IN AN ORGANIZED HEALTH CARE EDUCATION/TRAINING PROGRAM

## 2024-12-19 PROCEDURE — 2500000005 HC RX 250 GENERAL PHARMACY W/O HCPCS

## 2024-12-19 PROCEDURE — A35301 PR THROMBOENDARTECTMY NECK,NECK INCIS: Performed by: STUDENT IN AN ORGANIZED HEALTH CARE EDUCATION/TRAINING PROGRAM

## 2024-12-19 PROCEDURE — 2500000004 HC RX 250 GENERAL PHARMACY W/ HCPCS (ALT 636 FOR OP/ED): Mod: JZ

## 2024-12-19 PROCEDURE — 03CJ0ZZ EXTIRPATION OF MATTER FROM LEFT COMMON CAROTID ARTERY, OPEN APPROACH: ICD-10-PCS | Performed by: SURGERY

## 2024-12-19 PROCEDURE — 82435 ASSAY OF BLOOD CHLORIDE: CPT

## 2024-12-19 PROCEDURE — 99100 ANES PT EXTEME AGE<1 YR&>70: CPT | Performed by: STUDENT IN AN ORGANIZED HEALTH CARE EDUCATION/TRAINING PROGRAM

## 2024-12-19 PROCEDURE — 2500000001 HC RX 250 WO HCPCS SELF ADMINISTERED DRUGS (ALT 637 FOR MEDICARE OP)

## 2024-12-19 PROCEDURE — 35301 RECHANNELING OF ARTERY: CPT | Performed by: SURGERY

## 2024-12-19 PROCEDURE — 85347 COAGULATION TIME ACTIVATED: CPT

## 2024-12-19 PROCEDURE — 2500000004 HC RX 250 GENERAL PHARMACY W/ HCPCS (ALT 636 FOR OP/ED)

## 2024-12-19 PROCEDURE — 2780000003 HC OR 278 NO HCPCS: Performed by: SURGERY

## 2024-12-19 PROCEDURE — 2500000004 HC RX 250 GENERAL PHARMACY W/ HCPCS (ALT 636 FOR OP/ED): Performed by: SURGERY

## 2024-12-19 PROCEDURE — 2060000001 HC INTERMEDIATE ICU ROOM DAILY

## 2024-12-19 PROCEDURE — 7100000002 HC RECOVERY ROOM TIME - EACH INCREMENTAL 1 MINUTE: Performed by: SURGERY

## 2024-12-19 PROCEDURE — 03CN0ZZ EXTIRPATION OF MATTER FROM LEFT EXTERNAL CAROTID ARTERY, OPEN APPROACH: ICD-10-PCS | Performed by: SURGERY

## 2024-12-19 PROCEDURE — 95925 SOMATOSENSORY TESTING: CPT

## 2024-12-19 PROCEDURE — 36415 COLL VENOUS BLD VENIPUNCTURE: CPT | Performed by: STUDENT IN AN ORGANIZED HEALTH CARE EDUCATION/TRAINING PROGRAM

## 2024-12-19 PROCEDURE — 3600000004 HC OR TIME - INITIAL BASE CHARGE - PROCEDURE LEVEL FOUR: Performed by: SURGERY

## 2024-12-19 PROCEDURE — 36620 INSERTION CATHETER ARTERY: CPT | Performed by: STUDENT IN AN ORGANIZED HEALTH CARE EDUCATION/TRAINING PROGRAM

## 2024-12-19 PROCEDURE — C1762 CONN TISS, HUMAN(INC FASCIA): HCPCS | Performed by: SURGERY

## 2024-12-19 PROCEDURE — 2500000005 HC RX 250 GENERAL PHARMACY W/O HCPCS: Performed by: STUDENT IN AN ORGANIZED HEALTH CARE EDUCATION/TRAINING PROGRAM

## 2024-12-19 PROCEDURE — 88304 TISSUE EXAM BY PATHOLOGIST: CPT | Performed by: STUDENT IN AN ORGANIZED HEALTH CARE EDUCATION/TRAINING PROGRAM

## 2024-12-19 PROCEDURE — 3600000009 HC OR TIME - EACH INCREMENTAL 1 MINUTE - PROCEDURE LEVEL FOUR: Performed by: SURGERY

## 2024-12-19 PROCEDURE — 03UJ0KZ SUPPLEMENT LEFT COMMON CAROTID ARTERY WITH NONAUTOLOGOUS TISSUE SUBSTITUTE, OPEN APPROACH: ICD-10-PCS | Performed by: SURGERY

## 2024-12-19 PROCEDURE — 4A10X4G MONITORING OF CENTRAL NERVOUS ELECTRICAL ACTIVITY, INTRAOPERATIVE, EXTERNAL APPROACH: ICD-10-PCS | Performed by: SURGERY

## 2024-12-19 PROCEDURE — 88304 TISSUE EXAM BY PATHOLOGIST: CPT | Mod: TC,SUR | Performed by: SURGERY

## 2024-12-19 DEVICE — XENOSURE BIOLOGIC PATCH, 0.8CM X 8CM, EIFU
Type: IMPLANTABLE DEVICE | Site: CAROTID | Status: FUNCTIONAL
Brand: XENOSURE BIOLOGIC PATCH

## 2024-12-19 RX ORDER — HYDROMORPHONE HYDROCHLORIDE 0.2 MG/ML
0.2 INJECTION INTRAMUSCULAR; INTRAVENOUS; SUBCUTANEOUS EVERY 5 MIN PRN
Status: DISCONTINUED | OUTPATIENT
Start: 2024-12-19 | End: 2024-12-19 | Stop reason: HOSPADM

## 2024-12-19 RX ORDER — GABAPENTIN 300 MG/1
300 CAPSULE ORAL 3 TIMES DAILY
Status: DISCONTINUED | OUTPATIENT
Start: 2024-12-19 | End: 2024-12-20 | Stop reason: HOSPADM

## 2024-12-19 RX ORDER — HYDROMORPHONE HYDROCHLORIDE 1 MG/ML
INJECTION, SOLUTION INTRAMUSCULAR; INTRAVENOUS; SUBCUTANEOUS AS NEEDED
Status: DISCONTINUED | OUTPATIENT
Start: 2024-12-19 | End: 2024-12-19

## 2024-12-19 RX ORDER — NORETHINDRONE AND ETHINYL ESTRADIOL 0.5-0.035
KIT ORAL AS NEEDED
Status: DISCONTINUED | OUTPATIENT
Start: 2024-12-19 | End: 2024-12-19

## 2024-12-19 RX ORDER — ATORVASTATIN CALCIUM 80 MG/1
80 TABLET, FILM COATED ORAL NIGHTLY
Status: DISCONTINUED | OUTPATIENT
Start: 2024-12-19 | End: 2024-12-20 | Stop reason: HOSPADM

## 2024-12-19 RX ORDER — PHENYLEPHRINE HYDROCHLORIDE 10 MG/ML
INJECTION INTRAVENOUS AS NEEDED
Status: DISCONTINUED | OUTPATIENT
Start: 2024-12-19 | End: 2024-12-19

## 2024-12-19 RX ORDER — HEPARIN SODIUM 5000 [USP'U]/ML
5000 INJECTION, SOLUTION INTRAVENOUS; SUBCUTANEOUS EVERY 8 HOURS
Status: DISCONTINUED | OUTPATIENT
Start: 2024-12-19 | End: 2024-12-20 | Stop reason: HOSPADM

## 2024-12-19 RX ORDER — HYDRALAZINE HYDROCHLORIDE 20 MG/ML
INJECTION INTRAMUSCULAR; INTRAVENOUS AS NEEDED
Status: DISCONTINUED | OUTPATIENT
Start: 2024-12-19 | End: 2024-12-19

## 2024-12-19 RX ORDER — ALBUTEROL SULFATE 0.83 MG/ML
2.5 SOLUTION RESPIRATORY (INHALATION) ONCE AS NEEDED
Status: DISCONTINUED | OUTPATIENT
Start: 2024-12-19 | End: 2024-12-19 | Stop reason: HOSPADM

## 2024-12-19 RX ORDER — CEFAZOLIN 1 G/1
INJECTION, POWDER, FOR SOLUTION INTRAVENOUS AS NEEDED
Status: DISCONTINUED | OUTPATIENT
Start: 2024-12-19 | End: 2024-12-19

## 2024-12-19 RX ORDER — PROPOFOL 10 MG/ML
INJECTION, EMULSION INTRAVENOUS AS NEEDED
Status: DISCONTINUED | OUTPATIENT
Start: 2024-12-19 | End: 2024-12-19

## 2024-12-19 RX ORDER — OXYCODONE HYDROCHLORIDE 5 MG/1
5 TABLET ORAL EVERY 4 HOURS PRN
Status: DISCONTINUED | OUTPATIENT
Start: 2024-12-19 | End: 2024-12-19 | Stop reason: HOSPADM

## 2024-12-19 RX ORDER — PHENYLEPHRINE 10 MG/250 ML(40 MCG/ML)IN 0.9 % SOD.CHLORIDE INTRAVENOUS
CONTINUOUS PRN
Status: DISCONTINUED | OUTPATIENT
Start: 2024-12-19 | End: 2024-12-19

## 2024-12-19 RX ORDER — ASPIRIN 81 MG/1
81 TABLET ORAL DAILY
Status: DISCONTINUED | OUTPATIENT
Start: 2024-12-20 | End: 2024-12-20 | Stop reason: HOSPADM

## 2024-12-19 RX ORDER — HEPARIN SODIUM 1000 [USP'U]/ML
INJECTION, SOLUTION INTRAVENOUS; SUBCUTANEOUS AS NEEDED
Status: DISCONTINUED | OUTPATIENT
Start: 2024-12-19 | End: 2024-12-19

## 2024-12-19 RX ORDER — GLYCOPYRROLATE 0.2 MG/ML
INJECTION INTRAMUSCULAR; INTRAVENOUS AS NEEDED
Status: DISCONTINUED | OUTPATIENT
Start: 2024-12-19 | End: 2024-12-19

## 2024-12-19 RX ORDER — CEFAZOLIN SODIUM 2 G/100ML
2 INJECTION, SOLUTION INTRAVENOUS EVERY 8 HOURS
Status: COMPLETED | OUTPATIENT
Start: 2024-12-19 | End: 2024-12-20

## 2024-12-19 RX ORDER — LIDOCAINE HYDROCHLORIDE 10 MG/ML
0.1 INJECTION, SOLUTION INFILTRATION; PERINEURAL ONCE
Status: DISCONTINUED | OUTPATIENT
Start: 2024-12-19 | End: 2024-12-19 | Stop reason: HOSPADM

## 2024-12-19 RX ORDER — OXYCODONE HYDROCHLORIDE 5 MG/1
2.5 TABLET ORAL EVERY 4 HOURS PRN
Status: DISCONTINUED | OUTPATIENT
Start: 2024-12-19 | End: 2024-12-19 | Stop reason: HOSPADM

## 2024-12-19 RX ORDER — ONDANSETRON HYDROCHLORIDE 2 MG/ML
INJECTION, SOLUTION INTRAVENOUS AS NEEDED
Status: DISCONTINUED | OUTPATIENT
Start: 2024-12-19 | End: 2024-12-19

## 2024-12-19 RX ORDER — NITROGLYCERIN 20 MG/100ML
INJECTION INTRAVENOUS AS NEEDED
Status: DISCONTINUED | OUTPATIENT
Start: 2024-12-19 | End: 2024-12-19

## 2024-12-19 RX ORDER — ONDANSETRON HYDROCHLORIDE 2 MG/ML
4 INJECTION, SOLUTION INTRAVENOUS ONCE AS NEEDED
Status: DISCONTINUED | OUTPATIENT
Start: 2024-12-19 | End: 2024-12-19 | Stop reason: HOSPADM

## 2024-12-19 RX ORDER — AMLODIPINE BESYLATE 5 MG/1
5 TABLET ORAL DAILY
Status: DISCONTINUED | OUTPATIENT
Start: 2024-12-20 | End: 2024-12-20 | Stop reason: HOSPADM

## 2024-12-19 RX ORDER — SODIUM CHLORIDE, SODIUM LACTATE, POTASSIUM CHLORIDE, CALCIUM CHLORIDE 600; 310; 30; 20 MG/100ML; MG/100ML; MG/100ML; MG/100ML
INJECTION, SOLUTION INTRAVENOUS CONTINUOUS PRN
Status: DISCONTINUED | OUTPATIENT
Start: 2024-12-19 | End: 2024-12-19

## 2024-12-19 RX ORDER — MAGNESIUM SULFATE HEPTAHYDRATE 500 MG/ML
INJECTION, SOLUTION INTRAMUSCULAR; INTRAVENOUS AS NEEDED
Status: DISCONTINUED | OUTPATIENT
Start: 2024-12-19 | End: 2024-12-19

## 2024-12-19 RX ORDER — LIDOCAINE HYDROCHLORIDE 20 MG/ML
INJECTION, SOLUTION INFILTRATION; PERINEURAL AS NEEDED
Status: DISCONTINUED | OUTPATIENT
Start: 2024-12-19 | End: 2024-12-19

## 2024-12-19 RX ORDER — EZETIMIBE 10 MG/1
10 TABLET ORAL DAILY
Status: DISCONTINUED | OUTPATIENT
Start: 2024-12-20 | End: 2024-12-20 | Stop reason: HOSPADM

## 2024-12-19 RX ORDER — LISINOPRIL 20 MG/1
20 TABLET ORAL DAILY
Status: DISCONTINUED | OUTPATIENT
Start: 2024-12-20 | End: 2024-12-20 | Stop reason: HOSPADM

## 2024-12-19 RX ORDER — ACETAMINOPHEN 325 MG/1
650 TABLET ORAL EVERY 4 HOURS PRN
Status: DISCONTINUED | OUTPATIENT
Start: 2024-12-19 | End: 2024-12-19 | Stop reason: HOSPADM

## 2024-12-19 RX ORDER — ACETAMINOPHEN 325 MG/1
650 TABLET ORAL EVERY 6 HOURS
Status: DISCONTINUED | OUTPATIENT
Start: 2024-12-19 | End: 2024-12-20 | Stop reason: HOSPADM

## 2024-12-19 RX ORDER — PROTAMINE SULFATE 10 MG/ML
INJECTION, SOLUTION INTRAVENOUS AS NEEDED
Status: DISCONTINUED | OUTPATIENT
Start: 2024-12-19 | End: 2024-12-19

## 2024-12-19 RX ORDER — ROCURONIUM BROMIDE 10 MG/ML
INJECTION, SOLUTION INTRAVENOUS AS NEEDED
Status: DISCONTINUED | OUTPATIENT
Start: 2024-12-19 | End: 2024-12-19

## 2024-12-19 RX ORDER — FENTANYL CITRATE 50 UG/ML
INJECTION, SOLUTION INTRAMUSCULAR; INTRAVENOUS AS NEEDED
Status: DISCONTINUED | OUTPATIENT
Start: 2024-12-19 | End: 2024-12-19

## 2024-12-19 RX ORDER — SODIUM CHLORIDE 0.9 G/100ML
IRRIGANT IRRIGATION AS NEEDED
Status: DISCONTINUED | OUTPATIENT
Start: 2024-12-19 | End: 2024-12-19 | Stop reason: HOSPADM

## 2024-12-19 RX ORDER — PHENYLEPHRINE HCL IN 0.9% NACL 0.4MG/10ML
SYRINGE (ML) INTRAVENOUS AS NEEDED
Status: DISCONTINUED | OUTPATIENT
Start: 2024-12-19 | End: 2024-12-19

## 2024-12-19 RX ADMIN — HYDROMORPHONE HYDROCHLORIDE 0.5 MG: 0.5 INJECTION, SOLUTION INTRAMUSCULAR; INTRAVENOUS; SUBCUTANEOUS at 14:31

## 2024-12-19 RX ADMIN — Medication 4 L/MIN: at 14:15

## 2024-12-19 RX ADMIN — ATORVASTATIN CALCIUM 80 MG: 80 TABLET, FILM COATED ORAL at 21:09

## 2024-12-19 RX ADMIN — GABAPENTIN 300 MG: 300 CAPSULE ORAL at 21:09

## 2024-12-19 RX ADMIN — ACETAMINOPHEN 650 MG: 325 TABLET ORAL at 18:35

## 2024-12-19 RX ADMIN — CEFAZOLIN SODIUM 2 G: 2 INJECTION, SOLUTION INTRAVENOUS at 21:09

## 2024-12-19 RX ADMIN — HEPARIN SODIUM 5000 UNITS: 5000 INJECTION, SOLUTION INTRAVENOUS; SUBCUTANEOUS at 18:35

## 2024-12-19 SDOH — HEALTH STABILITY: MENTAL HEALTH: CURRENT SMOKER: 1

## 2024-12-19 ASSESSMENT — PAIN - FUNCTIONAL ASSESSMENT

## 2024-12-19 ASSESSMENT — PAIN SCALES - GENERAL
PAINLEVEL_OUTOF10: 3
PAINLEVEL_OUTOF10: 5 - MODERATE PAIN
PAINLEVEL_OUTOF10: 0 - NO PAIN
PAINLEVEL_OUTOF10: 3
PAINLEVEL_OUTOF10: 0 - NO PAIN
PAINLEVEL_OUTOF10: 7
PAINLEVEL_OUTOF10: 3
PAIN_LEVEL: 0
PAINLEVEL_OUTOF10: 3
PAINLEVEL_OUTOF10: 0 - NO PAIN
PAINLEVEL_OUTOF10: 3

## 2024-12-19 ASSESSMENT — COGNITIVE AND FUNCTIONAL STATUS - GENERAL
CLIMB 3 TO 5 STEPS WITH RAILING: A LITTLE
MOBILITY SCORE: 23
DAILY ACTIVITIY SCORE: 24

## 2024-12-19 NOTE — INTERVAL H&P NOTE
I have reviewed the patient's History and Physical Examination. I have personally seen and evaluated the patient, repeating key portions. There is no significant interval change.     Surgery is still indicated. Yes    Consent reviewed and signed by patient/family: Yes    Operative site verified and marked: Yes left    Girma Van MD  PGY-5 Vascular Surgery

## 2024-12-19 NOTE — BRIEF OP NOTE
Date: 24 OR Location: [facility] OR 16     Name: Lynsey Forman, : 1945, Age: 78 y.o., MRN: [id number], Sex: female    Pre-op Diagnosis: Asymptomatic left carotid stenosis  Post-op Diagnosis: Same    Procedures: Left carotid endarterectomy with bovine patch angioplasty with intraoperative EEG/SSEP monitoring    Surgeons: Shaquille Blackman MD PhD    Resident/Fellow/Other Assistant: Girma Van MD    Anesthesia: general ASA: III  Anesthesia Staff: Anesthesiologist: Gilma Farmer MD  C-AA: OLIVER Lomeli    Findings: Significant amount of inflammation noted during dissection    Specimens: Left carotid plaque    Estimated Blood Loss: 50 cc  Fluids and Transfusions: No transfusion  Urine Output: No Green  Lines: Right radial A-line, PIV's  Implants:   Implant Name Type Inv. Item Serial No.  Lot No. LRB No. Used Action   GRAFT, XENOSURE, BIOLOGIC PATCH, 0.8CM X 8CM - [id number] Implant GRAFT, XENOSURE, BIOLOGIC PATCH, 0.8CM X 8CM  [facility] VASCULAR [facility] [id number] Left 1 Implanted     Complications: None  Condition: stable  Disposition: PACU - hemodynamically stable.    Girma Van MD

## 2024-12-19 NOTE — ANESTHESIA PROCEDURE NOTES
Arterial Line:    Date/Time: 12/19/2024 11:40 AM    Staffing  Performed: CAA   Authorized by: Gilma Farmer MD    Performed by: Gilma Farmer MD    An arterial line was placed. Procedure performed using surface landmarks.in the OR for the following indication(s): continuous blood pressure monitoring.    A 20 gauge (size), 5 cm (length), Angiocath (type) catheter was placed into the Right radial artery, secured by Tegaderm,   Seldinger technique not used.  Events:  patient tolerated procedure well with no complications.

## 2024-12-19 NOTE — ANESTHESIA PROCEDURE NOTES
Airway  Date/Time: 12/19/2024 11:36 AM  Urgency: elective    Airway not difficult    Staffing  Performed: OLIVER   Authorized by: Gilma Farmer MD    Performed by: OLIVER Lomeli  Patient location during procedure: OR    Indications and Patient Condition  Indications for airway management: anesthesia  Spontaneous Ventilation: absent  Sedation level: deep  Preoxygenated: yes  Patient position: sniffing  MILS maintained throughout  Mask difficulty assessment: 1 - vent by mask    Final Airway Details  Final airway type: endotracheal airway      Successful airway: ETT  Cuffed: yes   Successful intubation technique: direct laryngoscopy  Facilitating devices/methods: intubating stylet  Endotracheal tube insertion site: oral  Blade: Nakul  Blade size: #3  ETT size (mm): 7.0  Cormack-Lehane Classification: grade IIa - partial view of glottis  Placement verified by: chest auscultation and capnometry   Measured from: lips  ETT to lips (cm): 20  Number of attempts at approach: 1

## 2024-12-19 NOTE — OP NOTE
Operative Date: 12/19/2024     PREOPERATIVE DIAGNOSIS:   Left carotid artery high grade stenosis.      POSTOPERATIVE DIAGNOSIS:   Same.     PROCEDURE PERFORMED:  Left carotid endarterectomy with patch angioplasty using bovine pericardial patch  Intraoperative EEG/SSEP monitoring.      SURGEON: Shaquille Blackman MD, PhD.      ASSISTANT: Girma Van MD.      ANESTHESIA:  General      Complications: None     EBL (ml): 20 mL     IVF (ml): Per anesthesia report        INDICATION:  This is a 78 year old female patient with B ICA stenosis and R MCA aneurysm.   RMCA aneurysm: Neurosurgery is recommending surveillance only at this time.   Given the patient is right hand dominant, will proceed with L carotid artery stenosis first.   I discussed the plan for left carotid endarterectomy (CEA). We reviewed the options for care, including medical management alone or carotid artery stenting1 . CEA was recommended to reduce the risk of future stroke, but it was explained that there is no expected change in current symptoms or condition. The typical recovery was discussed. The potential risks were reviewed, including stroke, myocardial infarction, death, cranial injury, bleeding, infection, recurrent stenosis, hyper- or hypotension, dysrhythmia, or other serious complication. The patient indicated understanding of the procedure, plan, alternatives, and risks, and voiced consent to proceed.        PROCEDURE IN DETAIL:  The patient was brought into the operating room and placed in a supine position.  Appropriate surgical pause was taken to confirm the patient, using two patient identifiers, the site of the procedure, and the procedure to be performed. General anesthesia was induced. An arterial line was placed by the anesthesia team.  EEG and SSEP monitoring were set up and used throughout the case to monitor the patient's neurological function. Ultrasound was used to identify and kami the site of the left carotid bifurcation. Patient's  left neck was then prepared and draped in the usual sterile fashion.       A longitudinal skin incision was made overlying the anterior border of the left sternocleidomastoid.  The incision was deepened through the platysma using bovie electrocautery. We then continued our dissection along the medial border of the sternocleidomastoid, allowing this to be retracted laterally.  Deep to the sternocleidomastoid, we identified the internal jugular vein and this was dissected free along the medial border until the facial vein was encountered.  The facial vein was then transected and suture ligated using 3-0 silk suture.     Next, the left common carotid, internal carotid, external carotid, and superior thyroid arteries were identified, dissected free from surrounding tissue, and encircled with Vesseloops.  The vagus nerve was identified and preserved from harm.  The hypoglossal nerve was identified and preserved. 100 units/kg of heparin was administered intravenously.  After after an appropriate period of time to allow circulation of the heparin, and activated clotting time was checked and noted to be therapeutic. Throughout the case, activated clotting time was checked periodically to ensure that the patient remained therapeutically anticoagulated.     The hypoglossal nerve was preserved. The left internal carotid artery was clamped followed by the common carotid artery, and then the external carotid artery.  No SSEP or EEG changes were noted.  An arteriotomy was performed on the anterior surface of the distal common carotid artery using a scalpel and extended from the common carotid artery to the internal carotid artery using Clements scissors.     The endarterectomy plane was developed under the carotid plaque.  Proximally, the plaque was transected in the common carotid artery using Clements scissors.  In the distal  internal carotid artery, the plaque was feathered with a smooth end point.  Eversion endarterectomy of the  external carotid artery was performed and the carotid plaque removed.  The endarterectomized  surface was irrigated with heparinized saline solution and all free debris was removed using forceps.     The distal endarterectomy endpoint was inspected and was satisfactory. Next, the patch angioplasty was performed using bovine pericardium.  The patch angioplasty was completed using running 6-0 Prolene suture.  Prior to  completing the anastomosis, the carotid artery appropriately flushed and irrigated.  Flow was first re-established  into the external carotid artery followed by the internal carotid  artery.  Next, the suture line was checked for hemostasis and satisfactory.       Intraoperative doppler examination was then performed and noted to be satisfactory. After ensuring hemostasis, the incision was closed using 2-0 Vicryl suture to reapproximate the platysma.  The skin was then reapproximated with running 4-0 Monocryl suture. The skin was then covered with dermabond.     The patient tolerated the procedure well.  When she woke up from the general anesthesia, he had no neurological deficits - his tongue was midline, voice was normal, and she was moving all extremities.  The patient was taken to the  postoperative area in stable condition.     I was physically present for the entire length of the case and scrubbed for the entire portions.      Shaquille Blackman MD, PhD.   Clinical   [facility] [employer]  Co-Director, Aortic Center  [facility] [place]

## 2024-12-19 NOTE — ANESTHESIA PROCEDURE NOTES
Peripheral IV  Date/Time: 12/19/2024 11:38 AM  Inserted by: Gilma Farmer MD    Placement  Needle size: 16 G  Laterality: right  Location: hand  Site prep: alcohol  Technique: anatomical landmarks  Attempts: 1

## 2024-12-19 NOTE — SIGNIFICANT EVENT
Vascular Surgery Post-Operative Plan    S/p left CEA    Pre-op Vascular Exam: Neuro intact  Post-op Vascular Exam: Neuro intact    Plan:  4 hours PACU stay for NV checks  pain control  aspirin  Goal SBP less than 140 m mercury  Holding home anticoagulation  Clear liquid diet as tolerated after 4 hours  24 hours Ancef for periop antibiotics  SQH for DVT Ppx  Wound: Left neck incision with Dermabond    Girma Van MD  PGY-5 Vascular Surgery Resident  [id number]

## 2024-12-19 NOTE — ANESTHESIA POSTPROCEDURE EVALUATION
Patient: Lynsey Forman    Procedure Summary       Date: 12/19/24 Room / Location: [facility] OR 16 / [facility] OR    Anesthesia Start: 1120 Anesthesia Stop: 1403    Procedure: Endarterectomy Carotid Artery (Left) Diagnosis:       Bilateral carotid artery stenosis      (Bilateral carotid artery stenosis [I65.23])    Surgeons: Shaquille Blackman MD PhD Responsible Provider: Gilma Farmer MD    Anesthesia Type: general ASA Status: 3            Anesthesia Type: general    Vitals Value Taken Time   /62 12/19/24 1400   Temp 36 °C (96.8 °F) 12/19/24 1400   Pulse 43 12/19/24 1400   Resp 13 12/19/24 1400   SpO2 100 % 12/19/24 1400       Anesthesia Post Evaluation    Patient location during evaluation: PACU  Patient participation: complete - patient participated  Level of consciousness: awake and alert  Pain score: 0  Pain management: adequate  Airway patency: patent  Cardiovascular status: acceptable  Respiratory status: acceptable  Hydration status: acceptable  Postoperative Nausea and Vomiting: none        No notable events documented.

## 2024-12-19 NOTE — ANESTHESIA PREPROCEDURE EVALUATION
Patient: Lynsey Forman    Procedure Information       Date/Time: 12/19/24 1040    Procedure: Endarterectomy Carotid Artery (Left)    Location: [facility] OR 16 / [facility] OR    Surgeons: Shaquille Blackman MD PhD            Relevant Problems   Cardiac   (+) Arteriosclerosis of coronary artery   (+) Atrial fibrillation (Multi)   (+) Hypercholesterolemia   (+) Hypertension   (+) Nonrheumatic aortic valve stenosis      Neuro   (+) Aneurysm of middle cerebral artery (HHS-HCC)   (+) Bilateral carotid artery stenosis   (+) Mixed anxiety depressive disorder      GI   (+) Gastroesophageal reflux disease      Musculoskeletal   (+) Degeneration of intervertebral disc of lumbar region   (+) Lumbar stenosis with neurogenic claudication   (+) Spinal stenosis       Clinical information reviewed:     Meds               NPO Detail:  No data recorded     Physical Exam    Airway  Mallampati: II  TM distance: >3 FB  Neck ROM: full     Cardiovascular   Rate: normal     Dental - normal exam     Pulmonary   Breath sounds clear to auscultation     Abdominal            Anesthesia Plan    History of general anesthesia?: yes  History of complications of general anesthesia?: no    ASA 3     general   (GA with ETT + art line)  The patient is a current smoker. (Marijuana)  Patient was previously instructed to abstain from smoking on day of procedure.  Patient did not smoke on day of procedure.    intravenous induction   Postoperative administration of opioids is intended.  Trial extubation is planned.  Anesthetic plan and risks discussed with patient.  Use of blood products discussed with patient who consented to blood products.    Plan discussed with CAA.

## 2024-12-20 VITALS
OXYGEN SATURATION: 95 % | TEMPERATURE: 97.3 F | WEIGHT: 110.23 LBS | DIASTOLIC BLOOD PRESSURE: 54 MMHG | RESPIRATION RATE: 16 BRPM | BODY MASS INDEX: 19.53 KG/M2 | HEART RATE: 73 BPM | SYSTOLIC BLOOD PRESSURE: 109 MMHG | HEIGHT: 63 IN

## 2024-12-20 LAB
ANION GAP SERPL CALC-SCNC: 13 MMOL/L (ref 10–20)
BUN SERPL-MCNC: 16 MG/DL (ref 6–23)
CALCIUM SERPL-MCNC: 8.8 MG/DL (ref 8.6–10.6)
CHLORIDE SERPL-SCNC: 95 MMOL/L (ref 98–107)
CO2 SERPL-SCNC: 27 MMOL/L (ref 21–32)
CREAT SERPL-MCNC: 0.85 MG/DL (ref 0.5–1.05)
EGFRCR SERPLBLD CKD-EPI 2021: 71 ML/MIN/1.73M*2
ERYTHROCYTE [DISTWIDTH] IN BLOOD BY AUTOMATED COUNT: 13.5 % (ref 11.5–14.5)
GLUCOSE SERPL-MCNC: 112 MG/DL (ref 74–99)
HCT VFR BLD AUTO: 32.7 % (ref 36–46)
HGB BLD-MCNC: 11.5 G/DL (ref 12–16)
MAGNESIUM SERPL-MCNC: 1.99 MG/DL (ref 1.6–2.4)
MCH RBC QN AUTO: 33.5 PG (ref 26–34)
MCHC RBC AUTO-ENTMCNC: 35.2 G/DL (ref 32–36)
MCV RBC AUTO: 95 FL (ref 80–100)
NRBC BLD-RTO: 0 /100 WBCS (ref 0–0)
PHOSPHATE SERPL-MCNC: 3.7 MG/DL (ref 2.5–4.9)
PLATELET # BLD AUTO: 208 X10*3/UL (ref 150–450)
POTASSIUM SERPL-SCNC: 3.9 MMOL/L (ref 3.5–5.3)
RBC # BLD AUTO: 3.43 X10*6/UL (ref 4–5.2)
SODIUM SERPL-SCNC: 131 MMOL/L (ref 136–145)
WBC # BLD AUTO: 10.6 X10*3/UL (ref 4.4–11.3)

## 2024-12-20 PROCEDURE — 2500000004 HC RX 250 GENERAL PHARMACY W/ HCPCS (ALT 636 FOR OP/ED)

## 2024-12-20 PROCEDURE — 2500000001 HC RX 250 WO HCPCS SELF ADMINISTERED DRUGS (ALT 637 FOR MEDICARE OP): Performed by: NURSE PRACTITIONER

## 2024-12-20 PROCEDURE — 2500000002 HC RX 250 W HCPCS SELF ADMINISTERED DRUGS (ALT 637 FOR MEDICARE OP, ALT 636 FOR OP/ED): Performed by: NURSE PRACTITIONER

## 2024-12-20 PROCEDURE — 99024 POSTOP FOLLOW-UP VISIT: CPT | Performed by: NURSE PRACTITIONER

## 2024-12-20 PROCEDURE — 80048 BASIC METABOLIC PNL TOTAL CA: CPT

## 2024-12-20 PROCEDURE — 83735 ASSAY OF MAGNESIUM: CPT

## 2024-12-20 PROCEDURE — 2500000002 HC RX 250 W HCPCS SELF ADMINISTERED DRUGS (ALT 637 FOR MEDICARE OP, ALT 636 FOR OP/ED)

## 2024-12-20 PROCEDURE — 36415 COLL VENOUS BLD VENIPUNCTURE: CPT

## 2024-12-20 PROCEDURE — 85027 COMPLETE CBC AUTOMATED: CPT

## 2024-12-20 PROCEDURE — 2500000001 HC RX 250 WO HCPCS SELF ADMINISTERED DRUGS (ALT 637 FOR MEDICARE OP)

## 2024-12-20 PROCEDURE — 2500000004 HC RX 250 GENERAL PHARMACY W/ HCPCS (ALT 636 FOR OP/ED): Mod: JZ

## 2024-12-20 PROCEDURE — 84100 ASSAY OF PHOSPHORUS: CPT

## 2024-12-20 RX ORDER — TRAMADOL HYDROCHLORIDE 50 MG/1
50 TABLET ORAL EVERY 8 HOURS PRN
Qty: 10 TABLET | Refills: 0 | Status: SHIPPED | OUTPATIENT
Start: 2024-12-20 | End: 2024-12-20 | Stop reason: HOSPADM

## 2024-12-20 RX ORDER — ASPIRIN 81 MG/1
81 TABLET ORAL DAILY
Qty: 90 TABLET | Refills: 3 | Status: SHIPPED | OUTPATIENT
Start: 2024-12-20

## 2024-12-20 RX ORDER — ACETAMINOPHEN 325 MG/1
650 TABLET ORAL EVERY 6 HOURS PRN
Start: 2024-12-20

## 2024-12-20 RX ORDER — POTASSIUM CHLORIDE 20 MEQ/1
20 TABLET, EXTENDED RELEASE ORAL ONCE
Status: COMPLETED | OUTPATIENT
Start: 2024-12-20 | End: 2024-12-20

## 2024-12-20 RX ORDER — OXYCODONE HYDROCHLORIDE 5 MG/1
5 TABLET ORAL EVERY 6 HOURS PRN
Status: DISCONTINUED | OUTPATIENT
Start: 2024-12-20 | End: 2024-12-20 | Stop reason: HOSPADM

## 2024-12-20 RX ORDER — OXYCODONE HYDROCHLORIDE 5 MG/1
5 TABLET ORAL EVERY 8 HOURS PRN
Qty: 10 TABLET | Refills: 0 | Status: SHIPPED | OUTPATIENT
Start: 2024-12-20 | End: 2024-12-23

## 2024-12-20 RX ADMIN — OXYCODONE HYDROCHLORIDE 5 MG: 5 TABLET ORAL at 10:43

## 2024-12-20 RX ADMIN — ASPIRIN 81 MG: 81 TABLET, COATED ORAL at 08:47

## 2024-12-20 RX ADMIN — POTASSIUM CHLORIDE 20 MEQ: 1500 TABLET, EXTENDED RELEASE ORAL at 10:40

## 2024-12-20 RX ADMIN — HEPARIN SODIUM 5000 UNITS: 5000 INJECTION, SOLUTION INTRAVENOUS; SUBCUTANEOUS at 04:08

## 2024-12-20 RX ADMIN — HEPARIN SODIUM 5000 UNITS: 5000 INJECTION, SOLUTION INTRAVENOUS; SUBCUTANEOUS at 12:04

## 2024-12-20 RX ADMIN — EZETIMIBE 10 MG: 10 TABLET ORAL at 08:47

## 2024-12-20 RX ADMIN — CEFAZOLIN SODIUM 2 G: 2 INJECTION, SOLUTION INTRAVENOUS at 04:08

## 2024-12-20 RX ADMIN — AMLODIPINE BESYLATE 5 MG: 5 TABLET ORAL at 08:47

## 2024-12-20 RX ADMIN — GABAPENTIN 300 MG: 300 CAPSULE ORAL at 08:47

## 2024-12-20 RX ADMIN — LISINOPRIL 20 MG: 20 TABLET ORAL at 08:47

## 2024-12-20 SDOH — ECONOMIC STABILITY: FOOD INSECURITY: WITHIN THE PAST 12 MONTHS, YOU WORRIED THAT YOUR FOOD WOULD RUN OUT BEFORE YOU GOT THE MONEY TO BUY MORE.: NEVER TRUE

## 2024-12-20 SDOH — SOCIAL STABILITY: SOCIAL INSECURITY: ARE YOU OR HAVE YOU BEEN THREATENED OR ABUSED PHYSICALLY, EMOTIONALLY, OR SEXUALLY BY ANYONE?: NO

## 2024-12-20 SDOH — ECONOMIC STABILITY: FOOD INSECURITY: HOW HARD IS IT FOR YOU TO PAY FOR THE VERY BASICS LIKE FOOD, HOUSING, MEDICAL CARE, AND HEATING?: NOT VERY HARD

## 2024-12-20 SDOH — ECONOMIC STABILITY: INCOME INSECURITY: IN THE PAST 12 MONTHS HAS THE ELECTRIC, GAS, OIL, OR WATER COMPANY THREATENED TO SHUT OFF SERVICES IN YOUR HOME?: NO

## 2024-12-20 SDOH — SOCIAL STABILITY: SOCIAL INSECURITY: DO YOU FEEL UNSAFE GOING BACK TO THE PLACE WHERE YOU ARE LIVING?: NO

## 2024-12-20 SDOH — ECONOMIC STABILITY: FOOD INSECURITY: WITHIN THE PAST 12 MONTHS, THE FOOD YOU BOUGHT JUST DIDN'T LAST AND YOU DIDN'T HAVE MONEY TO GET MORE.: NEVER TRUE

## 2024-12-20 SDOH — SOCIAL STABILITY: SOCIAL INSECURITY: WITHIN THE LAST YEAR, HAVE YOU BEEN AFRAID OF YOUR PARTNER OR EX-PARTNER?: NO

## 2024-12-20 SDOH — SOCIAL STABILITY: SOCIAL INSECURITY
WITHIN THE LAST YEAR, HAVE YOU BEEN KICKED, HIT, SLAPPED, OR OTHERWISE PHYSICALLY HURT BY YOUR PARTNER OR EX-PARTNER?: NO

## 2024-12-20 SDOH — ECONOMIC STABILITY: HOUSING INSECURITY: AT ANY TIME IN THE PAST 12 MONTHS, WERE YOU HOMELESS OR LIVING IN A SHELTER (INCLUDING NOW)?: NO

## 2024-12-20 SDOH — ECONOMIC STABILITY: HOUSING INSECURITY: IN THE LAST 12 MONTHS, WAS THERE A TIME WHEN YOU WERE NOT ABLE TO PAY THE MORTGAGE OR RENT ON TIME?: NO

## 2024-12-20 SDOH — SOCIAL STABILITY: SOCIAL INSECURITY: WITHIN THE LAST YEAR, HAVE YOU BEEN HUMILIATED OR EMOTIONALLY ABUSED IN OTHER WAYS BY YOUR PARTNER OR EX-PARTNER?: NO

## 2024-12-20 SDOH — SOCIAL STABILITY: SOCIAL INSECURITY: HAS ANYONE EVER THREATENED TO HURT YOUR FAMILY OR YOUR PETS?: NO

## 2024-12-20 SDOH — SOCIAL STABILITY: SOCIAL INSECURITY: ABUSE: ADULT

## 2024-12-20 SDOH — SOCIAL STABILITY: SOCIAL INSECURITY: ARE THERE ANY APPARENT SIGNS OF INJURIES/BEHAVIORS THAT COULD BE RELATED TO ABUSE/NEGLECT?: NO

## 2024-12-20 SDOH — SOCIAL STABILITY: SOCIAL INSECURITY: WERE YOU ABLE TO COMPLETE ALL THE BEHAVIORAL HEALTH SCREENINGS?: YES

## 2024-12-20 SDOH — SOCIAL STABILITY: SOCIAL INSECURITY: DO YOU FEEL ANYONE HAS EXPLOITED OR TAKEN ADVANTAGE OF YOU FINANCIALLY OR OF YOUR PERSONAL PROPERTY?: NO

## 2024-12-20 SDOH — SOCIAL STABILITY: SOCIAL INSECURITY
WITHIN THE LAST YEAR, HAVE YOU BEEN RAPED OR FORCED TO HAVE ANY KIND OF SEXUAL ACTIVITY BY YOUR PARTNER OR EX-PARTNER?: NO

## 2024-12-20 SDOH — SOCIAL STABILITY: SOCIAL INSECURITY: DOES ANYONE TRY TO KEEP YOU FROM HAVING/CONTACTING OTHER FRIENDS OR DOING THINGS OUTSIDE YOUR HOME?: NO

## 2024-12-20 SDOH — SOCIAL STABILITY: SOCIAL INSECURITY: HAVE YOU HAD THOUGHTS OF HARMING ANYONE ELSE?: NO

## 2024-12-20 SDOH — ECONOMIC STABILITY: TRANSPORTATION INSECURITY: IN THE PAST 12 MONTHS, HAS LACK OF TRANSPORTATION KEPT YOU FROM MEDICAL APPOINTMENTS OR FROM GETTING MEDICATIONS?: NO

## 2024-12-20 ASSESSMENT — LIFESTYLE VARIABLES
HOW MANY STANDARD DRINKS CONTAINING ALCOHOL DO YOU HAVE ON A TYPICAL DAY: 7 TO 9
AUDIT TOTAL SCORE: 0
HOW OFTEN DURING THE LAST YEAR HAVE YOU HAD A FEELING OF GUILT OR REMORSE AFTER DRINKING: NEVER
HOW OFTEN DURING THE LAST YEAR HAVE YOU BEEN UNABLE TO REMEMBER WHAT HAPPENED THE NIGHT BEFORE BECAUSE YOU HAD BEEN DRINKING: NEVER
AUDIT TOTAL SCORE: 9
AUDIT-C TOTAL SCORE: 9
HOW OFTEN DURING THE LAST YEAR HAVE YOU FAILED TO DO WHAT WAS NORMALLY EXPECTED FROM YOU BECAUSE OF DRINKING: NEVER
HOW OFTEN DURING THE LAST YEAR HAVE YOU FOUND THAT YOU WERE NOT ABLE TO STOP DRINKING ONCE YOU HAD STARTED: NEVER
HOW OFTEN DO YOU HAVE A DRINK CONTAINING ALCOHOL: 2-3 TIMES A WEEK
SKIP TO QUESTIONS 9-10: 0
HOW OFTEN DO YOU HAVE 6 OR MORE DRINKS ON ONE OCCASION: WEEKLY
HAS A RELATIVE, FRIEND, DOCTOR, OR ANOTHER HEALTH PROFESSIONAL EXPRESSED CONCERN ABOUT YOUR DRINKING OR SUGGESTED YOU CUT DOWN: NO
HAVE YOU OR SOMEONE ELSE BEEN INJURED AS A RESULT OF YOUR DRINKING: NO
AUDIT-C TOTAL SCORE: 9
HOW OFTEN DURING THE LAST YEAR HAVE YOU NEEDED AN ALCOHOLIC DRINK FIRST THING IN THE MORNING TO GET YOURSELF GOING AFTER A NIGHT OF HEAVY DRINKING: NEVER

## 2024-12-20 ASSESSMENT — COGNITIVE AND FUNCTIONAL STATUS - GENERAL
DAILY ACTIVITIY SCORE: 24
MOBILITY SCORE: 24
PATIENT BASELINE BEDBOUND: NO
DAILY ACTIVITIY SCORE: 24
MOBILITY SCORE: 24

## 2024-12-20 ASSESSMENT — ACTIVITIES OF DAILY LIVING (ADL)
FEEDING YOURSELF: INDEPENDENT
JUDGMENT_ADEQUATE_SAFELY_COMPLETE_DAILY_ACTIVITIES: YES
PATIENT'S MEMORY ADEQUATE TO SAFELY COMPLETE DAILY ACTIVITIES?: YES
ADEQUATE_TO_COMPLETE_ADL: YES
HEARING - LEFT EAR: FUNCTIONAL
LACK_OF_TRANSPORTATION: NO
DRESSING YOURSELF: INDEPENDENT
BATHING: INDEPENDENT
WALKS IN HOME: INDEPENDENT
HEARING - RIGHT EAR: FUNCTIONAL
TOILETING: INDEPENDENT
LACK_OF_TRANSPORTATION: NO
LACK_OF_TRANSPORTATION: NO
GROOMING: INDEPENDENT

## 2024-12-20 ASSESSMENT — PATIENT HEALTH QUESTIONNAIRE - PHQ9
2. FEELING DOWN, DEPRESSED OR HOPELESS: SEVERAL DAYS
1. LITTLE INTEREST OR PLEASURE IN DOING THINGS: SEVERAL DAYS
SUM OF ALL RESPONSES TO PHQ9 QUESTIONS 1 & 2: 2

## 2024-12-20 ASSESSMENT — PAIN SCALES - GENERAL
PAINLEVEL_OUTOF10: 3
PAINLEVEL_OUTOF10: 0 - NO PAIN
PAINLEVEL_OUTOF10: 0 - NO PAIN
PAINLEVEL_OUTOF10: 7

## 2024-12-20 ASSESSMENT — PAIN - FUNCTIONAL ASSESSMENT
PAIN_FUNCTIONAL_ASSESSMENT: 0-10

## 2024-12-20 NOTE — NURSING NOTE
12/20/24 1429 Discharge Note:  Patient discharged to home. IV removed. Patient was given carotid endarectomy and stroke education by RN prior to discharge. Patient and RN went over discharge instructions and patient verbalized understanding. -Pricila Saravia RN

## 2024-12-20 NOTE — DISCHARGE SUMMARY
Discharge Diagnosis  Bilateral carotid artery stenosis    Test Results Pending At Discharge  Pending Labs       Order Current Status    Blood Gas Arterial Full Panel In process    Surgical Pathology Exam In process            Hospital Course   Ms. Forman is a 77 y/o female with PMHx of HTN, HLD, R MCA aneurysm, bilateral >80% carotid stenosis, Aflutter & Afib (on Xarelto), CAD (no PCI), sinus bradycardia, lumbar and cervical disc disease, Moriah's edema of vocal folds and anxiety. CKDIII mentioned on chart review but Cr has been normal for some time.  The carotid stenosis has been known for sometime and was supposed to be addressed initially but she insisted on having her spine procedures (cervical & lumbar) first due to severe pain and poor quality of life. It was during work-up for spinal surgery that R MCA aneurysm and bilateral carotid stenosis was incidentally found [3/8 angio >80% stenosis of bilateral ICAs last duplex in 12/2023. MRA 09/26/24 with stable 6mm R MCA bifurcation aneurysm. On 12/19/24 she underwent left carotid endarterectomy with Dr. Blackman. In the post operative period she remained neurologically intact, VSS (asymptomatic bradycardia 40-50's). She was tolerating diet, mobilizing and pain controlled. She was discharged home on 12/20. She will follow up in clinic in 4 weeks wiuth repeat carotid duplex US. Discharged with aspirin resumed, and oxycodone for pain. She will resume Xarelto in 12/21/24.     Pertinent Physical Exam At Time of Discharge  Physical Exam  Constitutional: No acute distress, resting comfortably  Neuro:  AOx3, grossly intact, Face and tongue symmetric, full strength and sensation throughout, EOMI.  ENMT: moist mucous membranes, left neck surgical wowund intact no hemaotma, skin glue covering   CV: no tachycardia, S1S2 no murmur  Pulm: non-labored on RA, breath sounds clear  GI: soft, non-tender, non-distended, +BS  Skin: warm and dry  Musculoskeletal: moving all  extremities  Extremities: warm and perfused    Home Medications     Medication List      START taking these medications     oxyCODONE 5 mg immediate release tablet; Commonly known as: Roxicodone;   Take 1 tablet (5 mg) by mouth every 8 hours if needed for severe pain (7 -   10) for up to 3 days.     CHANGE how you take these medications     acetaminophen 325 mg tablet; Commonly known as: Tylenol; Take 2 tablets   (650 mg) by mouth every 6 hours if needed for mild pain (1 - 3) or   moderate pain (4 - 6).; What changed: reasons to take this   rivaroxaban 15 mg tablet; Commonly known as: Xarelto; Take 1 tablet (15   mg) by mouth once daily in the evening. Take with meals. Resume 12/21/24   Do not fill before December 21, 2024.; Start taking on: December 21, 2024;   What changed: additional instructions     CONTINUE taking these medications     amLODIPine 5 mg tablet; Commonly known as: Norvasc; TAKE 1 TABLET BY   MOUTH DAILY   aspirin 81 mg EC tablet; Take 1 tablet (81 mg) by mouth once daily.   atorvastatin 80 mg tablet; Commonly known as: Lipitor; Take 1 tablet (80   mg) by mouth once daily.   ezetimibe 10 mg tablet; Commonly known as: Zetia; Take 1 tablet (10 mg)   by mouth once daily.   furosemide 20 mg tablet; Commonly known as: Lasix; Take 1 tablet (20 mg)   by mouth if needed (for ankle swelling).   gabapentin 300 mg capsule; Commonly known as: Neurontin; Take 1 capsule   (300 mg) by mouth 3 times a day.   hydrOXYzine HCL 25 mg tablet; Commonly known as: Atarax   lisinopril 20 mg tablet; TAKE 1 TABLET BY MOUTH DAILY   triamterene-hydrochlorothiazid 37.5-25 mg tablet; Commonly known as:   Maxzide-25; Take 0.5 tablets by mouth once daily.     STOP taking these medications     chlorhexidine 0.12 % solution; Commonly known as: Peridex   chlorhexidine 4 % external liquid; Commonly known as: Hibiclens   polyethylene glycol 236-22.74-6.74 -5.86 gram solution; Commonly known   as: Golytely   traMADol 50 mg tablet;  Commonly known as: Ultram       Outpatient Follow-Up  Future Appointments   Date Time Provider Department Center   1/6/2025  1:00 PM [facility] [id number] DEXA [id number] [facility]   1/20/2025  1:00 PM [facility] VASC 1 AHUVSC [facility]   1/27/2025  2:00 PM Shaquille Blackman MD PhD [id number] [facility]   2/10/2025  1:00 PM [facility] AHU SARAH 6 CMCAHUMAM [facility] Rad   2/10/2025  2:40 PM Windy Bartholomew MD [id number] [facility]   3/7/2025  1:30 PM Gerson Martinez, PharmD [id number] [facility]   4/3/2025  1:00 PM Louie Garcias MD [id number] [facility]   4/23/2025  2:00 PM Lula Chacon MD [id number] [facility]   5/14/2025  3:00 PM Shaquille Mcdonald MD [id number] [facility]       Lani Day, APRN-CNP

## 2024-12-20 NOTE — PROGRESS NOTES
12/20/24 1300   Discharge Planning   Living Arrangements Family members   Support Systems Family members  (sister)   Assistance Needed no   Type of Residence Private residence   Who is requesting discharge planning? Provider   Home or Post Acute Services None   Expected Discharge Disposition Home   Does the patient need discharge transport arranged? No   Financial Resource Strain   How hard is it for you to pay for the very basics like food, housing, medical care, and heating? Not very   Housing Stability   In the last 12 months, was there a time when you were not able to pay the mortgage or rent on time? N   In the past 12 months, how many times have you moved where you were living? 0   At any time in the past 12 months, were you homeless or living in a shelter (including now)? N   Transportation Needs   In the past 12 months, has lack of transportation kept you from medical appointments or from getting medications? no   In the past 12 months, has lack of transportation kept you from meetings, work, or from getting things needed for daily living? No   Patient Choice   Provider Choice list and CMS website (https://medicare.gov/care-compare#search) for post-acute Quality and Resource Measure Data were provided and reviewed with: Patient   Patient / Family choosing to utilize agency / facility established prior to hospitalization No   Stroke Family Assessment   Stroke Family Assessment Needed No   Intensity of Service   Intensity of Service 0-30 min     Transitional Care Coordination Progress Note:  Patient discussed during interdisciplinary rounds.   Team members present: MD and TCC  Plan per Medical/Surgical team: Patient medically ready for discharge home with family.  Payor: Medicare-IMM given 12/19  Discharge disposition: Home no needs with family support  Potential Barriers: None  ADOD: 12/20/24

## 2024-12-20 NOTE — SIGNIFICANT EVENT
Postoperative Check Note    Subjective  Lynsey Forman is a 78 y.o. female who is now POD0 s/p left CEA . Postoperatively, patient feels well, and denies fevers/chills, n/v, chest pain, shortness of breath. Feels her pain is well-controlled and appropriate for this time. Has no other concerns.    Objective  Vitals:  Visit Vitals  /57   Pulse 52   Temp 36.2 °C (97.2 °F) (Temporal)   Resp 13       Physical Exam:  GEN: No acute distress. Alert, awake and conversive.  HEENT: Sclera anicteric. Moist mucous membranes.  RESP: Breathing non-labored, equal chest rise. On RA.  CV: Regular rate, normotensive  GI: Abdomen soft, nondistended, nontender.   : Voiding spontaneously.  MSK: No gross deformities. Moves all extremities spontaneously.  NEURO: Alert and oriented x3. No focal deficits.  PSYCH: Appropriate mood and affect.  SKIN: No rashes or lesions.  No Tongue deviation, no dysphagia, no SOB, CN defects.     Most recent labs:            13.0     7.2>-----<259              38.1     128  92  23                  ----------------<95     4.4  25  1.07            Mg No results found for requested labs within last 365 days.         Assessment  Lynsey Forman is a 78 y.o. female who is now POD0 s/p L CEA.  Patient is in stable condition, appropriate for postoperative course. The plan is as follows:    4 hours PACU stay for NV checks  pain control  aspirin  Goal SBP less than 140 m mercury  Holding home anticoagulation  Clear liquid diet as tolerated after 4 hours  24 hours Ancef for periop antibiotics  SQH for DVT Ppx  No changes in the outlines plan     Palak Leon MD  PGY-1 General Surgery  Vascular Surgery

## 2024-12-24 ENCOUNTER — APPOINTMENT (OUTPATIENT)
Dept: VASCULAR MEDICINE | Facility: HOSPITAL | Age: 79
End: 2024-12-24
Payer: MEDICARE

## 2024-12-24 ENCOUNTER — TELEPHONE (OUTPATIENT)
Dept: VASCULAR SURGERY | Facility: HOSPITAL | Age: 79
End: 2024-12-24
Payer: MEDICARE

## 2024-12-24 ENCOUNTER — APPOINTMENT (OUTPATIENT)
Dept: RADIOLOGY | Facility: HOSPITAL | Age: 79
End: 2024-12-24
Payer: MEDICARE

## 2024-12-24 ENCOUNTER — HOSPITAL ENCOUNTER (OUTPATIENT)
Dept: VASCULAR MEDICINE | Facility: HOSPITAL | Age: 79
Discharge: HOME | End: 2024-12-24
Payer: MEDICARE

## 2024-12-24 ENCOUNTER — HOSPITAL ENCOUNTER (EMERGENCY)
Facility: HOSPITAL | Age: 79
Discharge: HOME | End: 2024-12-24
Attending: EMERGENCY MEDICINE
Payer: MEDICARE

## 2024-12-24 VITALS
HEIGHT: 63 IN | BODY MASS INDEX: 19.49 KG/M2 | HEART RATE: 50 BPM | DIASTOLIC BLOOD PRESSURE: 57 MMHG | SYSTOLIC BLOOD PRESSURE: 132 MMHG | OXYGEN SATURATION: 100 % | RESPIRATION RATE: 16 BRPM | WEIGHT: 110 LBS | TEMPERATURE: 97.8 F

## 2024-12-24 DIAGNOSIS — I65.22 STENOSIS OF LEFT CAROTID ARTERY: ICD-10-CM

## 2024-12-24 DIAGNOSIS — R09.89 OTHER SPECIFIED SYMPTOMS AND SIGNS INVOLVING THE CIRCULATORY AND RESPIRATORY SYSTEMS: ICD-10-CM

## 2024-12-24 DIAGNOSIS — R51.9 ACUTE NONINTRACTABLE HEADACHE, UNSPECIFIED HEADACHE TYPE: Primary | ICD-10-CM

## 2024-12-24 DIAGNOSIS — I65.23 OCCLUSION AND STENOSIS OF BILATERAL CAROTID ARTERIES: ICD-10-CM

## 2024-12-24 DIAGNOSIS — I65.23 BILATERAL CAROTID ARTERY STENOSIS: Primary | ICD-10-CM

## 2024-12-24 PROCEDURE — 93880 EXTRACRANIAL BILAT STUDY: CPT | Performed by: INTERNAL MEDICINE

## 2024-12-24 PROCEDURE — 2550000001 HC RX 255 CONTRASTS: Performed by: EMERGENCY MEDICINE

## 2024-12-24 PROCEDURE — 2500000001 HC RX 250 WO HCPCS SELF ADMINISTERED DRUGS (ALT 637 FOR MEDICARE OP): Performed by: EMERGENCY MEDICINE

## 2024-12-24 PROCEDURE — 99285 EMERGENCY DEPT VISIT HI MDM: CPT | Mod: 25 | Performed by: EMERGENCY MEDICINE

## 2024-12-24 PROCEDURE — 93880 EXTRACRANIAL BILAT STUDY: CPT

## 2024-12-24 PROCEDURE — 70496 CT ANGIOGRAPHY HEAD: CPT | Performed by: RADIOLOGY

## 2024-12-24 PROCEDURE — 70498 CT ANGIOGRAPHY NECK: CPT | Performed by: RADIOLOGY

## 2024-12-24 PROCEDURE — 70498 CT ANGIOGRAPHY NECK: CPT

## 2024-12-24 RX ORDER — ACETAMINOPHEN 325 MG/1
975 TABLET ORAL ONCE
Status: COMPLETED | OUTPATIENT
Start: 2024-12-24 | End: 2024-12-24

## 2024-12-24 ASSESSMENT — PAIN - FUNCTIONAL ASSESSMENT: PAIN_FUNCTIONAL_ASSESSMENT: 0-10

## 2024-12-24 ASSESSMENT — COLUMBIA-SUICIDE SEVERITY RATING SCALE - C-SSRS
1. IN THE PAST MONTH, HAVE YOU WISHED YOU WERE DEAD OR WISHED YOU COULD GO TO SLEEP AND NOT WAKE UP?: NO
6. HAVE YOU EVER DONE ANYTHING, STARTED TO DO ANYTHING, OR PREPARED TO DO ANYTHING TO END YOUR LIFE?: NO
2. HAVE YOU ACTUALLY HAD ANY THOUGHTS OF KILLING YOURSELF?: NO

## 2024-12-24 ASSESSMENT — PAIN SCALES - GENERAL
PAINLEVEL_OUTOF10: 0 - NO PAIN
PAINLEVEL_OUTOF10: 7

## 2024-12-24 ASSESSMENT — PAIN DESCRIPTION - PAIN TYPE: TYPE: ACUTE PAIN

## 2024-12-24 NOTE — ED PROVIDER NOTES
HPI   Chief Complaint   Patient presents with    Headache       Chief complaint: Headache, neck pain    History of present illness: Patient is a 78-year-old female with recent carotid endarterectomy on the left presenting to the emergency department with complaints of neck pain and headache.  According to the patient, she has been experiencing an aching pain in her left neck as well as in the back of her head.  The patient denies any history of headaches.  Patient states that she has had no difficulty with speech numbness or weakness.  Patient states that today she went to receive an ultrasound of her carotid artery and was referred to the emergency department for further evaluation.  She has no other complaints this time she states that she is only here for a CAT scan of the head.      History provided by:  Patient   used: No            Patient History   Past Medical History:   Diagnosis Date    Abnormal kidney function 01/08/2024    BUN-24, GFR-56 (4/29/24)    Alcohol use, unspecified, uncomplicated 12/20/2022    Aneurysm of carotid artery (CMS-HCC)     Right MCA, followed by neurology    Anxiety     Atrial fibrillation (Multi)     no reccurance s/p ablation 4/2023 and 7/2023    Atrial flutter (Multi)     Carotid stenosis, asymptomatic, bilateral     on ASA and statin    Chronic cough 11/24/2009    Formatting of this note might be different from the original. Overview: No change with stopping lisinopril, no change with Advair, consensus of ENT, pulmonary, here is LP reflux, has associated past-nasal drip that does not respond to Mucinex or Flonase, 2012 has large nasal polyp L Formatting of this note might be different from the original. No change with stopping lisinopril, no change with Adv    Chronic pain disorder     CKD (chronic kidney disease)     stage III    Coronary artery disease     Diverticulosis 12/21/2016    Dysphonia     Easy bruising     Elevated coronary artery calcium score      CT calcium score 1730    GERD (gastroesophageal reflux disease)     History of arthrodesis 2023    Hyperlipidemia     Hypertension     Lipoma of arm     Left upper arm    Lumbar disc disease     Moriah's edema of vocal folds     s/p Moriah's edema surgery performed on 23.    Sinus bradycardia 2013    Formatting of this note might be different from the original. Overview: Asymptomatic, noticed on exam Formatting of this note might be different from the original. Asymptomatic, noticed on exam    Tachycardia 2024    Tobacco use disorder, mild, in early remission     Quit 2024,  PPD x 20 years    Vocal cord polyp      Past Surgical History:   Procedure Laterality Date    ANTERIOR CRUCIATE LIGAMENT REPAIR Left     CARDIAC ELECTROPHYSIOLOGY STUDY AND ABLATION      2023, 2023    CERVICAL LAMINECTOMY  2017    Cervical surgery    COLONOSCOPY      COSMETIC SURGERY  2018    face lift    LIPOMA RESECTION  2024    SKIN, LEFT UPPER ARM, EXCISION: --EPIDERMAL INCLUSION CYST, RUPTURED AND INFLAMED    LUMBAR LAMINECTOMY  2024    L4-5    MOUTH SURGERY  2018    Oral surgery-peridontal    THROAT SURGERY      polyp removed     Family History   Problem Relation Name Age of Onset    Heart failure Mother      Dementia Mother      Hypertension Mother      Dementia Father      No Known Problems Sister      No Known Problems Sister      Heart disease Brother      Other (back pain) Brother      No Known Problems Brother      No Known Problems Brother       Social History     Tobacco Use    Smoking status: Former     Current packs/day: 0.00     Average packs/day: 0.3 packs/day for 30.0 years (7.5 ttl pk-yrs)     Types: Cigarettes     Start date:      Quit date: 2024     Years since quittin.9    Smokeless tobacco: Never   Vaping Use    Vaping status: Never Used   Substance Use Topics    Alcohol use: Yes     Alcohol/week: 4.0 standard drinks of alcohol     Types: 1 Glasses of wine, 3  Shots of liquor per week     Comment: 4 nights a week    Drug use: Yes     Frequency: 7.0 times per week     Types: Marijuana     Comment: marijuana gummies for pain daily, last night was the last use       Physical Exam   ED Triage Vitals [12/24/24 1142]   Temperature Heart Rate Respirations BP   36.6 °C (97.8 °F) 54 16 146/60      Pulse Ox Temp src Heart Rate Source Patient Position   96 % -- -- --      BP Location FiO2 (%)     -- --       Physical Exam  Constitutional:       Appearance: Normal appearance.   HENT:      Head: Normocephalic and atraumatic.      Right Ear: External ear normal.      Left Ear: External ear normal.      Nose: Nose normal.      Mouth/Throat:      Mouth: Mucous membranes are moist.   Eyes:      General: Lids are normal.      Extraocular Movements: Extraocular movements intact.      Pupils: Pupils are equal, round, and reactive to light.   Neck:      Comments: Patient has no bruit in the neck.  There is a well-healing surgical incision of the patient's left carotid.  Cardiovascular:      Rate and Rhythm: Normal rate and regular rhythm.      Heart sounds: Normal heart sounds.   Pulmonary:      Effort: Pulmonary effort is normal.      Breath sounds: Normal breath sounds.   Abdominal:      General: Abdomen is flat.      Palpations: Abdomen is soft.      Tenderness: There is no abdominal tenderness. There is no guarding or rebound.   Musculoskeletal:         General: No deformity. Normal range of motion.      Cervical back: Normal range of motion and neck supple.   Skin:     General: Skin is warm.      Capillary Refill: Capillary refill takes less than 2 seconds.      Coloration: Skin is not jaundiced.   Neurological:      General: No focal deficit present.      Mental Status: She is alert and oriented to person, place, and time.   Psychiatric:         Mood and Affect: Mood normal.         Behavior: Behavior normal.           ED Course & MDM   Diagnoses as of 12/24/24 2012   Acute  nonintractable headache, unspecified headache type                 No data recorded                                 Medical Decision Making  Medical Decision Making: Patient remained stable during her time in the emergency department.  I reviewed the patient's most recent quired carotid Doppler.  Wall thickening of the endarterectomy patch site and a linear mobile structure noted distal to the patch indicative of a linear plaque versus dissection versus visible stitches.    Given these findings, I ordered a CT angiography of the patient's head and neck.  As the patient recently had blood work, I did not order repeat blood work.  CT angiography of the patient's head and neck demonstrated no significant abnormality particularly no evidence of dissection.  The patient was reassured.  At this time, given the patient's otherwise negative workup and comfortable the patient being discharged home she was instructed to follow-up with her vascular surgeon as an outpatient was given Tylenol in the emergency department the patient was then discharged home in otherwise stable condition.    Amount and/or Complexity of Data Reviewed  Radiology: ordered. Decision-making details documented in ED Course.        Procedure  Procedures     Domingo Vega MD  12/24/24 2019

## 2024-12-24 NOTE — TELEPHONE ENCOUNTER
Patient called with symptoms of left-sided neck pain and numbness, left sided head pain, behind left ear, and left jaw numbness.    She is s/p L CEA on 12/17 by Dr. Blackman. She reports that sx started post-op but she feels like it is getting worse.     I directed her to the nearest ED for evaluation, per Dr. Blackman.     She wants to get her carotid duplex done today at [facility] (already scheduled) and then if sx worsen she agreed to go to ED.      Communicated plan with Tana RANDHAWA, who will update me with results.     Magdalene Wooten, APRN-CNP  Vascular Surgery

## 2024-12-24 NOTE — ED TRIAGE NOTES
PT is A/Ox4 coming in for a headache that started Saturday PT recently;y had surgery on the carotid artery and stated  she needs a ct scan. PT denies any other symptoms

## 2024-12-24 NOTE — TELEPHONE ENCOUNTER
Directed patient to ED for CTA head/neck following results of her carotid duplex at [facility] now, and given her symptoms of left-sided neck pain and numbness, left sided head pain, behind left ear, and left jaw numbness.     She is s/p L carotid endarterectomy on 12/17 by Dr. Blackman.     Dr. Blackman will review CTA images once complete.    Patient has persistent pain and is asking to take pain medication now.      Told her to wait and be seen in ED and they will treat the pain.     She agreed to plan.     Magdalene Wooten, APRN-CNP  Vascular Surgery

## 2024-12-30 ENCOUNTER — OFFICE VISIT (OUTPATIENT)
Dept: VASCULAR SURGERY | Facility: HOSPITAL | Age: 79
End: 2024-12-30
Payer: MEDICARE

## 2024-12-30 VITALS
OXYGEN SATURATION: 96 % | HEIGHT: 63 IN | DIASTOLIC BLOOD PRESSURE: 71 MMHG | SYSTOLIC BLOOD PRESSURE: 118 MMHG | HEART RATE: 83 BPM | WEIGHT: 113.4 LBS | BODY MASS INDEX: 20.09 KG/M2

## 2024-12-30 DIAGNOSIS — I65.23 CAROTID STENOSIS, ASYMPTOMATIC, BILATERAL: Primary | ICD-10-CM

## 2024-12-30 PROCEDURE — 3078F DIAST BP <80 MM HG: CPT | Performed by: SURGERY

## 2024-12-30 PROCEDURE — 3074F SYST BP LT 130 MM HG: CPT | Performed by: SURGERY

## 2024-12-30 PROCEDURE — 99211 OFF/OP EST MAY X REQ PHY/QHP: CPT | Performed by: SURGERY

## 2024-12-30 PROCEDURE — 1157F ADVNC CARE PLAN IN RCRD: CPT | Performed by: SURGERY

## 2024-12-30 PROCEDURE — 1036F TOBACCO NON-USER: CPT | Performed by: SURGERY

## 2024-12-30 PROCEDURE — 1123F ACP DISCUSS/DSCN MKR DOCD: CPT | Performed by: SURGERY

## 2024-12-30 PROCEDURE — 1159F MED LIST DOCD IN RCRD: CPT | Performed by: SURGERY

## 2024-12-30 PROCEDURE — 1111F DSCHRG MED/CURRENT MED MERGE: CPT | Performed by: SURGERY

## 2024-12-30 NOTE — PROGRESS NOTES
Vascular Surgery Clinic Note    PCP: Windy Batrholomew MD    CC: follow up after left carotid endarterectomy on 12/19/24  Subjective   HPI:  Lynsey Forman is 79 y.o. female with history of asymptomatic carotid artery stenosis who presents for outpatient follow up after elective left carotid endarterectomy on 12/19/24. Initially she did have left sided headaches and numbness along her jaw and the back of her neck. She underwent duplex ultrasound and CTA on 12/24/24 which showed expected post operative changes. Her headaches and numbness have since resolved. Her blood pressure remains well controlled. She continues to take her xarelto and aspirin and atorvastatin. She does not use tobacco products.       Past Vascular History:  12/19/24 - left carotid endarterectomy     PMH/PSH: Hypertension, hyperlipidemia    Home Meds:  Current Outpatient Medications on File Prior to Visit   Medication Sig Dispense Refill    acetaminophen (Tylenol) 325 mg tablet Take 2 tablets (650 mg) by mouth every 6 hours if needed for mild pain (1 - 3) or moderate pain (4 - 6).      amLODIPine (Norvasc) 5 mg tablet TAKE 1 TABLET BY MOUTH DAILY 90 tablet 1    aspirin 81 mg EC tablet Take 1 tablet (81 mg) by mouth once daily. 90 tablet 3    atorvastatin (Lipitor) 80 mg tablet Take 1 tablet (80 mg) by mouth once daily. 90 tablet 3    ezetimibe (Zetia) 10 mg tablet Take 1 tablet (10 mg) by mouth once daily. 30 tablet 11    furosemide (Lasix) 20 mg tablet Take 1 tablet (20 mg) by mouth if needed (for ankle swelling). 30 tablet 0    gabapentin (Neurontin) 300 mg capsule Take 1 capsule (300 mg) by mouth 3 times a day. 270 capsule 1    hydrOXYzine HCL (Atarax) 25 mg tablet Take 1 tablet (25 mg) by mouth if needed for anxiety.      lisinopril 20 mg tablet TAKE 1 TABLET BY MOUTH DAILY 90 tablet 1    rivaroxaban (Xarelto) 15 mg tablet Take 1 tablet (15 mg) by mouth once daily in the evening. Take with meals. Resume 12/21/24 Do not fill before December  2024.      triamterene-hydrochlorothiazid (Maxzide-25) 37.5-25 mg tablet Take 0.5 tablets by mouth once daily.      [] oxyCODONE (Roxicodone) 5 mg immediate release tablet Take 1 tablet (5 mg) by mouth every 8 hours if needed for severe pain (7 - 10) for up to 3 days. (Patient not taking: Reported on 2024) 10 tablet 0     No current facility-administered medications on file prior to visit.      Allergies:  Allergies   Allergen Reactions    Codeine Anxiety, Itching, Unknown and Other     Denies itching, hive, shortness of breath     SH/FH:  Tobacco Use: Medium Risk (2024)    Patient History     Smoking Tobacco Use: Former     Smokeless Tobacco Use: Never     Passive Exposure: Not on file     ROS: 12 system negative except HPI    Objective   Vitals:  Vitals:    24 1257   BP: 118/71   Pulse: 83   SpO2: 96%     Exam:  Constitutional: No acute distress  Neuro:  AOx3, CN II-XII grossly intact  CV: no tachycardia  Pulm: non-labored on room air  GI: soft, non-tender, non-distended  Extremities: sensory and motor intact, no wounds    Imaging: Independently reviewed   - carotid duplex: B ICA without any flow limiting stenosis   - CTA shows expected post operative changes with widely patent L ICA; no dissection.     Assessment/Plan   Lynsey Forman is 79 y.o. female with history of asymptomatic carotid artery stenosis who presents for outpatient follow up after elective left carotid endarterectomy on 24.    Plan:  Return to clinic in 6 months   Meds: continue aspirin, xarelto, atorvastatin  Screening/Surveillance: 6 month carotid duplex   Follow-up: 6 months with carotid duplex       Seen, examined, and discussed with Dr. Hiral Ballard MD  Vascular Surgery Fellow  24  1:23 PM

## 2025-01-03 LAB
ATRIAL RATE: 56 BPM
P AXIS: -38 DEGREES
P OFFSET: 209 MS
P ONSET: 136 MS
PR INTERVAL: 190 MS
Q ONSET: 231 MS
QRS COUNT: 10 BEATS
QRS DURATION: 74 MS
QT INTERVAL: 412 MS
QTC CALCULATION(BAZETT): 397 MS
QTC FREDERICIA: 403 MS
R AXIS: 62 DEGREES
T AXIS: 11 DEGREES
T OFFSET: 437 MS
VENTRICULAR RATE: 56 BPM

## 2025-01-06 ENCOUNTER — HOSPITAL ENCOUNTER (OUTPATIENT)
Dept: RADIOLOGY | Facility: CLINIC | Age: 80
Discharge: HOME | End: 2025-01-06
Payer: MEDICARE

## 2025-01-06 DIAGNOSIS — Z78.0 ASYMPTOMATIC MENOPAUSAL STATE: ICD-10-CM

## 2025-01-06 PROCEDURE — 77080 DXA BONE DENSITY AXIAL: CPT | Performed by: RADIOLOGY

## 2025-01-06 PROCEDURE — 77080 DXA BONE DENSITY AXIAL: CPT

## 2025-01-20 ENCOUNTER — APPOINTMENT (OUTPATIENT)
Dept: VASCULAR MEDICINE | Facility: HOSPITAL | Age: 80
End: 2025-01-20
Payer: MEDICARE

## 2025-01-25 ENCOUNTER — HOSPITAL ENCOUNTER (INPATIENT)
Facility: HOSPITAL | Age: 80
DRG: 384 | End: 2025-01-25
Attending: EMERGENCY MEDICINE | Admitting: STUDENT IN AN ORGANIZED HEALTH CARE EDUCATION/TRAINING PROGRAM
Payer: MEDICARE

## 2025-01-25 ENCOUNTER — APPOINTMENT (OUTPATIENT)
Dept: RADIOLOGY | Facility: HOSPITAL | Age: 80
DRG: 384 | End: 2025-01-25
Payer: MEDICARE

## 2025-01-25 ENCOUNTER — APPOINTMENT (OUTPATIENT)
Dept: CARDIOLOGY | Facility: HOSPITAL | Age: 80
DRG: 384 | End: 2025-01-25
Payer: MEDICARE

## 2025-01-25 DIAGNOSIS — R07.9 CHEST PAIN AT REST: ICD-10-CM

## 2025-01-25 DIAGNOSIS — R79.89 ELEVATED TROPONIN: Primary | ICD-10-CM

## 2025-01-25 DIAGNOSIS — I10 PRIMARY HYPERTENSION: ICD-10-CM

## 2025-01-25 DIAGNOSIS — K25.3 ACUTE GASTRIC ULCER WITHOUT HEMORRHAGE OR PERFORATION: ICD-10-CM

## 2025-01-25 DIAGNOSIS — K27.9 PEPTIC ULCER: ICD-10-CM

## 2025-01-25 DIAGNOSIS — I25.9 CHEST PAIN DUE TO MYOCARDIAL ISCHEMIA, UNSPECIFIED ISCHEMIC CHEST PAIN TYPE: ICD-10-CM

## 2025-01-25 DIAGNOSIS — R93.3 ABNORMAL CT SCAN, STOMACH: ICD-10-CM

## 2025-01-25 DIAGNOSIS — K29.00 ACUTE SUPERFICIAL GASTRITIS WITHOUT HEMORRHAGE: ICD-10-CM

## 2025-01-25 LAB
ALBUMIN SERPL BCP-MCNC: 4.4 G/DL (ref 3.4–5)
ALP SERPL-CCNC: 58 U/L (ref 33–136)
ALT SERPL W P-5'-P-CCNC: 12 U/L (ref 7–45)
ANION GAP SERPL CALC-SCNC: 13 MMOL/L (ref 10–20)
APPEARANCE UR: CLEAR
AST SERPL W P-5'-P-CCNC: 20 U/L (ref 9–39)
BASOPHILS # BLD AUTO: 0.05 X10*3/UL (ref 0–0.1)
BASOPHILS NFR BLD AUTO: 0.3 %
BILIRUB SERPL-MCNC: 1.1 MG/DL (ref 0–1.2)
BILIRUB UR STRIP.AUTO-MCNC: NEGATIVE MG/DL
BUN SERPL-MCNC: 21 MG/DL (ref 6–23)
CALCIUM SERPL-MCNC: 9.4 MG/DL (ref 8.6–10.3)
CARDIAC TROPONIN I PNL SERPL HS: 22 NG/L (ref 0–13)
CARDIAC TROPONIN I PNL SERPL HS: 69 NG/L (ref 0–13)
CHLORIDE SERPL-SCNC: 86 MMOL/L (ref 98–107)
CO2 SERPL-SCNC: 31 MMOL/L (ref 21–32)
COLOR UR: COLORLESS
CREAT SERPL-MCNC: 0.78 MG/DL (ref 0.5–1.05)
EGFRCR SERPLBLD CKD-EPI 2021: 78 ML/MIN/1.73M*2
EOSINOPHIL # BLD AUTO: 0.02 X10*3/UL (ref 0–0.4)
EOSINOPHIL NFR BLD AUTO: 0.1 %
ERYTHROCYTE [DISTWIDTH] IN BLOOD BY AUTOMATED COUNT: 13.2 % (ref 11.5–14.5)
FLUAV RNA RESP QL NAA+PROBE: NOT DETECTED
FLUBV RNA RESP QL NAA+PROBE: NOT DETECTED
GLUCOSE SERPL-MCNC: 138 MG/DL (ref 74–99)
GLUCOSE UR STRIP.AUTO-MCNC: NORMAL MG/DL
HCT VFR BLD AUTO: 37.2 % (ref 36–46)
HGB BLD-MCNC: 13.1 G/DL (ref 12–16)
IMM GRANULOCYTES # BLD AUTO: 0.07 X10*3/UL (ref 0–0.5)
IMM GRANULOCYTES NFR BLD AUTO: 0.5 % (ref 0–0.9)
KETONES UR STRIP.AUTO-MCNC: ABNORMAL MG/DL
LACTATE SERPL-SCNC: 1.6 MMOL/L (ref 0.4–2)
LEUKOCYTE ESTERASE UR QL STRIP.AUTO: NEGATIVE
LIPASE SERPL-CCNC: 23 U/L (ref 9–82)
LYMPHOCYTES # BLD AUTO: 0.44 X10*3/UL (ref 0.8–3)
LYMPHOCYTES NFR BLD AUTO: 3.1 %
MAGNESIUM SERPL-MCNC: 1.56 MG/DL (ref 1.6–2.4)
MCH RBC QN AUTO: 32.6 PG (ref 26–34)
MCHC RBC AUTO-ENTMCNC: 35.2 G/DL (ref 32–36)
MCV RBC AUTO: 93 FL (ref 80–100)
MONOCYTES # BLD AUTO: 0.49 X10*3/UL (ref 0.05–0.8)
MONOCYTES NFR BLD AUTO: 3.4 %
NEUTROPHILS # BLD AUTO: 13.26 X10*3/UL (ref 1.6–5.5)
NEUTROPHILS NFR BLD AUTO: 92.6 %
NITRITE UR QL STRIP.AUTO: NEGATIVE
NRBC BLD-RTO: 0 /100 WBCS (ref 0–0)
PH UR STRIP.AUTO: 7 [PH]
PLATELET # BLD AUTO: 190 X10*3/UL (ref 150–450)
POTASSIUM SERPL-SCNC: 3.4 MMOL/L (ref 3.5–5.3)
PROT SERPL-MCNC: 7.7 G/DL (ref 6.4–8.2)
PROT UR STRIP.AUTO-MCNC: ABNORMAL MG/DL
RBC # BLD AUTO: 4.02 X10*6/UL (ref 4–5.2)
RBC # UR STRIP.AUTO: ABNORMAL /UL
RBC #/AREA URNS AUTO: NORMAL /HPF
SARS-COV-2 RNA RESP QL NAA+PROBE: NOT DETECTED
SODIUM SERPL-SCNC: 127 MMOL/L (ref 136–145)
SP GR UR STRIP.AUTO: 1.01
UROBILINOGEN UR STRIP.AUTO-MCNC: NORMAL MG/DL
WBC # BLD AUTO: 14.3 X10*3/UL (ref 4.4–11.3)
WBC #/AREA URNS AUTO: NORMAL /HPF

## 2025-01-25 PROCEDURE — 85025 COMPLETE CBC W/AUTO DIFF WBC: CPT | Performed by: EMERGENCY MEDICINE

## 2025-01-25 PROCEDURE — 93005 ELECTROCARDIOGRAM TRACING: CPT

## 2025-01-25 PROCEDURE — 96366 THER/PROPH/DIAG IV INF ADDON: CPT

## 2025-01-25 PROCEDURE — 71046 X-RAY EXAM CHEST 2 VIEWS: CPT | Performed by: STUDENT IN AN ORGANIZED HEALTH CARE EDUCATION/TRAINING PROGRAM

## 2025-01-25 PROCEDURE — 81001 URINALYSIS AUTO W/SCOPE: CPT | Performed by: EMERGENCY MEDICINE

## 2025-01-25 PROCEDURE — 2550000001 HC RX 255 CONTRASTS: Performed by: EMERGENCY MEDICINE

## 2025-01-25 PROCEDURE — 96375 TX/PRO/DX INJ NEW DRUG ADDON: CPT

## 2025-01-25 PROCEDURE — 74177 CT ABD & PELVIS W/CONTRAST: CPT | Performed by: RADIOLOGY

## 2025-01-25 PROCEDURE — 36415 COLL VENOUS BLD VENIPUNCTURE: CPT | Performed by: EMERGENCY MEDICINE

## 2025-01-25 PROCEDURE — 70450 CT HEAD/BRAIN W/O DYE: CPT

## 2025-01-25 PROCEDURE — 80053 COMPREHEN METABOLIC PANEL: CPT | Performed by: EMERGENCY MEDICINE

## 2025-01-25 PROCEDURE — 74177 CT ABD & PELVIS W/CONTRAST: CPT

## 2025-01-25 PROCEDURE — 84484 ASSAY OF TROPONIN QUANT: CPT | Performed by: EMERGENCY MEDICINE

## 2025-01-25 PROCEDURE — 99285 EMERGENCY DEPT VISIT HI MDM: CPT | Mod: 25 | Performed by: EMERGENCY MEDICINE

## 2025-01-25 PROCEDURE — 83605 ASSAY OF LACTIC ACID: CPT | Performed by: EMERGENCY MEDICINE

## 2025-01-25 PROCEDURE — 2500000001 HC RX 250 WO HCPCS SELF ADMINISTERED DRUGS (ALT 637 FOR MEDICARE OP): Performed by: EMERGENCY MEDICINE

## 2025-01-25 PROCEDURE — 87636 SARSCOV2 & INF A&B AMP PRB: CPT | Performed by: EMERGENCY MEDICINE

## 2025-01-25 PROCEDURE — 70450 CT HEAD/BRAIN W/O DYE: CPT | Performed by: RADIOLOGY

## 2025-01-25 PROCEDURE — 2500000004 HC RX 250 GENERAL PHARMACY W/ HCPCS (ALT 636 FOR OP/ED)

## 2025-01-25 PROCEDURE — 2500000004 HC RX 250 GENERAL PHARMACY W/ HCPCS (ALT 636 FOR OP/ED): Performed by: EMERGENCY MEDICINE

## 2025-01-25 PROCEDURE — 83690 ASSAY OF LIPASE: CPT | Performed by: EMERGENCY MEDICINE

## 2025-01-25 PROCEDURE — 83735 ASSAY OF MAGNESIUM: CPT | Performed by: EMERGENCY MEDICINE

## 2025-01-25 PROCEDURE — 96376 TX/PRO/DX INJ SAME DRUG ADON: CPT

## 2025-01-25 PROCEDURE — 96365 THER/PROPH/DIAG IV INF INIT: CPT

## 2025-01-25 PROCEDURE — 96361 HYDRATE IV INFUSION ADD-ON: CPT

## 2025-01-25 PROCEDURE — 71046 X-RAY EXAM CHEST 2 VIEWS: CPT

## 2025-01-25 RX ORDER — ONDANSETRON HYDROCHLORIDE 2 MG/ML
INJECTION, SOLUTION INTRAVENOUS
Status: COMPLETED
Start: 2025-01-25 | End: 2025-01-25

## 2025-01-25 RX ORDER — PANTOPRAZOLE SODIUM 40 MG/10ML
40 INJECTION, POWDER, LYOPHILIZED, FOR SOLUTION INTRAVENOUS ONCE
Status: COMPLETED | OUTPATIENT
Start: 2025-01-25 | End: 2025-01-25

## 2025-01-25 RX ORDER — NAPROXEN SODIUM 220 MG/1
324 TABLET, FILM COATED ORAL ONCE
Status: COMPLETED | OUTPATIENT
Start: 2025-01-25 | End: 2025-01-25

## 2025-01-25 RX ORDER — METOCLOPRAMIDE HYDROCHLORIDE 5 MG/ML
10 INJECTION INTRAMUSCULAR; INTRAVENOUS ONCE
Status: COMPLETED | OUTPATIENT
Start: 2025-01-25 | End: 2025-01-25

## 2025-01-25 RX ORDER — ONDANSETRON HYDROCHLORIDE 2 MG/ML
4 INJECTION, SOLUTION INTRAVENOUS ONCE
Status: COMPLETED | OUTPATIENT
Start: 2025-01-25 | End: 2025-01-25

## 2025-01-25 RX ORDER — MAGNESIUM SULFATE HEPTAHYDRATE 40 MG/ML
2 INJECTION, SOLUTION INTRAVENOUS ONCE
Status: COMPLETED | OUTPATIENT
Start: 2025-01-25 | End: 2025-01-25

## 2025-01-25 RX ADMIN — ASPIRIN 324 MG: 81 TABLET, CHEWABLE ORAL at 22:34

## 2025-01-25 RX ADMIN — PANTOPRAZOLE SODIUM 40 MG: 40 INJECTION, POWDER, FOR SOLUTION INTRAVENOUS at 18:53

## 2025-01-25 RX ADMIN — SODIUM CHLORIDE 1000 ML: 9 INJECTION, SOLUTION INTRAVENOUS at 18:52

## 2025-01-25 RX ADMIN — METOCLOPRAMIDE 10 MG: 5 INJECTION, SOLUTION INTRAMUSCULAR; INTRAVENOUS at 18:53

## 2025-01-25 RX ADMIN — IOHEXOL 75 ML: 350 INJECTION, SOLUTION INTRAVENOUS at 20:10

## 2025-01-25 RX ADMIN — PANTOPRAZOLE SODIUM 40 MG: 40 INJECTION, POWDER, FOR SOLUTION INTRAVENOUS at 22:34

## 2025-01-25 RX ADMIN — MAGNESIUM SULFATE HEPTAHYDRATE 2 G: 40 INJECTION, SOLUTION INTRAVENOUS at 20:39

## 2025-01-25 RX ADMIN — ONDANSETRON HYDROCHLORIDE 4 MG: 2 INJECTION, SOLUTION INTRAVENOUS at 20:42

## 2025-01-25 RX ADMIN — ONDANSETRON 4 MG: 2 INJECTION, SOLUTION INTRAMUSCULAR; INTRAVENOUS at 20:42

## 2025-01-25 ASSESSMENT — COLUMBIA-SUICIDE SEVERITY RATING SCALE - C-SSRS
2. HAVE YOU ACTUALLY HAD ANY THOUGHTS OF KILLING YOURSELF?: NO
6. HAVE YOU EVER DONE ANYTHING, STARTED TO DO ANYTHING, OR PREPARED TO DO ANYTHING TO END YOUR LIFE?: NO
1. IN THE PAST MONTH, HAVE YOU WISHED YOU WERE DEAD OR WISHED YOU COULD GO TO SLEEP AND NOT WAKE UP?: NO

## 2025-01-25 ASSESSMENT — PAIN - FUNCTIONAL ASSESSMENT: PAIN_FUNCTIONAL_ASSESSMENT: 0-10

## 2025-01-25 ASSESSMENT — PAIN DESCRIPTION - LOCATION: LOCATION: HEAD

## 2025-01-25 ASSESSMENT — PAIN SCALES - GENERAL: PAINLEVEL_OUTOF10: 3

## 2025-01-25 ASSESSMENT — PAIN DESCRIPTION - PAIN TYPE: TYPE: ACUTE PAIN

## 2025-01-25 NOTE — ED PROVIDER NOTES
HPI   Chief Complaint   Patient presents with    Flu Symptoms       The patient is a 79-year-old female past medical history of atrial fibrillation, chronically anticoagulated, carotid artery aneurysm status post surgical repair at the end of last year, CAD, CKD, hypertension, hyperlipidemia presenting with nausea, vomiting, diarrhea, headache.  Notes she developed nausea/vomiting over the last 1-2 days, describes multiple episodes of nonbloody, nonbilious emesis, also associated with loose stools.  Denies any dark black or bloody stools.  Denies any coffee-ground emesis/hematemesis.  Notes she has had poor p.o. intake due to symptoms over the last 1-2 days.  Also notes developing a headache over the last 24 hours associated with vomiting.  Headache was not maximal at onset, denies any associated double/blurred vision, denies any associated numbness or weakness in any of her extremities.  Denies any associated neck pain or stiffness.  Denies any inciting falls or other traumatic injury.  Denies any dysuria, hematuria, denies any flank pain.  Denies any lower extremity edema or pain.  Denies any sick contacts, suspicious ingestions.              Patient History   Past Medical History:   Diagnosis Date    Abnormal kidney function 01/08/2024    BUN-24, GFR-56 (4/29/24)    Alcohol use, unspecified, uncomplicated 12/20/2022    Aneurysm of carotid artery (CMS-HCC)     Right MCA, followed by neurology    Anxiety     Atrial fibrillation (Multi)     no reccurance s/p ablation 4/2023 and 7/2023    Atrial flutter (Multi)     Carotid stenosis, asymptomatic, bilateral     on ASA and statin    Chronic cough 11/24/2009    Formatting of this note might be different from the original. Overview: No change with stopping lisinopril, no change with Advair, consensus of ENT, pulmonary, here is LP reflux, has associated past-nasal drip that does not respond to Mucinex or Flonase, 2012 has large nasal polyp L Formatting of this note might  be different from the original. No change with stopping lisinopril, no change with Adv    Chronic pain disorder     CKD (chronic kidney disease)     stage III    Coronary artery disease     Diverticulosis 12/21/2016    Dysphonia     Easy bruising     Elevated coronary artery calcium score     CT calcium score 1730    GERD (gastroesophageal reflux disease)     History of arthrodesis 07/07/2023    Hyperlipidemia     Hypertension     Lipoma of arm     Left upper arm    Lumbar disc disease     Moriah's edema of vocal folds     s/p Moriah's edema surgery performed on 08/09/23.    Sinus bradycardia 01/04/2013    Formatting of this note might be different from the original. Overview: Asymptomatic, noticed on exam Formatting of this note might be different from the original. Asymptomatic, noticed on exam    Tachycardia 01/08/2024    Tobacco use disorder, mild, in early remission     Quit 1/1/2024, 1/4 PPD x 20 years    Vocal cord polyp      Past Surgical History:   Procedure Laterality Date    ANTERIOR CRUCIATE LIGAMENT REPAIR Left     CARDIAC ELECTROPHYSIOLOGY STUDY AND ABLATION      4/2023, 7/2023    CERVICAL LAMINECTOMY  2017    Cervical surgery    COLONOSCOPY      COSMETIC SURGERY  2018    face lift    LIPOMA RESECTION  06/18/2024    SKIN, LEFT UPPER ARM, EXCISION: --EPIDERMAL INCLUSION CYST, RUPTURED AND INFLAMED    LUMBAR LAMINECTOMY  05/2024    L4-5    MOUTH SURGERY  2018    Oral surgery-peridontal    THROAT SURGERY  2023    polyp removed     Family History   Problem Relation Name Age of Onset    Heart failure Mother      Dementia Mother      Hypertension Mother      Dementia Father      No Known Problems Sister      No Known Problems Sister      Heart disease Brother      Other (back pain) Brother      No Known Problems Brother      No Known Problems Brother       Social History     Tobacco Use    Smoking status: Former     Current packs/day: 0.00     Average packs/day: 0.3 packs/day for 30.0 years (7.5 ttl pk-yrs)      Types: Cigarettes     Start date:      Quit date: 2024     Years since quittin.0    Smokeless tobacco: Never   Vaping Use    Vaping status: Never Used   Substance Use Topics    Alcohol use: Yes     Alcohol/week: 4.0 standard drinks of alcohol     Types: 1 Glasses of wine, 3 Shots of liquor per week     Comment: 4 nights a week    Drug use: Yes     Frequency: 7.0 times per week     Types: Marijuana     Comment: marijuana gummies for pain daily, last night was the last use       Physical Exam   ED Triage Vitals [25 1709]   Temperature Heart Rate Respirations BP   36.6 °C (97.8 °F) 53 18 157/73      Pulse Ox Temp Source Heart Rate Source Patient Position   98 % Temporal Monitor Sitting      BP Location FiO2 (%)     Left arm --       Physical Exam      ED Course & MDM   Diagnoses as of 25 0210   Elevated troponin   Acute superficial gastritis without hemorrhage   Peptic ulcer                 No data recorded                                 Medical Decision Making      Procedure  Procedures   are normal. There is no distension.      Palpations: Abdomen is soft.      Tenderness: There is generalized abdominal tenderness. There is no right CVA tenderness, left CVA tenderness, guarding or rebound.   Musculoskeletal:      Cervical back: Full passive range of motion without pain.      Right lower leg: No edema.      Left lower leg: No edema.   Skin:     General: Skin is warm and dry.   Neurological:      General: No focal deficit present.      Mental Status: She is alert and oriented to person, place, and time.      GCS: GCS eye subscore is 4. GCS verbal subscore is 5. GCS motor subscore is 6.      Cranial Nerves: No cranial nerve deficit.      Sensory: No sensory deficit.      Motor: No weakness, tremor or abnormal muscle tone.   Psychiatric:         Mood and Affect: Mood normal.           ED Course & MDM   ED Course as of 01/28/25 2347   Sun Jan 26, 2025   0212 EKG, interpreted by me: Atrial fibrillation, rate 60 bpm.  Normal axis.  Similar slight diffuse ST depression unchanged from prior EKGs.  No acute T wave abnormalities.  QTc 450.  No evidence of acute STEMI at this time. [JG]      ED Course User Index  [JG] Sheri Colorado MD         Diagnoses as of 01/28/25 2347   Elevated troponin   Acute superficial gastritis without hemorrhage   Peptic ulcer                 No data recorded                                 Medical Decision Making  Patient presenting with nausea, vomiting, abdominal discomfort over the last day.  Does have mild abdominal tenderness on examination but no evidence of channing peritonitis.  Assess with labs, CT scan to assess for hepatobiliary pathology, colitis, gastroenteritis, diverticulitis, obstruction.  CT imaging shows evidence of gastritis/duodenitis, does show a deep peptic ulcer but no evidence of perforation.  Given patient is a as well as obtain troponins to assess for atypical cardiac pathology.  On reexamination patient feeling somewhat better after medication  administration but still moderately nauseated.  Patient troponin is mildly elevated but flat suggesting against occlusive myocardial infarction.  Did administer dose of aspirin given bump troponins.  Patient otherwise admitted for symptom control, further cardiac restratification, potential EGD given large peptic ulcer noted on CT scan.  Admitted in stable condition.        Procedure  Procedures     Omi Prescott MD  01/28/25 4566

## 2025-01-26 ENCOUNTER — APPOINTMENT (OUTPATIENT)
Dept: CARDIOLOGY | Facility: HOSPITAL | Age: 80
DRG: 384 | End: 2025-01-26
Payer: MEDICARE

## 2025-01-26 ENCOUNTER — APPOINTMENT (OUTPATIENT)
Dept: RADIOLOGY | Facility: HOSPITAL | Age: 80
DRG: 384 | End: 2025-01-26
Payer: MEDICARE

## 2025-01-26 VITALS
BODY MASS INDEX: 19.49 KG/M2 | RESPIRATION RATE: 17 BRPM | HEIGHT: 63 IN | SYSTOLIC BLOOD PRESSURE: 126 MMHG | WEIGHT: 110 LBS | OXYGEN SATURATION: 92 % | DIASTOLIC BLOOD PRESSURE: 59 MMHG | HEART RATE: 61 BPM | TEMPERATURE: 99.1 F

## 2025-01-26 PROBLEM — K25.3 ACUTE GASTRIC ULCER: Status: ACTIVE | Noted: 2025-01-26

## 2025-01-26 PROBLEM — I48.19 PERSISTENT ATRIAL FIBRILLATION (MULTI): Status: ACTIVE | Noted: 2024-06-18

## 2025-01-26 PROBLEM — R79.89 ELEVATED TROPONIN: Status: ACTIVE | Noted: 2025-01-26

## 2025-01-26 LAB
ANION GAP SERPL CALC-SCNC: 11 MMOL/L (ref 10–20)
BNP SERPL-MCNC: 796 PG/ML (ref 0–99)
BUN SERPL-MCNC: 12 MG/DL (ref 6–23)
CALCIUM SERPL-MCNC: 7.1 MG/DL (ref 8.6–10.3)
CARDIAC TROPONIN I PNL SERPL HS: 147 NG/L (ref 0–13)
CARDIAC TROPONIN I PNL SERPL HS: 219 NG/L (ref 0–13)
CARDIAC TROPONIN I PNL SERPL HS: 409 NG/L (ref 0–13)
CARDIAC TROPONIN I PNL SERPL HS: 453 NG/L (ref 0–13)
CHLORIDE SERPL-SCNC: 98 MMOL/L (ref 98–107)
CO2 SERPL-SCNC: 25 MMOL/L (ref 21–32)
CREAT SERPL-MCNC: 0.59 MG/DL (ref 0.5–1.05)
EGFRCR SERPLBLD CKD-EPI 2021: >90 ML/MIN/1.73M*2
ERYTHROCYTE [DISTWIDTH] IN BLOOD BY AUTOMATED COUNT: 13.3 % (ref 11.5–14.5)
GLUCOSE SERPL-MCNC: 118 MG/DL (ref 74–99)
HCT VFR BLD AUTO: 31.6 % (ref 36–46)
HGB BLD-MCNC: 11.3 G/DL (ref 12–16)
MAGNESIUM SERPL-MCNC: 1.49 MG/DL (ref 1.6–2.4)
MCH RBC QN AUTO: 32.8 PG (ref 26–34)
MCHC RBC AUTO-ENTMCNC: 35.8 G/DL (ref 32–36)
MCV RBC AUTO: 92 FL (ref 80–100)
NRBC BLD-RTO: 0 /100 WBCS (ref 0–0)
PHOSPHATE SERPL-MCNC: 4 MG/DL (ref 2.5–4.9)
PLATELET # BLD AUTO: 161 X10*3/UL (ref 150–450)
POTASSIUM SERPL-SCNC: 2.8 MMOL/L (ref 3.5–5.3)
POTASSIUM SERPL-SCNC: 4.1 MMOL/L (ref 3.5–5.3)
RBC # BLD AUTO: 3.44 X10*6/UL (ref 4–5.2)
SODIUM SERPL-SCNC: 131 MMOL/L (ref 136–145)
UFH PPP CHRO-ACNC: 0.2 IU/ML
WBC # BLD AUTO: 10.1 X10*3/UL (ref 4.4–11.3)

## 2025-01-26 PROCEDURE — 93005 ELECTROCARDIOGRAM TRACING: CPT

## 2025-01-26 PROCEDURE — 1100000001 HC PRIVATE ROOM DAILY

## 2025-01-26 PROCEDURE — 2500000004 HC RX 250 GENERAL PHARMACY W/ HCPCS (ALT 636 FOR OP/ED): Performed by: STUDENT IN AN ORGANIZED HEALTH CARE EDUCATION/TRAINING PROGRAM

## 2025-01-26 PROCEDURE — 84484 ASSAY OF TROPONIN QUANT: CPT | Performed by: EMERGENCY MEDICINE

## 2025-01-26 PROCEDURE — 99223 1ST HOSP IP/OBS HIGH 75: CPT | Performed by: STUDENT IN AN ORGANIZED HEALTH CARE EDUCATION/TRAINING PROGRAM

## 2025-01-26 PROCEDURE — 84484 ASSAY OF TROPONIN QUANT: CPT | Performed by: INTERNAL MEDICINE

## 2025-01-26 PROCEDURE — 96366 THER/PROPH/DIAG IV INF ADDON: CPT

## 2025-01-26 PROCEDURE — 83735 ASSAY OF MAGNESIUM: CPT | Performed by: STUDENT IN AN ORGANIZED HEALTH CARE EDUCATION/TRAINING PROGRAM

## 2025-01-26 PROCEDURE — 2500000002 HC RX 250 W HCPCS SELF ADMINISTERED DRUGS (ALT 637 FOR MEDICARE OP, ALT 636 FOR OP/ED): Performed by: STUDENT IN AN ORGANIZED HEALTH CARE EDUCATION/TRAINING PROGRAM

## 2025-01-26 PROCEDURE — 99232 SBSQ HOSP IP/OBS MODERATE 35: CPT | Performed by: NURSE PRACTITIONER

## 2025-01-26 PROCEDURE — 36415 COLL VENOUS BLD VENIPUNCTURE: CPT | Performed by: STUDENT IN AN ORGANIZED HEALTH CARE EDUCATION/TRAINING PROGRAM

## 2025-01-26 PROCEDURE — 76856 US EXAM PELVIC COMPLETE: CPT

## 2025-01-26 PROCEDURE — 85027 COMPLETE CBC AUTOMATED: CPT | Performed by: STUDENT IN AN ORGANIZED HEALTH CARE EDUCATION/TRAINING PROGRAM

## 2025-01-26 PROCEDURE — 2500000001 HC RX 250 WO HCPCS SELF ADMINISTERED DRUGS (ALT 637 FOR MEDICARE OP): Performed by: STUDENT IN AN ORGANIZED HEALTH CARE EDUCATION/TRAINING PROGRAM

## 2025-01-26 PROCEDURE — 76857 US EXAM PELVIC LIMITED: CPT | Performed by: STUDENT IN AN ORGANIZED HEALTH CARE EDUCATION/TRAINING PROGRAM

## 2025-01-26 PROCEDURE — 96376 TX/PRO/DX INJ SAME DRUG ADON: CPT

## 2025-01-26 PROCEDURE — 2500000002 HC RX 250 W HCPCS SELF ADMINISTERED DRUGS (ALT 637 FOR MEDICARE OP, ALT 636 FOR OP/ED): Performed by: NURSE PRACTITIONER

## 2025-01-26 PROCEDURE — 2500000004 HC RX 250 GENERAL PHARMACY W/ HCPCS (ALT 636 FOR OP/ED): Performed by: NURSE PRACTITIONER

## 2025-01-26 PROCEDURE — 83880 ASSAY OF NATRIURETIC PEPTIDE: CPT | Performed by: EMERGENCY MEDICINE

## 2025-01-26 PROCEDURE — 82374 ASSAY BLOOD CARBON DIOXIDE: CPT | Performed by: STUDENT IN AN ORGANIZED HEALTH CARE EDUCATION/TRAINING PROGRAM

## 2025-01-26 PROCEDURE — 85520 HEPARIN ASSAY: CPT | Performed by: INTERNAL MEDICINE

## 2025-01-26 PROCEDURE — 36415 COLL VENOUS BLD VENIPUNCTURE: CPT | Performed by: EMERGENCY MEDICINE

## 2025-01-26 PROCEDURE — 2500000001 HC RX 250 WO HCPCS SELF ADMINISTERED DRUGS (ALT 637 FOR MEDICARE OP): Performed by: NURSE PRACTITIONER

## 2025-01-26 PROCEDURE — 84100 ASSAY OF PHOSPHORUS: CPT | Performed by: NURSE PRACTITIONER

## 2025-01-26 PROCEDURE — 84484 ASSAY OF TROPONIN QUANT: CPT | Performed by: STUDENT IN AN ORGANIZED HEALTH CARE EDUCATION/TRAINING PROGRAM

## 2025-01-26 PROCEDURE — 99222 1ST HOSP IP/OBS MODERATE 55: CPT | Performed by: INTERNAL MEDICINE

## 2025-01-26 PROCEDURE — 84132 ASSAY OF SERUM POTASSIUM: CPT | Performed by: NURSE PRACTITIONER

## 2025-01-26 PROCEDURE — 99223 1ST HOSP IP/OBS HIGH 75: CPT | Performed by: INTERNAL MEDICINE

## 2025-01-26 RX ORDER — ALUMINUM HYDROXIDE, MAGNESIUM HYDROXIDE, AND SIMETHICONE 1200; 120; 1200 MG/30ML; MG/30ML; MG/30ML
20 SUSPENSION ORAL EVERY 4 HOURS PRN
Status: DISCONTINUED | OUTPATIENT
Start: 2025-01-26 | End: 2025-01-28 | Stop reason: HOSPADM

## 2025-01-26 RX ORDER — AMOXICILLIN AND CLAVULANATE POTASSIUM 875; 125 MG/1; MG/1
1 TABLET, FILM COATED ORAL EVERY 12 HOURS SCHEDULED
Status: DISCONTINUED | OUTPATIENT
Start: 2025-01-26 | End: 2025-01-28 | Stop reason: HOSPADM

## 2025-01-26 RX ORDER — CALCIUM CARBONATE 200(500)MG
500 TABLET,CHEWABLE ORAL 4 TIMES DAILY PRN
Status: DISCONTINUED | OUTPATIENT
Start: 2025-01-26 | End: 2025-01-28 | Stop reason: HOSPADM

## 2025-01-26 RX ORDER — DOCUSATE SODIUM 100 MG/1
100 CAPSULE, LIQUID FILLED ORAL 2 TIMES DAILY PRN
Status: DISCONTINUED | OUTPATIENT
Start: 2025-01-26 | End: 2025-01-28 | Stop reason: HOSPADM

## 2025-01-26 RX ORDER — POTASSIUM CHLORIDE 14.9 MG/ML
20 INJECTION INTRAVENOUS
Status: DISPENSED | OUTPATIENT
Start: 2025-01-26 | End: 2025-01-26

## 2025-01-26 RX ORDER — IPRATROPIUM BROMIDE AND ALBUTEROL SULFATE 2.5; .5 MG/3ML; MG/3ML
3 SOLUTION RESPIRATORY (INHALATION) EVERY 4 HOURS PRN
Status: DISCONTINUED | OUTPATIENT
Start: 2025-01-26 | End: 2025-01-26

## 2025-01-26 RX ORDER — LISINOPRIL 20 MG/1
20 TABLET ORAL DAILY
Status: DISCONTINUED | OUTPATIENT
Start: 2025-01-26 | End: 2025-01-28 | Stop reason: HOSPADM

## 2025-01-26 RX ORDER — HYDROXYZINE HYDROCHLORIDE 25 MG/1
25 TABLET, FILM COATED ORAL EVERY 8 HOURS PRN
Status: DISCONTINUED | OUTPATIENT
Start: 2025-01-26 | End: 2025-01-28 | Stop reason: HOSPADM

## 2025-01-26 RX ORDER — ATORVASTATIN CALCIUM 80 MG/1
80 TABLET, FILM COATED ORAL DAILY
Status: DISCONTINUED | OUTPATIENT
Start: 2025-01-26 | End: 2025-01-28 | Stop reason: HOSPADM

## 2025-01-26 RX ORDER — IPRATROPIUM BROMIDE AND ALBUTEROL SULFATE 2.5; .5 MG/3ML; MG/3ML
3 SOLUTION RESPIRATORY (INHALATION) EVERY 2 HOUR PRN
Status: DISCONTINUED | OUTPATIENT
Start: 2025-01-26 | End: 2025-01-28 | Stop reason: HOSPADM

## 2025-01-26 RX ORDER — POTASSIUM CHLORIDE 20 MEQ/1
40 TABLET, EXTENDED RELEASE ORAL ONCE
Status: COMPLETED | OUTPATIENT
Start: 2025-01-26 | End: 2025-01-26

## 2025-01-26 RX ORDER — TRIAMTERENE/HYDROCHLOROTHIAZID 37.5-25 MG
0.5 TABLET ORAL DAILY
Status: DISCONTINUED | OUTPATIENT
Start: 2025-01-26 | End: 2025-01-28 | Stop reason: HOSPADM

## 2025-01-26 RX ORDER — METOCLOPRAMIDE HYDROCHLORIDE 5 MG/ML
10 INJECTION INTRAMUSCULAR; INTRAVENOUS EVERY 6 HOURS PRN
Status: DISCONTINUED | OUTPATIENT
Start: 2025-01-26 | End: 2025-01-28 | Stop reason: HOSPADM

## 2025-01-26 RX ORDER — TALC
3 POWDER (GRAM) TOPICAL NIGHTLY PRN
Status: DISCONTINUED | OUTPATIENT
Start: 2025-01-26 | End: 2025-01-28 | Stop reason: HOSPADM

## 2025-01-26 RX ORDER — POLYETHYLENE GLYCOL 3350 17 G/17G
17 POWDER, FOR SOLUTION ORAL DAILY
Status: DISCONTINUED | OUTPATIENT
Start: 2025-01-26 | End: 2025-01-28 | Stop reason: HOSPADM

## 2025-01-26 RX ORDER — ACETAMINOPHEN 325 MG/1
650 TABLET ORAL EVERY 6 HOURS PRN
Status: DISCONTINUED | OUTPATIENT
Start: 2025-01-26 | End: 2025-01-28 | Stop reason: HOSPADM

## 2025-01-26 RX ORDER — ONDANSETRON HYDROCHLORIDE 2 MG/ML
4 INJECTION, SOLUTION INTRAVENOUS EVERY 4 HOURS PRN
Status: DISCONTINUED | OUTPATIENT
Start: 2025-01-26 | End: 2025-01-28 | Stop reason: HOSPADM

## 2025-01-26 RX ORDER — SODIUM CHLORIDE AND POTASSIUM CHLORIDE 150; 900 MG/100ML; MG/100ML
100 INJECTION, SOLUTION INTRAVENOUS CONTINUOUS
Status: DISCONTINUED | OUTPATIENT
Start: 2025-01-26 | End: 2025-01-26

## 2025-01-26 RX ORDER — ASPIRIN 81 MG/1
81 TABLET ORAL DAILY
Status: DISCONTINUED | OUTPATIENT
Start: 2025-01-26 | End: 2025-01-28 | Stop reason: HOSPADM

## 2025-01-26 RX ORDER — PANTOPRAZOLE SODIUM 40 MG/10ML
40 INJECTION, POWDER, LYOPHILIZED, FOR SOLUTION INTRAVENOUS 2 TIMES DAILY
Status: DISCONTINUED | OUTPATIENT
Start: 2025-01-26 | End: 2025-01-28 | Stop reason: HOSPADM

## 2025-01-26 RX ORDER — MAGNESIUM SULFATE HEPTAHYDRATE 40 MG/ML
2 INJECTION, SOLUTION INTRAVENOUS ONCE
Status: COMPLETED | OUTPATIENT
Start: 2025-01-26 | End: 2025-01-26

## 2025-01-26 RX ORDER — AMLODIPINE BESYLATE 5 MG/1
5 TABLET ORAL DAILY
Status: DISCONTINUED | OUTPATIENT
Start: 2025-01-26 | End: 2025-01-28 | Stop reason: HOSPADM

## 2025-01-26 RX ORDER — ENOXAPARIN SODIUM 100 MG/ML
40 INJECTION SUBCUTANEOUS EVERY 24 HOURS
Status: DISCONTINUED | OUTPATIENT
Start: 2025-01-26 | End: 2025-01-26

## 2025-01-26 RX ORDER — POTASSIUM CHLORIDE 14.9 MG/ML
20 INJECTION INTRAVENOUS
Status: DISCONTINUED | OUTPATIENT
Start: 2025-01-26 | End: 2025-01-26

## 2025-01-26 RX ORDER — EZETIMIBE 10 MG/1
10 TABLET ORAL DAILY
Status: DISCONTINUED | OUTPATIENT
Start: 2025-01-26 | End: 2025-01-28 | Stop reason: HOSPADM

## 2025-01-26 RX ORDER — CALCIUM CARBONATE 200(500)MG
1000 TABLET,CHEWABLE ORAL 2 TIMES DAILY
Status: DISPENSED | OUTPATIENT
Start: 2025-01-26 | End: 2025-01-28

## 2025-01-26 RX ORDER — SIMETHICONE 80 MG
80 TABLET,CHEWABLE ORAL 4 TIMES DAILY PRN
Status: DISCONTINUED | OUTPATIENT
Start: 2025-01-26 | End: 2025-01-28 | Stop reason: HOSPADM

## 2025-01-26 RX ORDER — SODIUM CHLORIDE 9 MG/ML
75 INJECTION, SOLUTION INTRAVENOUS CONTINUOUS
Status: DISCONTINUED | OUTPATIENT
Start: 2025-01-26 | End: 2025-01-27

## 2025-01-26 RX ORDER — GABAPENTIN 300 MG/1
300 CAPSULE ORAL 3 TIMES DAILY
Status: DISCONTINUED | OUTPATIENT
Start: 2025-01-26 | End: 2025-01-28 | Stop reason: HOSPADM

## 2025-01-26 RX ADMIN — ACETAMINOPHEN 650 MG: 325 TABLET, FILM COATED ORAL at 09:56

## 2025-01-26 RX ADMIN — POTASSIUM CHLORIDE AND SODIUM CHLORIDE 100 ML/HR: 900; 150 INJECTION, SOLUTION INTRAVENOUS at 04:43

## 2025-01-26 RX ADMIN — EZETIMIBE 10 MG: 10 TABLET ORAL at 11:55

## 2025-01-26 RX ADMIN — ATORVASTATIN CALCIUM 80 MG: 80 TABLET, FILM COATED ORAL at 11:55

## 2025-01-26 RX ADMIN — ONDANSETRON 4 MG: 2 INJECTION, SOLUTION INTRAMUSCULAR; INTRAVENOUS at 10:00

## 2025-01-26 RX ADMIN — SODIUM CHLORIDE 75 ML/HR: 9 INJECTION, SOLUTION INTRAVENOUS at 19:47

## 2025-01-26 RX ADMIN — PANTOPRAZOLE SODIUM 40 MG: 40 INJECTION, POWDER, FOR SOLUTION INTRAVENOUS at 21:24

## 2025-01-26 RX ADMIN — CALCIUM CARBONATE 1000 MG: 500 TABLET, CHEWABLE ORAL at 10:44

## 2025-01-26 RX ADMIN — AMLODIPINE BESYLATE 5 MG: 5 TABLET ORAL at 11:55

## 2025-01-26 RX ADMIN — AMOXICILLIN AND CLAVULANATE POTASSIUM 1 TABLET: 875; 125 TABLET, FILM COATED ORAL at 21:24

## 2025-01-26 RX ADMIN — PANTOPRAZOLE SODIUM 40 MG: 40 INJECTION, POWDER, FOR SOLUTION INTRAVENOUS at 09:56

## 2025-01-26 RX ADMIN — LISINOPRIL 20 MG: 20 TABLET ORAL at 11:55

## 2025-01-26 RX ADMIN — ASPIRIN 81 MG: 81 TABLET, COATED ORAL at 16:54

## 2025-01-26 RX ADMIN — POTASSIUM CHLORIDE 40 MEQ: 1500 TABLET, EXTENDED RELEASE ORAL at 14:57

## 2025-01-26 RX ADMIN — GABAPENTIN 300 MG: 300 CAPSULE ORAL at 21:24

## 2025-01-26 RX ADMIN — GABAPENTIN 300 MG: 300 CAPSULE ORAL at 11:55

## 2025-01-26 RX ADMIN — GABAPENTIN 300 MG: 300 CAPSULE ORAL at 16:54

## 2025-01-26 RX ADMIN — POTASSIUM CHLORIDE 40 MEQ: 1500 TABLET, EXTENDED RELEASE ORAL at 10:35

## 2025-01-26 RX ADMIN — MAGNESIUM SULFATE HEPTAHYDRATE 2 G: 40 INJECTION, SOLUTION INTRAVENOUS at 10:33

## 2025-01-26 SDOH — HEALTH STABILITY: MENTAL HEALTH: HOW OFTEN DO YOU HAVE SIX OR MORE DRINKS ON ONE OCCASION?: NEVER

## 2025-01-26 SDOH — ECONOMIC STABILITY: INCOME INSECURITY: IN THE PAST 12 MONTHS HAS THE ELECTRIC, GAS, OIL, OR WATER COMPANY THREATENED TO SHUT OFF SERVICES IN YOUR HOME?: NO

## 2025-01-26 SDOH — HEALTH STABILITY: PHYSICAL HEALTH: ON AVERAGE, HOW MANY DAYS PER WEEK DO YOU ENGAGE IN MODERATE TO STRENUOUS EXERCISE (LIKE A BRISK WALK)?: 0 DAYS

## 2025-01-26 SDOH — ECONOMIC STABILITY: FOOD INSECURITY: WITHIN THE PAST 12 MONTHS, THE FOOD YOU BOUGHT JUST DIDN'T LAST AND YOU DIDN'T HAVE MONEY TO GET MORE.: NEVER TRUE

## 2025-01-26 SDOH — ECONOMIC STABILITY: HOUSING INSECURITY: IN THE LAST 12 MONTHS, WAS THERE A TIME WHEN YOU WERE NOT ABLE TO PAY THE MORTGAGE OR RENT ON TIME?: NO

## 2025-01-26 SDOH — SOCIAL STABILITY: SOCIAL INSECURITY: WITHIN THE LAST YEAR, HAVE YOU BEEN HUMILIATED OR EMOTIONALLY ABUSED IN OTHER WAYS BY YOUR PARTNER OR EX-PARTNER?: NO

## 2025-01-26 SDOH — SOCIAL STABILITY: SOCIAL INSECURITY: ABUSE: ADULT

## 2025-01-26 SDOH — SOCIAL STABILITY: SOCIAL INSECURITY: DOES ANYONE TRY TO KEEP YOU FROM HAVING/CONTACTING OTHER FRIENDS OR DOING THINGS OUTSIDE YOUR HOME?: NO

## 2025-01-26 SDOH — SOCIAL STABILITY: SOCIAL INSECURITY: HAVE YOU HAD ANY THOUGHTS OF HARMING ANYONE ELSE?: NO

## 2025-01-26 SDOH — HEALTH STABILITY: PHYSICAL HEALTH: ON AVERAGE, HOW MANY MINUTES DO YOU ENGAGE IN EXERCISE AT THIS LEVEL?: 0 MIN

## 2025-01-26 SDOH — SOCIAL STABILITY: SOCIAL INSECURITY: HAVE YOU HAD THOUGHTS OF HARMING ANYONE ELSE?: NO

## 2025-01-26 SDOH — HEALTH STABILITY: MENTAL HEALTH: HOW MANY DRINKS CONTAINING ALCOHOL DO YOU HAVE ON A TYPICAL DAY WHEN YOU ARE DRINKING?: 1 OR 2

## 2025-01-26 SDOH — ECONOMIC STABILITY: FOOD INSECURITY: WITHIN THE PAST 12 MONTHS, YOU WORRIED THAT YOUR FOOD WOULD RUN OUT BEFORE YOU GOT THE MONEY TO BUY MORE.: NEVER TRUE

## 2025-01-26 SDOH — SOCIAL STABILITY: SOCIAL INSECURITY: WITHIN THE LAST YEAR, HAVE YOU BEEN AFRAID OF YOUR PARTNER OR EX-PARTNER?: NO

## 2025-01-26 SDOH — ECONOMIC STABILITY: FOOD INSECURITY: HOW HARD IS IT FOR YOU TO PAY FOR THE VERY BASICS LIKE FOOD, HOUSING, MEDICAL CARE, AND HEATING?: NOT VERY HARD

## 2025-01-26 SDOH — SOCIAL STABILITY: SOCIAL INSECURITY: DO YOU FEEL UNSAFE GOING BACK TO THE PLACE WHERE YOU ARE LIVING?: NO

## 2025-01-26 SDOH — ECONOMIC STABILITY: HOUSING INSECURITY: AT ANY TIME IN THE PAST 12 MONTHS, WERE YOU HOMELESS OR LIVING IN A SHELTER (INCLUDING NOW)?: NO

## 2025-01-26 SDOH — SOCIAL STABILITY: SOCIAL INSECURITY: ARE THERE ANY APPARENT SIGNS OF INJURIES/BEHAVIORS THAT COULD BE RELATED TO ABUSE/NEGLECT?: NO

## 2025-01-26 SDOH — HEALTH STABILITY: MENTAL HEALTH: HOW OFTEN DO YOU HAVE A DRINK CONTAINING ALCOHOL?: 4 OR MORE TIMES A WEEK

## 2025-01-26 SDOH — SOCIAL STABILITY: SOCIAL INSECURITY: DO YOU FEEL ANYONE HAS EXPLOITED OR TAKEN ADVANTAGE OF YOU FINANCIALLY OR OF YOUR PERSONAL PROPERTY?: NO

## 2025-01-26 SDOH — ECONOMIC STABILITY: TRANSPORTATION INSECURITY: IN THE PAST 12 MONTHS, HAS LACK OF TRANSPORTATION KEPT YOU FROM MEDICAL APPOINTMENTS OR FROM GETTING MEDICATIONS?: NO

## 2025-01-26 SDOH — SOCIAL STABILITY: SOCIAL INSECURITY: ARE YOU OR HAVE YOU BEEN THREATENED OR ABUSED PHYSICALLY, EMOTIONALLY, OR SEXUALLY BY ANYONE?: NO

## 2025-01-26 SDOH — ECONOMIC STABILITY: HOUSING INSECURITY: IN THE PAST 12 MONTHS, HOW MANY TIMES HAVE YOU MOVED WHERE YOU WERE LIVING?: 0

## 2025-01-26 SDOH — SOCIAL STABILITY: SOCIAL INSECURITY: HAS ANYONE EVER THREATENED TO HURT YOUR FAMILY OR YOUR PETS?: NO

## 2025-01-26 SDOH — SOCIAL STABILITY: SOCIAL INSECURITY: WERE YOU ABLE TO COMPLETE ALL THE BEHAVIORAL HEALTH SCREENINGS?: YES

## 2025-01-26 ASSESSMENT — PATIENT HEALTH QUESTIONNAIRE - PHQ9
SUM OF ALL RESPONSES TO PHQ9 QUESTIONS 1 & 2: 0
1. LITTLE INTEREST OR PLEASURE IN DOING THINGS: NOT AT ALL
2. FEELING DOWN, DEPRESSED OR HOPELESS: NOT AT ALL

## 2025-01-26 ASSESSMENT — COGNITIVE AND FUNCTIONAL STATUS - GENERAL
MOBILITY SCORE: 24
PATIENT BASELINE BEDBOUND: NO
DAILY ACTIVITIY SCORE: 24
DAILY ACTIVITIY SCORE: 24
MOBILITY SCORE: 24

## 2025-01-26 ASSESSMENT — ENCOUNTER SYMPTOMS
ROS GI COMMENTS: AS PER HPI
CHILLS: 0
DIFFICULTY URINATING: 0
MYALGIAS: 0
EYE REDNESS: 0
ADENOPATHY: 0
BRUISES/BLEEDS EASILY: 0
ARTHRALGIAS: 0
FEVER: 0
SHORTNESS OF BREATH: 0
SORE THROAT: 0
UNEXPECTED WEIGHT CHANGE: 0
HEADACHES: 0
NERVOUS/ANXIOUS: 0
FATIGUE: 0

## 2025-01-26 ASSESSMENT — PAIN SCALES - GENERAL
PAINLEVEL_OUTOF10: 0 - NO PAIN

## 2025-01-26 ASSESSMENT — ACTIVITIES OF DAILY LIVING (ADL)
ADEQUATE_TO_COMPLETE_ADL: YES
HEARING - RIGHT EAR: FUNCTIONAL
GROOMING: INDEPENDENT
LACK_OF_TRANSPORTATION: NO
JUDGMENT_ADEQUATE_SAFELY_COMPLETE_DAILY_ACTIVITIES: YES
LACK_OF_TRANSPORTATION: NO
ASSISTIVE_DEVICE: EYEGLASSES
BATHING: INDEPENDENT
LACK_OF_TRANSPORTATION: NO
TOILETING: INDEPENDENT
HEARING - LEFT EAR: FUNCTIONAL
DRESSING YOURSELF: INDEPENDENT
WALKS IN HOME: INDEPENDENT
PATIENT'S MEMORY ADEQUATE TO SAFELY COMPLETE DAILY ACTIVITIES?: YES
FEEDING YOURSELF: INDEPENDENT

## 2025-01-26 ASSESSMENT — PAIN - FUNCTIONAL ASSESSMENT
PAIN_FUNCTIONAL_ASSESSMENT: 0-10
PAIN_FUNCTIONAL_ASSESSMENT: 0-10

## 2025-01-26 ASSESSMENT — LIFESTYLE VARIABLES
HOW MANY STANDARD DRINKS CONTAINING ALCOHOL DO YOU HAVE ON A TYPICAL DAY: 1 OR 2
HOW OFTEN DO YOU HAVE A DRINK CONTAINING ALCOHOL: 4 OR MORE TIMES A WEEK
AUDIT-C TOTAL SCORE: 4
AUDIT-C TOTAL SCORE: 4
SKIP TO QUESTIONS 9-10: 1
SKIP TO QUESTIONS 9-10: 1
HOW OFTEN DO YOU HAVE 6 OR MORE DRINKS ON ONE OCCASION: NEVER
AUDIT-C TOTAL SCORE: 4

## 2025-01-26 NOTE — PROGRESS NOTES
"Medicine NP follow up note    Subjective:  Reports ongoing nausea and chills, improved some from admission.   Denies emesis, abd pain, diarrhea. Last formed BM 2 days ago on Friday, normal in color. Denies hx hematemesis, NSAID use, does take ASA and Xarelto, no PPIs chronically.   Reports remote EGD and recent colonoscopy.   Admits to smoking marijuana daily (for sleep and arthritis), drinks a glass of wine daily (last on Friday), quit tobacco 1 year ago.   Reports 30-40 lb wt loss over 4 years.     Additional information:  HypoK, hypoCa and hypoMg this am, troponins uptrending.   Denies CP/SOB.   Reports chronic sinusitis symptoms.     Vitals (Last 24 Hours):  Heart Rate:  [53-70]   Temperature:  [36.6 °C (97.8 °F)-36.7 °C (98 °F)]   Respirations:  [10-18]   BP: (106-157)/(55-88)   Height:  [160 cm (5' 3\")]   Weight:  [49.9 kg (110 lb)]   Pulse Ox:  [94 %-98 %]     I have reviewed imaging reports and labs from this visit    PHYSICAL EXAM:  Constitutional: NAD, alert and cooperative  Eyes: no icterus  ENMT: mucous membranes moist, no lesions  Head/Neck: supple  Respiratory/Thorax: mild wheezing bilaterally, non-labored breathing, no cough, on RA  Cardiovascular: irregular, no murmurs heard  Gastrointestinal: ND/S/NT  : no Green, no SP/flank discomfort  Musculoskeletal: no joint swelling, ROM intact  Extremities: no edema  Neurological: non-focal  Skin: warm and dry  Psych: calm, stable mood     MEDS:  Scheduled meds  magnesium sulfate, 2 g, intravenous, Once  pantoprazole, 40 mg, intravenous, BID  polyethylene glycol, 17 g, oral, Daily  potassium chloride, 20 mEq, intravenous, q2h  potassium chloride CR, 40 mEq, oral, Once    Continuous meds  potassium chloride in 0.9%NaCl, 100 mL/hr, Last Rate: 100 mL/hr (01/26/25 0443)    PRN meds  PRN medications: acetaminophen, alum-mag hydroxide-simeth, benzocaine-menthol, calcium carbonate, docusate sodium, lubricating eye drops, melatonin, metoclopramide, ondansetron, " simethicone, sodium chloride    ASSESSMENT/PLAN:  80 yo woman with h/o CAD, elevated CT coronary artery calcium score 1730, HTN, HLD, atrial fibrillation/atrial flutter status post ablation - on xarelto, CKD, anxiety, GERD, R MCA aneurysm, bilateral >80% carotid stenosis s/p left carotid endarterectomy, hypoNa, back pain, prior tobacco use, current daily marijuana and alcohol use (1 glass wine daily per pt report), p/w new onset nausea / dry heaving and chills/diaphoresis.   Work up with stable VS, EKG with Afib vs SR with PACs, labs with hypoNa 127, hypoK 3.4, hypoMg 1.56, WBC 14.3 (neutrophil predominant), HS troponins 22/69/147/219, UA with 1+ ketones, 1+ protein, respiratory viral panel negative; CXR without acute findings, CTH with possible acute left maxillary sinusitis; CT A/P with IV contrast with multiple findings - marked edematous wall thickening at the gastric antrum and pylorus c/w gastritis and/or peptic ulcer disease, gas focus at the medial wall of the gastric pylorus, concerning for a deep area of ulceration; edematous wall thickening at the duodenum, suggestive of acute duodenitis; colitis; colonic diverticulosis; 2.1 cm soft tissue density at the L adnexa; diffuse mesenteric edema/body wall edema; multilevel degenerative changes in the spine, pars defect on the left at L4, 9 mm anterolisthesis of L4 on L5, complete loss of the intervertebral disc space at L4-L5, hx right charlene laminectomy at L4.     Persistent nausea, suspected PUD  -NSAID (Meloxicam) use in 2024 noted per dispense hx   -continue PPI BID, anti-emetics   -NPO until seen by GI, anticipate need for EGD this admission   -holding home Xarelto, resumed ASA (ok with GI)  -marijuana use may be contributing to symptoms as well   -colitis noted on CT, no current symptoms     Elevated HS troponins   Hx CAD   Hx A-Fib   -denies CP/SOB  -monitor on tele, trend troponins, check TTE  -holding Xarelto until cleared to resume by GI   -cardio  following   -continue ASA     Possible acute left maxillary sinusitis  Leukocytosis on admission (resolved today)   Chills / diaphoresis   -will treat empirically with PO Augmentin    HypoK  HypoMg   HypoCa  -repleted, likely 2/2 GI symptoms / poor PO intake, chronic triamterene/hydrochlorothiazide use   -recheck K later today     HypoNa, chronic since 2023   -Na 131 today, could be in setting of chronic triamterene/hydrochlorothiazide use (held)     HTN  HLD  -continue chronic meds, holding triamterene/hydrochlorothiazide as above     L adnexa mass   -pelvic US non-diagnostic, OP FU with PCP    Daily alcohol use (1 glass wine per pt, last one on 1/24)  -monitor for any etoh w/d symptoms (none currently)     Extensive spine DJD   L4 stress fx   -OP FU with PCP, recent normal DEXA scan   -continue chronic gabapentin     VTE / GI prophylaxis   -AC on hold until cleared by GI/SCDs for now, PPI, bowel regimen in place     Discharge planning  -no PT needs at present     Discussed with Dr. Jeff Richard, APRN-CNP

## 2025-01-26 NOTE — CONSULTS
"Inpatient consult to Cardiology  Consult performed by: Gaudencio Hollins,   Consult ordered by: Stephanie Justin MD  Reason for consult: elevated troponin, increasing      Primary Cardiology -- Harry / [facility]  History Of Present Illness:    Lynsey Forman is a 79 y.o. female who has a pertinent medical history notable for coronary artery disease with significantly elevated CT coronary artery calcium score 1730, cardiac MRI stress test 2023 with no inducible ischemia, essential hypertension, dyslipidemia, persistent atrial fibrillation/atrial flutter status post ablation, chronic kidney disease stage III, who presented to the emergency department complaints of nausea.  Cardiology has been consulted for \"elevated troponin, increasing\". A full hospital course review was completed.    Patient states that she was in her usual state of health.  She awoke on day of presentation with severe nausea.  She just felt unwell,his continued. she did not have any associated emesis, diarrhea on my interview however the ER documented that she she reported episodes of nonbloody, nonbilious nausea vomiting as well as diarrhea.  She was the only 1 affected by this symptomatology.  She called her sister who brought her to the emergency department.    On arrival to the ER, she was afebrile at 36.6 heart rate 53 respiratory rate 18 saturating 98% on room air.  Blood pressure 157/73. Laboratory studies show serum sodium 127 potassium 3.4 chloride 86 BUN 21 creatinine 0.78.  Magnesium low at 1.56.  CBC shows white count 14.3 hemoglobin hematocrit 13.1/37.2 platelet count 190.  Viral panels were negative for flu A, flu B, COVID-19.  Lactate normal at 1.6 lipase normal at 23 high-sensitivity cardiac troponin 22, 69, 147, 219.  Brain natruretic peptide elevated at 796.  Imaging including a two-view chest x-ray was interpreted by radiology as cardiomegaly without evidence of acute disease CT abdomen pelvis with contrast was interpreted as " marked edematous wall thickening at the gastric antrum and pylorus felt secondary to gastritis and/or peptic ulcer disease with a gas focus at the medial wall of the gastric pylorus concerning for deep ulceration.  There is also edematous wall thickening of the duodenum suggestive of acute duodenitis evidence of colitis and colitis colonic diverticulosis. EKG obtained 1/25/2025 at 1907 shows significant baseline artifact but probable atrial fibrillation with controlled ventricular response.  A repeat EKG 1/25/2025 at 2237 appears more sinus with frequent PACs although continued computer interpretation suggests possible atrial fibrillation EKG 1/26/2025 at 0208 shows sinus rhythm with PACs.  This has dramatically improved baseline artifact from prior studies.    Patient denies chest pain and angina.  Pt denies shortness of breath, dyspnea on exertion, orthopnea, and paroxysmal nocturnal dyspnea.  Pt denies worsening lower extremity edema.  Pt denies palpitations or syncope.  No recent falls.  No fever or chills.  No cough.  No change in bowel or bladder habits.  No travel.  No sick contacts.  No recent travel    12 point review of systems was performed and is otherwise negative.  Past medical history:  As above.  Medications:  Reviewed.  Allergies:  Reviewed.  Social history:  Patient denies smoking, alcohol abuse, or illicit drug use.  Family history:  No sudden cardiac death or premature coronary artery disease.       Last Recorded Vitals:  Vitals:    01/26/25 0439 01/26/25 0553 01/26/25 0645 01/26/25 0700   BP: 106/55 154/70 154/70 130/67   Pulse: 55 66 62 65   Resp: 16 18 17 16   Temp:       TempSrc:       SpO2: 98% 94% 94% 94%   Weight:       Height:           Last Labs:  Results from last 7 days   Lab Units 01/26/25  0440 01/25/25  1840   WBC AUTO x10*3/uL 10.1 14.3*   HEMOGLOBIN g/dL 11.3* 13.1   HEMATOCRIT % 31.6* 37.2   PLATELETS AUTO x10*3/uL 161 190     Results from last 7 days   Lab Units 01/26/25  0440  01/25/25  1840   SODIUM mmol/L 131* 127*   POTASSIUM mmol/L 2.8* 3.4*   CHLORIDE mmol/L 98 86*   CO2 mmol/L 25 31   BUN mg/dL 12 21   CREATININE mg/dL 0.59 0.78   CALCIUM mg/dL 7.1* 9.4   PROTEIN TOTAL g/dL  --  7.7   BILIRUBIN TOTAL mg/dL  --  1.1   ALK PHOS U/L  --  58   ALT U/L  --  12   AST U/L  --  20   GLUCOSE mg/dL 118* 138*     Results from last 7 days   Lab Units 01/26/25  0440 01/26/25  0049 01/25/25 2010 01/25/25  1840   TROPHS ng/L 219* 147* 69* 22*      Troponin I, High Sensitivity   Date/Time Value Ref Range Status   01/26/2025 04:40  (HH) 0 - 13 ng/L Final     Comment:     Previous result verified on 1/25/2025 2146 on specimen/case [id number] called with component [employer] for procedure Troponin I, High Sensitivity with value 69 ng/L.   01/26/2025 12:49  (HH) 0 - 13 ng/L Final     Comment:     Previous result verified on 1/25/2025 2146 on specimen/case [id number] called with component [employer] for procedure Troponin I, High Sensitivity with value 69 ng/L.   01/25/2025 08:10 PM 69 (HH) 0 - 13 ng/L Final     BNP   Date/Time Value Ref Range Status   01/26/2025 04:40  (H) 0 - 99 pg/mL Final     LDL Calculated   Date/Time Value Ref Range Status   07/03/2024 03:59 PM 51 <=99 mg/dL Final     Comment:                                 Near   Borderline      AGE      Desirable  Optimal    High     High     Very High     0-19 Y     0 - 109     ---    110-129   >/= 130     ----    20-24 Y     0 - 119     ---    120-159   >/= 160     ----      >24 Y     0 -  99   100-129  130-159   160-189     >/=190     03/21/2024 02:10 PM 61 <=99 mg/dL Final     Comment:                                 Near   Borderline      AGE      Desirable  Optimal    High     High     Very High     0-19 Y     0 - 109     ---    110-129   >/= 130     ----    20-24 Y     0 - 119     ---    120-159   >/= 160     ----      >24 Y     0 -  99   100-129  130-159   160-189     >/=190     12/11/2023 11:36 AM 65 <=99 mg/dL Final  "    Comment:                                 Near   Borderline      AGE      Desirable  Optimal    High     High     Very High     0-19 Y     0 - 109     ---    110-129   >/= 130     ----    20-24 Y     0 - 119     ---    120-159   >/= 160     ----      >24 Y     0 -  99   100-129  130-159   160-189     >/=190       VLDL   Date/Time Value Ref Range Status   07/03/2024 03:59 PM 14 0 - 40 mg/dL Final   03/21/2024 02:10 PM 12 0 - 40 mg/dL Final   12/11/2023 11:36 AM 10 0 - 40 mg/dL Final      Last I/O:  No intake/output data recorded.    Past Cardiology Tests (Last 3 Years):  EKG:  ECG 12 lead (Clinic Performed) 10/02/2024      ECG 12 lead (Ancillary Performed) 04/29/2024      ECG 12 lead (Clinic Performed) 04/02/2024      ECG 12 lead (Clinic Performed)     Echo:  02/16/2023  1. Left ventricular systolic function is normal with a 60-65% estimated ejection fraction.  2. RVSP within normal limits.  3. Mildly dilated atria.    Ejection Fractions:  No results found for: \"EF\"  Cath:  No results found for this or any previous visit from the past 1095 days.    Stress Test:  No results found for this or any previous visit from the past 1095 days.    Cardiac Imaging:  MR cardiac w and wo IV contrast w regadenoson stress for MORPH FUNCT and valve DZ 11/01/2023  CARDIAC CHAMBERS  Normal atrioventricular and ventriculoarterial concordance      LEFT ATRIUM  Mildly dilated (ILDA-43.27 ml/m2).      RIGHT ATRIUM  Severely dilated (RA max Vol indexed - 53.16 ml/m2)      INTERATRIAL SEPTUM  Aneurysmal      LEFT VENTRICLE:  1. Normal LV size and hyperdynamic systolic function. Quantitative  LVEF 75%.  2. No regional wall motion abnormalities.  3. Normal LV wall thickness and normal LV indexed mass.  4. T2 weighted imaging/T2 STIR imaging was not performed.  5. T1 weighted imaging was not performed. ECV could not be calculated.  6. No evidence of LV thrombus.  7. Delayed-enhancement imaging reveals mid myocardial basal to mid  septal (RV " insertion point) LGE in a nonischemic pattern.      Quantitative left ventricular functional values are as follows:  EDV = 98.24 cc; EDVi = 64.62 cc/m2  ESV = 24.22 cc; ESVi = 15.93 cc/m2  Stroke volume = 74.02 cc; SVi = 48.69 cc/m2  LVEF = 75.34 %  Absolute Cardiac Output = 3.4 l/min.; COi = 2.24 l/min/m2  LV mass = 74.50 gm; LVMi = 49.01 gm/m2      LVEDD: 4.1 cm  LVESD: 2.2 cm  LV septal wall thicknes(anterobasal): 1.0 cm  LV postero-inferior wall thickness: 0.7 cm      STRESS PERFUSION:  Following administration of regadenoson, during first pass perfusion,  there is uniform contrast wash-in in all visualized myocardial  segments.      RIGHT VENTRICLE  The right ventricle appears mildly dilated in size (EDV I 91.69 mL  per meter squared), shape, and has normal qualitative systolic  function. Quantitative RVEF60.23 % no segmental wall motion  abnormalities. No abnormal delayed enhancement in the myocardium.      INTERVENTRICULAR SEPTUM  Intact.      AORTIC VALVE  The aortic valve is trileaflet  There is  trivial aortic regurgitation.  Flow quantification through the ascending aorta:  Forward volume =66.73 cc/beat  Reverse volume = 4.83 cc/beat  Net forward volume = 61.89 cc/beat  Aortic regurgitant fraction = 7.24 %      MITRAL VALVE  There is  trivial to mild mitral regurgitation.  Integrating LV volumetric and aortic flow quantification data reveals:  Quantitative mitral regurgitant volume = 7.29 cc/beat  Quantitative mitral regurgitant fraction = 9.70 %      TRICUSPID VALVE  There is qualitative no tricuspid regurgitation.      PULMONARY VALVE:  Not assessed.      PERICARDIUM:  The pericardium is normal. There is no pericardial effussion.      THORACIC AORTA  The thoracic aorta measures 3.6 cm, upper limit of normal and appears  normal in course, caliber, and contour.  There is no evidence for  acute aortic pathology. The arch vessel branching pattern is  normal.  All the arch branch vessels appear widely patent  in their proximal  portions.      AORTIC ROOT DIMENSIONS:  Aortic root(sinus of valsalva): 3.2 cm  Annulus: 2.5 cm  Sinotubular junction: 2.5 cm      PULMONARY ARTERIES  The central pulmonary arteries appear  normal (MPA-2.4 cm, RPA-1.9  cm, LPA-2.2 cm).      SYSTEMIC AND PULMONARY VEINS  Normal systemic venous and pulmonary venous return.  The SVC and IVC are of normal caliber.  Normal pulmonary venous anatomy.      CHEST  The chest wall is normal.  Limited imaging through the lungs reveals no gross abnormalities. No  pleural effusion.      UPPER ABDOMEN  Limited imaging through the upper abdomen reveals no abnormalities of  the visualized organs.       Impression   1. No evidence of inducible myocardial ischemia with regadenoson  stress perfusion imaging.  2. Normal LV size and hyperdynamic systolic function. Quantitative  LVEF 75%.  3. Trivial MR (RF 10%), trivial AI (RF 7%).  4. Mild biatrial enlargement.  5. Normal RV size and systolic function. Quantitative RVEF 60%,         Past Medical History:  She has a past medical history of Abnormal kidney function (01/08/2024), Alcohol use, unspecified, uncomplicated (12/20/2022), Aneurysm of carotid artery ([facility]), Anxiety, Atrial fibrillation (Multi), Atrial flutter (Multi), Carotid stenosis, asymptomatic, bilateral, Chronic cough (11/24/2009), Chronic pain disorder, CKD (chronic kidney disease), Coronary artery disease, Diverticulosis (12/21/2016), Dysphonia, Easy bruising, Elevated coronary artery calcium score, GERD (gastroesophageal reflux disease), History of arthrodesis (07/07/2023), Hyperlipidemia, Hypertension, Lipoma of arm, Lumbar disc disease, Moriah's edema of vocal folds, Sinus bradycardia (01/04/2013), Tachycardia (01/08/2024), Tobacco use disorder, mild, in early remission, and Vocal cord polyp.    She has no past medical history of Shortness of breath.    Past Surgical History:  She has a past surgical history that includes Cervical laminectomy  (2017); Mouth surgery (2018); Anterior cruciate ligament repair (Left); Cardiac electrophysiology study and ablation; Lumbar laminectomy (05/2024); Lipoma resection (06/18/2024); Throat surgery (2023); Colonoscopy; and Cosmetic surgery (2018).      Social History:  She reports that she quit smoking about 12 months ago. Her smoking use included cigarettes. She started smoking about 31 years ago. She has a 7.5 pack-year smoking history. She has never used smokeless tobacco. She reports current alcohol use of about 4.0 standard drinks of alcohol per week. She reports current drug use. Frequency: 7.00 times per week. Drug: Marijuana.    Family History:  Family History   Problem Relation Name Age of Onset    Heart failure Mother      Dementia Mother      Hypertension Mother      Dementia Father      No Known Problems Sister      No Known Problems Sister      Heart disease Brother      Other (back pain) Brother      No Known Problems Brother      No Known Problems Brother          Allergies:  Codeine    Inpatient Medications:  Scheduled medications   Medication Dose Route Frequency    magnesium sulfate  2 g intravenous Once    pantoprazole  40 mg intravenous BID    polyethylene glycol  17 g oral Daily    potassium chloride  20 mEq intravenous q2h    potassium chloride CR  40 mEq oral Once     PRN medications   Medication    acetaminophen    alum-mag hydroxide-simeth    benzocaine-menthol    calcium carbonate    docusate sodium    lubricating eye drops    melatonin    metoclopramide    ondansetron    simethicone    sodium chloride     Continuous Medications   Medication Dose Last Rate    potassium chloride in 0.9%NaCl  100 mL/hr 100 mL/hr (01/26/25 0443)     Outpatient Medications:  Current Outpatient Medications   Medication Instructions    acetaminophen (TYLENOL) 650 mg, oral, Every 6 hours PRN    amLODIPine (NORVASC) 5 mg, oral, Daily    aspirin 81 mg, oral, Daily    atorvastatin (LIPITOR) 80 mg, oral, Daily     "ezetimibe (ZETIA) 10 mg, oral, Daily    furosemide (LASIX) 20 mg, oral, As needed    gabapentin (NEURONTIN) 300 mg, oral, 3 times daily    hydrOXYzine HCL (Atarax) 25 mg tablet 1 tablet, As needed    lisinopril 20 mg, oral, Daily    rivaroxaban (XARELTO) 15 mg, oral, Daily with evening meal, Resume 12/21/24    triamterene-hydrochlorothiazid (Maxzide-25) 37.5-25 mg tablet 0.5 tablets, oral, Daily       Physical Exam:  Documented Vital Signs   Heart Rate:  [53-70]   Temperature:  [36.6 °C (97.8 °F)]   Respirations:  [10-18]   BP: (106-157)/(55-88)   Height:  [160 cm (5' 3\")]   Weight:  [49.9 kg (110 lb)]   Pulse Ox:  [94 %-98 %]   Temperature:  [36.6 °C (97.8 °F)] 36.6 °C (97.8 °F)  Heart Rate:  [53-70] 65  Respirations:  [10-18] 16  BP: (106-157)/(55-88) 130/67     Documented Fluid Status   No intake or output data in the 24 hours ending 01/26/25 0910  Net IO Since Admission: No IO data has been entered for this period [01/26/25 0910]    PHYSICAL EXAMINATION    GENERAL APPEARANCE: Well developed, well nourished, in no acute distress.  She is asking if she can go home when seen  VITAL SIGNS: reviewed and confirmed.   SKIN: Inspection of the skin reveals no rashes, ulcerations or petechiae.  She appears somewhat pale  HEENT: The sclerae were anicteric and conjunctivae were pink and moist.   NECK: Supple and symmetric. There was no thyroid enlargement, and no tenderness, or masses were felt.  Well-healed carotid endarterectomy scar is noted  CHEST: Normal AP diameter and normal contour without any kyphoscoliosis.  LUNGS: Auscultation of the lungs revealed normal breath sounds without any other adventitious sounds or rubs.  CARDIOVASCULAR: There was a regular rate and rhythm without any murmurs, gallops, rubs. The carotid pulses were normal and 2+ bilaterally without bruits. Peripheral pulses were 2+ and symmetric.  ABDOMEN: Soft and nontender with normal bowel sounds.  LYMPH NODES: No lymphadenopathy was appreciated in " "the neck  MUSCULOSKELETAL:  There was no tenderness or effusions noted. Muscle strength and tone were normal.  EXTREMITIES: No cyanosis, clubbing or edema.  NEUROLOGIC: Alert and oriented x 3. Normal affect. Normal deep tendon reflexes with no pathological reflexes. Sensation to touch was normal.     Assessment/Plan   Lynsey Forman is a 79 y.o. female who has a pertinent medical history notable for coronary artery disease with significantly elevated CT coronary artery calcium score 1730, cardiac MRI stress test 2023 with no inducible ischemia, essential hypertension, dyslipidemia, persistent atrial fibrillation/atrial flutter status post ablation, chronic kidney disease stage III, who presented to the emergency department complaints of nausea.  Cardiology has been consulted for \"elevated troponin, increasing\". A full hospital course review was completed.    Patient states that she was in her usual state of health.  She awoke on day of presentation with severe nausea.  She just felt unwell,his continued. she did not have any associated emesis, diarrhea on my interview however the ER documented that she she reported episodes of nonbloody, nonbilious nausea vomiting as well as diarrhea.  She was the only 1 affected by this symptomatology.  She called her sister who brought her to the emergency department.    On arrival to the ER, she was afebrile at 36.6 heart rate 53 respiratory rate 18 saturating 98% on room air.  Blood pressure 157/73. Laboratory studies show serum sodium 127 potassium 3.4 chloride 86 BUN 21 creatinine 0.78.  Magnesium low at 1.56.  CBC shows white count 14.3 hemoglobin hematocrit 13.1/37.2 platelet count 190.  Viral panels were negative for flu A, flu B, COVID-19.  Lactate normal at 1.6 lipase normal at 23 high-sensitivity cardiac troponin 22, 69, 147, 219.  Brain natruretic peptide elevated at 796.  Imaging including a two-view chest x-ray was interpreted by radiology as cardiomegaly without " evidence of acute disease CT abdomen pelvis with contrast was interpreted as marked edematous wall thickening at the gastric antrum and pylorus felt secondary to gastritis and/or peptic ulcer disease with a gas focus at the medial wall of the gastric pylorus concerning for deep ulceration.  There is also edematous wall thickening of the duodenum suggestive of acute duodenitis evidence of colitis and colitis colonic diverticulosis. EKG obtained 1/25/2025 at 1907 shows significant baseline artifact but probable atrial fibrillation with controlled ventricular response.  A repeat EKG 1/25/2025 at 2237 appears more sinus with frequent PACs although continued computer interpretation suggests possible atrial fibrillation EKG 1/26/2025 at 0208 shows sinus rhythm with PACs.  This has dramatically improved baseline artifact from prior studies.    Agree with ongoing supportive management.    Symptomatology may be related to atrial fibrillation causing transient ischemia due to thromboembolic phenomena versus small vessel occlusion.  However, at this time, CT imaging is highly suggestive of gastritis with possible deep ulceration.  As such, we would not proceed with heparinization nor invasive studies in this asymptomatic patient until further evaluation has been completed.  Systemic anticoagulation in this setting could have catastrophic results.    Given CT findings suggesting deep ulcer of the gastric pylorus, we strongly suggest gastroenterology consultation if not already performed    This time, continue to trend cardiac troponin    We will obtain a transthoracic echocardiogram for structural evaluation including ejection fraction, assessment of regional wall motion abnormalities or valvular disease, and further evaluation of hemodynamics.    Hold anticoagulation at this time, specifically home rivaroxaban 15 mg daily    Check heparin assay               Code Status:  Full Code    .  Gaudencio Hollins DO   Division of  Cardiovascular Medicine  [facility]

## 2025-01-26 NOTE — HOSPITAL COURSE
Lynsey Forman is a 79 y.o. female presenting with nausea and dry heaving for 1 day.  Patient denies vomiting, fevers, or diarrhea.  States that she was concerned she was having a heart attack.  In the ED, initial troponin was 22; however, this increased to 69 and then 147.  Patient denies chest pain or shortness of breath.  Did have a headache, but has not tolerated any p.o. intake for a day.  Labs otherwise showed mild hyponatremia, hypomagnesemia, and hypokalemia.  She was given aspirin, IV fluids, Zofran, and magnesium in the ED as well as Protonix, as CT was consistent with duodenitis/peptic ulcer disease.  Patient reports feeling improved at this time.  No longer feeling nauseous.       Observation Course: ***      Discharge weight: ### kg    After all labs and VS were reviewed the decision was made that the patient was medically stable for discharge.  The patient was discharged in satisfactory condition.    More than 30 minutes were spent in coordinating patient discharge.

## 2025-01-26 NOTE — PROGRESS NOTES
Pharmacy Medication History     Source of Information: per patient , Not taking Furosamide  20 mg tablet unless needed.    Additional concerns with the patient's PTA list.   N/A  The following updates were made to the Prior to Admission medication list:     Medications ADDED:   N/A  Medications CHANGED:  N/A  Medications REMOVED:   N/A  Medications NOT TAKING:   N/A    Allergy reviewed : Yes    Meds 2 Beds : Yes    Outpatient pharmacy confirmed and updated in chart : Yes    [facility]: [facility]     The list below reflectives the updated PTA list. Please review each medication in order reconciliation for additional clarification and justification.    Prior to Admission Medications   Prescriptions Last Dose Informant   acetaminophen (Tylenol) 325 mg tablet Past Week    Sig: Take 2 tablets (650 mg) by mouth every 6 hours if needed for mild pain (1 - 3) or moderate pain (4 - 6).   amLODIPine (Norvasc) 5 mg tablet 1/24/2025 Morning    Sig: TAKE 1 TABLET BY MOUTH DAILY   aspirin 81 mg EC tablet 1/24/2025 Morning    Sig: Take 1 tablet (81 mg) by mouth once daily.   atorvastatin (Lipitor) 80 mg tablet 1/24/2025 Morning    Sig: Take 1 tablet (80 mg) by mouth once daily.   ezetimibe (Zetia) 10 mg tablet 1/24/2025 Morning    Sig: Take 1 tablet (10 mg) by mouth once daily.   furosemide (Lasix) 20 mg tablet Not Taking    Sig: Take 1 tablet (20 mg) by mouth if needed (for ankle swelling).   Patient not taking: Reported on 1/26/2025   gabapentin (Neurontin) 300 mg capsule 1/24/2025 Morning    Sig: Take 1 capsule (300 mg) by mouth 3 times a day.   hydrOXYzine HCL (Atarax) 25 mg tablet     Sig: Take 1 tablet (25 mg) by mouth if needed for anxiety.   lisinopril 20 mg tablet 1/24/2025 Morning    Sig: TAKE 1 TABLET BY MOUTH DAILY   rivaroxaban (Xarelto) 15 mg tablet 1/24/2025 Morning    Sig: Take 1 tablet (15 mg) by mouth once daily in the evening. Take with meals. Resume 12/21/24 Do not fill before December 21, 2024.    triamterene-hydrochlorothiazid (Maxzide-25) 37.5-25 mg tablet 1/24/2025 Morning    Sig: Take 0.5 tablets by mouth once daily.      Facility-Administered Medications: None       The list below reflectives the updated allergy list. Please review each documented allergy for additional clarification and justification.    Allergies   Allergen Reactions    Codeine Anxiety, Itching, Unknown and Other     Denies itching, hive, shortness of breath          01/26/25 at 12:02 PM - Tess Hernandez

## 2025-01-26 NOTE — H&P
History Of Present Illness  Lynsey Forman is a 79 y.o. female presenting with nausea and dry heaving for 1 day.  Patient denies vomiting, fevers, or diarrhea.  States that she was concerned she was having a heart attack.  In the ED, initial troponin was 22; however, this increased to 69 and then 147.  Patient denies chest pain or shortness of breath.  Did have a headache, but has not tolerated any p.o. intake for a day.  Labs otherwise showed mild hyponatremia, hypomagnesemia, and hypokalemia.  She was given aspirin, IV fluids, Zofran, and magnesium in the ED as well as Protonix, as CT was consistent with duodenitis/peptic ulcer disease.  Patient reports feeling improved at this time.  No longer feeling nauseous.     Past Medical History  Past Medical History:   Diagnosis Date    Abnormal kidney function 01/08/2024    BUN-24, GFR-56 (4/29/24)    Alcohol use, unspecified, uncomplicated 12/20/2022    Aneurysm of carotid artery (CMS-HCC)     Right MCA, followed by neurology    Anxiety     Atrial fibrillation (Multi)     no reccurance s/p ablation 4/2023 and 7/2023    Atrial flutter (Multi)     Carotid stenosis, asymptomatic, bilateral     on ASA and statin    Chronic cough 11/24/2009    Formatting of this note might be different from the original. Overview: No change with stopping lisinopril, no change with Advair, consensus of ENT, pulmonary, here is LP reflux, has associated past-nasal drip that does not respond to Mucinex or Flonase, 2012 has large nasal polyp L Formatting of this note might be different from the original. No change with stopping lisinopril, no change with Adv    Chronic pain disorder     CKD (chronic kidney disease)     stage III    Coronary artery disease     Diverticulosis 12/21/2016    Dysphonia     Easy bruising     Elevated coronary artery calcium score     CT calcium score 1730    GERD (gastroesophageal reflux disease)     History of arthrodesis 07/07/2023    Hyperlipidemia     Hypertension      Lipoma of arm     Left upper arm    Lumbar disc disease     Moriah's edema of vocal folds     s/p Moriah's edema surgery performed on 08/09/23.    Sinus bradycardia 01/04/2013    Formatting of this note might be different from the original. Overview: Asymptomatic, noticed on exam Formatting of this note might be different from the original. Asymptomatic, noticed on exam    Tachycardia 01/08/2024    Tobacco use disorder, mild, in early remission     Quit 1/1/2024, 1/4 PPD x 20 years    Vocal cord polyp        Surgical History  Past Surgical History:   Procedure Laterality Date    ANTERIOR CRUCIATE LIGAMENT REPAIR Left     CARDIAC ELECTROPHYSIOLOGY STUDY AND ABLATION      4/2023, 7/2023    CERVICAL LAMINECTOMY  2017    Cervical surgery    COLONOSCOPY      COSMETIC SURGERY  2018    face lift    LIPOMA RESECTION  06/18/2024    SKIN, LEFT UPPER ARM, EXCISION: --EPIDERMAL INCLUSION CYST, RUPTURED AND INFLAMED    LUMBAR LAMINECTOMY  05/2024    L4-5    MOUTH SURGERY  2018    Oral surgery-peridontal    THROAT SURGERY  2023    polyp removed        Social History  She reports that she quit smoking about 12 months ago. Her smoking use included cigarettes. She started smoking about 31 years ago. She has a 7.5 pack-year smoking history. She has never used smokeless tobacco. She reports current alcohol use of about 4.0 standard drinks of alcohol per week. She reports current drug use. Frequency: 7.00 times per week. Drug: Marijuana.    Family History  Family History   Problem Relation Name Age of Onset    Heart failure Mother      Dementia Mother      Hypertension Mother      Dementia Father      No Known Problems Sister      No Known Problems Sister      Heart disease Brother      Other (back pain) Brother      No Known Problems Brother      No Known Problems Brother          Allergies  Codeine    Review of Systems   All other systems reviewed and are negative.       Physical Exam  Constitutional:       General: She is not in  "acute distress.     Appearance: Normal appearance.   HENT:      Head: Normocephalic and atraumatic.      Nose: Nose normal.      Mouth/Throat:      Mouth: Mucous membranes are moist.      Pharynx: Oropharynx is clear.   Eyes:      Conjunctiva/sclera: Conjunctivae normal.      Pupils: Pupils are equal, round, and reactive to light.   Pulmonary:      Effort: Pulmonary effort is normal. No respiratory distress.   Musculoskeletal:         General: No swelling, deformity or signs of injury.      Cervical back: Normal range of motion and neck supple.   Skin:     General: Skin is warm and dry.   Neurological:      General: No focal deficit present.      Mental Status: She is alert and oriented to person, place, and time. Mental status is at baseline.   Psychiatric:         Attention and Perception: Attention and perception normal.         Mood and Affect: Mood normal.         Behavior: Behavior normal.         Judgment: Judgment normal.          Last Recorded Vitals  Blood pressure 133/59, pulse 62, temperature 36.6 °C (97.8 °F), temperature source Temporal, resp. rate 17, height 1.6 m (5' 3\"), weight 49.9 kg (110 lb), SpO2 97%.    Relevant Results        Results for orders placed or performed during the hospital encounter of 01/25/25 (from the past 24 hours)   Sars-CoV-2 and Influenza A/B PCR   Result Value Ref Range    Flu A Result Not Detected Not Detected    Flu B Result Not Detected Not Detected    Coronavirus 2019, PCR Not Detected Not Detected   CBC and Auto Differential   Result Value Ref Range    WBC 14.3 (H) 4.4 - 11.3 x10*3/uL    nRBC 0.0 0.0 - 0.0 /100 WBCs    RBC 4.02 4.00 - 5.20 x10*6/uL    Hemoglobin 13.1 12.0 - 16.0 g/dL    Hematocrit 37.2 36.0 - 46.0 %    MCV 93 80 - 100 fL    MCH 32.6 26.0 - 34.0 pg    MCHC 35.2 32.0 - 36.0 g/dL    RDW 13.2 11.5 - 14.5 %    Platelets 190 150 - 450 x10*3/uL    Neutrophils % 92.6 40.0 - 80.0 %    Immature Granulocytes %, Automated 0.5 0.0 - 0.9 %    Lymphocytes % 3.1 13.0 - " 44.0 %    Monocytes % 3.4 2.0 - 10.0 %    Eosinophils % 0.1 0.0 - 6.0 %    Basophils % 0.3 0.0 - 2.0 %    Neutrophils Absolute 13.26 (H) 1.60 - 5.50 x10*3/uL    Immature Granulocytes Absolute, Automated 0.07 0.00 - 0.50 x10*3/uL    Lymphocytes Absolute 0.44 (L) 0.80 - 3.00 x10*3/uL    Monocytes Absolute 0.49 0.05 - 0.80 x10*3/uL    Eosinophils Absolute 0.02 0.00 - 0.40 x10*3/uL    Basophils Absolute 0.05 0.00 - 0.10 x10*3/uL   Comprehensive metabolic panel   Result Value Ref Range    Glucose 138 (H) 74 - 99 mg/dL    Sodium 127 (L) 136 - 145 mmol/L    Potassium 3.4 (L) 3.5 - 5.3 mmol/L    Chloride 86 (L) 98 - 107 mmol/L    Bicarbonate 31 21 - 32 mmol/L    Anion Gap 13 10 - 20 mmol/L    Urea Nitrogen 21 6 - 23 mg/dL    Creatinine 0.78 0.50 - 1.05 mg/dL    eGFR 78 >60 mL/min/1.73m*2    Calcium 9.4 8.6 - 10.3 mg/dL    Albumin 4.4 3.4 - 5.0 g/dL    Alkaline Phosphatase 58 33 - 136 U/L    Total Protein 7.7 6.4 - 8.2 g/dL    AST 20 9 - 39 U/L    Bilirubin, Total 1.1 0.0 - 1.2 mg/dL    ALT 12 7 - 45 U/L   Magnesium   Result Value Ref Range    Magnesium 1.56 (L) 1.60 - 2.40 mg/dL   Lactate   Result Value Ref Range    Lactate 1.6 0.4 - 2.0 mmol/L   Lipase   Result Value Ref Range    Lipase 23 9 - 82 U/L   Troponin I, High Sensitivity   Result Value Ref Range    Troponin I, High Sensitivity 22 (H) 0 - 13 ng/L   Urinalysis with Reflex Culture and Microscopic   Result Value Ref Range    Color, Urine Colorless (N) Light-Yellow, Yellow, Dark-Yellow    Appearance, Urine Clear Clear    Specific Gravity, Urine 1.009 1.005 - 1.035    pH, Urine 7.0 5.0, 5.5, 6.0, 6.5, 7.0, 7.5, 8.0    Protein, Urine 30 (1+) (A) NEGATIVE, 10 (TRACE), 20 (TRACE) mg/dL    Glucose, Urine Normal Normal mg/dL    Blood, Urine 0.03 (TRACE) (A) NEGATIVE    Ketones, Urine 10 (1+) (A) NEGATIVE mg/dL    Bilirubin, Urine NEGATIVE NEGATIVE    Urobilinogen, Urine Normal Normal mg/dL    Nitrite, Urine NEGATIVE NEGATIVE    Leukocyte Esterase, Urine NEGATIVE NEGATIVE    Urinalysis Microscopic   Result Value Ref Range    WBC, Urine NONE 1-5, NONE /HPF    RBC, Urine 3-5 NONE, 1-2, 3-5 /HPF   Troponin I, High Sensitivity   Result Value Ref Range    Troponin I, High Sensitivity 69 (HH) 0 - 13 ng/L   Troponin I, High Sensitivity   Result Value Ref Range    Troponin I, High Sensitivity 147 (HH) 0 - 13 ng/L     CT abdomen pelvis w IV contrast    Result Date: 1/25/2025  Interpreted By:  Shweta Anaya, STUDY: CT ABDOMEN PELVIS W IV CONTRAST;  1/25/2025 8:22 pm   INDICATION: Signs/Symptoms:nausea, vomiting, abdominal discomfort.   COMPARISON: None   ACCESSION NUMBER(S): [id number]   ORDERING CLINICIAN: KELLY CAMPUZANO   TECHNIQUE: Axial CT of the abdomen and pelvis was performed. Coronal and sagittal reconstructions were performed.   Intravenous contrast material was administered without immediate complication.   FINDINGS: LOWER CHEST: Evaluation degraded by motion artifact. No consolidation or pleural effusion. Coronary artery calcifications are present. There is right atrial enlargement. No pericardial effusion.   ABDOMEN:   LIVER: Subcentimeter hypodensities at the liver are too small to be adequately characterized.   BILE DUCTS: No significant dilation.   GALLBLADDER: Present.   PANCREAS: No peripancreatic inflammatory changes.   SPLEEN: Unremarkable.   ADRENAL GLANDS: Unremarkable.   KIDNEYS AND URETERS: Symmetrical renal enhancement.  No hydronephrosis or hydroureter is identified. There are bilateral renal cysts.   PELVIS:   BLADDER: Partially distended.   REPRODUCTIVE ORGANS: There is a 2.1 cm soft tissue density with calcifications at the left adnexa.   BOWEL: There is marked edematous wall thickening at the gastric antrum and pylorus. There is a gas focus the medial wall of the gastric pylorus. There is edematous wall thickening at the duodenum. No bowel obstruction. The colon is decompressed with diffuse wall thickening and adjacent soft tissue stranding. There is colonic  diverticulosis with no CT evidence for acute diverticulitis. The appendix is normal.   VESSELS: Atherosclerotic calcifications within the abdominal aorta and its branches. No abdominal aortic aneurysm.   PERITONEUM/RETROPERITONEUM/LYMPH NODES: No free fluid or free air. There is diffuse mesenteric edema. No retroperitoneal hemorrhage. No pathologically enlarged lymph nodes are identified.   BONES AND ABDOMINAL WALL: Multilevel degenerative changes are noted in the spine. There is pars defect on the left at L4. There is 9 mm anterolisthesis of L4 on L5. There is complete loss of the intervertebral disc space at L4-L5. Postsurgical changes of right charlene laminectomy at L4. There is diffuse body wall edema.       1. Marked edematous wall thickening at the gastric antrum and pylorus, may be secondary to gastritis and/or peptic ulcer disease. Gas focus at the medial wall of the gastric pylorus, concerning for a deep area of ulceration. 2. Edematous wall thickening at the duodenum, suggestive of acute duodenitis. 3. Colitis. 4. Colonic diverticulosis. 5. 2.1 cm soft tissue density with calcifications at the left adnexa. This can be further evaluated with nonemergent pelvic ultrasound. 6. Diffuse mesenteric edema. Diffuse body wall edema. 7. Right atrial enlargement. Coronary artery calcifications.     MACRO: None.   Signed by: Shweta Anaya 1/25/2025 10:07 PM Dictation workstation:   [id number]    CT head wo IV contrast    Result Date: 1/25/2025  Interpreted By:  Shweta Anaya, STUDY: CT HEAD WO IV CONTRAST  1/25/2025 8:22 pm   INDICATION: Signs/Symptoms:headache, nausea, vomiting   COMPARISON: CT angiogram head and neck 12/24/2024, CT angiogram neck 02/08/2024   ACCESSION NUMBER(S): [id number]   ORDERING CLINICIAN: KELLY CAMPUZANO   TECHNIQUE: Serial, axial CT images of the brain were obtained without IV contrast. Coronal and sagittal reformatted images were performed.   FINDINGS: The ventricles, cisterns and sulci  are prominent, consistent with diffuse volume loss.  There are areas of nonspecific white matter hypodensity, which are probably age-related or microvascular in nature. The gray-white matter differentiation is intact and there is no evidence of acute territorial infarct.  No mass effect or midline shift is seen.  There is no hemorrhage.  No extraaxial fluid collection. Air-fluid level at the left maxillary sinus. The mastoid air cells are clear. No depressed calvarial fracture.       1.  No acute intracranial hemorrhage or acute territorial infarct. 2.  Diffuse parenchymal volume loss. Chronic microvascular ischemic changes. 3. Air-fluid level at the left maxillary sinus. Please correlate for acute sinusitis.     MACRO: None.   Signed by: Shweta Anaya 1/25/2025 9:42 PM Dictation workstation:   [id number]    XR chest 2 views    Result Date: 1/25/2025  Interpreted By:  Skip Spencer, STUDY: XR CHEST 2 VIEWS;  1/25/2025 7:40 pm   INDICATION: Signs/Symptoms:Flu like illness.     COMPARISON: None.   ACCESSION NUMBER(S): [id number]   ORDERING CLINICIAN: KELLY CAMPUZANO   FINDINGS:     Cardiomegaly. The pulmonary vasculature is within normal limits.   No consolidation, pleural effusion or pneumothorax.         Cardiomegaly without evidence of acute disease in the chest.     MACRO: None.   Signed by: Skip Spencer 1/25/2025 8:45 PM Dictation workstation:   [id number]       Assessment/Plan   Assessment & Plan  Elevated troponin      Elevated troponin  Trend is increasing  Patient without symptoms at this time  Given ASA in the ED, anticoagulated with Xarelto already  Repeat troponin in AM  Monitor on tele  Check echo  CT chest showing coronary artery calcifications  Consult cardiology for further recs  PUD  Start IV PPI twice daily  Clear liquid diet, advance as tolerated  Adnexal mass  Ultrasound ordered, per radiology Deyvi  Dehydration  IV fluids overnight  Recheck lytes in a.m.  A-fib  Continue  Xarelto  Hypertension  Stable  Continue amlodipine, lisinopril  Hold triamterene-hydrochlorothiazide with hyponatremia/dehydration at this time             Stephanie Justin MD

## 2025-01-26 NOTE — H&P (VIEW-ONLY)
Reason For Consult  Abnormal CT scan, possible ulcer    History Of Present Illness  Lynsey Forman is a 79 y.o. female with PMHx of CAD, Afib/flutter s/p ablation on Xarelto, HTN, HLD presenting with acute onset of nausea.  She states that she went to bed two nights ago feeling fine, and then woke up yesterday feeling nauseous, and this worsened during the day to the point where she started dry-heaving so she came to the ED.  Currently she feels OK.  No fevers or chills at home.  Generally she has no GI complaints - no acid reflux symptoms so she does not regularly take any PPIs or H2 blockers.  No dysphagia or unexplained weight loss.  She does have back pain, so takes both Tylenol and Advil daily (takes 200mg ibuprofen BID).  On admission to the ED she had blood work that showed elevated troponin and BNP, along with CT demonstrating marked wall thickening in the distal stomach and proximal duodenum with a gas focus near the pylorus concerning for deep ulcer.  She denies any abdominal pain.    States that she had an EGD many years ago, unsure what was seen.  She had a colonoscopy two months ago that showed a tortuous colon, diverticulosis, and internal hemorrhoids; no repeat was recommended due to age.     Past Medical History  As per HPI.    Surgical History  She has a past surgical history that includes Cervical laminectomy (2017); Mouth surgery (2018); Anterior cruciate ligament repair (Left); Cardiac electrophysiology study and ablation; Lumbar laminectomy (05/2024); Lipoma resection (06/18/2024); Throat surgery (2023); Colonoscopy; and Cosmetic surgery (2018).     Social History  She reports that she quit smoking about 12 months ago. Her smoking use included cigarettes. She started smoking about 31 years ago. She has a 7.5 pack-year smoking history. She has never used smokeless tobacco. She reports current alcohol use of about 4.0 standard drinks of alcohol per week. She reports current drug use. Frequency:  7.00 times per week. Drug: Marijuana.    Family History  Family History   Problem Relation Name Age of Onset    Heart failure Mother      Dementia Mother      Hypertension Mother      Dementia Father      No Known Problems Sister      No Known Problems Sister      Heart disease Brother      Other (back pain) Brother      No Known Problems Brother      No Known Problems Brother          Allergies  Codeine    Review of Systems  Review of Systems   Constitutional:  Negative for chills, fatigue, fever and unexpected weight change.   HENT:  Negative for sore throat.    Eyes:  Negative for redness and visual disturbance.   Respiratory:  Negative for shortness of breath.    Cardiovascular:  Negative for chest pain.   Gastrointestinal:         As per HPI   Genitourinary:  Negative for difficulty urinating.   Musculoskeletal:  Negative for arthralgias and myalgias.   Skin:  Negative for rash.   Neurological:  Negative for headaches.   Hematological:  Negative for adenopathy. Does not bruise/bleed easily.   Psychiatric/Behavioral:  The patient is not nervous/anxious.    All other systems reviewed and are negative.       Physical Exam  Physical Exam  Constitutional:       General: She is not in acute distress.  HENT:      Mouth/Throat:      Mouth: Mucous membranes are moist.   Eyes:      Extraocular Movements: Extraocular movements intact.   Cardiovascular:      Rate and Rhythm: Normal rate and regular rhythm.   Pulmonary:      Breath sounds: Normal breath sounds.   Abdominal:      General: Bowel sounds are normal. There is no distension.      Palpations: Abdomen is soft.      Tenderness: There is abdominal tenderness in the epigastric area. There is no guarding or rebound.      Comments: Subtle firm area in epigastrium at the area of tenderness.   Musculoskeletal:         General: No swelling.   Skin:     General: Skin is warm and dry.   Neurological:      General: No focal deficit present.      Mental Status: She is alert.  "  Psychiatric:         Mood and Affect: Mood normal.         Behavior: Behavior normal.          Last Recorded Vitals  Blood pressure 136/70, pulse 61, temperature 36.4 °C (97.6 °F), temperature source Oral, resp. rate 17, height 1.6 m (5' 3\"), weight 49.9 kg (110 lb), SpO2 96%.    Relevant Results    Scheduled medications  amLODIPine, 5 mg, oral, Daily  amoxicillin-pot clavulanate, 1 tablet, oral, q12h GIOVANNA  [Held by provider] aspirin, 81 mg, oral, Daily  atorvastatin, 80 mg, oral, Daily  calcium carbonate, 1,000 mg, oral, BID  ezetimibe, 10 mg, oral, Daily  gabapentin, 300 mg, oral, TID  lisinopril, 20 mg, oral, Daily  pantoprazole, 40 mg, intravenous, BID  perflutren lipid microspheres, 0.5-10 mL of dilution, intravenous, Once in imaging  perflutren protein A microsphere, 0.5 mL, intravenous, Once in imaging  polyethylene glycol, 17 g, oral, Daily  potassium chloride CR, 40 mEq, oral, Once  sulfur hexafluoride microsphr, 2 mL, intravenous, Once in imaging  [Held by provider] triamterene-hydrochlorothiazid, 0.5 tablet, oral, Daily      Continuous medications  potassium chloride in 0.9%NaCl, 100 mL/hr, Last Rate: 100 mL/hr (01/26/25 0443)      PRN medications  PRN medications: acetaminophen, alum-mag hydroxide-simeth, benzocaine-menthol, calcium carbonate, docusate sodium, hydrOXYzine HCL, ipratropium-albuteroL, lubricating eye drops, melatonin, metoclopramide, ondansetron, simethicone, sodium chloride    Results for orders placed or performed during the hospital encounter of 01/25/25 (from the past 24 hours)   Sars-CoV-2 and Influenza A/B PCR   Result Value Ref Range    Flu A Result Not Detected Not Detected    Flu B Result Not Detected Not Detected    Coronavirus 2019, PCR Not Detected Not Detected   CBC and Auto Differential   Result Value Ref Range    WBC 14.3 (H) 4.4 - 11.3 x10*3/uL    nRBC 0.0 0.0 - 0.0 /100 WBCs    RBC 4.02 4.00 - 5.20 x10*6/uL    Hemoglobin 13.1 12.0 - 16.0 g/dL    Hematocrit 37.2 36.0 - " 46.0 %    MCV 93 80 - 100 fL    MCH 32.6 26.0 - 34.0 pg    MCHC 35.2 32.0 - 36.0 g/dL    RDW 13.2 11.5 - 14.5 %    Platelets 190 150 - 450 x10*3/uL    Neutrophils % 92.6 40.0 - 80.0 %    Immature Granulocytes %, Automated 0.5 0.0 - 0.9 %    Lymphocytes % 3.1 13.0 - 44.0 %    Monocytes % 3.4 2.0 - 10.0 %    Eosinophils % 0.1 0.0 - 6.0 %    Basophils % 0.3 0.0 - 2.0 %    Neutrophils Absolute 13.26 (H) 1.60 - 5.50 x10*3/uL    Immature Granulocytes Absolute, Automated 0.07 0.00 - 0.50 x10*3/uL    Lymphocytes Absolute 0.44 (L) 0.80 - 3.00 x10*3/uL    Monocytes Absolute 0.49 0.05 - 0.80 x10*3/uL    Eosinophils Absolute 0.02 0.00 - 0.40 x10*3/uL    Basophils Absolute 0.05 0.00 - 0.10 x10*3/uL   Comprehensive metabolic panel   Result Value Ref Range    Glucose 138 (H) 74 - 99 mg/dL    Sodium 127 (L) 136 - 145 mmol/L    Potassium 3.4 (L) 3.5 - 5.3 mmol/L    Chloride 86 (L) 98 - 107 mmol/L    Bicarbonate 31 21 - 32 mmol/L    Anion Gap 13 10 - 20 mmol/L    Urea Nitrogen 21 6 - 23 mg/dL    Creatinine 0.78 0.50 - 1.05 mg/dL    eGFR 78 >60 mL/min/1.73m*2    Calcium 9.4 8.6 - 10.3 mg/dL    Albumin 4.4 3.4 - 5.0 g/dL    Alkaline Phosphatase 58 33 - 136 U/L    Total Protein 7.7 6.4 - 8.2 g/dL    AST 20 9 - 39 U/L    Bilirubin, Total 1.1 0.0 - 1.2 mg/dL    ALT 12 7 - 45 U/L   Magnesium   Result Value Ref Range    Magnesium 1.56 (L) 1.60 - 2.40 mg/dL   Lactate   Result Value Ref Range    Lactate 1.6 0.4 - 2.0 mmol/L   Lipase   Result Value Ref Range    Lipase 23 9 - 82 U/L   Troponin I, High Sensitivity   Result Value Ref Range    Troponin I, High Sensitivity 22 (H) 0 - 13 ng/L   Urinalysis with Reflex Culture and Microscopic   Result Value Ref Range    Color, Urine Colorless (N) Light-Yellow, Yellow, Dark-Yellow    Appearance, Urine Clear Clear    Specific Gravity, Urine 1.009 1.005 - 1.035    pH, Urine 7.0 5.0, 5.5, 6.0, 6.5, 7.0, 7.5, 8.0    Protein, Urine 30 (1+) (A) NEGATIVE, 10 (TRACE), 20 (TRACE) mg/dL    Glucose, Urine Normal  Normal mg/dL    Blood, Urine 0.03 (TRACE) (A) NEGATIVE    Ketones, Urine 10 (1+) (A) NEGATIVE mg/dL    Bilirubin, Urine NEGATIVE NEGATIVE    Urobilinogen, Urine Normal Normal mg/dL    Nitrite, Urine NEGATIVE NEGATIVE    Leukocyte Esterase, Urine NEGATIVE NEGATIVE   Urinalysis Microscopic   Result Value Ref Range    WBC, Urine NONE 1-5, NONE /HPF    RBC, Urine 3-5 NONE, 1-2, 3-5 /HPF   Troponin I, High Sensitivity   Result Value Ref Range    Troponin I, High Sensitivity 69 (HH) 0 - 13 ng/L   Troponin I, High Sensitivity   Result Value Ref Range    Troponin I, High Sensitivity 147 (HH) 0 - 13 ng/L   B-type natriuretic peptide   Result Value Ref Range     (H) 0 - 99 pg/mL   Basic Metabolic Panel   Result Value Ref Range    Glucose 118 (H) 74 - 99 mg/dL    Sodium 131 (L) 136 - 145 mmol/L    Potassium 2.8 (LL) 3.5 - 5.3 mmol/L    Chloride 98 98 - 107 mmol/L    Bicarbonate 25 21 - 32 mmol/L    Anion Gap 11 10 - 20 mmol/L    Urea Nitrogen 12 6 - 23 mg/dL    Creatinine 0.59 0.50 - 1.05 mg/dL    eGFR >90 >60 mL/min/1.73m*2    Calcium 7.1 (L) 8.6 - 10.3 mg/dL   CBC   Result Value Ref Range    WBC 10.1 4.4 - 11.3 x10*3/uL    nRBC 0.0 0.0 - 0.0 /100 WBCs    RBC 3.44 (L) 4.00 - 5.20 x10*6/uL    Hemoglobin 11.3 (L) 12.0 - 16.0 g/dL    Hematocrit 31.6 (L) 36.0 - 46.0 %    MCV 92 80 - 100 fL    MCH 32.8 26.0 - 34.0 pg    MCHC 35.8 32.0 - 36.0 g/dL    RDW 13.3 11.5 - 14.5 %    Platelets 161 150 - 450 x10*3/uL   Troponin I, High Sensitivity   Result Value Ref Range    Troponin I, High Sensitivity 219 (HH) 0 - 13 ng/L   Magnesium   Result Value Ref Range    Magnesium 1.49 (L) 1.60 - 2.40 mg/dL   Phosphorus   Result Value Ref Range    Phosphorus 4.0 2.5 - 4.9 mg/dL   Heparin Assay   Result Value Ref Range    Heparin Unfractionated 0.2 See Comment Below for Therapeutic Ranges IU/mL       US pelvis    Result Date: 1/26/2025  Interpreted By:  Khris Gage, STUDY: US PELVIS;  1/26/2025 3:14 am   INDICATION: Signs/Symptoms:pelvic  mass.     COMPARISON: None.   ACCESSION NUMBER(S): [id number]   ORDERING CLINICIAN: KELLY CAMPUZANO   TECHNIQUE: Multiple multiplanar static gray scale, color and spectral waveform sonographic images of the pelvis were obtained. Transabdominal ultrasound was performed.   FINDINGS: Extremely limited study by bowel gas shadowing. No gross pelvic masses. No free fluid.       Limited transabdominal ultrasound 1. Extremely limited study with excessive bowel gas shadowing with no gross lesions could be visualized to correlate with the prior CT scan finding. Previously seen CT scan finding could be further assessed by dedicated nonemergent pelvis MRI (over protocol) with IV contrast.   MACRO: None   Signed by: Khris Gage 1/26/2025 5:49 AM Dictation workstation:   [id number]    CT abdomen pelvis w IV contrast    Result Date: 1/25/2025  Interpreted By:  Shweta Anaya, STUDY: CT ABDOMEN PELVIS W IV CONTRAST;  1/25/2025 8:22 pm   INDICATION: Signs/Symptoms:nausea, vomiting, abdominal discomfort.   COMPARISON: None   ACCESSION NUMBER(S): [id number]   ORDERING CLINICIAN: KELLY CAMPUZANO   TECHNIQUE: Axial CT of the abdomen and pelvis was performed. Coronal and sagittal reconstructions were performed.   Intravenous contrast material was administered without immediate complication.   FINDINGS: LOWER CHEST: Evaluation degraded by motion artifact. No consolidation or pleural effusion. Coronary artery calcifications are present. There is right atrial enlargement. No pericardial effusion.   ABDOMEN:   LIVER: Subcentimeter hypodensities at the liver are too small to be adequately characterized.   BILE DUCTS: No significant dilation.   GALLBLADDER: Present.   PANCREAS: No peripancreatic inflammatory changes.   SPLEEN: Unremarkable.   ADRENAL GLANDS: Unremarkable.   KIDNEYS AND URETERS: Symmetrical renal enhancement.  No hydronephrosis or hydroureter is identified. There are bilateral renal cysts.   PELVIS:   BLADDER: Partially  distended.   REPRODUCTIVE ORGANS: There is a 2.1 cm soft tissue density with calcifications at the left adnexa.   BOWEL: There is marked edematous wall thickening at the gastric antrum and pylorus. There is a gas focus the medial wall of the gastric pylorus. There is edematous wall thickening at the duodenum. No bowel obstruction. The colon is decompressed with diffuse wall thickening and adjacent soft tissue stranding. There is colonic diverticulosis with no CT evidence for acute diverticulitis. The appendix is normal.   VESSELS: Atherosclerotic calcifications within the abdominal aorta and its branches. No abdominal aortic aneurysm.   PERITONEUM/RETROPERITONEUM/LYMPH NODES: No free fluid or free air. There is diffuse mesenteric edema. No retroperitoneal hemorrhage. No pathologically enlarged lymph nodes are identified.   BONES AND ABDOMINAL WALL: Multilevel degenerative changes are noted in the spine. There is pars defect on the left at L4. There is 9 mm anterolisthesis of L4 on L5. There is complete loss of the intervertebral disc space at L4-L5. Postsurgical changes of right charlene laminectomy at L4. There is diffuse body wall edema.       1. Marked edematous wall thickening at the gastric antrum and pylorus, may be secondary to gastritis and/or peptic ulcer disease. Gas focus at the medial wall of the gastric pylorus, concerning for a deep area of ulceration. 2. Edematous wall thickening at the duodenum, suggestive of acute duodenitis. 3. Colitis. 4. Colonic diverticulosis. 5. 2.1 cm soft tissue density with calcifications at the left adnexa. This can be further evaluated with nonemergent pelvic ultrasound. 6. Diffuse mesenteric edema. Diffuse body wall edema. 7. Right atrial enlargement. Coronary artery calcifications.     MACRO: None.   Signed by: Shweta Anaya 1/25/2025 10:07 PM Dictation workstation:   [id number]    CT head wo IV contrast    Result Date: 1/25/2025  Interpreted By:  Shweta Anaya,  STUDY: CT HEAD WO IV CONTRAST  1/25/2025 8:22 pm   INDICATION: Signs/Symptoms:headache, nausea, vomiting   COMPARISON: CT angiogram head and neck 12/24/2024, CT angiogram neck 02/08/2024   ACCESSION NUMBER(S): [id number]   ORDERING CLINICIAN: KELLY CAMPUZANO   TECHNIQUE: Serial, axial CT images of the brain were obtained without IV contrast. Coronal and sagittal reformatted images were performed.   FINDINGS: The ventricles, cisterns and sulci are prominent, consistent with diffuse volume loss.  There are areas of nonspecific white matter hypodensity, which are probably age-related or microvascular in nature. The gray-white matter differentiation is intact and there is no evidence of acute territorial infarct.  No mass effect or midline shift is seen.  There is no hemorrhage.  No extraaxial fluid collection. Air-fluid level at the left maxillary sinus. The mastoid air cells are clear. No depressed calvarial fracture.       1.  No acute intracranial hemorrhage or acute territorial infarct. 2.  Diffuse parenchymal volume loss. Chronic microvascular ischemic changes. 3. Air-fluid level at the left maxillary sinus. Please correlate for acute sinusitis.     MACRO: None.   Signed by: Shweta Anaya 1/25/2025 9:42 PM Dictation workstation:   [id number]    XR chest 2 views    Result Date: 1/25/2025  Interpreted By:  Skip Spencer, STUDY: XR CHEST 2 VIEWS;  1/25/2025 7:40 pm   INDICATION: Signs/Symptoms:Flu like illness.     COMPARISON: None.   ACCESSION NUMBER(S): [id number]   ORDERING CLINICIAN: KELLY CAMPUZANO   FINDINGS:     Cardiomegaly. The pulmonary vasculature is within normal limits.   No consolidation, pleural effusion or pneumothorax.         Cardiomegaly without evidence of acute disease in the chest.     MACRO: None.   Signed by: Skip Spencer 1/25/2025 8:45 PM Dictation workstation:   [id number]           Assessment/Plan     79 y.o. female with PMHx of CAD, Afib/flutter s/p ablation on Xarelto, HTN, HLD  presenting with acute onset of nausea, and imaging demonstrating gastritis and duodenitis with possible pyloric ulcer.  It is possible that an ulcer could cause her nausea symptoms, but it is odd that the nausea started so suddenly.  Her CT also shows some evidence of possible colitis, so the acute-onset nausea could be a viral illness, and the gastritis/ulcer could be an incidental finding.  This could be due to her regular ibuprofen use or could also be due to H. pylori, less likely malignancy.    Will plan on EGD tomorrow for further evaluation.  OK for clear liquid diet today, then NPO after midnight.  Continue BID pantoprazole.  Cardiology has also seen her due to elevated troponin, and do not want to start her on blood thinners at this point.  Continue to hold Xarelto.  Further recommendations tomorrow after EGD.        Morris Blair MD

## 2025-01-26 NOTE — CARE PLAN
The patient's goals for the shift include      The clinical goals for the shift include Pt will remain safe and comfortable throughout this shift.    Over the shift, the patient did make progress toward the following goals.

## 2025-01-26 NOTE — CONSULTS
Reason For Consult  Abnormal CT scan, possible ulcer    History Of Present Illness  Lynsey Forman is a 79 y.o. female with PMHx of CAD, Afib/flutter s/p ablation on Xarelto, HTN, HLD presenting with acute onset of nausea.  She states that she went to bed two nights ago feeling fine, and then woke up yesterday feeling nauseous, and this worsened during the day to the point where she started dry-heaving so she came to the ED.  Currently she feels OK.  No fevers or chills at home.  Generally she has no GI complaints - no acid reflux symptoms so she does not regularly take any PPIs or H2 blockers.  No dysphagia or unexplained weight loss.  She does have back pain, so takes both Tylenol and Advil daily (takes 200mg ibuprofen BID).  On admission to the ED she had blood work that showed elevated troponin and BNP, along with CT demonstrating marked wall thickening in the distal stomach and proximal duodenum with a gas focus near the pylorus concerning for deep ulcer.  She denies any abdominal pain.    States that she had an EGD many years ago, unsure what was seen.  She had a colonoscopy two months ago that showed a tortuous colon, diverticulosis, and internal hemorrhoids; no repeat was recommended due to age.     Past Medical History  As per HPI.    Surgical History  She has a past surgical history that includes Cervical laminectomy (2017); Mouth surgery (2018); Anterior cruciate ligament repair (Left); Cardiac electrophysiology study and ablation; Lumbar laminectomy (05/2024); Lipoma resection (06/18/2024); Throat surgery (2023); Colonoscopy; and Cosmetic surgery (2018).     Social History  She reports that she quit smoking about 12 months ago. Her smoking use included cigarettes. She started smoking about 31 years ago. She has a 7.5 pack-year smoking history. She has never used smokeless tobacco. She reports current alcohol use of about 4.0 standard drinks of alcohol per week. She reports current drug use. Frequency:  7.00 times per week. Drug: Marijuana.    Family History  Family History   Problem Relation Name Age of Onset    Heart failure Mother      Dementia Mother      Hypertension Mother      Dementia Father      No Known Problems Sister      No Known Problems Sister      Heart disease Brother      Other (back pain) Brother      No Known Problems Brother      No Known Problems Brother          Allergies  Codeine    Review of Systems  Review of Systems   Constitutional:  Negative for chills, fatigue, fever and unexpected weight change.   HENT:  Negative for sore throat.    Eyes:  Negative for redness and visual disturbance.   Respiratory:  Negative for shortness of breath.    Cardiovascular:  Negative for chest pain.   Gastrointestinal:         As per HPI   Genitourinary:  Negative for difficulty urinating.   Musculoskeletal:  Negative for arthralgias and myalgias.   Skin:  Negative for rash.   Neurological:  Negative for headaches.   Hematological:  Negative for adenopathy. Does not bruise/bleed easily.   Psychiatric/Behavioral:  The patient is not nervous/anxious.    All other systems reviewed and are negative.       Physical Exam  Physical Exam  Constitutional:       General: She is not in acute distress.  HENT:      Mouth/Throat:      Mouth: Mucous membranes are moist.   Eyes:      Extraocular Movements: Extraocular movements intact.   Cardiovascular:      Rate and Rhythm: Normal rate and regular rhythm.   Pulmonary:      Breath sounds: Normal breath sounds.   Abdominal:      General: Bowel sounds are normal. There is no distension.      Palpations: Abdomen is soft.      Tenderness: There is abdominal tenderness in the epigastric area. There is no guarding or rebound.      Comments: Subtle firm area in epigastrium at the area of tenderness.   Musculoskeletal:         General: No swelling.   Skin:     General: Skin is warm and dry.   Neurological:      General: No focal deficit present.      Mental Status: She is alert.  "  Psychiatric:         Mood and Affect: Mood normal.         Behavior: Behavior normal.          Last Recorded Vitals  Blood pressure 136/70, pulse 61, temperature 36.4 °C (97.6 °F), temperature source Oral, resp. rate 17, height 1.6 m (5' 3\"), weight 49.9 kg (110 lb), SpO2 96%.    Relevant Results    Scheduled medications  amLODIPine, 5 mg, oral, Daily  amoxicillin-pot clavulanate, 1 tablet, oral, q12h GIOVANNA  [Held by provider] aspirin, 81 mg, oral, Daily  atorvastatin, 80 mg, oral, Daily  calcium carbonate, 1,000 mg, oral, BID  ezetimibe, 10 mg, oral, Daily  gabapentin, 300 mg, oral, TID  lisinopril, 20 mg, oral, Daily  pantoprazole, 40 mg, intravenous, BID  perflutren lipid microspheres, 0.5-10 mL of dilution, intravenous, Once in imaging  perflutren protein A microsphere, 0.5 mL, intravenous, Once in imaging  polyethylene glycol, 17 g, oral, Daily  potassium chloride CR, 40 mEq, oral, Once  sulfur hexafluoride microsphr, 2 mL, intravenous, Once in imaging  [Held by provider] triamterene-hydrochlorothiazid, 0.5 tablet, oral, Daily      Continuous medications  potassium chloride in 0.9%NaCl, 100 mL/hr, Last Rate: 100 mL/hr (01/26/25 0443)      PRN medications  PRN medications: acetaminophen, alum-mag hydroxide-simeth, benzocaine-menthol, calcium carbonate, docusate sodium, hydrOXYzine HCL, ipratropium-albuteroL, lubricating eye drops, melatonin, metoclopramide, ondansetron, simethicone, sodium chloride    Results for orders placed or performed during the hospital encounter of 01/25/25 (from the past 24 hours)   Sars-CoV-2 and Influenza A/B PCR   Result Value Ref Range    Flu A Result Not Detected Not Detected    Flu B Result Not Detected Not Detected    Coronavirus 2019, PCR Not Detected Not Detected   CBC and Auto Differential   Result Value Ref Range    WBC 14.3 (H) 4.4 - 11.3 x10*3/uL    nRBC 0.0 0.0 - 0.0 /100 WBCs    RBC 4.02 4.00 - 5.20 x10*6/uL    Hemoglobin 13.1 12.0 - 16.0 g/dL    Hematocrit 37.2 36.0 - " 46.0 %    MCV 93 80 - 100 fL    MCH 32.6 26.0 - 34.0 pg    MCHC 35.2 32.0 - 36.0 g/dL    RDW 13.2 11.5 - 14.5 %    Platelets 190 150 - 450 x10*3/uL    Neutrophils % 92.6 40.0 - 80.0 %    Immature Granulocytes %, Automated 0.5 0.0 - 0.9 %    Lymphocytes % 3.1 13.0 - 44.0 %    Monocytes % 3.4 2.0 - 10.0 %    Eosinophils % 0.1 0.0 - 6.0 %    Basophils % 0.3 0.0 - 2.0 %    Neutrophils Absolute 13.26 (H) 1.60 - 5.50 x10*3/uL    Immature Granulocytes Absolute, Automated 0.07 0.00 - 0.50 x10*3/uL    Lymphocytes Absolute 0.44 (L) 0.80 - 3.00 x10*3/uL    Monocytes Absolute 0.49 0.05 - 0.80 x10*3/uL    Eosinophils Absolute 0.02 0.00 - 0.40 x10*3/uL    Basophils Absolute 0.05 0.00 - 0.10 x10*3/uL   Comprehensive metabolic panel   Result Value Ref Range    Glucose 138 (H) 74 - 99 mg/dL    Sodium 127 (L) 136 - 145 mmol/L    Potassium 3.4 (L) 3.5 - 5.3 mmol/L    Chloride 86 (L) 98 - 107 mmol/L    Bicarbonate 31 21 - 32 mmol/L    Anion Gap 13 10 - 20 mmol/L    Urea Nitrogen 21 6 - 23 mg/dL    Creatinine 0.78 0.50 - 1.05 mg/dL    eGFR 78 >60 mL/min/1.73m*2    Calcium 9.4 8.6 - 10.3 mg/dL    Albumin 4.4 3.4 - 5.0 g/dL    Alkaline Phosphatase 58 33 - 136 U/L    Total Protein 7.7 6.4 - 8.2 g/dL    AST 20 9 - 39 U/L    Bilirubin, Total 1.1 0.0 - 1.2 mg/dL    ALT 12 7 - 45 U/L   Magnesium   Result Value Ref Range    Magnesium 1.56 (L) 1.60 - 2.40 mg/dL   Lactate   Result Value Ref Range    Lactate 1.6 0.4 - 2.0 mmol/L   Lipase   Result Value Ref Range    Lipase 23 9 - 82 U/L   Troponin I, High Sensitivity   Result Value Ref Range    Troponin I, High Sensitivity 22 (H) 0 - 13 ng/L   Urinalysis with Reflex Culture and Microscopic   Result Value Ref Range    Color, Urine Colorless (N) Light-Yellow, Yellow, Dark-Yellow    Appearance, Urine Clear Clear    Specific Gravity, Urine 1.009 1.005 - 1.035    pH, Urine 7.0 5.0, 5.5, 6.0, 6.5, 7.0, 7.5, 8.0    Protein, Urine 30 (1+) (A) NEGATIVE, 10 (TRACE), 20 (TRACE) mg/dL    Glucose, Urine Normal  Normal mg/dL    Blood, Urine 0.03 (TRACE) (A) NEGATIVE    Ketones, Urine 10 (1+) (A) NEGATIVE mg/dL    Bilirubin, Urine NEGATIVE NEGATIVE    Urobilinogen, Urine Normal Normal mg/dL    Nitrite, Urine NEGATIVE NEGATIVE    Leukocyte Esterase, Urine NEGATIVE NEGATIVE   Urinalysis Microscopic   Result Value Ref Range    WBC, Urine NONE 1-5, NONE /HPF    RBC, Urine 3-5 NONE, 1-2, 3-5 /HPF   Troponin I, High Sensitivity   Result Value Ref Range    Troponin I, High Sensitivity 69 (HH) 0 - 13 ng/L   Troponin I, High Sensitivity   Result Value Ref Range    Troponin I, High Sensitivity 147 (HH) 0 - 13 ng/L   B-type natriuretic peptide   Result Value Ref Range     (H) 0 - 99 pg/mL   Basic Metabolic Panel   Result Value Ref Range    Glucose 118 (H) 74 - 99 mg/dL    Sodium 131 (L) 136 - 145 mmol/L    Potassium 2.8 (LL) 3.5 - 5.3 mmol/L    Chloride 98 98 - 107 mmol/L    Bicarbonate 25 21 - 32 mmol/L    Anion Gap 11 10 - 20 mmol/L    Urea Nitrogen 12 6 - 23 mg/dL    Creatinine 0.59 0.50 - 1.05 mg/dL    eGFR >90 >60 mL/min/1.73m*2    Calcium 7.1 (L) 8.6 - 10.3 mg/dL   CBC   Result Value Ref Range    WBC 10.1 4.4 - 11.3 x10*3/uL    nRBC 0.0 0.0 - 0.0 /100 WBCs    RBC 3.44 (L) 4.00 - 5.20 x10*6/uL    Hemoglobin 11.3 (L) 12.0 - 16.0 g/dL    Hematocrit 31.6 (L) 36.0 - 46.0 %    MCV 92 80 - 100 fL    MCH 32.8 26.0 - 34.0 pg    MCHC 35.8 32.0 - 36.0 g/dL    RDW 13.3 11.5 - 14.5 %    Platelets 161 150 - 450 x10*3/uL   Troponin I, High Sensitivity   Result Value Ref Range    Troponin I, High Sensitivity 219 (HH) 0 - 13 ng/L   Magnesium   Result Value Ref Range    Magnesium 1.49 (L) 1.60 - 2.40 mg/dL   Phosphorus   Result Value Ref Range    Phosphorus 4.0 2.5 - 4.9 mg/dL   Heparin Assay   Result Value Ref Range    Heparin Unfractionated 0.2 See Comment Below for Therapeutic Ranges IU/mL       US pelvis    Result Date: 1/26/2025  Interpreted By:  Khris Gage, STUDY: US PELVIS;  1/26/2025 3:14 am   INDICATION: Signs/Symptoms:pelvic  mass.     COMPARISON: None.   ACCESSION NUMBER(S): [id number]   ORDERING CLINICIAN: KELLY CAMPUZANO   TECHNIQUE: Multiple multiplanar static gray scale, color and spectral waveform sonographic images of the pelvis were obtained. Transabdominal ultrasound was performed.   FINDINGS: Extremely limited study by bowel gas shadowing. No gross pelvic masses. No free fluid.       Limited transabdominal ultrasound 1. Extremely limited study with excessive bowel gas shadowing with no gross lesions could be visualized to correlate with the prior CT scan finding. Previously seen CT scan finding could be further assessed by dedicated nonemergent pelvis MRI (over protocol) with IV contrast.   MACRO: None   Signed by: Khris Gage 1/26/2025 5:49 AM Dictation workstation:   [id number]    CT abdomen pelvis w IV contrast    Result Date: 1/25/2025  Interpreted By:  Shweta Anaya, STUDY: CT ABDOMEN PELVIS W IV CONTRAST;  1/25/2025 8:22 pm   INDICATION: Signs/Symptoms:nausea, vomiting, abdominal discomfort.   COMPARISON: None   ACCESSION NUMBER(S): [id number]   ORDERING CLINICIAN: KELLY CAMPUZANO   TECHNIQUE: Axial CT of the abdomen and pelvis was performed. Coronal and sagittal reconstructions were performed.   Intravenous contrast material was administered without immediate complication.   FINDINGS: LOWER CHEST: Evaluation degraded by motion artifact. No consolidation or pleural effusion. Coronary artery calcifications are present. There is right atrial enlargement. No pericardial effusion.   ABDOMEN:   LIVER: Subcentimeter hypodensities at the liver are too small to be adequately characterized.   BILE DUCTS: No significant dilation.   GALLBLADDER: Present.   PANCREAS: No peripancreatic inflammatory changes.   SPLEEN: Unremarkable.   ADRENAL GLANDS: Unremarkable.   KIDNEYS AND URETERS: Symmetrical renal enhancement.  No hydronephrosis or hydroureter is identified. There are bilateral renal cysts.   PELVIS:   BLADDER: Partially  distended.   REPRODUCTIVE ORGANS: There is a 2.1 cm soft tissue density with calcifications at the left adnexa.   BOWEL: There is marked edematous wall thickening at the gastric antrum and pylorus. There is a gas focus the medial wall of the gastric pylorus. There is edematous wall thickening at the duodenum. No bowel obstruction. The colon is decompressed with diffuse wall thickening and adjacent soft tissue stranding. There is colonic diverticulosis with no CT evidence for acute diverticulitis. The appendix is normal.   VESSELS: Atherosclerotic calcifications within the abdominal aorta and its branches. No abdominal aortic aneurysm.   PERITONEUM/RETROPERITONEUM/LYMPH NODES: No free fluid or free air. There is diffuse mesenteric edema. No retroperitoneal hemorrhage. No pathologically enlarged lymph nodes are identified.   BONES AND ABDOMINAL WALL: Multilevel degenerative changes are noted in the spine. There is pars defect on the left at L4. There is 9 mm anterolisthesis of L4 on L5. There is complete loss of the intervertebral disc space at L4-L5. Postsurgical changes of right charlene laminectomy at L4. There is diffuse body wall edema.       1. Marked edematous wall thickening at the gastric antrum and pylorus, may be secondary to gastritis and/or peptic ulcer disease. Gas focus at the medial wall of the gastric pylorus, concerning for a deep area of ulceration. 2. Edematous wall thickening at the duodenum, suggestive of acute duodenitis. 3. Colitis. 4. Colonic diverticulosis. 5. 2.1 cm soft tissue density with calcifications at the left adnexa. This can be further evaluated with nonemergent pelvic ultrasound. 6. Diffuse mesenteric edema. Diffuse body wall edema. 7. Right atrial enlargement. Coronary artery calcifications.     MACRO: None.   Signed by: Shweta Anaya 1/25/2025 10:07 PM Dictation workstation:   [id number]    CT head wo IV contrast    Result Date: 1/25/2025  Interpreted By:  Shweta Anaya,  STUDY: CT HEAD WO IV CONTRAST  1/25/2025 8:22 pm   INDICATION: Signs/Symptoms:headache, nausea, vomiting   COMPARISON: CT angiogram head and neck 12/24/2024, CT angiogram neck 02/08/2024   ACCESSION NUMBER(S): [id number]   ORDERING CLINICIAN: KELLY CAMPUZANO   TECHNIQUE: Serial, axial CT images of the brain were obtained without IV contrast. Coronal and sagittal reformatted images were performed.   FINDINGS: The ventricles, cisterns and sulci are prominent, consistent with diffuse volume loss.  There are areas of nonspecific white matter hypodensity, which are probably age-related or microvascular in nature. The gray-white matter differentiation is intact and there is no evidence of acute territorial infarct.  No mass effect or midline shift is seen.  There is no hemorrhage.  No extraaxial fluid collection. Air-fluid level at the left maxillary sinus. The mastoid air cells are clear. No depressed calvarial fracture.       1.  No acute intracranial hemorrhage or acute territorial infarct. 2.  Diffuse parenchymal volume loss. Chronic microvascular ischemic changes. 3. Air-fluid level at the left maxillary sinus. Please correlate for acute sinusitis.     MACRO: None.   Signed by: Shweta Anaya 1/25/2025 9:42 PM Dictation workstation:   [id number]    XR chest 2 views    Result Date: 1/25/2025  Interpreted By:  Skip Spencer, STUDY: XR CHEST 2 VIEWS;  1/25/2025 7:40 pm   INDICATION: Signs/Symptoms:Flu like illness.     COMPARISON: None.   ACCESSION NUMBER(S): [id number]   ORDERING CLINICIAN: KELLY CAMPUZANO   FINDINGS:     Cardiomegaly. The pulmonary vasculature is within normal limits.   No consolidation, pleural effusion or pneumothorax.         Cardiomegaly without evidence of acute disease in the chest.     MACRO: None.   Signed by: Skip Spencer 1/25/2025 8:45 PM Dictation workstation:   [id number]           Assessment/Plan     79 y.o. female with PMHx of CAD, Afib/flutter s/p ablation on Xarelto, HTN, HLD  presenting with acute onset of nausea, and imaging demonstrating gastritis and duodenitis with possible pyloric ulcer.  It is possible that an ulcer could cause her nausea symptoms, but it is odd that the nausea started so suddenly.  Her CT also shows some evidence of possible colitis, so the acute-onset nausea could be a viral illness, and the gastritis/ulcer could be an incidental finding.  This could be due to her regular ibuprofen use or could also be due to H. pylori, less likely malignancy.    Will plan on EGD tomorrow for further evaluation.  OK for clear liquid diet today, then NPO after midnight.  Continue BID pantoprazole.  Cardiology has also seen her due to elevated troponin, and do not want to start her on blood thinners at this point.  Continue to hold Xarelto.  Further recommendations tomorrow after EGD.        Morris Blair MD

## 2025-01-26 NOTE — PROGRESS NOTES
Emergency Medicine Transition of Care Note.    I received Lynsey Forman in signout from Dr. Prescott.  Please see the previous ED provider note for all HPI, PE and MDM up to the time of signout. This is in addition to the primary record.    In brief Lynsey Forman is an 79 y.o. female presenting for   Chief Complaint   Patient presents with    Flu Symptoms        ED Course as of 01/26/25 0727   Sun Jan 26, 2025 0212 EKG, interpreted by me: Atrial fibrillation, rate 60 bpm.  Normal axis.  Similar slight diffuse ST depression unchanged from prior EKGs.  No acute T wave abnormalities.  QTc 450.  No evidence of acute STEMI at this time. [JG]      ED Course User Index  [JG] Sheri Colorado MD         Diagnoses as of 01/26/25 0727   Elevated troponin   Acute superficial gastritis without hemorrhage   Peptic ulcer       Medical Decision Making  Received patient in signout at this time. Patient is admitted to [facility] for gastritis and peptic ulcer with history of Afib on anticoagulation, unable to tolerate PO intake. Boarding in the emergency department at this time pending bed availability secondary to high patient volumes.  No acute concerns were vocalized and no significant events occurred while under my care.      Final diagnoses:   [R79.89] Elevated troponin   [K29.00] Acute superficial gastritis without hemorrhage   [K27.9] Peptic ulcer           Procedure  Procedures    Sheri Colorado MD

## 2025-01-27 ENCOUNTER — ANESTHESIA EVENT (OUTPATIENT)
Dept: GASTROENTEROLOGY | Facility: HOSPITAL | Age: 80
DRG: 384 | End: 2025-01-27
Payer: MEDICARE

## 2025-01-27 ENCOUNTER — APPOINTMENT (OUTPATIENT)
Dept: GASTROENTEROLOGY | Facility: HOSPITAL | Age: 80
DRG: 384 | End: 2025-01-27
Payer: MEDICARE

## 2025-01-27 ENCOUNTER — ANESTHESIA (OUTPATIENT)
Dept: GASTROENTEROLOGY | Facility: HOSPITAL | Age: 80
DRG: 384 | End: 2025-01-27
Payer: MEDICARE

## 2025-01-27 ENCOUNTER — APPOINTMENT (OUTPATIENT)
Dept: VASCULAR SURGERY | Facility: HOSPITAL | Age: 80
End: 2025-01-27
Payer: MEDICARE

## 2025-01-27 ENCOUNTER — APPOINTMENT (OUTPATIENT)
Dept: CARDIOLOGY | Facility: HOSPITAL | Age: 80
DRG: 384 | End: 2025-01-27
Payer: MEDICARE

## 2025-01-27 LAB
ANION GAP SERPL CALC-SCNC: 7 MMOL/L (ref 10–20)
ATRIAL RATE: 67 BPM
BUN SERPL-MCNC: 10 MG/DL (ref 6–23)
CA-I BLD-SCNC: 1.06 MMOL/L (ref 1.1–1.33)
CALCIUM SERPL-MCNC: 8.2 MG/DL (ref 8.6–10.3)
CARDIAC TROPONIN I PNL SERPL HS: 233 NG/L (ref 0–13)
CARDIAC TROPONIN I PNL SERPL HS: 236 NG/L (ref 0–13)
CHLORIDE SERPL-SCNC: 101 MMOL/L (ref 98–107)
CO2 SERPL-SCNC: 28 MMOL/L (ref 21–32)
CREAT SERPL-MCNC: 0.83 MG/DL (ref 0.5–1.05)
EGFRCR SERPLBLD CKD-EPI 2021: 73 ML/MIN/1.73M*2
ERYTHROCYTE [DISTWIDTH] IN BLOOD BY AUTOMATED COUNT: 14.1 % (ref 11.5–14.5)
GLUCOSE SERPL-MCNC: 101 MG/DL (ref 74–99)
HCT VFR BLD AUTO: 29.6 % (ref 36–46)
HGB BLD-MCNC: 10.4 G/DL (ref 12–16)
HOLD SPECIMEN: NORMAL
MAGNESIUM SERPL-MCNC: 2.13 MG/DL (ref 1.6–2.4)
MCH RBC QN AUTO: 33.2 PG (ref 26–34)
MCHC RBC AUTO-ENTMCNC: 35.1 G/DL (ref 32–36)
MCV RBC AUTO: 95 FL (ref 80–100)
NRBC BLD-RTO: 0 /100 WBCS (ref 0–0)
P AXIS: 94 DEGREES
P OFFSET: 187 MS
P ONSET: 124 MS
PLATELET # BLD AUTO: 155 X10*3/UL (ref 150–450)
POTASSIUM SERPL-SCNC: 4.1 MMOL/L (ref 3.5–5.3)
PR INTERVAL: 204 MS
Q ONSET: 226 MS
Q ONSET: 227 MS
Q ONSET: 229 MS
QRS COUNT: 11 BEATS
QRS COUNT: 12 BEATS
QRS COUNT: 9 BEATS
QRS DURATION: 76 MS
QRS DURATION: 82 MS
QRS DURATION: 88 MS
QT INTERVAL: 342 MS
QT INTERVAL: 450 MS
QT INTERVAL: 484 MS
QTC CALCULATION(BAZETT): 371 MS
QTC CALCULATION(BAZETT): 450 MS
QTC CALCULATION(BAZETT): 540 MS
QTC FREDERICIA: 361 MS
QTC FREDERICIA: 450 MS
QTC FREDERICIA: 521 MS
R AXIS: 70 DEGREES
R AXIS: 71 DEGREES
R AXIS: 79 DEGREES
RBC # BLD AUTO: 3.13 X10*6/UL (ref 4–5.2)
SODIUM SERPL-SCNC: 132 MMOL/L (ref 136–145)
T AXIS: -81 DEGREES
T AXIS: 33 DEGREES
T AXIS: 49 DEGREES
T OFFSET: 398 MS
T OFFSET: 451 MS
T OFFSET: 471 MS
VENTRICULAR RATE: 60 BPM
VENTRICULAR RATE: 71 BPM
VENTRICULAR RATE: 75 BPM
WBC # BLD AUTO: 7.1 X10*3/UL (ref 4.4–11.3)

## 2025-01-27 PROCEDURE — 93010 ELECTROCARDIOGRAM REPORT: CPT | Performed by: INTERNAL MEDICINE

## 2025-01-27 PROCEDURE — 2500000004 HC RX 250 GENERAL PHARMACY W/ HCPCS (ALT 636 FOR OP/ED): Performed by: STUDENT IN AN ORGANIZED HEALTH CARE EDUCATION/TRAINING PROGRAM

## 2025-01-27 PROCEDURE — 43239 EGD BIOPSY SINGLE/MULTIPLE: CPT | Performed by: INTERNAL MEDICINE

## 2025-01-27 PROCEDURE — 85027 COMPLETE CBC AUTOMATED: CPT | Performed by: NURSE PRACTITIONER

## 2025-01-27 PROCEDURE — 80048 BASIC METABOLIC PNL TOTAL CA: CPT | Performed by: NURSE PRACTITIONER

## 2025-01-27 PROCEDURE — 36415 COLL VENOUS BLD VENIPUNCTURE: CPT | Performed by: INTERNAL MEDICINE

## 2025-01-27 PROCEDURE — 2500000001 HC RX 250 WO HCPCS SELF ADMINISTERED DRUGS (ALT 637 FOR MEDICARE OP): Performed by: STUDENT IN AN ORGANIZED HEALTH CARE EDUCATION/TRAINING PROGRAM

## 2025-01-27 PROCEDURE — 84484 ASSAY OF TROPONIN QUANT: CPT | Performed by: INTERNAL MEDICINE

## 2025-01-27 PROCEDURE — 3700000001 HC GENERAL ANESTHESIA TIME - INITIAL BASE CHARGE

## 2025-01-27 PROCEDURE — 2500000001 HC RX 250 WO HCPCS SELF ADMINISTERED DRUGS (ALT 637 FOR MEDICARE OP): Performed by: NURSE PRACTITIONER

## 2025-01-27 PROCEDURE — 2500000004 HC RX 250 GENERAL PHARMACY W/ HCPCS (ALT 636 FOR OP/ED)

## 2025-01-27 PROCEDURE — 99100 ANES PT EXTEME AGE<1 YR&>70: CPT | Performed by: ANESTHESIOLOGY

## 2025-01-27 PROCEDURE — 2500000002 HC RX 250 W HCPCS SELF ADMINISTERED DRUGS (ALT 637 FOR MEDICARE OP, ALT 636 FOR OP/ED): Performed by: STUDENT IN AN ORGANIZED HEALTH CARE EDUCATION/TRAINING PROGRAM

## 2025-01-27 PROCEDURE — 83735 ASSAY OF MAGNESIUM: CPT | Performed by: NURSE PRACTITIONER

## 2025-01-27 PROCEDURE — 82330 ASSAY OF CALCIUM: CPT | Performed by: NURSE PRACTITIONER

## 2025-01-27 PROCEDURE — 43239 EGD BIOPSY SINGLE/MULTIPLE: CPT

## 2025-01-27 PROCEDURE — 0DB78ZX EXCISION OF STOMACH, PYLORUS, VIA NATURAL OR ARTIFICIAL OPENING ENDOSCOPIC, DIAGNOSTIC: ICD-10-PCS | Performed by: INTERNAL MEDICINE

## 2025-01-27 PROCEDURE — 7100000009 HC PHASE TWO TIME - INITIAL BASE CHARGE

## 2025-01-27 PROCEDURE — 99232 SBSQ HOSP IP/OBS MODERATE 35: CPT | Performed by: INTERNAL MEDICINE

## 2025-01-27 PROCEDURE — 0DB68ZX EXCISION OF STOMACH, VIA NATURAL OR ARTIFICIAL OPENING ENDOSCOPIC, DIAGNOSTIC: ICD-10-PCS | Performed by: INTERNAL MEDICINE

## 2025-01-27 PROCEDURE — 1100000001 HC PRIVATE ROOM DAILY

## 2025-01-27 PROCEDURE — 3700000002 HC GENERAL ANESTHESIA TIME - EACH INCREMENTAL 1 MINUTE

## 2025-01-27 PROCEDURE — 2500000004 HC RX 250 GENERAL PHARMACY W/ HCPCS (ALT 636 FOR OP/ED): Performed by: ANESTHESIOLOGY

## 2025-01-27 PROCEDURE — 7100000010 HC PHASE TWO TIME - EACH INCREMENTAL 1 MINUTE

## 2025-01-27 PROCEDURE — 93005 ELECTROCARDIOGRAM TRACING: CPT

## 2025-01-27 PROCEDURE — 88305 TISSUE EXAM BY PATHOLOGIST: CPT | Mod: TC,AHULAB | Performed by: INTERNAL MEDICINE

## 2025-01-27 PROCEDURE — A43239 PR EDG TRANSORAL BIOPSY SINGLE/MULTIPLE

## 2025-01-27 PROCEDURE — 2500000004 HC RX 250 GENERAL PHARMACY W/ HCPCS (ALT 636 FOR OP/ED): Performed by: NURSE PRACTITIONER

## 2025-01-27 PROCEDURE — A43239 PR EDG TRANSORAL BIOPSY SINGLE/MULTIPLE: Performed by: ANESTHESIOLOGY

## 2025-01-27 RX ORDER — ONDANSETRON HYDROCHLORIDE 2 MG/ML
4 INJECTION, SOLUTION INTRAVENOUS ONCE
Status: COMPLETED | OUTPATIENT
Start: 2025-01-27 | End: 2025-01-27

## 2025-01-27 RX ORDER — PROPOFOL 10 MG/ML
INJECTION, EMULSION INTRAVENOUS AS NEEDED
Status: DISCONTINUED | OUTPATIENT
Start: 2025-01-27 | End: 2025-01-27

## 2025-01-27 RX ORDER — ALUMINUM HYDROXIDE, MAGNESIUM HYDROXIDE, AND SIMETHICONE 2400; 240; 2400 MG/30ML; MG/30ML; MG/30ML
20 SUSPENSION ORAL EVERY 4 HOURS PRN
Qty: 355 ML | Refills: 0 | Status: SHIPPED | OUTPATIENT
Start: 2025-01-27

## 2025-01-27 RX ORDER — LISINOPRIL 20 MG/1
20 TABLET ORAL DAILY
Qty: 90 TABLET | Refills: 1 | Status: SHIPPED | OUTPATIENT
Start: 2025-01-27 | End: 2025-01-28 | Stop reason: HOSPADM

## 2025-01-27 RX ORDER — LIDOCAINE HYDROCHLORIDE 20 MG/ML
INJECTION, SOLUTION INFILTRATION; PERINEURAL AS NEEDED
Status: DISCONTINUED | OUTPATIENT
Start: 2025-01-27 | End: 2025-01-27

## 2025-01-27 RX ORDER — PANTOPRAZOLE SODIUM 40 MG/1
40 TABLET, DELAYED RELEASE ORAL 2 TIMES DAILY
Qty: 60 TABLET | Refills: 1 | Status: SHIPPED | OUTPATIENT
Start: 2025-01-27 | End: 2025-03-28

## 2025-01-27 RX ADMIN — ATORVASTATIN CALCIUM 80 MG: 80 TABLET, FILM COATED ORAL at 12:31

## 2025-01-27 RX ADMIN — EZETIMIBE 10 MG: 10 TABLET ORAL at 12:31

## 2025-01-27 RX ADMIN — ONDANSETRON 4 MG: 2 INJECTION, SOLUTION INTRAMUSCULAR; INTRAVENOUS at 10:36

## 2025-01-27 RX ADMIN — PROPOFOL 20 MG: 10 INJECTION, EMULSION INTRAVENOUS at 09:58

## 2025-01-27 RX ADMIN — GABAPENTIN 300 MG: 300 CAPSULE ORAL at 21:16

## 2025-01-27 RX ADMIN — SODIUM CHLORIDE 75 ML/HR: 9 INJECTION, SOLUTION INTRAVENOUS at 05:46

## 2025-01-27 RX ADMIN — AMLODIPINE BESYLATE 5 MG: 5 TABLET ORAL at 12:30

## 2025-01-27 RX ADMIN — PROPOFOL 60 MG: 10 INJECTION, EMULSION INTRAVENOUS at 09:55

## 2025-01-27 RX ADMIN — GABAPENTIN 300 MG: 300 CAPSULE ORAL at 16:49

## 2025-01-27 RX ADMIN — AMOXICILLIN AND CLAVULANATE POTASSIUM 1 TABLET: 875; 125 TABLET, FILM COATED ORAL at 12:41

## 2025-01-27 RX ADMIN — CALCIUM CARBONATE 1000 MG: 500 TABLET, CHEWABLE ORAL at 21:16

## 2025-01-27 RX ADMIN — LISINOPRIL 20 MG: 20 TABLET ORAL at 12:31

## 2025-01-27 RX ADMIN — CALCIUM CARBONATE 1000 MG: 500 TABLET, CHEWABLE ORAL at 12:31

## 2025-01-27 RX ADMIN — PANTOPRAZOLE SODIUM 40 MG: 40 INJECTION, POWDER, FOR SOLUTION INTRAVENOUS at 21:16

## 2025-01-27 RX ADMIN — PANTOPRAZOLE SODIUM 40 MG: 40 INJECTION, POWDER, FOR SOLUTION INTRAVENOUS at 12:26

## 2025-01-27 RX ADMIN — ASPIRIN 81 MG: 81 TABLET, COATED ORAL at 12:30

## 2025-01-27 RX ADMIN — AMOXICILLIN AND CLAVULANATE POTASSIUM 1 TABLET: 875; 125 TABLET, FILM COATED ORAL at 21:22

## 2025-01-27 RX ADMIN — PROPOFOL 125 MCG/KG/MIN: 10 INJECTION, EMULSION INTRAVENOUS at 09:56

## 2025-01-27 RX ADMIN — METOCLOPRAMIDE 10 MG: 5 INJECTION, SOLUTION INTRAMUSCULAR; INTRAVENOUS at 12:26

## 2025-01-27 RX ADMIN — LIDOCAINE HYDROCHLORIDE 3 ML: 20 INJECTION, SOLUTION INFILTRATION; PERINEURAL at 09:56

## 2025-01-27 RX ADMIN — PROPOFOL 20 MG: 10 INJECTION, EMULSION INTRAVENOUS at 09:57

## 2025-01-27 ASSESSMENT — PAIN SCALES - GENERAL
PAINLEVEL_OUTOF10: 0 - NO PAIN

## 2025-01-27 ASSESSMENT — PAIN - FUNCTIONAL ASSESSMENT
PAIN_FUNCTIONAL_ASSESSMENT: 0-10

## 2025-01-27 ASSESSMENT — COGNITIVE AND FUNCTIONAL STATUS - GENERAL
MOBILITY SCORE: 24
DAILY ACTIVITIY SCORE: 24

## 2025-01-27 ASSESSMENT — ACTIVITIES OF DAILY LIVING (ADL)
LACK_OF_TRANSPORTATION: NO
LACK_OF_TRANSPORTATION: NO

## 2025-01-27 ASSESSMENT — COLUMBIA-SUICIDE SEVERITY RATING SCALE - C-SSRS
6. HAVE YOU EVER DONE ANYTHING, STARTED TO DO ANYTHING, OR PREPARED TO DO ANYTHING TO END YOUR LIFE?: NO
1. IN THE PAST MONTH, HAVE YOU WISHED YOU WERE DEAD OR WISHED YOU COULD GO TO SLEEP AND NOT WAKE UP?: NO
2. HAVE YOU ACTUALLY HAD ANY THOUGHTS OF KILLING YOURSELF?: NO

## 2025-01-27 NOTE — PROGRESS NOTES
01/27/25 1210   Discharge Planning   Living Arrangements Family members  (sister)   Support Systems Family members   Type of Residence Private residence   Number of Stairs to Enter Residence 15   Expected Discharge Disposition Home   Does the patient need discharge transport arranged? Yes   RoundTrip coordination needed? Yes   Has discharge transport been arranged? No   Financial Resource Strain   How hard is it for you to pay for the very basics like food, housing, medical care, and heating? Not hard   Housing Stability   In the last 12 months, was there a time when you were not able to pay the mortgage or rent on time? N   At any time in the past 12 months, were you homeless or living in a shelter (including now)? N   Transportation Needs   In the past 12 months, has lack of transportation kept you from medical appointments or from getting medications? no   In the past 12 months, has lack of transportation kept you from meetings, work, or from getting things needed for daily living? No   Intensity of Service   Intensity of Service 0-30 min     TCC met with pt at bedside. Explained my role as discharge planner. Lives with sister. Independent with ADLs and drives. No home going needs identified.

## 2025-01-27 NOTE — PROGRESS NOTES
"Subjective Data:  EGD done today- showed two ulcers   NO chest pain or shortness of breath   Feels nauseous after EGD   Echo pending   Troponin peaked at 409 and downtrended     Overnight Events:    Telemetry showed SR frequent PACs      Objective Data:  Last Recorded Vitals:  Vitals:    01/27/25 1015 01/27/25 1030 01/27/25 1045 01/27/25 1100   BP: 134/69 147/65 157/71 169/65   BP Location:       Patient Position:       Pulse: 53 59 63 59   Resp: 16 16 17 16   Temp:    36.4 °C (97.5 °F)   TempSrc:    Temporal   SpO2: 100% 95% 96% 95%   Weight:       Height:           Last Labs:  LABS:  CMP:  Results from last 7 days   Lab Units 01/27/25  0545 01/26/25  1252 01/26/25  0440 01/25/25  1840   SODIUM mmol/L 132*  --  131* 127*   POTASSIUM mmol/L 4.1 4.1 2.8* 3.4*   CHLORIDE mmol/L 101  --  98 86*   CO2 mmol/L 28  --  25 31   ANION GAP mmol/L 7*  --  11 13   BUN mg/dL 10  --  12 21   CREATININE mg/dL 0.83  --  0.59 0.78   EGFR mL/min/1.73m*2 73  --  >90 78   MAGNESIUM mg/dL 2.13  --  1.49* 1.56*   ALBUMIN g/dL  --   --   --  4.4   ALT U/L  --   --   --  12   AST U/L  --   --   --  20   BILIRUBIN TOTAL mg/dL  --   --   --  1.1   LIPASE U/L  --   --   --  23     CBC:  Results from last 7 days   Lab Units 01/27/25  0545 01/26/25  0440 01/25/25  1840   WBC AUTO x10*3/uL 7.1 10.1 14.3*   HEMOGLOBIN g/dL 10.4* 11.3* 13.1   HEMATOCRIT % 29.6* 31.6* 37.2   PLATELETS AUTO x10*3/uL 155 161 190   MCV fL 95 92 93     COAG:     ABO: No results found for: \"ABO\"  HEME/ENDO:     CARDIAC:   Results from last 7 days   Lab Units 01/27/25  0547 01/27/25  0545 01/26/25  1857 01/26/25  1252 01/26/25  0440 01/26/25  0049 01/25/25 2010 01/25/25  1840   TROPHS ng/L 233* 236* 409* 453* 219* 147* 69* 22*   BNP pg/mL  --   --   --   --  796*  --   --   --      Recent Labs     07/03/24  1559 03/21/24  1410 12/11/23  1136 10/31/22  1243   CHOL 158 182 178 213*   LDLF  --   --   --  86   LDLCALC 51 61 65  --    HDL 93.1 108.7 102.5 105.9   TRIG 72 60 " "51 106        Last I/O:  I/O last 3 completed shifts:  In: 1495.4 (30 mL/kg) [I.V.:1495.4 (30 mL/kg)]  Out: - (0 mL/kg)   Weight: 49.9 kg     Past Cardiology Tests (Last 3 Years):  EKG:  ECG 12 lead 01/26/2025 (Preliminary)      ECG 12 lead 01/25/2025 (Preliminary)      ECG 12 lead 01/25/2025 (Preliminary)      ECG 12 lead (Clinic Performed) 10/02/2024      ECG 12 lead (Ancillary Performed) 04/29/2024      ECG 12 lead (Clinic Performed) 04/02/2024      ECG 12 lead (Clinic Performed)     Echo:  No results found for this or any previous visit from the past 1095 days.    Ejection Fractions:  No results found for: \"EF\"  Cath:  No results found for this or any previous visit from the past 1095 days.    Stress Test:  No results found for this or any previous visit from the past 1095 days.    Cardiac Imaging:  MR cardiac w and wo IV contrast w regadenoson stress for MORPH FUNCT and valve DZ 11/01/2023      Inpatient Medications:  Scheduled medications   Medication Dose Route Frequency    amLODIPine  5 mg oral Daily    amoxicillin-pot clavulanate  1 tablet oral q12h GIOVANNA    aspirin  81 mg oral Daily    atorvastatin  80 mg oral Daily    calcium carbonate  1,000 mg oral BID    ezetimibe  10 mg oral Daily    gabapentin  300 mg oral TID    lisinopril  20 mg oral Daily    pantoprazole  40 mg intravenous BID    perflutren lipid microspheres  0.5-10 mL of dilution intravenous Once in imaging    perflutren protein A microsphere  0.5 mL intravenous Once in imaging    polyethylene glycol  17 g oral Daily    sulfur hexafluoride microsphr  2 mL intravenous Once in imaging    [Held by provider] triamterene-hydrochlorothiazid  0.5 tablet oral Daily     PRN medications   Medication    acetaminophen    alum-mag hydroxide-simeth    benzocaine-menthol    calcium carbonate    docusate sodium    hydrOXYzine HCL    ipratropium-albuteroL    lubricating eye drops    melatonin    metoclopramide    ondansetron    simethicone    sodium chloride " "    Continuous Medications   Medication Dose Last Rate    sodium chloride 0.9%  75 mL/hr 75 mL/hr (01/27/25 0546)       Physical Exam:  GENERAL: alert, cooperative, pleasant, in no acute distress  SKIN: warm, dry  NECK: no JVD  CARDIAC: Regular rate and rhythm no murmurs  PULMONARY: Normal respiratory efforts, lungs clear to auscultation bilaterally.  ABDOMEN: soft, nondistended  EXTREMITIES: no lower extremity edema  NEURO: Alert and oriented x 3.  Grossly normal.  Moves all 4 extremities.      Assessment/Plan   Lynsey Forman is a 79 y.o. female who has a pertinent medical history notable for coronary artery disease with significantly elevated CT coronary artery calcium score 1730, cardiac MRI stress test 2023 with no inducible ischemia, essential hypertension, dyslipidemia, persistent atrial fibrillation/atrial flutter status post ablation, chronic kidney disease stage III, who presented to the emergency department complaints of nausea.  Cardiology has been consulted for \"elevated troponin, increasing\". A full hospital course review was completed.     Patient states that she was in her usual state of health.  She awoke on day of presentation with severe nausea.  She just felt unwell,his continued. she did not have any associated emesis, diarrhea on my interview however the ER documented that she she reported episodes of nonbloody, nonbilious nausea vomiting as well as diarrhea.  She was the only 1 affected by this symptomatology.  She called her sister who brought her to the emergency department.     On arrival to the ER, she was afebrile at 36.6 heart rate 53 respiratory rate 18 saturating 98% on room air.  Blood pressure 157/73. Laboratory studies show serum sodium 127 potassium 3.4 chloride 86 BUN 21 creatinine 0.78.  Magnesium low at 1.56.  CBC shows white count 14.3 hemoglobin hematocrit 13.1/37.2 platelet count 190.  Viral panels were negative for flu A, flu B, COVID-19.  Lactate normal at 1.6 lipase normal " at 23 high-sensitivity cardiac troponin 22, 69, 147, 219.  Brain natruretic peptide elevated at 796.  Imaging including a two-view chest x-ray was interpreted by radiology as cardiomegaly without evidence of acute disease CT abdomen pelvis with contrast was interpreted as marked edematous wall thickening at the gastric antrum and pylorus felt secondary to gastritis and/or peptic ulcer disease with a gas focus at the medial wall of the gastric pylorus concerning for deep ulceration.  There is also edematous wall thickening of the duodenum suggestive of acute duodenitis evidence of colitis and colitis colonic diverticulosis. EKG obtained 1/25/2025 at 1907 shows significant baseline artifact but probable atrial fibrillation with controlled ventricular response.  A repeat EKG 1/25/2025 at 2237 appears more sinus with frequent PACs although continued computer interpretation suggests possible atrial fibrillation EKG 1/26/2025 at 0208 shows sinus rhythm with PACs.  This has dramatically improved baseline artifact from prior studies.     Agree with ongoing supportive management.     Symptomatology may be related to atrial fibrillation causing transient ischemia due to thromboembolic phenomena versus small vessel occlusion.  However, CT imaging is highly suggestive of gastritis with possible deep ulceration.  As such, we would not proceed with heparinization nor invasive studies in this asymptomatic patient until further evaluation has been completed.  Systemic anticoagulation in this setting could have catastrophic results.    EGD today showed two ulcers in the gastric antrum near the pylorus. Biopsies were obtained from the edge of the larger ulcer and also from the antrum (to rule out H pylori). She was continued on PPI     High sensitivity troponin peaked at 409 and downtrended.      We will obtain a transthoracic echocardiogram for structural evaluation including ejection fraction, assessment of regional wall motion  "abnormalities or valvular disease, and further evaluation of hemodynamics.     Hold anticoagulation at this time, specifically home rivaroxaban 15 mg daily. Will discuss with GI.     Code Status:  Full Code    Meenadexter ABDULLAHI SANTY Zhu-ROCKY    ========================================  Attending note   ========================================  Both the STEVAN and I have had a face to face encounter with the patient today. I have examined the patient and edited the documented physical examination as necessary.  I personally reviewed the patient's vital signs, telemetry, recent labs, medications, orders, EKGs, and pertinent cardiac imaging/ echocardiography.  I have reviewed the STEVAN's encounter note, approve the STEVAN's documentation and have edited the note to reflect my diagnostic and therapeutic plan.    Looks subjectively better   -- Less discomfort   -- Gastric Ulcer X 2 on EGD    Documented Vital Signs   Heart Rate:  [49-90]   Temp:  [36 °C (96.8 °F)-37.3 °C (99.1 °F)]   Resp:  [15-17]   BP: (117-169)/(55-88)   Height:  [160 cm (5' 3\")]   SpO2:  [92 %-100 %]   Temp:  [36 °C (96.8 °F)-37.3 °C (99.1 °F)] 37 °C (98.6 °F)  Heart Rate:  [49-90] 90  Resp:  [15-17] 16  BP: (117-169)/(55-88) 154/88           Documented Fluid Status     Intake/Output Summary (Last 24 hours) at 1/27/2025 1544  Last data filed at 1/27/2025 1300  Gross per 24 hour   Intake 1615.41 ml   Output --   Net 1615.41 ml     Net IO Since Admission: 1,615.41 mL [01/27/25 1544]    Lynsey Forman is a 79 y.o. female who has a pertinent medical history notable for coronary artery disease with significantly elevated CT coronary artery calcium score 1730, cardiac MRI stress test 2023 with no inducible ischemia, essential hypertension, dyslipidemia, persistent atrial fibrillation/atrial flutter status post ablation, chronic kidney disease stage III, who presented to the emergency department complaints of nausea.  Cardiology has been consulted for \"elevated " "troponin, increasing\". A full hospital course review was completed.     Patient states that she was in her usual state of health.  She awoke on day of presentation with severe nausea.  She just felt unwell,his continued. she did not have any associated emesis, diarrhea on my interview however the ER documented that she she reported episodes of nonbloody, nonbilious nausea vomiting as well as diarrhea.  She was the only 1 affected by this symptomatology.  She called her sister who brought her to the emergency department.     On arrival to the ER, she was afebrile at 36.6 heart rate 53 respiratory rate 18 saturating 98% on room air.  Blood pressure 157/73. Laboratory studies show serum sodium 127 potassium 3.4 chloride 86 BUN 21 creatinine 0.78.  Magnesium low at 1.56.  CBC shows white count 14.3 hemoglobin hematocrit 13.1/37.2 platelet count 190.  Viral panels were negative for flu A, flu B, COVID-19.  Lactate normal at 1.6 lipase normal at 23 high-sensitivity cardiac troponin 22, 69, 147, 219.  Brain natruretic peptide elevated at 796.  Imaging including a two-view chest x-ray was interpreted by radiology as cardiomegaly without evidence of acute disease CT abdomen pelvis with contrast was interpreted as marked edematous wall thickening at the gastric antrum and pylorus felt secondary to gastritis and/or peptic ulcer disease with a gas focus at the medial wall of the gastric pylorus concerning for deep ulceration.  There is also edematous wall thickening of the duodenum suggestive of acute duodenitis evidence of colitis and colitis colonic diverticulosis. EKG obtained 1/25/2025 at 1907 shows significant baseline artifact but probable atrial fibrillation with controlled ventricular response.  A repeat EKG 1/25/2025 at 2237 appears more sinus with frequent PACs although continued computer interpretation suggests possible atrial fibrillation EKG 1/26/2025 at 0208 shows sinus rhythm with PACs.  This has dramatically " improved baseline artifact from prior studies.     Agree with ongoing supportive management.     Symptomatology may be related to atrial fibrillation causing transient ischemia due to thromboembolic phenomena versus small vessel occlusion.  However, CT imaging is highly suggestive of gastritis with possible deep ulceration.  As such, we would not proceed with heparinization nor invasive studies in this asymptomatic patient until further evaluation has been completed.  Systemic anticoagulation in this setting could have catastrophic results.    EGD today showed two ulcers in the gastric antrum near the pylorus. Biopsies were obtained from the edge of the larger ulcer and also from the antrum (to rule out H pylori). She was continued on PPI     High sensitivity troponin peaked at 409 and downtrended.      We will obtain a transthoracic echocardiogram for structural evaluation including ejection fraction, assessment of regional wall motion abnormalities or valvular disease, and further evaluation of hemodynamics.     Hold anticoagulation at this time, specifically home rivaroxaban 15 mg daily. Will discuss with GI.         Gaudencio Hollins DO   Division of Cardiovascular Medicine  [facility] [place]

## 2025-01-27 NOTE — PROGRESS NOTES
Lynsey Forman is a 79 y.o. female     Patient feels better  Abdominal pain has resolved  Endoscopy showed 2 ulcers with clean bases  Echocardiogram still pending    Review of Systems     Constitutional: no fever, no chills, not feeling poorly, not feeling tired   Cardiovascular: no chest pain   Respiratory: no cough, wheezing or shortness of breath a  Gastrointestinal: no abdominal pain, no constipation, no melena, no nausea, no diarrhea, no vomiting and no blood in stools.   Neurological: no headache,   All other systems have been reviewed and are negative for complaint.       Vitals:    01/27/25 1658   BP: (!) 115/48   Pulse: 56   Resp: 16   Temp: 37 °C (98.6 °F)   SpO2: 93%        Scheduled medications  amLODIPine, 5 mg, oral, Daily  amoxicillin-pot clavulanate, 1 tablet, oral, q12h GIOVANNA  aspirin, 81 mg, oral, Daily  atorvastatin, 80 mg, oral, Daily  calcium carbonate, 1,000 mg, oral, BID  ezetimibe, 10 mg, oral, Daily  gabapentin, 300 mg, oral, TID  lisinopril, 20 mg, oral, Daily  pantoprazole, 40 mg, intravenous, BID  perflutren lipid microspheres, 0.5-10 mL of dilution, intravenous, Once in imaging  perflutren protein A microsphere, 0.5 mL, intravenous, Once in imaging  polyethylene glycol, 17 g, oral, Daily  sulfur hexafluoride microsphr, 2 mL, intravenous, Once in imaging  [Held by provider] triamterene-hydrochlorothiazid, 0.5 tablet, oral, Daily      Continuous medications     PRN medications  PRN medications: acetaminophen, alum-mag hydroxide-simeth, benzocaine-menthol, calcium carbonate, docusate sodium, hydrOXYzine HCL, ipratropium-albuteroL, lubricating eye drops, melatonin, metoclopramide, ondansetron, simethicone, sodium chloride    Lab Review   Results from last 7 days   Lab Units 01/27/25  0545 01/26/25  0440 01/25/25  1840   WBC AUTO x10*3/uL 7.1 10.1 14.3*   HEMOGLOBIN g/dL 10.4* 11.3* 13.1   HEMATOCRIT % 29.6* 31.6* 37.2   PLATELETS AUTO x10*3/uL 155 161 190     Results from last 7 days   Lab  Units 01/27/25  0545 01/26/25  1252 01/26/25  0440 01/25/25  1840   SODIUM mmol/L 132*  --  131* 127*   POTASSIUM mmol/L 4.1 4.1 2.8* 3.4*   CHLORIDE mmol/L 101  --  98 86*   CO2 mmol/L 28 --  25 31   BUN mg/dL 10  --  12 21   CREATININE mg/dL 0.83  --  0.59 0.78   CALCIUM mg/dL 8.2*  --  7.1* 9.4   PROTEIN TOTAL g/dL  --   --   --  7.7   BILIRUBIN TOTAL mg/dL  --   --   --  1.1   ALK PHOS U/L  --   --   --  58   ALT U/L  --   --   --  12   AST U/L  --   --   --  20   GLUCOSE mg/dL 101*  --  118* 138*     Results from last 7 days   Lab Units 01/27/25  0547 01/27/25  0545 01/26/25  1857   TROPHS ng/L 233* 236* 409*        US pelvis   Final Result   Limited transabdominal ultrasound   1. Extremely limited study with excessive bowel gas shadowing with no   gross lesions could be visualized to correlate with the prior CT scan   finding. Previously seen CT scan finding could be further assessed by   dedicated nonemergent pelvis MRI (over protocol) with IV contrast.        MACRO:   None        Signed by: Khris Gage 1/26/2025 5:49 AM   Dictation workstation:   [id number]      CT head wo IV contrast   Final Result   1.  No acute intracranial hemorrhage or acute territorial infarct.   2.  Diffuse parenchymal volume loss. Chronic microvascular ischemic   changes.   3. Air-fluid level at the left maxillary sinus. Please correlate for   acute sinusitis.             MACRO:   None.        Signed by: Shweta Anaya 1/25/2025 9:42 PM   Dictation workstation:   [id number]      CT abdomen pelvis w IV contrast   Final Result   1. Marked edematous wall thickening at the gastric antrum and   pylorus, may be secondary to gastritis and/or peptic ulcer disease.   Gas focus at the medial wall of the gastric pylorus, concerning for a   deep area of ulceration.   2. Edematous wall thickening at the duodenum, suggestive of acute   duodenitis.   3. Colitis.   4. Colonic diverticulosis.   5. 2.1 cm soft tissue density with calcifications  at the left adnexa.   This can be further evaluated with nonemergent pelvic ultrasound.   6. Diffuse mesenteric edema. Diffuse body wall edema.   7. Right atrial enlargement. Coronary artery calcifications.             MACRO:   None.        Signed by: Shweta Anaya 1/25/2025 10:07 PM   Dictation workstation:   [id number]      XR chest 2 views   Final Result   Cardiomegaly without evidence of acute disease in the chest.             MACRO:   None.        Signed by: Skip Spencer 1/25/2025 8:45 PM   Dictation workstation:   [id number]      Transthoracic Echo (TTE) Complete    (Results Pending)         Physical Exam    Constitutional   General appearance: Alert and in no acute distress.   Pulmonary   Respiratory assessment: No respiratory distress, normal respiratory rhythm and effort.    Auscultation of Lungs: Clear bilateral breath sounds.   Cardiovascular   Auscultation of heart: Apical pulse normal, heart rate and rhythm normal, normal S1 and S2, no murmurs and no pericardial rub.    Exam for edema: No peripheral edema.   Abdomen   Abdominal Exam: No bruits, normal bowel sounds, soft, non-tender, no abdominal mass palpated.    Liver and Spleen exam: No hepato-splenomegaly.   Musculoskeletal   Examination of gait: Normal.    Inspection of digits and nails: No clubbing or cyanosis of the fingernails.    Inspection/palpation of joints, bones and muscles: No joint swelling. Normal movement of all extremities.   Neurologic   Cranial nerves: Nerves 2-12 were intact, no focal neuro defects.         Assessment/Plan      #Gastric ulcers likely NSAID induced  Continue PPI twice a day for 2 months  Will need a repeat endoscopy in 3 months  Avoid all anti-inflammatories    #Elevated troponins  Likely demand ischemia from blood loss  Echocardiogram pending prior to discharge    #Hypertension  #Dyslipidemia  Stable    #Chronic hyponatremia  Stable chronic    #Left adnexal mass  Outpatient gynecology input

## 2025-01-27 NOTE — ANESTHESIA PREPROCEDURE EVALUATION
Patient: Lynsey Forman    Procedure Information       Date/Time: 01/27/25 0700    Scheduled providers: Bryant Tejeda MD; OLIVER Soto; Morris Blair MD    Procedure: EGD    Location: [facility]            Relevant Problems   Cardiac   (+) Arteriosclerosis of coronary artery   (+) Atrial flutter (Multi)   (+) Benign essential hypertension   (+) Hypercholesterolemia   (+) Hypertension   (+) Nonrheumatic aortic valve stenosis   (+) Persistent atrial fibrillation (Multi)      Neuro   (+) Aneurysm of middle cerebral artery (HHS-HCC)   (+) Bilateral carotid artery occlusion   (+) Bilateral carotid artery stenosis   (+) Mixed anxiety depressive disorder      GI   (+) Acute gastric ulcer   (+) Gastroesophageal reflux disease   (+) Gastroesophageal reflux disease with esophagitis      Hematology   (+) Anticoagulant long-term use      Musculoskeletal   (+) Chronic bilateral low back pain with bilateral sciatica   (+) Degeneration of intervertebral disc of lumbar region   (+) Lumbar stenosis with neurogenic claudication   (+) Spinal stenosis      Skin   (+) Eczema       Clinical information reviewed:   Tobacco  Allergies  Meds  Problems  Med Hx  Surg Hx   Fam Hx  Soc   Hx         Past Medical History:   Diagnosis Date    Abnormal kidney function 01/08/2024    BUN-24, GFR-56 (4/29/24)    Alcohol use, unspecified, uncomplicated 12/20/2022    Anxiety     Atrial fibrillation (Multi)     no reccurance s/p ablation 4/2023 and 7/2023    Atrial flutter (Multi)     Carotid stenosis, asymptomatic, bilateral     on ASA and statin    Cerebral aneurysm (HHS-HCC)     Right MCA    Chronic cough 11/24/2009    Formatting of this note might be different from the original. Overview: No change with stopping lisinopril, no change with Advair, consensus of ENT, pulmonary, here is LP reflux, has associated past-nasal drip that does not respond to Mucinex or Flonase, 2012 has large nasal polyp L Formatting of this note  might be different from the original. No change with stopping lisinopril, no change with Adv    Chronic pain disorder     CKD (chronic kidney disease)     stage III    Coronary artery disease     Diverticulosis 2016    Dysphonia     Easy bruising     Elevated coronary artery calcium score     CT calcium score 1730    GERD (gastroesophageal reflux disease)     Hyperlipidemia     Hypertension     Lumbar disc disease     Moriah's edema of vocal folds     s/p Moriah's edema surgery performed on 23.    Sinus bradycardia 2013    Formatting of this note might be different from the original. Overview: Asymptomatic, noticed on exam Formatting of this note might be different from the original. Asymptomatic, noticed on exam    Tachycardia 2024    Tobacco use disorder, mild, in early remission     Quit 2024,  PPD x 20 years    Vocal cord polyp       Past Surgical History:   Procedure Laterality Date    ANTERIOR CRUCIATE LIGAMENT REPAIR Left     CARDIAC ELECTROPHYSIOLOGY STUDY AND ABLATION      2023, 2023    CAROTID ENDARTERECTOMY Left 2024    CERVICAL LAMINECTOMY  2017    Cervical surgery    COLONOSCOPY      COSMETIC SURGERY  2018    face lift    LIPOMA RESECTION  2024    SKIN, LEFT UPPER ARM, EXCISION: --EPIDERMAL INCLUSION CYST, RUPTURED AND INFLAMED    LUMBAR LAMINECTOMY  2024    L4-5    MOUTH SURGERY  2018    Oral surgery-peridontal    TUBAL LIGATION      VOCAL FOLD LESION EXCISION  2023     Social History     Tobacco Use    Smoking status: Former     Current packs/day: 0.00     Average packs/day: 0.3 packs/day for 30.0 years (7.5 ttl pk-yrs)     Types: Cigarettes     Start date:      Quit date: 2024     Years since quittin.0    Smokeless tobacco: Never   Vaping Use    Vaping status: Never Used   Substance Use Topics    Alcohol use: Yes     Alcohol/week: 4.0 standard drinks of alcohol     Types: 1 Glasses of wine, 3 Shots of liquor per week     Comment: 4  "nights a week    Drug use: Yes     Frequency: 7.0 times per week     Types: Marijuana     Comment: marijuana gummies for pain daily, last night was the last use      Current Outpatient Medications   Medication Instructions    acetaminophen (TYLENOL) 650 mg, oral, Every 6 hours PRN    amLODIPine (NORVASC) 5 mg, oral, Daily    aspirin 81 mg, oral, Daily    atorvastatin (LIPITOR) 80 mg, oral, Daily    ezetimibe (ZETIA) 10 mg, oral, Daily    furosemide (LASIX) 20 mg, oral, As needed    gabapentin (NEURONTIN) 300 mg, oral, 3 times daily    hydrOXYzine HCL (Atarax) 25 mg tablet 1 tablet, As needed    lisinopril 20 mg, oral, Daily    rivaroxaban (XARELTO) 15 mg, oral, Daily with evening meal, Resume 12/21/24    triamterene-hydrochlorothiazid (Maxzide-25) 37.5-25 mg tablet 0.5 tablets, oral, Daily      Allergies   Allergen Reactions    Codeine Anxiety, Itching, Unknown and Other     Denies itching, hive, shortness of breath        Chemistry    Lab Results   Component Value Date/Time     (L) 01/27/2025 0545    K 4.1 01/27/2025 0545     01/27/2025 0545    CO2 28 01/27/2025 0545    BUN 10 01/27/2025 0545    CREATININE 0.83 01/27/2025 0545    Lab Results   Component Value Date/Time    CALCIUM 8.2 (L) 01/27/2025 0545    ALKPHOS 58 01/25/2025 1840    AST 20 01/25/2025 1840    ALT 12 01/25/2025 1840    BILITOT 1.1 01/25/2025 1840          No results found for: \"HGBA1C\"  Lab Results   Component Value Date/Time    WBC 7.1 01/27/2025 0545    HGB 10.4 (L) 01/27/2025 0545    HCT 29.6 (L) 01/27/2025 0545     01/27/2025 0545     Lab Results   Component Value Date/Time    PROTIME 24.1 (H) 12/13/2024 1438    INR 2.1 (H) 12/13/2024 1438     No results found for: \"ABORH\"  Encounter Date: 01/25/25   ECG 12 lead   Result Value    Ventricular Rate 60    Atrial Rate 67    DC Interval 204    QRS Duration 88    QT Interval 450    QTC Calculation(Bazett) 450    P Axis 94    R Axis 71    T Axis 49    QRS Count 9    Q Onset 226    " P Onset 124    P Offset 187    T Offset 451    QTC Fredericia 450    Narrative    Sinus rhythm with Premature atrial complexes  Septal infarct (cited on or before 29-APR-2024)  Abnormal ECG  When compared with ECG of 25-JAN-2025 22:37, (unconfirmed)  Sinus rhythm has replaced Atrial fibrillation  T wave inversion no longer evident in Inferior leads  Nonspecific T wave abnormality now evident in Anterior leads  QT has lengthened     No results found for this or any previous visit from the past 1095 days.     Echo 2/16/2023:  Left Ventricle: The left ventricular systolic function is normal, with an estimated ejection fraction of 60-65%. There are no regional wall motion abnormalities. The left ventricular cavity size is normal. Spectral Doppler shows a normal pattern of left ventricular diastolic filling.  Left Atrium: The left atrium is mildly dilated.  Right Ventricle: The right ventricle is normal in size. There is normal right ventricular global systolic function.  Right Atrium: The right atrium is mildly dilated.  Aortic Valve: The aortic valve appears structurally normal. There is no evidence of aortic valve regurgitation. The peak instantaneous gradient of the aortic valve is 7.1 mmHg. The mean gradient of the aortic valve is 3.9 mmHg.  Mitral Valve: The mitral valve is normal in structure. There is trace mitral valve regurgitation.  Tricuspid Valve: The tricuspid valve is structurally normal. There is trace to mild tricuspid regurgitation. The Doppler estimated RVSP is within normal limits at 22.2 mmHg.  Pulmonic Valve: The pulmonic valve is structurally normal. There is trace pulmonic valve regurgitation.  Pericardium: There is a trivial pericardial effusion.  Aorta: The aortic root is normal.  Systemic Veins: The inferior vena cava appears to be of normal size. There is IVC inspiratory collapse greater than 50%.  In comparison to the previous echocardiogram(s): There are no prior studies on this patient for  "comparison purposes.        CONCLUSIONS:  1. Left ventricular systolic function is normal with a 60-65% estimated ejection fraction.  2. RVSP within normal limits.  3. Mildly dilated atria.    Visit Vitals  /63   Pulse (!) 49   Temp 36 °C (96.8 °F) (Temporal)   Resp 16   Ht 1.6 m (5' 3\")   Wt 49.9 kg (110 lb)   SpO2 94%   BMI 19.49 kg/m²   OB Status Postmenopausal   Smoking Status Former   BSA 1.49 m²     NPO/Void Status  Carbohydrate Drink Given Prior to Surgery? : N  Date of Last Liquid: 01/27/25  Time of Last Liquid: 0700 (sip of water)  Date of Last Solid: 01/24/25  Time of Last Solid: 2000  Last Intake Type: Clear fluids        Physical Exam    Airway  Mallampati: III  TM distance: >3 FB  Neck ROM: full     Cardiovascular - normal exam  Rhythm: regular  Rate: normal     Dental    Pulmonary - normal exam     Abdominal - normal exam             Anesthesia Plan    History of general anesthesia?: yes  History of complications of general anesthesia?: no    ASA 3     MAC   (Standard ASA monitoring.)  intravenous induction   Anesthetic plan and risks discussed with patient.    Plan discussed with CRNA and CAA.        "

## 2025-01-27 NOTE — CARE PLAN
The patient's goals for the shift include      The clinical goals for the shift include Pt will remain HDS throughout shift

## 2025-01-27 NOTE — POST-PROCEDURE NOTE
EGD done today for evaluation of possible ulcer seen on CT scan and nausea.  The EGD did show two ulcers in the gastric antrum near the pylorus.  Biopsies were obtained from the edge of the larger ulcer and also from the antrum (to rule out H pylori).    Recommendations:  - I will follow up biopsies  - continue pantoprazole 40mg BID x2 months  - avoid all NSAIDs  - OK for diet as tolerated  - no barriers to discharge from a GI standpoint

## 2025-01-27 NOTE — ANESTHESIA POSTPROCEDURE EVALUATION
Patient: Lynsey Forman    Procedure Summary       Date: 01/27/25 Room / Location: [facility]    Anesthesia Start: 0952 Anesthesia Stop: 1013    Procedure: EGD Diagnosis:       Abnormal CT scan, stomach      Acute gastric ulcer without hemorrhage or perforation    Scheduled Providers: Bryant Tejeda MD; OLIVER Soto; Morris Blair MD Responsible Provider: Bryant Tejeda MD    Anesthesia Type: MAC ASA Status: 3            Anesthesia Type: MAC    Vitals Value Taken Time   /65 01/27/25 1100   Temp 36.4 °C (97.5 °F) 01/27/25 1100   Pulse 59 01/27/25 1100   Resp 16 01/27/25 1100   SpO2 95 % 01/27/25 1100       Anesthesia Post Evaluation    Patient location during evaluation: PACU  Patient participation: complete - patient participated  Level of consciousness: awake and alert  Pain management: adequate  Airway patency: patent  Cardiovascular status: acceptable and hemodynamically stable  Respiratory status: acceptable, spontaneous ventilation and nonlabored ventilation  Hydration status: acceptable  Postoperative Nausea and Vomiting: none        There were no known notable events for this encounter.

## 2025-01-27 NOTE — CONSULTS
Nutrition Initial Assessment Note    Reason for Assessment: Admission nursing screening    Pt admitted for:  Peptic ulcer [K27.9]  Elevated troponin [R79.89]  Chest pain at rest [R07.9]  Acute superficial gastritis without hemorrhage [K29.00]  Chest pain due to myocardial ischemia, unspecified ischemic chest pain type [I25.9]    MST triggered for weight loss and eating poorly due to decreased appetite.  Chart reviewed and pt visited.  Per pt decreased appetite x 3 days.  Prior to this normally eats 2-3 meals a day.  EGD today.    Pt agreeable to supplement while admitted.    Past Medical History:   Diagnosis Date    Abnormal kidney function 01/08/2024    BUN-24, GFR-56 (4/29/24)    Alcohol use, unspecified, uncomplicated 12/20/2022    Anxiety     Atrial fibrillation (Multi)     no reccurance s/p ablation 4/2023 and 7/2023    Atrial flutter (Multi)     Carotid stenosis, asymptomatic, bilateral     on ASA and statin    Cerebral aneurysm ([facility])     Right MCA    Chronic cough 11/24/2009    Formatting of this note might be different from the original. Overview: No change with stopping lisinopril, no change with Advair, consensus of ENT, pulmonary, here is LP reflux, has associated past-nasal drip that does not respond to Mucinex or Flonase, 2012 has large nasal polyp L Formatting of this note might be different from the original. No change with stopping lisinopril, no change with Adv    Chronic pain disorder     CKD (chronic kidney disease)     stage III    Coronary artery disease     Diverticulosis 12/21/2016    Dysphonia     Easy bruising     Elevated coronary artery calcium score     CT calcium score 1730    GERD (gastroesophageal reflux disease)     Hyperlipidemia     Hypertension     Lumbar disc disease     Moriah's edema of vocal folds     s/p Moriah's edema surgery performed on 08/09/23.    Sinus bradycardia 01/04/2013    Formatting of this note might be different from the original. Overview: Asymptomatic,  noticed on exam Formatting of this note might be different from the original. Asymptomatic, noticed on exam    Tachycardia 01/08/2024    Tobacco use disorder, mild, in early remission     Quit 1/1/2024, 1/4 PPD x 20 years    Vocal cord polyp      Results for orders placed or performed during the hospital encounter of 01/25/25 (from the past 24 hours)   Troponin I, High Sensitivity   Result Value Ref Range    Troponin I, High Sensitivity 409 (HH) 0 - 13 ng/L   SST TOP   Result Value Ref Range    Extra Tube Hold for add-ons.    Troponin I, High Sensitivity   Result Value Ref Range    Troponin I, High Sensitivity 236 (HH) 0 - 13 ng/L   Basic Metabolic Panel   Result Value Ref Range    Glucose 101 (H) 74 - 99 mg/dL    Sodium 132 (L) 136 - 145 mmol/L    Potassium 4.1 3.5 - 5.3 mmol/L    Chloride 101 98 - 107 mmol/L    Bicarbonate 28 21 - 32 mmol/L    Anion Gap 7 (L) 10 - 20 mmol/L    Urea Nitrogen 10 6 - 23 mg/dL    Creatinine 0.83 0.50 - 1.05 mg/dL    eGFR 73 >60 mL/min/1.73m*2    Calcium 8.2 (L) 8.6 - 10.3 mg/dL   CBC   Result Value Ref Range    WBC 7.1 4.4 - 11.3 x10*3/uL    nRBC 0.0 0.0 - 0.0 /100 WBCs    RBC 3.13 (L) 4.00 - 5.20 x10*6/uL    Hemoglobin 10.4 (L) 12.0 - 16.0 g/dL    Hematocrit 29.6 (L) 36.0 - 46.0 %    MCV 95 80 - 100 fL    MCH 33.2 26.0 - 34.0 pg    MCHC 35.1 32.0 - 36.0 g/dL    RDW 14.1 11.5 - 14.5 %    Platelets 155 150 - 450 x10*3/uL   Calcium, ionized   Result Value Ref Range    POCT Calcium, Ionized 1.06 (L) 1.1 - 1.33 mmol/L   Magnesium   Result Value Ref Range    Magnesium 2.13 1.60 - 2.40 mg/dL   Troponin I, High Sensitivity   Result Value Ref Range    Troponin I, High Sensitivity 233 (HH) 0 - 13 ng/L     Scheduled medications  amLODIPine, 5 mg, oral, Daily  amoxicillin-pot clavulanate, 1 tablet, oral, q12h GIOVANNA  aspirin, 81 mg, oral, Daily  atorvastatin, 80 mg, oral, Daily  calcium carbonate, 1,000 mg, oral, BID  ezetimibe, 10 mg, oral, Daily  gabapentin, 300 mg, oral, TID  lisinopril, 20 mg,  "oral, Daily  pantoprazole, 40 mg, intravenous, BID  perflutren lipid microspheres, 0.5-10 mL of dilution, intravenous, Once in imaging  perflutren protein A microsphere, 0.5 mL, intravenous, Once in imaging  polyethylene glycol, 17 g, oral, Daily  sulfur hexafluoride microsphr, 2 mL, intravenous, Once in imaging  [Held by provider] triamterene-hydrochlorothiazid, 0.5 tablet, oral, Daily      Continuous medications     PRN medications  PRN medications: acetaminophen, alum-mag hydroxide-simeth, benzocaine-menthol, calcium carbonate, docusate sodium, hydrOXYzine HCL, ipratropium-albuteroL, lubricating eye drops, melatonin, metoclopramide, ondansetron, simethicone, sodium chloride  Dietary Orders (From admission, onward)       Start     Ordered    01/27/25 1608  Oral nutritional supplements  Until discontinued        Question Answer Comment   Deliver with All meals    Select supplement: Ensure Clear        01/27/25 1607    01/27/25 1055  Adult diet Cardiac; 70 gm fat; 2 - 3 grams Sodium  Diet effective now        Question Answer Comment   Diet type Cardiac    Fat restriction: 70 gm fat    Sodium restriction: 2 - 3 grams Sodium        01/27/25 1055    01/26/25 1435  May Participate in Room Service  ( ROOM SERVICE MAY PARTICIPATE)  Once        Question:  .  Answer:  Yes    01/26/25 1436                    History:  Energy Intake: Poor < 50 %  Food and Nutrient History: x 3 days per pt report    Anthropometrics:  Height: 160 cm (5' 3\")  Weight: 49.9 kg (110 lb)  BMI (Calculated): 19.49    Wt Readings from Last 21 Encounters:   01/25/25 49.9 kg (110 lb)   12/30/24 51.4 kg (113 lb 6.4 oz)   12/24/24 49.9 kg (110 lb)   12/19/24 50 kg (110 lb 3.7 oz)   12/13/24 51.2 kg (112 lb 12.8 oz)   11/18/24 49.9 kg (110 lb)   10/02/24 51.2 kg (112 lb 12.8 oz)   08/08/24 50.8 kg (111 lb 15.9 oz)   07/03/24 (!) 4.99 kg (11 lb)   06/18/24 48.3 kg (106 lb 7.7 oz)   06/11/24 49.3 kg (108 lb 11 oz)   06/03/24 53.8 kg (118 lb 9.6 oz) "   05/08/24 54 kg (119 lb 0.8 oz)   04/05/24 53.1 kg (117 lb 1 oz)   03/26/24 54.3 kg (119 lb 11.4 oz)   03/25/24 54.3 kg (119 lb 11.2 oz)   03/21/24 53.1 kg (117 lb 1.6 oz)   03/08/24 53.1 kg (117 lb)   02/22/24 53.1 kg (117 lb)   02/12/24 53.1 kg (117 lb)   02/07/24 53.8 kg (118 lb 9.7 oz)         Weight History / % Weight Change: pt reports stable weight for past 2-3 years  Significant Weight Loss: No    Total Energy Estimated Needs in 24 hours (kCal): 1250 kCal  Energy Estimated Needs per kg Body Weight in 24 hours (kCal/kg): 1500 kCal/kg  Method for Estimating Needs: 25-30    Total Protein Estimated Needs in 24 Hours (g): 40 g  Protein Estimated Needs per kg Body Weight in 24 Hours (g/kg): 50 g/kg  Method for Estimating 24 Hour Protein Needs: 0.8-1.0    Method for Estimating 24 Hour Fluid Needs: 1ml/kcal or per MD    Nutrition Focused Physical Findings:  Orbital Fat Pads: Well nourished (slightly bulging fat pads)  Buccal Fat Pads: Well nourished (full, rounded cheeks)    Temporalis: Well nourished (well-defined muscle)    Edema: none    Skin: Negative  Digestive System Findings: Other (Comment), Nausea (Decreased appetite)     Nutrition Diagnosis   Malnutrition Diagnosis  Patient has Malnutrition Diagnosis: No    Patient has Nutrition Diagnosis: Yes  Nutrition Diagnosis 1: Inadequate oral intake  Diagnosis Status (1): New  Related to (1): acute illness  As Evidenced by (1): report of weight loss and eating poorly due to decreased appetite       Nutrition Interventions/Recommendations   Food and/or Nutrient Delivery Interventions  Meals and Snacks: Mineral-modified diet  Goal: > 75% of meals consumed     Goal: Ensure Clear TID for encouraged intake    Education Documentation  No documentation found.      Nutrition Monitoring and Evaluation   Food and Nutrient Related History  Estimated Energy Intake: Energy intake greater or equal to 75% of estimated energy needs    Fluid Intake: Estimated fluid  intake    Anthropometrics: Body Composition/Growth/Weight History  Body Weight: Body weight - Maintain stable weight    Biochemical Data, Medical Tests and Procedures  Electrolyte and Renal Panel: Other (Comment)  Criteria: as clinically indicated    Gastrointestinal Profile: Other (Comment)  Criteria: as clinically indicated    Glucose/Endocrine Profile: Other (Comment)  Criteria: as clinically indicated    Nutritional Anemia Profile: Other (Comment)  Criteria: as clinically indicated    Vitamin Profile: Other (Comment)  Criteria: as clinically indicated    Nutrition Focused Physical Findings  Digestive System Finding: Other (Comment), Nausea  Criteria: Decrease in appetite    Other: Stool output, Urine volume, Overall appearance    Time Spent (min): 60 minutes  Last Date of Nutrition Visit: 01/27/25  Nutrition Follow-Up Needed?: Dietitian to reassess per policy  Follow up Comment: GETACHEW NEGRON

## 2025-01-28 ENCOUNTER — PHARMACY VISIT (OUTPATIENT)
Dept: PHARMACY | Facility: CLINIC | Age: 80
End: 2025-01-28
Payer: COMMERCIAL

## 2025-01-28 ENCOUNTER — APPOINTMENT (OUTPATIENT)
Dept: CARDIOLOGY | Facility: HOSPITAL | Age: 80
DRG: 384 | End: 2025-01-28
Payer: MEDICARE

## 2025-01-28 VITALS
HEIGHT: 63 IN | HEART RATE: 77 BPM | WEIGHT: 110 LBS | RESPIRATION RATE: 18 BRPM | SYSTOLIC BLOOD PRESSURE: 144 MMHG | BODY MASS INDEX: 19.49 KG/M2 | OXYGEN SATURATION: 96 % | DIASTOLIC BLOOD PRESSURE: 72 MMHG | TEMPERATURE: 98.1 F

## 2025-01-28 LAB
ANION GAP SERPL CALC-SCNC: 9 MMOL/L (ref 10–20)
AORTIC VALVE MEAN GRADIENT: 6 MMHG
AORTIC VALVE PEAK VELOCITY: 1.75 M/S
ATRIAL RATE: 59 BPM
AV PEAK GRADIENT: 12 MMHG
AVA (PEAK VEL): 2.78 CM2
AVA (VTI): 2.76 CM2
BUN SERPL-MCNC: 9 MG/DL (ref 6–23)
CALCIUM SERPL-MCNC: 8.5 MG/DL (ref 8.6–10.3)
CHLORIDE SERPL-SCNC: 101 MMOL/L (ref 98–107)
CO2 SERPL-SCNC: 29 MMOL/L (ref 21–32)
CREAT SERPL-MCNC: 0.86 MG/DL (ref 0.5–1.05)
EGFRCR SERPLBLD CKD-EPI 2021: 70 ML/MIN/1.73M*2
EJECTION FRACTION APICAL 4 CHAMBER: 71.5
EJECTION FRACTION: 69 %
ERYTHROCYTE [DISTWIDTH] IN BLOOD BY AUTOMATED COUNT: 13.5 % (ref 11.5–14.5)
GLUCOSE SERPL-MCNC: 103 MG/DL (ref 74–99)
HCT VFR BLD AUTO: 33.6 % (ref 36–46)
HGB BLD-MCNC: 11.2 G/DL (ref 12–16)
LEFT ATRIUM VOLUME AREA LENGTH INDEX BSA: 49.6 ML/M2
LEFT VENTRICLE INTERNAL DIMENSION DIASTOLE: 4.6 CM (ref 3.5–6)
LEFT VENTRICULAR OUTFLOW TRACT DIAMETER: 2.27 CM
MCH RBC QN AUTO: 31.7 PG (ref 26–34)
MCHC RBC AUTO-ENTMCNC: 33.3 G/DL (ref 32–36)
MCV RBC AUTO: 95 FL (ref 80–100)
MITRAL VALVE E/A RATIO: 2.26
NRBC BLD-RTO: 0 /100 WBCS (ref 0–0)
P OFFSET: 167 MS
P ONSET: 147 MS
PLATELET # BLD AUTO: 179 X10*3/UL (ref 150–450)
POTASSIUM SERPL-SCNC: 4 MMOL/L (ref 3.5–5.3)
Q ONSET: 229 MS
QRS COUNT: 11 BEATS
QRS DURATION: 76 MS
QT INTERVAL: 414 MS
QTC CALCULATION(BAZETT): 423 MS
QTC FREDERICIA: 420 MS
R AXIS: -13 DEGREES
RBC # BLD AUTO: 3.53 X10*6/UL (ref 4–5.2)
RIGHT VENTRICLE PEAK SYSTOLIC PRESSURE: 35.3 MMHG
SODIUM SERPL-SCNC: 135 MMOL/L (ref 136–145)
T AXIS: 2 DEGREES
T OFFSET: 436 MS
TRICUSPID ANNULAR PLANE SYSTOLIC EXCURSION: 2.3 CM
VENTRICULAR RATE: 63 BPM
WBC # BLD AUTO: 7.2 X10*3/UL (ref 4.4–11.3)

## 2025-01-28 PROCEDURE — 93306 TTE W/DOPPLER COMPLETE: CPT | Performed by: STUDENT IN AN ORGANIZED HEALTH CARE EDUCATION/TRAINING PROGRAM

## 2025-01-28 PROCEDURE — 2500000002 HC RX 250 W HCPCS SELF ADMINISTERED DRUGS (ALT 637 FOR MEDICARE OP, ALT 636 FOR OP/ED): Performed by: STUDENT IN AN ORGANIZED HEALTH CARE EDUCATION/TRAINING PROGRAM

## 2025-01-28 PROCEDURE — RXMED WILLOW AMBULATORY MEDICATION CHARGE

## 2025-01-28 PROCEDURE — 99239 HOSP IP/OBS DSCHRG MGMT >30: CPT | Performed by: INTERNAL MEDICINE

## 2025-01-28 PROCEDURE — 80048 BASIC METABOLIC PNL TOTAL CA: CPT | Performed by: NURSE PRACTITIONER

## 2025-01-28 PROCEDURE — 93306 TTE W/DOPPLER COMPLETE: CPT

## 2025-01-28 PROCEDURE — 85027 COMPLETE CBC AUTOMATED: CPT | Performed by: NURSE PRACTITIONER

## 2025-01-28 PROCEDURE — 2500000001 HC RX 250 WO HCPCS SELF ADMINISTERED DRUGS (ALT 637 FOR MEDICARE OP): Performed by: NURSE PRACTITIONER

## 2025-01-28 PROCEDURE — 2500000001 HC RX 250 WO HCPCS SELF ADMINISTERED DRUGS (ALT 637 FOR MEDICARE OP): Performed by: STUDENT IN AN ORGANIZED HEALTH CARE EDUCATION/TRAINING PROGRAM

## 2025-01-28 PROCEDURE — 2500000004 HC RX 250 GENERAL PHARMACY W/ HCPCS (ALT 636 FOR OP/ED): Performed by: STUDENT IN AN ORGANIZED HEALTH CARE EDUCATION/TRAINING PROGRAM

## 2025-01-28 PROCEDURE — 36415 COLL VENOUS BLD VENIPUNCTURE: CPT | Performed by: NURSE PRACTITIONER

## 2025-01-28 RX ORDER — LISINOPRIL 20 MG/1
20 TABLET ORAL DAILY
Qty: 30 TABLET | Refills: 0 | Status: SHIPPED | OUTPATIENT
Start: 2025-01-28 | End: 2025-02-27

## 2025-01-28 RX ADMIN — GABAPENTIN 300 MG: 300 CAPSULE ORAL at 09:03

## 2025-01-28 RX ADMIN — GABAPENTIN 300 MG: 300 CAPSULE ORAL at 14:10

## 2025-01-28 RX ADMIN — PANTOPRAZOLE SODIUM 40 MG: 40 INJECTION, POWDER, FOR SOLUTION INTRAVENOUS at 09:03

## 2025-01-28 RX ADMIN — LISINOPRIL 20 MG: 20 TABLET ORAL at 09:03

## 2025-01-28 RX ADMIN — ATORVASTATIN CALCIUM 80 MG: 80 TABLET, FILM COATED ORAL at 09:03

## 2025-01-28 RX ADMIN — AMOXICILLIN AND CLAVULANATE POTASSIUM 1 TABLET: 875; 125 TABLET, FILM COATED ORAL at 09:03

## 2025-01-28 RX ADMIN — ASPIRIN 81 MG: 81 TABLET, COATED ORAL at 09:03

## 2025-01-28 RX ADMIN — EZETIMIBE 10 MG: 10 TABLET ORAL at 09:03

## 2025-01-28 RX ADMIN — AMLODIPINE BESYLATE 5 MG: 5 TABLET ORAL at 09:03

## 2025-01-28 ASSESSMENT — PAIN SCALES - GENERAL
PAINLEVEL_OUTOF10: 0 - NO PAIN

## 2025-01-28 ASSESSMENT — PAIN - FUNCTIONAL ASSESSMENT
PAIN_FUNCTIONAL_ASSESSMENT: 0-10
PAIN_FUNCTIONAL_ASSESSMENT: 0-10

## 2025-01-28 NOTE — DISCHARGE SUMMARY
Discharge Diagnosis  Elevated troponin    Issues Requiring Follow-Up  Follow-up endoscopy in 3 months    Test Results Pending At Discharge  Pending Labs       Order Current Status    Extra Urine Gray Tube Collected (01/25/25 9469)    Surgical Pathology Exam In process    Urinalysis with Reflex Culture and Microscopic In process            Hospital Course  Presented to the hospital with abdominal pain nausea and dry evening with elevated troponins  Imaging showed possible duodenitis process peptic ulcer disease  She underwent an endoscopy which showed a couple of gastric ulcers with clean base these were clipped  And biopsies obtained  For the elevated troponin she underwent echocardiogram which is unremarkable  We recommend the patient continue Protonix twice a day for 2 months  And to avoid all anti-inflammatories  Will discharge patient in stable condition    Discharge diagnosis  Peptic ulcer disease  Esophagitis  Coronary artery disease  Elevated troponins  Hypertension  Dyslipidemia  Chronic kidney disease  Atrial fibrillation/flutter status post ablation    Pertinent Physical Exam At Time of Discharge  Physical Exam    Constitutional   General appearance: Alert and in no acute distress.     Pulmonary   Respiratory assessment: No respiratory distress, normal respiratory rhythm and effort.    Auscultation of Lungs: Clear bilateral breath sounds.   Cardiovascular   Auscultation of heart: Apical pulse normal, heart rate and rhythm normal, normal S1 and S2, no murmurs and no pericardial rub.    Exam for edema: No peripheral edema.   Abdomen   Abdominal Exam: No bruits, normal bowel sounds, soft, non-tender, no abdominal mass palpated.    Liver and Spleen exam: No hepato-splenomegaly.   Musculoskeletal   Examination of gait: Normal.    Inspection of digits and nails: No clubbing or cyanosis of the fingernails.    Inspection/palpation of joints, bones and muscles: No joint swelling. Normal movement of all extremities.    Skin   Skin inspection: Normal skin color and pigmentation, normal skin turgor and no visible rash.   Neurologic   Cranial nerves: Nerves 2-12 were intact, no focal neuro defects.    Home Medications     Medication List      START taking these medications     alum-mag hydroxide-simeth 400-400-40 mg/5 mL suspension; Commonly known   as: Maalox MAX; Take 10 mL by mouth every 4 hours if needed for   indigestion or heartburn.   pantoprazole 40 mg EC tablet; Commonly known as: ProtoNix; Take 1 tablet   (40 mg) by mouth 2 times a day. Do not crush, chew, or split.     CONTINUE taking these medications     acetaminophen 325 mg tablet; Commonly known as: Tylenol; Take 2 tablets   (650 mg) by mouth every 6 hours if needed for mild pain (1 - 3) or   moderate pain (4 - 6).   aspirin 81 mg EC tablet; Take 1 tablet (81 mg) by mouth once daily.   atorvastatin 80 mg tablet; Commonly known as: Lipitor; Take 1 tablet (80   mg) by mouth once daily.   ezetimibe 10 mg tablet; Commonly known as: Zetia; Take 1 tablet (10 mg)   by mouth once daily.   gabapentin 300 mg capsule; Commonly known as: Neurontin; Take 1 capsule   (300 mg) by mouth 3 times a day.   hydrOXYzine HCL 25 mg tablet; Commonly known as: Atarax     ASK your doctor about these medications     amLODIPine 5 mg tablet; Commonly known as: Norvasc; TAKE 1 TABLET BY   MOUTH DAILY   lisinopril 20 mg tablet; TAKE 1 TABLET BY MOUTH DAILY; Ask about: Which   instructions should I use?   rivaroxaban 15 mg tablet; Commonly known as: Xarelto; Take 1 tablet (15   mg) by mouth once daily in the evening. Take with meals. Resume 12/21/24   Do not fill before December 21, 2024.   triamterene-hydrochlorothiazid 37.5-25 mg tablet; Commonly known as:   Maxzide-25; Take 0.5 tablets by mouth once daily.       Outpatient Follow-Up  Future Appointments   Date Time Provider Department Center   2/7/2025  2:45 PM U CT 1 AHUCT U Rad   [facility]2025  [id number]    2/10/2025  2:40 PM Windy Bartholomew MD [id number] [facility]   3/7/2025  1:30 PM Gerson Martinez, PharmD [id number] [facility]   4/3/2025  1:00 PM Louie Garcias MD [id number] [facility]   4/23/2025  2:00 PM Lula Chacon MD [id number] [facility]   5/14/2025  3:00 PM Shaquille Mcdonald MD [id number] [facility]   6/23/2025  1:30 PM AHU VASC 2 AHUVSC AHU Rad   6/23/2025  2:40 PM Shaquille Blackman MD PhD [facility]     Patient seen at bedside. Events from the last visit reviewed. Discussed with staff. Results of tests and investigations from last visit reviewed and discussed with patient/Family. Electronic chart on [facility] reviewed. Input / Recommendations  from consultants  appreciated and reviewed and agreed with.     discharge summary and profile completed. medications reviewed and discussed with patient and family.  scripts completed and signed.     total discharge time in excess of 30 minutes.    Venessa Lawton MD

## 2025-01-28 NOTE — CARE PLAN
Problem: Pain - Adult  Goal: Verbalizes/displays adequate comfort level or baseline comfort level  Outcome: Progressing     Problem: Safety - Adult  Goal: Free from fall injury  Outcome: Progressing   The patient's goals for the shift include rest and no cardiac events.      The clinical goals for the shift include Patient will  remain HDS during shift.

## 2025-01-29 NOTE — DOCUMENTATION CLARIFICATION NOTE
"    PATIENT:               LISA CHÁVEZ  ACCT #:                  [id number]  MRN:                       [id number]  :                       1945  ADMIT DATE:       2025 5:36 PM  DISCH DATE:        2025 4:03 PM  RESPONDING PROVIDER #:        [id number]          PROVIDER RESPONSE TEXT:    Acute Myocardial Injury    CDI QUERY TEXT:    Clarification        Instruction:    Based on your assessment of the patient and the clinical information, please provide the requested documentation by clicking on the appropriate radio button and enter any additional information if prompted.    Question: Is there a diagnosis indicative of the patient elevated Troponins and symptoms    When answering this query, please exercise your independent professional judgment. The fact that a question is being asked, does not imply that any particular answer is desired or expected.    The patient's clinical indicators include:  Clinical Information: Patient presenting to the ED with nausea and dry heaving for 1 day. On work-up found to have prepyloric ulcers, gastritis, elevated troponin levels    Clinical Indicators:    -ED  progress note on 25: \"EKG, interpreted by me: Atrial fibrillation, rate 60 bpm.  Normal axis.  Similar slight diffuse ST depression unchanged from prior EKGs.  No acute T wave abnormalities.  QTc 450.  No evidence of acute STEMI at this time\"    -Trended troponin levels from 25 to date: 22, 69, 147, 219, 453, 409, 236, 233    -25 Cardiology consult note-HPI: \"EKG obtained 2025 at 1907 shows significant baseline artifact but probable atrial fibrillation with controlled ventricular response.  A repeat EKG 2025 at 2237 appears more sinus with frequent PACs although continued computer interpretation suggests possible atrial fibrillation EKG 2025 at 0208 shows sinus rhythm with PACs.  This has dramatically improved baseline artifact from prior studies. Patient denies chest pain and " "angina.  Pt denies shortness of breath, dyspnea on exertion, orthopnea, and paroxysmal nocturnal dyspnea.\"    -1/26/25 TTE results: \"Left ventricular ejection fraction is normal, calculated by Zazueta's biplane at 69 percent. There are no regional left ventricular wall motion abnormalities. Spectral Doppler shows a Grade III restrictive pattern of left ventricular diastolic filling with an elevated left atrial pressure. Left ventricular cavity size is moderately dilated. Moderately enlarged right ventricle. The left atrial size is severely dilated. The right atrium is moderately dilated. Compared with study dated 2/16/2023, the LV is now moderately dilated. The diastolic function was previosuly indeterminate now is restrictive due to presence of LVH, dilated LA, increased TR velocity and E/A ratio greater than 2.\"      Treatment: TTE, serial trops    Risk Factors: HTN, a-fib history, CAD  Options provided:  -- NSTEMI  -- Type II MI  -- Acute Myocardial Injury  -- Chronic Myocardial Injury  -- Other - I will add my own diagnosis  -- Refer to Clinical Documentation Reviewer    Query created by: Pricila Wan on 1/28/2025 6:32 PM      Electronically signed by:  IDALIA LI DO 1/29/2025 9:06 AM          "

## 2025-02-07 ENCOUNTER — HOSPITAL ENCOUNTER (OUTPATIENT)
Dept: RADIOLOGY | Facility: HOSPITAL | Age: 80
Discharge: HOME | End: 2025-02-07
Payer: MEDICARE

## 2025-02-07 DIAGNOSIS — Z87.891 PERSONAL HISTORY OF NICOTINE DEPENDENCE: ICD-10-CM

## 2025-02-07 PROCEDURE — 71271 CT THORAX LUNG CANCER SCR C-: CPT

## 2025-02-07 PROCEDURE — 71271 CT THORAX LUNG CANCER SCR C-: CPT | Performed by: STUDENT IN AN ORGANIZED HEALTH CARE EDUCATION/TRAINING PROGRAM

## 2025-02-10 ENCOUNTER — HOSPITAL ENCOUNTER (OUTPATIENT)
Dept: RADIOLOGY | Facility: CLINIC | Age: 80
Discharge: HOME | End: 2025-02-10
Payer: MEDICARE

## 2025-02-10 ENCOUNTER — APPOINTMENT (OUTPATIENT)
Dept: PRIMARY CARE | Facility: CLINIC | Age: 80
End: 2025-02-10
Payer: MEDICARE

## 2025-02-10 VITALS
DIASTOLIC BLOOD PRESSURE: 74 MMHG | OXYGEN SATURATION: 96 % | SYSTOLIC BLOOD PRESSURE: 124 MMHG | WEIGHT: 116.4 LBS | HEART RATE: 59 BPM | BODY MASS INDEX: 20.63 KG/M2

## 2025-02-10 VITALS — HEIGHT: 63 IN | WEIGHT: 110.01 LBS | BODY MASS INDEX: 19.49 KG/M2

## 2025-02-10 DIAGNOSIS — E78.00 HYPERCHOLESTEROLEMIA: ICD-10-CM

## 2025-02-10 DIAGNOSIS — Z12.31 VISIT FOR SCREENING MAMMOGRAM: ICD-10-CM

## 2025-02-10 DIAGNOSIS — M81.0 OSTEOPOROSIS, UNSPECIFIED OSTEOPOROSIS TYPE, UNSPECIFIED PATHOLOGICAL FRACTURE PRESENCE: ICD-10-CM

## 2025-02-10 DIAGNOSIS — K25.3 ACUTE GASTRIC ULCER WITHOUT HEMORRHAGE OR PERFORATION: ICD-10-CM

## 2025-02-10 DIAGNOSIS — E87.1 HYPONATREMIA: ICD-10-CM

## 2025-02-10 DIAGNOSIS — E55.9 VITAMIN D DEFICIENCY: ICD-10-CM

## 2025-02-10 DIAGNOSIS — Z09 HOSPITAL DISCHARGE FOLLOW-UP: Primary | ICD-10-CM

## 2025-02-10 DIAGNOSIS — R53.83 FATIGUE, UNSPECIFIED TYPE: ICD-10-CM

## 2025-02-10 DIAGNOSIS — Z12.31 ENCOUNTER FOR SCREENING MAMMOGRAM FOR BREAST CANCER: ICD-10-CM

## 2025-02-10 DIAGNOSIS — I10 BENIGN ESSENTIAL HYPERTENSION: ICD-10-CM

## 2025-02-10 PROBLEM — D17.22 LIPOMA OF LEFT UPPER EXTREMITY: Status: RESOLVED | Noted: 2024-05-08 | Resolved: 2025-02-10

## 2025-02-10 PROCEDURE — 77067 SCR MAMMO BI INCL CAD: CPT | Performed by: RADIOLOGY

## 2025-02-10 PROCEDURE — 1111F DSCHRG MED/CURRENT MED MERGE: CPT | Performed by: INTERNAL MEDICINE

## 2025-02-10 PROCEDURE — 99495 TRANSJ CARE MGMT MOD F2F 14D: CPT | Performed by: INTERNAL MEDICINE

## 2025-02-10 PROCEDURE — 3078F DIAST BP <80 MM HG: CPT | Performed by: INTERNAL MEDICINE

## 2025-02-10 PROCEDURE — 77063 BREAST TOMOSYNTHESIS BI: CPT | Performed by: RADIOLOGY

## 2025-02-10 PROCEDURE — 1036F TOBACCO NON-USER: CPT | Performed by: INTERNAL MEDICINE

## 2025-02-10 PROCEDURE — 1160F RVW MEDS BY RX/DR IN RCRD: CPT | Performed by: INTERNAL MEDICINE

## 2025-02-10 PROCEDURE — 1123F ACP DISCUSS/DSCN MKR DOCD: CPT | Performed by: INTERNAL MEDICINE

## 2025-02-10 PROCEDURE — 1157F ADVNC CARE PLAN IN RCRD: CPT | Performed by: INTERNAL MEDICINE

## 2025-02-10 PROCEDURE — 77067 SCR MAMMO BI INCL CAD: CPT

## 2025-02-10 PROCEDURE — 1159F MED LIST DOCD IN RCRD: CPT | Performed by: INTERNAL MEDICINE

## 2025-02-10 PROCEDURE — 3074F SYST BP LT 130 MM HG: CPT | Performed by: INTERNAL MEDICINE

## 2025-02-10 ASSESSMENT — PATIENT HEALTH QUESTIONNAIRE - PHQ9
SUM OF ALL RESPONSES TO PHQ9 QUESTIONS 1 AND 2: 0
2. FEELING DOWN, DEPRESSED OR HOPELESS: NOT AT ALL
1. LITTLE INTEREST OR PLEASURE IN DOING THINGS: NOT AT ALL

## 2025-02-10 ASSESSMENT — ENCOUNTER SYMPTOMS
FLANK PAIN: 0
NUMBNESS: 0
HEADACHES: 0
SEIZURES: 0
DIZZINESS: 0
DYSURIA: 0
SPEECH DIFFICULTY: 0
LOSS OF SENSATION IN FEET: 0
BACK PAIN: 0
PHOTOPHOBIA: 0
APPETITE CHANGE: 0
POLYPHAGIA: 0
OCCASIONAL FEELINGS OF UNSTEADINESS: 1
COUGH: 0
CHILLS: 0
HEMATURIA: 0
PALPITATIONS: 0
TREMORS: 0
BRUISES/BLEEDS EASILY: 0
FATIGUE: 0
WEAKNESS: 0
ADENOPATHY: 0
STRIDOR: 0
DEPRESSION: 0
DIAPHORESIS: 0
ARTHRALGIAS: 0

## 2025-02-10 NOTE — ASSESSMENT & PLAN NOTE
Within 14 days hospital discharge follow-up for duodenal ulcer nausea vomiting.  Advised to take pantoprazole 40 mg twice a day for 2 months and then decrease to once a day for another month.    Avoid NSAIDs.    Avoid spicy acidic foods ,alcohol.

## 2025-02-10 NOTE — ASSESSMENT & PLAN NOTE
Hypertension : controlled blood pressure and continues same medications and educate a low salt diet do not exceed 200 mg per serving. Keep monitor the blood pressure at home.   She continues to take triamterene-HCTZ.  Upon discharge from the hospital was advised to discontinue it she has had low potassium and hyponatremia.  Tells me without the diuretic her ankles are swollen.

## 2025-02-10 NOTE — PROGRESS NOTES
Subjective   Patient ID: Lynsey Forman is a 79 y.o. female who presents for Follow-up.    Follow-up visit after hospital discharge.  She was hospitalized January 25 to January 28.  Discharged home January 28.  She was admitted for epigastric pain nausea vomiting.  On admission found to have hyponatremia hypokalemia.  Has had EGD found to have 2 ulcers.  Prescribed pantoprazole 40 mg twice a day.  She was taking Advil for bilateral knees pain.  She also takes aspirin and Xarelto.  She has hypertension hypercholesterolemia history of atrial fibrillation, bilateral carotid stenosis more than 80% has had carotid endarterectomy on the left side last year.  Small 6.6 mm ICA aneurysm.  Upon discharge from the hospital was advised to stop triamterene-HCTZ but she continues to take it saying she develops legs edema if she stops it.         Review of Systems   Constitutional:  Negative for appetite change, chills, diaphoresis and fatigue.   HENT:  Negative for congestion, ear discharge, hearing loss, mouth sores and postnasal drip.    Eyes:  Negative for photophobia and visual disturbance.   Respiratory:  Negative for cough and stridor.    Cardiovascular:  Negative for palpitations and leg swelling.   Endocrine: Negative for cold intolerance, heat intolerance, polyphagia and polyuria.   Genitourinary:  Negative for decreased urine volume, dysuria, enuresis, flank pain and hematuria.   Musculoskeletal:  Negative for arthralgias, back pain and gait problem.   Allergic/Immunologic: Negative for food allergies and immunocompromised state.   Neurological:  Negative for dizziness, tremors, seizures, syncope, speech difficulty, weakness, numbness and headaches.   Hematological:  Negative for adenopathy. Does not bruise/bleed easily.       Objective   /74 (BP Location: Right arm, Patient Position: Sitting)   Pulse 59   Wt 52.8 kg (116 lb 6.5 oz)   SpO2 96%   BMI 20.63 kg/m²     Physical Exam  Constitutional:        Appearance: Normal appearance.   HENT:      Head: Normocephalic and atraumatic.      Right Ear: External ear normal.      Left Ear: External ear normal.      Mouth/Throat:      Mouth: Mucous membranes are moist.      Pharynx: Oropharynx is clear.   Neck:      Vascular: No carotid bruit.   Cardiovascular:      Rate and Rhythm: Normal rate and regular rhythm.      Heart sounds: No murmur heard.     No gallop.   Pulmonary:      Effort: Pulmonary effort is normal. No respiratory distress.      Breath sounds: No wheezing or rales.   Abdominal:      General: Abdomen is flat.      Palpations: Abdomen is soft.      Hernia: No hernia is present.   Musculoskeletal:         General: No swelling, tenderness, deformity or signs of injury.      Cervical back: No rigidity or tenderness.      Right lower leg: No edema.   Lymphadenopathy:      Cervical: No cervical adenopathy.   Skin:     Coloration: Skin is not jaundiced or pale.      Findings: No bruising, erythema, lesion or rash.   Neurological:      General: No focal deficit present.      Mental Status: She is oriented to person, place, and time.      Motor: No weakness.      Coordination: Coordination normal.   Psychiatric:         Mood and Affect: Mood normal.         Behavior: Behavior normal.         Assessment/Plan   Problem List Items Addressed This Visit             ICD-10-CM    Hypercholesterolemia E78.00     Hypercholesterolemia: check/reviewed lipid profile.   Educate low cholesterol diet , avoid fast foods , processed meats and fried foods, red meat.  Increase physical activity.  Keep a low carb diet.             Relevant Orders    Lipid Panel    Benign essential hypertension I10     Hypertension : controlled blood pressure and continues same medications and educate a low salt diet do not exceed 200 mg per serving. Keep monitor the blood pressure at home.   She continues to take triamterene-HCTZ.  Upon discharge from the hospital was advised to discontinue it she has  had low potassium and hyponatremia.  Tells me without the diuretic her ankles are swollen.           Relevant Orders    Comprehensive Metabolic Panel    Acute gastric ulcer K25.3     Take pantoprazole 40 mg twice a day for the next 2 months and then 1 capsule daily in the morning.         Hyponatremia E87.1     Check sodium level.         Hospital discharge follow-up - Primary Z09     Within 14 days hospital discharge follow-up for duodenal ulcer nausea vomiting.  Advised to take pantoprazole 40 mg twice a day for 2 months and then decrease to once a day for another month.    Avoid NSAIDs.    Avoid spicy acidic foods ,alcohol.          Other Visit Diagnoses         Codes    Visit for screening mammogram     Z12.31    Relevant Orders    BI mammo bilateral screening tomosynthesis    Osteoporosis, unspecified osteoporosis type, unspecified pathological fracture presence     M81.0    Relevant Orders    XR DEXA bone density    Fatigue, unspecified type     R53.83    Relevant Orders    CBC and Auto Differential    Vitamin D deficiency     E55.9    Relevant Orders    Vitamin D 25-Hydroxy,Total (for eval of Vitamin D levels)

## 2025-02-10 NOTE — ASSESSMENT & PLAN NOTE
Take pantoprazole 40 mg twice a day for the next 2 months and then 1 capsule daily in the morning.

## 2025-02-10 NOTE — ASSESSMENT & PLAN NOTE
Hypercholesterolemia: check/reviewed lipid profile.   Educate low cholesterol diet , avoid fast foods , processed meats and fried foods, red meat.  Increase physical activity.  Keep a low carb diet.

## 2025-02-13 DIAGNOSIS — R92.8 ABNORMAL MAMMOGRAM OF RIGHT BREAST: Primary | ICD-10-CM

## 2025-02-13 LAB
ATRIAL RATE: 67 BPM
P AXIS: 94 DEGREES
P OFFSET: 187 MS
P ONSET: 124 MS
PR INTERVAL: 204 MS
Q ONSET: 226 MS
Q ONSET: 227 MS
Q ONSET: 229 MS
QRS COUNT: 11 BEATS
QRS COUNT: 12 BEATS
QRS COUNT: 9 BEATS
QRS DURATION: 76 MS
QRS DURATION: 82 MS
QRS DURATION: 88 MS
QT INTERVAL: 342 MS
QT INTERVAL: 450 MS
QT INTERVAL: 484 MS
QTC CALCULATION(BAZETT): 371 MS
QTC CALCULATION(BAZETT): 450 MS
QTC CALCULATION(BAZETT): 540 MS
QTC FREDERICIA: 361 MS
QTC FREDERICIA: 450 MS
QTC FREDERICIA: 521 MS
R AXIS: 70 DEGREES
R AXIS: 71 DEGREES
R AXIS: 79 DEGREES
T AXIS: -81 DEGREES
T AXIS: 33 DEGREES
T AXIS: 49 DEGREES
T OFFSET: 398 MS
T OFFSET: 451 MS
T OFFSET: 471 MS
VENTRICULAR RATE: 60 BPM
VENTRICULAR RATE: 71 BPM
VENTRICULAR RATE: 75 BPM

## 2025-02-14 ENCOUNTER — HOSPITAL ENCOUNTER (OUTPATIENT)
Dept: RADIOLOGY | Facility: CLINIC | Age: 80
Discharge: HOME | End: 2025-02-14
Payer: MEDICARE

## 2025-02-14 DIAGNOSIS — R92.8 ABNORMAL MAMMOGRAM OF RIGHT BREAST: ICD-10-CM

## 2025-02-14 PROCEDURE — 77065 DX MAMMO INCL CAD UNI: CPT | Mod: RT

## 2025-02-14 PROCEDURE — 76642 ULTRASOUND BREAST LIMITED: CPT | Mod: RT

## 2025-02-14 PROCEDURE — 76982 USE 1ST TARGET LESION: CPT | Mod: RT

## 2025-02-24 NOTE — PROGRESS NOTES
Pharmacist Clinic: Cardiology Management    Lynsey Forman is a 79 y.o. female was referred to Clinical Pharmacy Team for Anticoagulation management.     Referring Provider: Urszula Lebron APRN*    THIS IS A FOLLOW UP PATIENT APPOINTMENT. AT LAST VISIT ON 9/6/24 WITH PHARMACIST (Gerson Martinez).    Appointment was completed by Lynsey who was reached at primary number.    REVIEW OF PAST APPNT (IF APPLICABLE):   During last appointment Lynsey was renewed into  PAP to cover Eliquis cost for another year.  Renewal date of 9/11/25.  No bruising or bleeding reported at last appointment.      Allergies   Allergen Reactions    Codeine Anxiety, Itching, Unknown and Other     Denies itching, hive, shortness of breath       Past Medical History:   Diagnosis Date    Abnormal kidney function 01/08/2024    BUN-24, GFR-56 (4/29/24)    Alcohol use, unspecified, uncomplicated 12/20/2022    Anxiety     Atrial fibrillation (Multi)     no reccurance s/p ablation 4/2023 and 7/2023    Atrial flutter (Multi)     Carotid stenosis, asymptomatic, bilateral     on ASA and statin    Cerebral aneurysm (HHS-HCC)     Right MCA    Chronic cough 11/24/2009    Formatting of this note might be different from the original. Overview: No change with stopping lisinopril, no change with Advair, consensus of ENT, pulmonary, here is LP reflux, has associated past-nasal drip that does not respond to Mucinex or Flonase, 2012 has large nasal polyp L Formatting of this note might be different from the original. No change with stopping lisinopril, no change with Adv    Chronic pain disorder     CKD (chronic kidney disease)     stage III    Coronary artery disease     Diverticulosis 12/21/2016    Dysphonia     Easy bruising     Elevated coronary artery calcium score     CT calcium score 1730    GERD (gastroesophageal reflux disease)     Hyperlipidemia     Hypertension     Lumbar disc disease     Moriah's edema of vocal folds     s/p Moriah's edema surgery  performed on 08/09/23.    Sinus bradycardia 01/04/2013    Formatting of this note might be different from the original. Overview: Asymptomatic, noticed on exam Formatting of this note might be different from the original. Asymptomatic, noticed on exam    Tachycardia 01/08/2024    Tobacco use disorder, mild, in early remission     Quit 1/1/2024, 1/4 PPD x 20 years    Vocal cord polyp        Current Outpatient Medications on File Prior to Visit   Medication Sig Dispense Refill    acetaminophen (Tylenol) 325 mg tablet Take 2 tablets (650 mg) by mouth every 6 hours if needed for mild pain (1 - 3) or moderate pain (4 - 6).      alum-mag hydroxide-simeth (Maalox MAX) 400-400-40 mg/5 mL suspension Take 10 mL by mouth every 4 hours if needed for indigestion or heartburn. 355 mL 0    amLODIPine (Norvasc) 5 mg tablet TAKE 1 TABLET BY MOUTH DAILY 90 tablet 1    aspirin 81 mg EC tablet Take 1 tablet (81 mg) by mouth once daily. 90 tablet 3    atorvastatin (Lipitor) 80 mg tablet Take 1 tablet (80 mg) by mouth once daily. 90 tablet 3    ezetimibe (Zetia) 10 mg tablet Take 1 tablet (10 mg) by mouth once daily. 30 tablet 11    gabapentin (Neurontin) 300 mg capsule Take 1 capsule (300 mg) by mouth 3 times a day. 270 capsule 1    hydrOXYzine HCL (Atarax) 25 mg tablet Take 1 tablet (25 mg) by mouth if needed for anxiety.      lisinopril 20 mg tablet Take 1 tablet (20 mg) by mouth once daily. 30 tablet 0    pantoprazole (ProtoNix) 40 mg EC tablet Take 1 tablet (40 mg) by mouth 2 times a day. Do not crush, chew, or split. 60 tablet 1    rivaroxaban (Xarelto) 15 mg tablet Take 1 tablet (15 mg) by mouth once daily in the evening. Take with meals. Resume 12/21/24 Do not fill before December 21, 2024.       No current facility-administered medications on file prior to visit.         RELEVANT LAB RESULTS:  Lab Results   Component Value Date    BILITOT 1.1 01/25/2025    CALCIUM 8.5 (L) 01/28/2025    CO2 29 01/28/2025     01/28/2025     "CREATININE 0.86 01/28/2025    GLUCOSE 103 (H) 01/28/2025    ALKPHOS 58 01/25/2025    K 4.0 01/28/2025    PROT 7.7 01/25/2025     (L) 01/28/2025    AST 20 01/25/2025    ALT 12 01/25/2025    BUN 9 01/28/2025    ANIONGAP 9 (L) 01/28/2025    MG 2.13 01/27/2025    PHOS 4.0 01/26/2025    ALBUMIN 4.4 01/25/2025    LIPASE 23 01/25/2025    GFRF 49 (A) 06/27/2023     Lab Results   Component Value Date    TRIG 72 07/03/2024    CHOL 158 07/03/2024    LDLCALC 51 07/03/2024    HDL 93.1 07/03/2024     No results found for: \"BMCBC\", \"CBCDIF\"     PHARMACEUTICAL ASSESSMENT:  Drug Interactions? No    Medication Documentation Review Audit       Reviewed by Windy Bartholomew MD (Physician) on 02/10/25 at 1514      Medication Order Taking? Sig Documenting Provider Last Dose Status   acetaminophen (Tylenol) 325 mg tablet [id number] No Take 2 tablets (650 mg) by mouth every 6 hours if needed for mild pain (1 - 3) or moderate pain (4 - 6). LILY Walton Past Week Active   alum-mag hydroxide-simeth (Maalox MAX) 400-400-40 mg/5 mL suspension [id number]  Take 10 mL by mouth every 4 hours if needed for indigestion or heartburn. SANTY Burkett-CNP  Active   amLODIPine (Norvasc) 5 mg tablet [id number] No TAKE 1 TABLET BY MOUTH DAILY Windy Bartholomew MD 1/24/2025 Morning Active   aspirin 81 mg EC tablet [id number] No Take 1 tablet (81 mg) by mouth once daily. LILY Walton 1/24/2025 Morning Active   atorvastatin (Lipitor) 80 mg tablet [id number] No Take 1 tablet (80 mg) by mouth once daily. LILY Tan 1/24/2025 Morning Active   ezetimibe (Zetia) 10 mg tablet [id number] No Take 1 tablet (10 mg) by mouth once daily. SANTY Tan-CNP 1/24/2025 Morning Active   gabapentin (Neurontin) 300 mg capsule [id number] No Take 1 capsule (300 mg) by mouth 3 times a day. Windy Bartholomew MD 1/24/2025 Morning Active   hydrOXYzine HCL (Atarax) 25 mg tablet [id number] No Take 1 tablet (25 mg) by mouth if " needed for anxiety. Bill Rizzo MD 12/13/2024 Active   lisinopril 20 mg tablet [id number]  Take 1 tablet (20 mg) by mouth once daily. Venessa Lawton MD  Active   pantoprazole (ProtoNix) 40 mg EC tablet [id number]  Take 1 tablet (40 mg) by mouth 2 times a day. Do not crush, chew, or split. Kymberly OLIVA Hilary, APRN-CNP  Active   rivaroxaban (Xarelto) 15 mg tablet [id number] No Take 1 tablet (15 mg) by mouth once daily in the evening. Take with meals. Resume 12/21/24 Do not fill before December 21, 2024. Lani Day, APRN-CNP 1/24/2025 Morning Active                    DISEASE MANAGEMENT ASSESSMENT:     ANTICOAGULATION ASSESSMENT    The 10-year ASCVD risk score (Anat BATES, et al., 2019) is: 25.4%    Values used to calculate the score:      Age: 79 years      Sex: Female      Is Non- : No      Diabetic: No      Tobacco smoker: No      Systolic Blood Pressure: 124 mmHg      Is BP treated: Yes      HDL Cholesterol: 93.1 mg/dL      Total Cholesterol: 158 mg/dL    DIAGNOSIS: prevention of nonvalvular atrial fibrilliation stroke and systemic embolism  - Patient is projected to be on anticoagulation indefinitely  - [id number] Score: [4] (only included if diagnosis is atrial fibrillation)   Age: [<65 (0)] [65-74 (+1)] [> 75 (+2)]: 2  Sex: [Male/Female (+1)]: 1  CHF history: [No/Yes(+1)]: 0  Hypertension history: [No/Yes(+1)]: 1  Stroke/TIA/thromboembolism history: [No/Yes(+2)]: 0  Vascular disease history (prior MI, peripheral artery disease, aortic plaque): [No/Yes(+1)]: 0  Diabetes history: [No/Yes(+1)]: 0    CURRENT PHARMACOTHERAPY:   Xarelto 15mg daily  CrCl 46ml/min    Affordability/Accessibility: [facility]  Adherence/Organization: reports adherence  Adverse Reactions: none reported - all bruising from hospital stay  Recent Hospitalizations: 1/25-1/28  Recent Falls/Trauma: none reported  Changes in Tobacco or Alcohol Intake:   Tobacco: does not use  Alcohol: usually 2 drinks a  day    EDUCATION/COUNSELING:   - Counseled patient on MOA, expectations, duration of therapy, contraindications, administration, and monitoring parameters  - Counseled patient of side effects that are indicative of bleeding such as dark tarry stool, unexplainable bruising, or vomiting up a coffee ground like substance    DISCUSSION/NOTES:   Reports adherence to Xarelto. Does not normally notice bruising, but had some during hospitalization from IV and blood draws. Notes all of these have healed.  Refills resent to [facility] pharmacy to continue to cover the cost of refills.    ASSESSMENT:    Assessment/Plan   Problem List Items Addressed This Visit       Atrial flutter (Multi)     No dose adjustments needed to Xarelto at today's visit.  CrCl 15-50ml/min           Other Visit Diagnoses       Atrial fibrillation, unspecified type (Multi)    -  Primary    Relevant Orders    Referral to Clinical Pharmacy              RECOMMENDATIONS/PLAN:    CONTINUE  Xarelto 15mg daily    Last Appnt with Referring Provider: 10/02/24  Next Appnt with Referring Provider: 5/14/25  Clinical Pharmacist follow up: 8/12/25  VAF/Application Expiration: Yes    Date: 9/11/25  Type of Encounter: Nathan Martinez PharmD    Verbal consent to manage patient's drug therapy was obtained from the patient . They were informed they may decline to participate or withdraw from participation in pharmacy services at any time.    Continue all meds under the continuation of care with the referring provider and clinical pharmacy team.

## 2025-02-25 ENCOUNTER — APPOINTMENT (OUTPATIENT)
Dept: PHARMACY | Facility: HOSPITAL | Age: 80
End: 2025-02-25
Payer: MEDICARE

## 2025-02-25 DIAGNOSIS — I48.91 ATRIAL FIBRILLATION, UNSPECIFIED TYPE (MULTI): Primary | ICD-10-CM

## 2025-02-25 DIAGNOSIS — I48.92 ATRIAL FLUTTER, UNSPECIFIED TYPE (MULTI): ICD-10-CM

## 2025-02-25 PROCEDURE — RXMED WILLOW AMBULATORY MEDICATION CHARGE

## 2025-02-25 NOTE — Clinical Note
Refills of Xarelto resent as inpatient team canceled the script at [facility] [place] [place].

## 2025-02-26 PROCEDURE — RXMED WILLOW AMBULATORY MEDICATION CHARGE

## 2025-02-27 ENCOUNTER — PHARMACY VISIT (OUTPATIENT)
Dept: PHARMACY | Facility: CLINIC | Age: 80
End: 2025-02-27
Payer: COMMERCIAL

## 2025-02-28 LAB
25(OH)D3+25(OH)D2 SERPL-MCNC: 25 NG/ML (ref 30–100)
ALBUMIN SERPL-MCNC: 4.6 G/DL (ref 3.6–5.1)
ALP SERPL-CCNC: 64 U/L (ref 37–153)
ALT SERPL-CCNC: 11 U/L (ref 6–29)
ANION GAP SERPL CALCULATED.4IONS-SCNC: 9 MMOL/L (CALC) (ref 7–17)
AST SERPL-CCNC: 19 U/L (ref 10–35)
BASOPHILS # BLD AUTO: 57 CELLS/UL (ref 0–200)
BASOPHILS NFR BLD AUTO: 0.8 %
BILIRUB SERPL-MCNC: 0.5 MG/DL (ref 0.2–1.2)
BUN SERPL-MCNC: 19 MG/DL (ref 7–25)
CALCIUM SERPL-MCNC: 9.6 MG/DL (ref 8.6–10.4)
CHLORIDE SERPL-SCNC: 94 MMOL/L (ref 98–110)
CHOLEST SERPL-MCNC: 169 MG/DL
CHOLEST/HDLC SERPL: 1.5 (CALC)
CO2 SERPL-SCNC: 30 MMOL/L (ref 20–32)
CREAT SERPL-MCNC: 1.01 MG/DL (ref 0.6–1)
EGFRCR SERPLBLD CKD-EPI 2021: 57 ML/MIN/1.73M2
EOSINOPHIL # BLD AUTO: 341 CELLS/UL (ref 15–500)
EOSINOPHIL NFR BLD AUTO: 4.8 %
ERYTHROCYTE [DISTWIDTH] IN BLOOD BY AUTOMATED COUNT: 11.5 % (ref 11–15)
GLUCOSE SERPL-MCNC: 83 MG/DL (ref 65–99)
HCT VFR BLD AUTO: 36.4 % (ref 35–45)
HDLC SERPL-MCNC: 110 MG/DL
HGB BLD-MCNC: 12.2 G/DL (ref 11.7–15.5)
LDLC SERPL CALC-MCNC: 43 MG/DL (CALC)
LYMPHOCYTES # BLD AUTO: 2024 CELLS/UL (ref 850–3900)
LYMPHOCYTES NFR BLD AUTO: 28.5 %
MCH RBC QN AUTO: 32.4 PG (ref 27–33)
MCHC RBC AUTO-ENTMCNC: 33.5 G/DL (ref 32–36)
MCV RBC AUTO: 96.8 FL (ref 80–100)
MONOCYTES # BLD AUTO: 604 CELLS/UL (ref 200–950)
MONOCYTES NFR BLD AUTO: 8.5 %
NEUTROPHILS # BLD AUTO: 4075 CELLS/UL (ref 1500–7800)
NEUTROPHILS NFR BLD AUTO: 57.4 %
NONHDLC SERPL-MCNC: 59 MG/DL (CALC)
PLATELET # BLD AUTO: 206 THOUSAND/UL (ref 140–400)
PMV BLD REES-ECKER: 9.9 FL (ref 7.5–12.5)
POTASSIUM SERPL-SCNC: 4.6 MMOL/L (ref 3.5–5.3)
PROT SERPL-MCNC: 7.3 G/DL (ref 6.1–8.1)
RBC # BLD AUTO: 3.76 MILLION/UL (ref 3.8–5.1)
SODIUM SERPL-SCNC: 133 MMOL/L (ref 135–146)
TRIGL SERPL-MCNC: 80 MG/DL
WBC # BLD AUTO: 7.1 THOUSAND/UL (ref 3.8–10.8)

## 2025-03-07 ENCOUNTER — APPOINTMENT (OUTPATIENT)
Dept: PHARMACY | Facility: HOSPITAL | Age: 80
End: 2025-03-07
Payer: MEDICARE

## 2025-03-27 ENCOUNTER — APPOINTMENT (OUTPATIENT)
Dept: OTOLARYNGOLOGY | Facility: CLINIC | Age: 80
End: 2025-03-27
Payer: MEDICARE

## 2025-04-03 ENCOUNTER — APPOINTMENT (OUTPATIENT)
Dept: OTOLARYNGOLOGY | Facility: CLINIC | Age: 80
End: 2025-04-03
Payer: MEDICARE

## 2025-04-07 ENCOUNTER — TELEPHONE (OUTPATIENT)
Dept: CARDIOLOGY | Facility: HOSPITAL | Age: 80
End: 2025-04-07
Payer: MEDICARE

## 2025-04-07 DIAGNOSIS — I65.23 BILATERAL CAROTID ARTERY OCCLUSION: ICD-10-CM

## 2025-04-07 DIAGNOSIS — E78.00 HYPERCHOLESTEROLEMIA: ICD-10-CM

## 2025-04-07 RX ORDER — EZETIMIBE 10 MG/1
10 TABLET ORAL NIGHTLY
Qty: 90 TABLET | Refills: 3 | Status: SHIPPED | OUTPATIENT
Start: 2025-04-07 | End: 2026-04-07

## 2025-04-16 DIAGNOSIS — I10 PRIMARY HYPERTENSION: ICD-10-CM

## 2025-04-17 RX ORDER — TRIAMTERENE/HYDROCHLOROTHIAZID 37.5-25 MG
1 TABLET ORAL DAILY
Qty: 90 TABLET | Refills: 1 | Status: SHIPPED | OUTPATIENT
Start: 2025-04-17

## 2025-04-17 RX ORDER — AMLODIPINE BESYLATE 5 MG/1
5 TABLET ORAL DAILY
Qty: 90 TABLET | Refills: 1 | Status: SHIPPED | OUTPATIENT
Start: 2025-04-17

## 2025-04-23 ENCOUNTER — APPOINTMENT (OUTPATIENT)
Dept: DERMATOLOGY | Facility: CLINIC | Age: 80
End: 2025-04-23
Payer: MEDICARE

## 2025-04-23 DIAGNOSIS — L30.8 ASTEATOTIC ECZEMA: ICD-10-CM

## 2025-04-23 DIAGNOSIS — L82.1 SEBORRHEIC KERATOSIS: ICD-10-CM

## 2025-04-23 DIAGNOSIS — L57.8 ACTINIC SKIN DAMAGE: ICD-10-CM

## 2025-04-23 DIAGNOSIS — D22.9 MULTIPLE BENIGN NEVI: Primary | ICD-10-CM

## 2025-04-23 DIAGNOSIS — Z12.83 SCREENING EXAM FOR SKIN CANCER: ICD-10-CM

## 2025-04-23 DIAGNOSIS — L81.4 LENTIGO: ICD-10-CM

## 2025-04-23 PROCEDURE — 1036F TOBACCO NON-USER: CPT | Performed by: DERMATOLOGY

## 2025-04-23 PROCEDURE — 1157F ADVNC CARE PLAN IN RCRD: CPT | Performed by: DERMATOLOGY

## 2025-04-23 PROCEDURE — 1159F MED LIST DOCD IN RCRD: CPT | Performed by: DERMATOLOGY

## 2025-04-23 PROCEDURE — 99214 OFFICE O/P EST MOD 30 MIN: CPT | Performed by: DERMATOLOGY

## 2025-04-23 PROCEDURE — 1123F ACP DISCUSS/DSCN MKR DOCD: CPT | Performed by: DERMATOLOGY

## 2025-04-23 PROCEDURE — 1160F RVW MEDS BY RX/DR IN RCRD: CPT | Performed by: DERMATOLOGY

## 2025-04-23 RX ORDER — TRIAMCINOLONE ACETONIDE 1 MG/G
CREAM TOPICAL
Qty: 80 G | Refills: 3 | Status: SHIPPED | OUTPATIENT
Start: 2025-04-23

## 2025-04-23 ASSESSMENT — DERMATOLOGY PATIENT ASSESSMENT
DO YOU USE A TANNING BED: NO
HAVE YOU HAD OR DO YOU HAVE VASCULAR DISEASE: YES
DO YOU HAVE IRREGULAR MENSTRUAL CYCLES: NO
DO YOU HAVE ANY NEW OR CHANGING LESIONS: NO
DO YOU USE SUNSCREEN: OCCASIONALLY
ARE YOU TRYING TO GET PREGNANT: NO
ARE YOU AN ORGAN TRANSPLANT RECIPIENT: NO
ARE YOU ON BIRTH CONTROL: NO
HAVE YOU HAD OR DO YOU HAVE A STAPH INFECTION: NO

## 2025-04-23 ASSESSMENT — ITCH NUMERIC RATING SCALE: HOW SEVERE IS YOUR ITCHING?: 0

## 2025-04-23 ASSESSMENT — DERMATOLOGY QUALITY OF LIFE (QOL) ASSESSMENT
ARE THERE EXCLUSIONS OR EXCEPTIONS FOR THE QUALITY OF LIFE ASSESSMENT: NO
RATE HOW BOTHERED YOU ARE BY EFFECTS OF YOUR SKIN PROBLEMS ON YOUR ACTIVITIES (EG, GOING OUT, ACCOMPLISHING WHAT YOU WANT, WORK ACTIVITIES OR YOUR RELATIONSHIPS WITH OTHERS): 0 - NEVER BOTHERED
RATE HOW BOTHERED YOU ARE BY SYMPTOMS OF YOUR SKIN PROBLEM (EG, ITCHING, STINGING BURNING, HURTING OR SKIN IRRITATION): 0 - NEVER BOTHERED
DATE THE QUALITY-OF-LIFE ASSESSMENT WAS COMPLETED: 67318
RATE HOW EMOTIONALLY BOTHERED YOU ARE BY YOUR SKIN PROBLEM (FOR EXAMPLE, WORRY, EMBARRASSMENT, FRUSTRATION): 0 - NEVER BOTHERED

## 2025-04-23 ASSESSMENT — PATIENT GLOBAL ASSESSMENT (PGA): PATIENT GLOBAL ASSESSMENT: PATIENT GLOBAL ASSESSMENT:  1 - CLEAR

## 2025-04-23 NOTE — Clinical Note
Full body skin exam  -No lesions concerning for malignancy on the remainder the skin exam today   - The ugly duckling sign was discussed. Monitor for any skin lesions that are different in color, shape, or size than others on body  -Sun protection was discussed. Recommend SPF 30+, hats with brims, sun protective clothing, and avoiding sun exposure between 10 AM and 2 PM whenever possible  -Recommend regular skin exams or sooner if new or changing lesions

## 2025-04-23 NOTE — PROGRESS NOTES
Subjective     Lynsey Forman is a 79 y.o. female who presents for the following: Skin Check. Last derm visit 4/23/24 for Full Skin Exam.     Review of Systems:  No other skin or systemic complaints other than what is documented elsewhere in the note.    The following portions of the chart were reviewed this encounter and updated as appropriate:  Tobacco  Allergies  Meds  Problems  Med Hx  Surg Hx         Skin Cancer History  Biopsy Log Book  No skin cancers from Specimen Tracking.    Additional History      Specialty Problems          Dermatology Problems    Eczema    Cyst of skin and subcutaneous tissue        Objective   Well appearing patient in no apparent distress; mood and affect are within normal limits.    A full examination was performed including scalp, head, eyes, ears, nose, lips, neck, chest, axillae, abdomen, back, buttocks, bilateral upper extremities, bilateral lower extremities, hands, feet, fingers, toes, fingernails, and toenails. All findings within normal limits unless otherwise noted below.    Assessment/Plan   Skin Exam  1. MULTIPLE BENIGN NEVI  Generalized  Brown and tan macules and papules with reassuring findings on dermoscopy  -These lesions have benign, reassuring patterns on dermoscopy  -Recommend continued self observation, and to contact the office if any changes in nevi are noticed  2. SCREENING EXAM FOR SKIN CANCER  Generalized  Full body skin exam  -No lesions concerning for malignancy on the remainder the skin exam today   - The ugly duckling sign was discussed. Monitor for any skin lesions that are different in color, shape, or size than others on body  -Sun protection was discussed. Recommend SPF 30+, hats with brims, sun protective clothing, and avoiding sun exposure between 10 AM and 2 PM whenever possible  -Recommend regular skin exams or sooner if new or changing lesions     Related Procedures  Follow Up In Dermatology - Established Patient  Follow Up In Dermatology -  Established Patient  3. LENTIGO  Generalized  Tan macules  -Benign appearing on exam  -Reassurance, recommend observation  4. SEBORRHEIC KERATOSIS  Generalized  Stuck on, waxy macule(s)/papule(s)/plaque(s) with comedo-like openings and milia like cysts  -Discussed the nature of the diagnosis  -Reassurance, recommend continued observation  5. ACTINIC SKIN DAMAGE  Generalized  Background of photodamage with hyper- and hypo-pigmented macules on the skin  6. ASTEATOTIC ECZEMA  Left Lower Leg - Anterior, Right Forearm - Posterior, Right Lower Leg - Anterior  A few areas of cracked dry riverbed appearance    Related Medications  triamcinolone (Kenalog) 0.1 % cream  Apply to affected areas of eczema on arms and legs twice daily when active as needed. Use less than 14 days per month.    Follow up 1-2 years Full Skin Exam     She is planning to move to [place] but wants to keep care at . We can alternate in future if drive is too long

## 2025-04-23 NOTE — Clinical Note
-These lesions have benign, reassuring patterns on dermoscopy  -Recommend continued self observation, and to contact the office if any changes in nevi are noticed

## 2025-04-28 ENCOUNTER — APPOINTMENT (OUTPATIENT)
Dept: PRIMARY CARE | Facility: CLINIC | Age: 80
End: 2025-04-28
Payer: MEDICARE

## 2025-04-28 VITALS
RESPIRATION RATE: 16 BRPM | BODY MASS INDEX: 19.61 KG/M2 | WEIGHT: 110.67 LBS | HEART RATE: 60 BPM | OXYGEN SATURATION: 95 % | SYSTOLIC BLOOD PRESSURE: 99 MMHG | DIASTOLIC BLOOD PRESSURE: 54 MMHG

## 2025-04-28 DIAGNOSIS — K26.9 DUODENAL ULCER: ICD-10-CM

## 2025-04-28 DIAGNOSIS — R63.4 ABNORMAL WEIGHT LOSS: Primary | ICD-10-CM

## 2025-04-28 DIAGNOSIS — I10 PRIMARY HYPERTENSION: ICD-10-CM

## 2025-04-28 DIAGNOSIS — F41.9 ANXIETY: ICD-10-CM

## 2025-04-28 PROCEDURE — G2211 COMPLEX E/M VISIT ADD ON: HCPCS | Performed by: INTERNAL MEDICINE

## 2025-04-28 PROCEDURE — 1036F TOBACCO NON-USER: CPT | Performed by: INTERNAL MEDICINE

## 2025-04-28 PROCEDURE — 1157F ADVNC CARE PLAN IN RCRD: CPT | Performed by: INTERNAL MEDICINE

## 2025-04-28 PROCEDURE — 1125F AMNT PAIN NOTED PAIN PRSNT: CPT | Performed by: INTERNAL MEDICINE

## 2025-04-28 PROCEDURE — 99214 OFFICE O/P EST MOD 30 MIN: CPT | Performed by: INTERNAL MEDICINE

## 2025-04-28 PROCEDURE — 1123F ACP DISCUSS/DSCN MKR DOCD: CPT | Performed by: INTERNAL MEDICINE

## 2025-04-28 PROCEDURE — 3078F DIAST BP <80 MM HG: CPT | Performed by: INTERNAL MEDICINE

## 2025-04-28 PROCEDURE — 1159F MED LIST DOCD IN RCRD: CPT | Performed by: INTERNAL MEDICINE

## 2025-04-28 PROCEDURE — 3074F SYST BP LT 130 MM HG: CPT | Performed by: INTERNAL MEDICINE

## 2025-04-28 RX ORDER — PANTOPRAZOLE SODIUM 40 MG/1
40 TABLET, DELAYED RELEASE ORAL DAILY
Qty: 30 TABLET | Refills: 5 | Status: SHIPPED | OUTPATIENT
Start: 2025-04-28 | End: 2025-04-28

## 2025-04-28 RX ORDER — PANTOPRAZOLE SODIUM 40 MG/1
TABLET, DELAYED RELEASE ORAL
Qty: 90 TABLET | Refills: 1 | Status: SHIPPED | OUTPATIENT
Start: 2025-04-28

## 2025-04-28 RX ORDER — LISINOPRIL 20 MG/1
20 TABLET ORAL DAILY
Qty: 90 TABLET | Refills: 1 | Status: SHIPPED | OUTPATIENT
Start: 2025-04-28 | End: 2025-10-25

## 2025-04-28 RX ORDER — HYDROXYZINE HYDROCHLORIDE 25 MG/1
25 TABLET, FILM COATED ORAL 2 TIMES DAILY PRN
Qty: 180 TABLET | Refills: 1 | Status: SHIPPED | OUTPATIENT
Start: 2025-04-28 | End: 2025-10-25

## 2025-04-28 ASSESSMENT — ENCOUNTER SYMPTOMS
APPETITE CHANGE: 1
UNEXPECTED WEIGHT CHANGE: 1
OCCASIONAL FEELINGS OF UNSTEADINESS: 1
ARTHRALGIAS: 1
DEPRESSION: 1
LOSS OF SENSATION IN FEET: 0

## 2025-04-28 ASSESSMENT — PATIENT HEALTH QUESTIONNAIRE - PHQ9
10. IF YOU CHECKED OFF ANY PROBLEMS, HOW DIFFICULT HAVE THESE PROBLEMS MADE IT FOR YOU TO DO YOUR WORK, TAKE CARE OF THINGS AT HOME, OR GET ALONG WITH OTHER PEOPLE: NOT DIFFICULT AT ALL
5. POOR APPETITE OR OVEREATING: NEARLY EVERY DAY
3. TROUBLE FALLING OR STAYING ASLEEP OR SLEEPING TOO MUCH: NOT AT ALL
9. THOUGHTS THAT YOU WOULD BE BETTER OFF DEAD, OR OF HURTING YOURSELF: NOT AT ALL
6. FEELING BAD ABOUT YOURSELF - OR THAT YOU ARE A FAILURE OR HAVE LET YOURSELF OR YOUR FAMILY DOWN: NOT AT ALL
SUM OF ALL RESPONSES TO PHQ QUESTIONS 1-9: 12
8. MOVING OR SPEAKING SO SLOWLY THAT OTHER PEOPLE COULD HAVE NOTICED. OR THE OPPOSITE, BEING SO FIGETY OR RESTLESS THAT YOU HAVE BEEN MOVING AROUND A LOT MORE THAN USUAL: NOT AT ALL
7. TROUBLE CONCENTRATING ON THINGS, SUCH AS READING THE NEWSPAPER OR WATCHING TELEVISION: NOT AT ALL
SUM OF ALL RESPONSES TO PHQ9 QUESTIONS 1 AND 2: 6
4. FEELING TIRED OR HAVING LITTLE ENERGY: NEARLY EVERY DAY
1. LITTLE INTEREST OR PLEASURE IN DOING THINGS: NEARLY EVERY DAY
2. FEELING DOWN, DEPRESSED OR HOPELESS: NEARLY EVERY DAY

## 2025-04-28 ASSESSMENT — PAIN SCALES - GENERAL: PAINLEVEL_OUTOF10: 4

## 2025-04-28 NOTE — PROGRESS NOTES
Subjective   Patient ID: Lynsey Forman is a 79 y.o. female who presents for Weight Loss, Anorexia, Shoulder Pain (Right), and Arm Pain (Right).    Follow-up visit.  Concerned about weight loss, 10 pounds in 6 months.  She has no appetite.  Denies abdominal pain diarrhea or constipation.  She eats 2 meals a day but very small amount of food, cannot finish the meal. Started to drink 1 Ensure a day.  In February of this year she was admitted to hospital for abdominal pain, has had EGD found to have duodenal ulcers.  Prescribed pantoprazole 40 mg twice a day for 2 months she finished the medication.  She stopped taking NSAIDs,  drinks 2 glasses of wine in the evening.    Shoulder Pain     Arm Pain          Review of Systems   Constitutional:  Positive for appetite change and unexpected weight change.   Musculoskeletal:  Positive for arthralgias.   All other systems reviewed and are negative.      Objective   BP 99/54 (BP Location: Left arm, Patient Position: Sitting)   Pulse 60   Resp 16   Wt 50.2 kg (110 lb 10.7 oz)   SpO2 95%   BMI 19.61 kg/m²     Physical Exam  Constitutional:       Comments: Underweight   HENT:      Head: Normocephalic.   Eyes:      Extraocular Movements: Extraocular movements intact.   Cardiovascular:      Rate and Rhythm: Regular rhythm.   Pulmonary:      Breath sounds: Normal breath sounds.   Abdominal:      Comments: Abdomen soft, BS present, slight tenderness to deep palpation left epigastric area   Musculoskeletal:         General: Normal range of motion.      Cervical back: Normal range of motion.   Skin:     General: Skin is warm.   Neurological:      Mental Status: She is oriented to person, place, and time.         Assessment/Plan   Problem List Items Addressed This Visit           ICD-10-CM    Hypertension I10    Blood pressure on low side, advised to decrease Amlodipine to 2.5 mg daily,   Wants to continue diuretic, without it she tells me develops ankle swelling.  Follow  low-sodium diet do not exceed 200 mg per serving.           Relevant Medications    lisinopril 20 mg tablet    Other Relevant Orders    Comprehensive Metabolic Panel    Abnormal weight loss - Primary R63.4     Weight loss,  loss of appetite.  No other GI symptoms.   Recommend to see gastroenterologist to follow-up for duodenal ulcers, was recommended to have EGD in 3 months.  Resume pantoprazole 40 mg in the morning.  Have blood work.         Relevant Orders    TSH with reflex to Free T4 if abnormal    Amylase    Lipase    Anxiety F41.9    Relevant Medications    hydrOXYzine HCL (Atarax) 25 mg tablet    Duodenal ulcer K26.9    Relevant Medications    pantoprazole (ProtoNix) 40 mg EC tablet    Other Relevant Orders    Referral to Gastroenterology    CBC and Auto Differential

## 2025-04-28 NOTE — ASSESSMENT & PLAN NOTE
Weight loss,  loss of appetite.  No other GI symptoms.   Recommend to see gastroenterologist to follow-up for duodenal ulcers, was recommended to have EGD in 3 months.  Resume pantoprazole 40 mg in the morning.  Have blood work.

## 2025-04-28 NOTE — ASSESSMENT & PLAN NOTE
Blood pressure on low side, advised to decrease Amlodipine to 2.5 mg daily,   Wants to continue diuretic, without it she tells me develops ankle swelling.  Follow low-sodium diet do not exceed 200 mg per serving.

## 2025-04-29 DIAGNOSIS — E87.1 HYPONATREMIA: Primary | ICD-10-CM

## 2025-04-29 LAB
ALBUMIN SERPL-MCNC: 4.4 G/DL (ref 3.6–5.1)
ALP SERPL-CCNC: 55 U/L (ref 37–153)
ALT SERPL-CCNC: 13 U/L (ref 6–29)
AMYLASE SERPL-CCNC: 74 U/L (ref 21–101)
ANION GAP SERPL CALCULATED.4IONS-SCNC: 9 MMOL/L (CALC) (ref 7–17)
AST SERPL-CCNC: 22 U/L (ref 10–35)
BASOPHILS # BLD AUTO: 42 CELLS/UL (ref 0–200)
BASOPHILS NFR BLD AUTO: 0.6 %
BILIRUB SERPL-MCNC: 0.8 MG/DL (ref 0.2–1.2)
BUN SERPL-MCNC: 25 MG/DL (ref 7–25)
CALCIUM SERPL-MCNC: 9 MG/DL (ref 8.6–10.4)
CHLORIDE SERPL-SCNC: 89 MMOL/L (ref 98–110)
CO2 SERPL-SCNC: 27 MMOL/L (ref 20–32)
CREAT SERPL-MCNC: 0.96 MG/DL (ref 0.6–1)
EGFRCR SERPLBLD CKD-EPI 2021: 61 ML/MIN/1.73M2
EOSINOPHIL # BLD AUTO: 224 CELLS/UL (ref 15–500)
EOSINOPHIL NFR BLD AUTO: 3.2 %
ERYTHROCYTE [DISTWIDTH] IN BLOOD BY AUTOMATED COUNT: 13 % (ref 11–15)
GLUCOSE SERPL-MCNC: 75 MG/DL (ref 65–99)
HCT VFR BLD AUTO: 32.8 % (ref 35–45)
HGB BLD-MCNC: 11.1 G/DL (ref 11.7–15.5)
LIPASE SERPL-CCNC: 39 U/L (ref 7–60)
LYMPHOCYTES # BLD AUTO: 1498 CELLS/UL (ref 850–3900)
LYMPHOCYTES NFR BLD AUTO: 21.4 %
MCH RBC QN AUTO: 32.3 PG (ref 27–33)
MCHC RBC AUTO-ENTMCNC: 33.8 G/DL (ref 32–36)
MCV RBC AUTO: 95.3 FL (ref 80–100)
MONOCYTES # BLD AUTO: 616 CELLS/UL (ref 200–950)
MONOCYTES NFR BLD AUTO: 8.8 %
NEUTROPHILS # BLD AUTO: 4620 CELLS/UL (ref 1500–7800)
NEUTROPHILS NFR BLD AUTO: 66 %
PLATELET # BLD AUTO: 233 THOUSAND/UL (ref 140–400)
PMV BLD REES-ECKER: 9.2 FL (ref 7.5–12.5)
POTASSIUM SERPL-SCNC: 4.4 MMOL/L (ref 3.5–5.3)
PROT SERPL-MCNC: 6.8 G/DL (ref 6.1–8.1)
RBC # BLD AUTO: 3.44 MILLION/UL (ref 3.8–5.1)
SODIUM SERPL-SCNC: 125 MMOL/L (ref 135–146)
TSH SERPL-ACNC: 0.76 MIU/L (ref 0.4–4.5)
WBC # BLD AUTO: 7 THOUSAND/UL (ref 3.8–10.8)

## 2025-05-06 LAB
ANION GAP SERPL CALCULATED.4IONS-SCNC: 8 MMOL/L (CALC) (ref 7–17)
BUN SERPL-MCNC: 17 MG/DL (ref 7–25)
BUN/CREAT SERPL: ABNORMAL (CALC) (ref 6–22)
CALCIUM SERPL-MCNC: 9.1 MG/DL (ref 8.6–10.4)
CHLORIDE SERPL-SCNC: 99 MMOL/L (ref 98–110)
CO2 SERPL-SCNC: 27 MMOL/L (ref 20–32)
CREAT SERPL-MCNC: 0.9 MG/DL (ref 0.6–1)
EGFRCR SERPLBLD CKD-EPI 2021: 66 ML/MIN/1.73M2
GLUCOSE SERPL-MCNC: 198 MG/DL (ref 65–99)
POTASSIUM SERPL-SCNC: 4.2 MMOL/L (ref 3.5–5.3)
SODIUM SERPL-SCNC: 134 MMOL/L (ref 135–146)

## 2025-05-14 ENCOUNTER — OFFICE VISIT (OUTPATIENT)
Dept: CARDIOLOGY | Facility: HOSPITAL | Age: 80
End: 2025-05-14
Payer: MEDICARE

## 2025-05-14 VITALS
DIASTOLIC BLOOD PRESSURE: 80 MMHG | BODY MASS INDEX: 20.3 KG/M2 | HEIGHT: 63 IN | RESPIRATION RATE: 16 BRPM | OXYGEN SATURATION: 97 % | HEART RATE: 70 BPM | SYSTOLIC BLOOD PRESSURE: 156 MMHG | WEIGHT: 114.6 LBS

## 2025-05-14 DIAGNOSIS — I48.91 ATRIAL FIBRILLATION, UNSPECIFIED TYPE (MULTI): ICD-10-CM

## 2025-05-14 DIAGNOSIS — I65.23 CAROTID STENOSIS, BILATERAL: ICD-10-CM

## 2025-05-14 DIAGNOSIS — I25.10 ARTERIOSCLEROSIS OF CORONARY ARTERY: ICD-10-CM

## 2025-05-14 DIAGNOSIS — I10 PRIMARY HYPERTENSION: ICD-10-CM

## 2025-05-14 DIAGNOSIS — I48.92 ATRIAL FLUTTER, UNSPECIFIED TYPE (MULTI): Primary | ICD-10-CM

## 2025-05-14 DIAGNOSIS — E78.00 HYPERCHOLESTEROLEMIA: ICD-10-CM

## 2025-05-14 PROCEDURE — 1036F TOBACCO NON-USER: CPT | Performed by: INTERNAL MEDICINE

## 2025-05-14 PROCEDURE — 1160F RVW MEDS BY RX/DR IN RCRD: CPT | Performed by: INTERNAL MEDICINE

## 2025-05-14 PROCEDURE — 1159F MED LIST DOCD IN RCRD: CPT | Performed by: INTERNAL MEDICINE

## 2025-05-14 PROCEDURE — 99212 OFFICE O/P EST SF 10 MIN: CPT | Performed by: INTERNAL MEDICINE

## 2025-05-14 PROCEDURE — 3079F DIAST BP 80-89 MM HG: CPT | Performed by: INTERNAL MEDICINE

## 2025-05-14 PROCEDURE — 93005 ELECTROCARDIOGRAM TRACING: CPT | Performed by: INTERNAL MEDICINE

## 2025-05-14 PROCEDURE — 99214 OFFICE O/P EST MOD 30 MIN: CPT | Performed by: INTERNAL MEDICINE

## 2025-05-14 PROCEDURE — 3077F SYST BP >= 140 MM HG: CPT | Performed by: INTERNAL MEDICINE

## 2025-05-14 PROCEDURE — G2211 COMPLEX E/M VISIT ADD ON: HCPCS | Performed by: INTERNAL MEDICINE

## 2025-05-14 RX ORDER — FUROSEMIDE 20 MG/1
20 TABLET ORAL DAILY
Qty: 90 TABLET | Refills: 3 | Status: SHIPPED | OUTPATIENT
Start: 2025-05-14 | End: 2026-05-14

## 2025-05-14 RX ORDER — LISINOPRIL 20 MG/1
20 TABLET ORAL DAILY
Start: 2025-05-14 | End: 2026-05-14

## 2025-05-14 NOTE — PROGRESS NOTES
Primary Care Physician: Windy Bartholomew MD  Date of Visit: 05/14/2025  3:00 PM EDT  Location of visit: [facility]     Chief Complaint:   Chief Complaint   Patient presents with    Follow-up        HPI / Summary:   Lynsey Forman is a 79 y.o. female who presents for followup.  She has no cardiac complaints.  She does complain of decreased appetite and intermittent nausea after eating meals.  She was seen by her primary physician 2 weeks ago.  Her triamterene/hydrochlorothiazide was stopped due to low sodium level and mildly reduced blood pressure with a systolic blood pressure of 99.  She then developed increasing lower extremity edema after stopping the medication.  She restarted it 2 days ago with improvement in her lower extremity edema.  She is able to walk up stairs without chest pain or shortness of breath.  The patient denies chest pain, shortness of breath, palpitations, lightheadedness, syncope, orthopnea, paroxysmal nocturnal dyspnea, or bleeding problems.    She was hospitalized in January with duodenitis and peptic ulcer disease.  She underwent endoscopy that revealed gastric ulcers that were clipped.      Medical History[1]     Surgical History[2]       Social History:   reports that she quit smoking about 16 months ago. Her smoking use included cigarettes. She started smoking about 31 years ago. She has a 7.5 pack-year smoking history. She has never used smokeless tobacco. She reports current alcohol use of about 4.0 standard drinks of alcohol per week. She reports current drug use. Frequency: 7.00 times per week. Drug: Marijuana.     Family History:  family history includes Dementia in her father and mother; Heart disease in her brother; Heart failure in her mother; Hypertension in her mother; No Known Problems in her brother, brother, sister, and sister; back pain in her brother.      Allergies:  RX Allergies[3]    Outpatient Medications:  Current Outpatient Medications   Medication  "Instructions    acetaminophen (TYLENOL) 650 mg, oral, Every 6 hours PRN    alum-mag hydroxide-simeth (Maalox MAX) 400-400-40 mg/5 mL suspension Take 10 mL by mouth every 4 hours if needed for indigestion or heartburn.    amLODIPine (NORVASC) 5 mg, oral, Daily    aspirin 81 mg, oral, Daily    atorvastatin (LIPITOR) 80 mg, oral, Daily    ezetimibe (ZETIA) 10 mg, oral, Nightly    gabapentin (NEURONTIN) 300 mg, oral, 3 times daily    hydrOXYzine HCL (ATARAX) 25 mg, oral, 2 times daily PRN    lisinopril 20 mg, oral, Daily    pantoprazole (ProtoNix) 40 mg EC tablet TAKE 1 TABLET(40 MG) BY MOUTH DAILY. DO NOT CRUSH, CHEW, OR SPLIT    triamcinolone (Kenalog) 0.1 % cream Apply to affected areas of eczema on arms and legs twice daily when active as needed. Use less than 14 days per month.    triamterene-hydrochlorothiazid (Maxzide-25) 37.5-25 mg tablet 1 tablet, oral, Daily    Xarelto 15 mg, oral, Daily with evening meal       Physical Exam:  Vitals:    05/14/25 1442   BP: 156/80   BP Location: Left arm   Patient Position: Sitting   BP Cuff Size: Adult   Pulse: 70   Resp: 16   SpO2: 97%   Weight: 52 kg (114 lb 9.6 oz)   Height: 1.6 m (5' 3\")     Wt Readings from Last 5 Encounters:   05/14/25 52 kg (114 lb 9.6 oz)   04/28/25 50.2 kg (110 lb 10.7 oz)   02/10/25 49.9 kg (110 lb 0.2 oz)   02/10/25 52.8 kg (116 lb 6.5 oz)   01/25/25 49.9 kg (110 lb)     Body mass index is 20.3 kg/m².   General: in no acute distress  HEENT: Normocephalic atraumatic  Neck: Supple, JVP is normal negative hepatojugular reflux 2+ carotid pulses right carotid bruit  Pulmonary: Normal respiratory effort, clear to auscultation  Cardiovascular: No right ventricular heave, normal S1 and S2, 1 out of 6 early peaking systolic ejection murmur no rubs or gallops  Abdomen: Soft nontender nondistended  Extremities: Warm trace bilateral ankle edema 2+ radial pulses bilaterally   Neurologic: Alert and oriented x3  Psychiatric: Normal mood and affect     Last " Labs:  CMP:  Recent Labs     05/05/25  1611 04/28/25  1600 02/27/25  0911   * 125* 133*   K 4.2 4.4 4.6   CL 99 89* 94*   CO2 27 27 30   ANIONGAP 8 9 9   BUN 17 25 19   CREATININE 0.90 0.96 1.01*   EGFR 66 61 57*   GLUCOSE 198* 75 83     Recent Labs     04/28/25  1600 02/27/25  0911 01/25/25  1840   ALBUMIN 4.4 4.6 4.4   ALKPHOS 55 64 58   ALT 13 11 12   AST 22 19 20   BILITOT 0.8 0.5 1.1     CBC:  Recent Labs     04/28/25  1600 02/27/25  0911 01/28/25  0643   WBC 7.0 7.1 7.2   HGB 11.1* 12.2 11.2*   HCT 32.8* 36.4 33.6*    206 179   MCV 95.3 96.8 95     COAG:   Recent Labs     12/13/24  1438 04/29/24  1439   INR 2.1* 1.3*     HEME/ENDO:  Recent Labs     04/28/25  1600 02/06/23  1116 10/31/22  1243   TSH 0.76 1.57 2.10      CARDIAC:   Recent Labs     01/27/25  0547 01/27/25  0545 01/26/25  1857 01/26/25  1252 01/26/25  0440   TROPHS 233* 236* 409*   < > 219*   BNP  --   --   --   --  796*    < > = values in this interval not displayed.     Recent Labs     02/27/25  0911 07/03/24  1559 03/21/24  1410 12/11/23  1136 10/31/22  1243   CHOL 169 158 182   < > 213*   LDLF  --   --   --   --  86   LDLCALC 43 51 61   < >  --     93.1 108.7   < > 105.9   TRIG 80 72 60   < > 106    < > = values in this interval not displayed.       Last Cardiology Tests:  ECG:  I reviewed electrocardiogram performed today that showed ectopic atrial rhythm with PACs also prior septal infarct pattern nonspecific ST abnormality.    Echo:  Echocardiogram January 28, 2025  CONCLUSIONS:  1. Left ventricular ejection fraction is normal, calculated by Zazueta's biplane at 69%.  2. Spectral Doppler shows a Grade III (restrictive pattern) of left ventricular diastolic filling with an elevated left atrial pressure.  3. Left ventricular cavity size is moderately dilated.  4. There is normal right ventricular global systolic function.  5. Moderately enlarged right ventricle.  6. Mildly elevated right ventricular systolic pressure.  7.  The left atrial size is severely dilated.  8. The right atrium is moderately dilated.  9. Compared with study dated 2/16/2023, the LV is now moderately dilated. The diastolic function was previosuly indeterminate now is restrictive due to presence of LVH, dilated LA, increased TR velocity and E/A ratio >2. On direct comparison, the RV was previously mildly dilated on prior study now is moderatelly dilated.    EEchocardiogram 2/16/23   CONCLUSIONS:  1. Left ventricular systolic function is normal with a 60-65% estimated ejection fraction.  2. RVSP within normal limits.  3. Mildly dilated atria.    Cath:      Stress Test:  Cardiac MRI stress November 2023  Impression   1. No evidence of inducible myocardial ischemia with regadenoson  stress perfusion imaging.  2. Normal LV size and hyperdynamic systolic function. Quantitative  LVEF 75%.  3. Trivial MR (RF 10%), trivial AI (RF 7%).  4. Mild biatrial enlargement.  5. Normal RV size and systolic function. Quantitative RVEF 60%,            Cardiac Imaging:  CT lung screening February 7, 2025  HEART AND VESSELS:  The thoracic aorta normal in course and caliber.There is moderate  calcified atherosclerosis present. Main pulmonary artery is again  mildly dilated measuring 3.1 cm; correlate with concern for pulmonary  hypertension.. Severe coronary artery calcifications, with underlying  stents not excluded. The cardiac chambers are not enlarged.  There is no pericardial effusion seen.  IMPRESSION:  1. Multiple small stable bilateral noncalcified pulmonary nodules  measuring up to 5 mm, likely benign. Continued screening with  low-dose noncontrast chest CT in 12 months (from current date) is  recommended.  2. Similar mild upper lung predominant centrilobular and paraseptal  emphysema with findings suggestive of respiratory bronchiolitis.  3. Redemonstrated severe coronary artery calcifications, with  underlying stents not excluded. Please note that, the present study  is not  tailored for evaluation of coronary arteries.    CT angio of the head and neck December 24, 2024  IMPRESSION:  1. Patient is status post left-sided carotid endarterectomy. There is  some fluid tracking along the carotid sheath in the adjacent fat  planes and a small amount of gas in the surrounding soft tissues. It  is unclear whether this is a normal postoperative appearance or may  represent a postoperative hematoma or infectious process. The  endarterectomized vessel is widely patent without evidence of  dissection, pseudoaneurysm or active extravasation into the  surrounding tissues.  2. Bulky atherosclerotic calcification at the right carotid  bifurcation extending into the proximal right internal carotid artery  with clearly severe segmental stenosis as a result. The degree of  luminal stenosis is difficult to measure because of the artifact  generated by the calcium.  3. Approximately 4 mm saccular aneurysm at the right MCA bifurcation.  4. A beaded appearance of the left vertebral artery V3 segment  without clear evidence for dissection. This may represent connective  tissue disorder such as fibromuscular dysplasia. This is similar to  the prior study.  5. Shallow CSF density fusiform prominence of the subdural spaces  over the bilateral frontal convexities. These are similar to the CTA  neck from February 20, 2024, chronic subdural hematomas versus  hygromas.  6. Moderate to advanced multilevel degenerative cervical spondylosis.  7. Chronic appearing bilateral maxillary sinusitis.    Carotid ultrasound December 24, 2024  CONCLUSIONS:  Incidental finding: Possible right thyroid nodules on limited images obtained.     Right Carotid: Findings are consistent with greater than 70% stenosis of the right proximal internal carotid artery. Post stenotic turbulent flow seen by color Doppler. Tortuous distal internal carotid artery noted. Right external carotid artery appears patent with no evidence of stenosis. The  right vertebral artery is patent with antegrade flow. No evidence of hemodynamically significant stenosis in the right subclavian artery.     Left Carotid: Findings are consistent with less than 50% stenosis of the left proximal internal carotid artery. Turbulent flow seen by color Doppler. Tortuous distal internal carotid artery noted. Left external carotid artery appears patent with no evidence of stenosis. Hematoma noted surrounding the left comon cartid artery. The left vertebral artery is patent with antegrade flow. No evidence of hemodynamically significant stenosis in the left subclavian artery.     Endarterectomy: Left endarterectomy patch appears patent measuring 1.0 cm. Wall thickening noted near the endarterectomy patch site. Linear mobile structure are noted in the patch site and immediately distal to the patch. This may be indicative of linear plaque versus dissection versus visible stitches. May wish further means of evaluation.    CT cardiac scoring April 24, 2023  FINDINGS:   The score and distribution of calcium in the coronary arteries is as   follows:      LM           0   LAD           464.12   LCx           300.73   RCA           966.14      Total 1730.99       Assessment/Plan   Diagnoses and all orders for this visit:  Atrial flutter, unspecified type (Multi)  -     ECG 12 lead (Clinic Performed)  Primary hypertension  -     furosemide (Lasix) 20 mg tablet; Take 1 tablet (20 mg) by mouth once daily.  -     Basic metabolic panel; Future  -     lisinopril 20 mg tablet; Take 1 tablet (20 mg) by mouth once daily.  Hypercholesterolemia  Carotid stenosis, bilateral  Arteriosclerosis of coronary artery  Atrial fibrillation, unspecified type (Multi)    In summary Ms. Forman is a pleasant 79-year-old white female with a past medical history significant for coronary artery disease with a CT calcium score 1730 with preserved LV function, hypertension, hyperlipidemia, persistent atrial flutter status post  ablation, persistent atrial fibrillation status post ablation, stage III chronic kidney disease, carotid artery disease status post left carotid endarterectomy, MCA aneurysm, and prior tobacco use.  She is asymptomatic from a cardiac perspective.  She appears euvolemic on exam.  She did note increasing lower extremity edema when she stopped triamterene/hydrochlorothiazide.  I did discontinue triamterene/hydrochlorothiazide due to her hyponatremia.  I started furosemide 20 mg daily.  Will repeat a BMP in 1 week.  Her blood pressure is elevated today but it was low 2 weeks ago at her appointment.  I asked her to monitor her blood pressure at home.  I advised her to follow-up with her primary physician and gastroenterology.  She will also follow-up with vascular neurosurgery as scheduled.  I did discuss the possibility of a Watchman with her and she will think about this.  She should continue her other cardiovascular medications.  We will see her back in follow-up in 2 months.        Orders:  Orders Placed This Encounter   Procedures    Basic metabolic panel    ECG 12 lead (Clinic Performed)      Followup Appts:  Future Appointments   Date Time Provider Department Center   6/23/2025  1:30 PM U VASC 2 [facility]   6/23/2025  2:40 PM Shaquille Blackman MD PhD [id number] [facility]   6/26/2025  2:30 PM Marvel Song MD [id number] [facility]   8/12/2025 11:40 AM Gerson Martinez, PharmD [id number] [facility]   8/13/2025  1:00 PM Windy Bartholomew MD [id number] [facility]   4/22/2026  1:45 PM Lula Chacon MD [id number] [facility]           ____________________________________________________________  Shaquille Mcdonald MD  [facility]         [1]   Past Medical History:  Diagnosis Date    Abnormal kidney function 01/08/2024    BUN-24, GFR-56 (4/29/24)    Alcohol use, unspecified, uncomplicated 12/20/2022    Anxiety     Atrial fibrillation (Multi)     no reccurance s/p ablation 4/2023 and 7/2023     Atrial flutter (Multi)     Carotid stenosis, asymptomatic, bilateral     on ASA and statin    Cerebral aneurysm (HHS-HCC)     Right MCA    Chronic cough 11/24/2009    Formatting of this note might be different from the original. Overview: No change with stopping lisinopril, no change with Advair, consensus of ENT, pulmonary, here is LP reflux, has associated past-nasal drip that does not respond to Mucinex or Flonase, 2012 has large nasal polyp L Formatting of this note might be different from the original. No change with stopping lisinopril, no change with Adv    Chronic pain disorder     CKD (chronic kidney disease)     stage III    Coronary artery disease     Diverticulosis 12/21/2016    Dysphonia     Easy bruising     Elevated coronary artery calcium score     CT calcium score 1730    GERD (gastroesophageal reflux disease)     Hyperlipidemia     Hypertension     Lumbar disc disease     Moriah's edema of vocal folds     s/p Moriah's edema surgery performed on 08/09/23.    Sinus bradycardia 01/04/2013    Formatting of this note might be different from the original. Overview: Asymptomatic, noticed on exam Formatting of this note might be different from the original. Asymptomatic, noticed on exam    Tachycardia 01/08/2024    Tobacco use disorder, mild, in early remission     Quit 1/1/2024, 1/4 PPD x 20 years    Vocal cord polyp    [2]   Past Surgical History:  Procedure Laterality Date    ANTERIOR CRUCIATE LIGAMENT REPAIR Left     CARDIAC ELECTROPHYSIOLOGY STUDY AND ABLATION      4/2023, 7/2023    CAROTID ENDARTERECTOMY Left 12/19/2024    CERVICAL LAMINECTOMY  2017    Cervical surgery    COLONOSCOPY      COSMETIC SURGERY  2018    face lift    LIPOMA RESECTION  06/18/2024    SKIN, LEFT UPPER ARM, EXCISION: --EPIDERMAL INCLUSION CYST, RUPTURED AND INFLAMED    LUMBAR LAMINECTOMY  05/2024    L4-5    MOUTH SURGERY  2018    Oral surgery-peridontal    TUBAL LIGATION      VOCAL FOLD LESION EXCISION  08/09/2023   [3]    Allergies  Allergen Reactions    Codeine Anxiety, Itching, Unknown and Other     Denies itching, hive, shortness of breath

## 2025-05-14 NOTE — PATIENT INSTRUCTIONS
Stop triamterene/hydrochlorothiazide.  Start furosemide 20 mg daily.  Check a BMP in 1 week.  Monitor your blood pressure at home.  Call the office if your blood pressure is consistently more than 130/80.  Follow-up with your primary physician and gastroenterology.  Follow-up in 2 months.

## 2025-05-15 LAB
ATRIAL RATE: 70 BPM
P AXIS: -72 DEGREES
P OFFSET: 219 MS
P ONSET: 180 MS
PR INTERVAL: 104 MS
Q ONSET: 232 MS
QRS COUNT: 11 BEATS
QRS DURATION: 72 MS
QT INTERVAL: 396 MS
QTC CALCULATION(BAZETT): 427 MS
QTC FREDERICIA: 417 MS
R AXIS: 57 DEGREES
T AXIS: -26 DEGREES
T OFFSET: 430 MS
VENTRICULAR RATE: 70 BPM

## 2025-05-21 ENCOUNTER — TELEPHONE (OUTPATIENT)
Dept: CARDIOLOGY | Facility: HOSPITAL | Age: 80
End: 2025-05-21
Payer: MEDICARE

## 2025-05-21 DIAGNOSIS — I10 PRIMARY HYPERTENSION: ICD-10-CM

## 2025-05-21 DIAGNOSIS — I48.91 ATRIAL FIBRILLATION, UNSPECIFIED TYPE (MULTI): Primary | ICD-10-CM

## 2025-05-21 PROCEDURE — RXMED WILLOW AMBULATORY MEDICATION CHARGE

## 2025-05-21 NOTE — TELEPHONE ENCOUNTER
Patient was here last week for an apointment with Dr. Mcdonald. He discussed a WATCHMAN referral and she was going to think about it. She called saying she would like to get that referral placed so she can meet with the doctor about that procedure.     Thank you.  
Take 40 mg by mouth every morning (before breakfast)           STOP taking these previous medications     Medication Dose Reason for Stopping Comments   (STOP TAKING) norethindrone (AYGESTIN) 5 MG tablet

## 2025-05-22 ENCOUNTER — PHARMACY VISIT (OUTPATIENT)
Dept: PHARMACY | Facility: CLINIC | Age: 80
End: 2025-05-22
Payer: COMMERCIAL

## 2025-05-23 LAB
ANION GAP SERPL CALCULATED.4IONS-SCNC: 12 MMOL/L (CALC) (ref 7–17)
BUN SERPL-MCNC: 15 MG/DL (ref 7–25)
BUN/CREAT SERPL: ABNORMAL (CALC) (ref 6–22)
CALCIUM SERPL-MCNC: 9.3 MG/DL (ref 8.6–10.4)
CHLORIDE SERPL-SCNC: 95 MMOL/L (ref 98–110)
CO2 SERPL-SCNC: 29 MMOL/L (ref 20–32)
CREAT SERPL-MCNC: 0.94 MG/DL (ref 0.6–1)
EGFRCR SERPLBLD CKD-EPI 2021: 62 ML/MIN/1.73M2
GLUCOSE SERPL-MCNC: 92 MG/DL (ref 65–99)
POTASSIUM SERPL-SCNC: 4 MMOL/L (ref 3.5–5.3)
SODIUM SERPL-SCNC: 136 MMOL/L (ref 135–146)

## 2025-06-23 ENCOUNTER — OFFICE VISIT (OUTPATIENT)
Dept: VASCULAR SURGERY | Facility: HOSPITAL | Age: 80
End: 2025-06-23
Payer: MEDICARE

## 2025-06-23 ENCOUNTER — HOSPITAL ENCOUNTER (OUTPATIENT)
Dept: VASCULAR MEDICINE | Facility: HOSPITAL | Age: 80
Discharge: HOME | End: 2025-06-23
Payer: MEDICARE

## 2025-06-23 VITALS
WEIGHT: 108.8 LBS | BODY MASS INDEX: 19.28 KG/M2 | HEART RATE: 53 BPM | HEIGHT: 63 IN | SYSTOLIC BLOOD PRESSURE: 131 MMHG | DIASTOLIC BLOOD PRESSURE: 68 MMHG

## 2025-06-23 DIAGNOSIS — I65.23 BILATERAL CAROTID ARTERY STENOSIS: ICD-10-CM

## 2025-06-23 DIAGNOSIS — I65.21 CAROTID STENOSIS, ASYMPTOMATIC, RIGHT: Primary | ICD-10-CM

## 2025-06-23 DIAGNOSIS — I65.23 CAROTID STENOSIS, ASYMPTOMATIC, BILATERAL: ICD-10-CM

## 2025-06-23 PROCEDURE — 99214 OFFICE O/P EST MOD 30 MIN: CPT | Mod: 25 | Performed by: SURGERY

## 2025-06-23 PROCEDURE — 1159F MED LIST DOCD IN RCRD: CPT | Performed by: SURGERY

## 2025-06-23 PROCEDURE — 93880 EXTRACRANIAL BILAT STUDY: CPT | Performed by: SURGERY

## 2025-06-23 PROCEDURE — 3074F SYST BP LT 130 MM HG: CPT | Performed by: SURGERY

## 2025-06-23 PROCEDURE — 99214 OFFICE O/P EST MOD 30 MIN: CPT | Performed by: SURGERY

## 2025-06-23 PROCEDURE — 3078F DIAST BP <80 MM HG: CPT | Performed by: SURGERY

## 2025-06-23 PROCEDURE — 93880 EXTRACRANIAL BILAT STUDY: CPT

## 2025-06-23 NOTE — PROGRESS NOTES
Vascular Surgery Consult/Clinic Note    CC: Follow up    HPI:  Lynsey Forman is 79 y.o. female with B ICA stenosis where she was undergoing a preoperative work-up for a lumbar spinal surgery and got a carotid duplex done during that evaluation.   She underwent left carotid endarterectomy on 12/19/24.   Pt denies any complaints.       Meds:   Medications Ordered Prior to Encounter[1]     Allergies:   RX Allergies[2]    SH:    Social Drivers of Health     Tobacco Use: Medium Risk (5/14/2025)    Patient History     Smoking Tobacco Use: Former     Smokeless Tobacco Use: Never     Passive Exposure: Not on file   Alcohol Use: Not At Risk (1/26/2025)    AUDIT-C     Frequency of Alcohol Consumption: 4 or more times a week     Average Number of Drinks: 1 or 2     Frequency of Binge Drinking: Never   Recent Concern: Alcohol Use - Alcohol Misuse (12/20/2024)    AUDIT-C     Frequency of Alcohol Consumption: 2-3 times a week     Average Number of Drinks: 7 to 9     Frequency of Binge Drinking: Weekly   Financial Resource Strain: Low Risk  (1/27/2025)    Overall Financial Resource Strain (CARDIA)     Difficulty of Paying Living Expenses: Not hard at all   Food Insecurity: No Food Insecurity (1/26/2025)    Hunger Vital Sign     Worried About Running Out of Food in the Last Year: Never true     Ran Out of Food in the Last Year: Never true   Transportation Needs: No Transportation Needs (1/27/2025)    PRAPARE - Transportation     Lack of Transportation (Medical): No     Lack of Transportation (Non-Medical): No   Physical Activity: Inactive (1/26/2025)    Exercise Vital Sign     Days of Exercise per Week: 0 days     Minutes of Exercise per Session: 0 min   Stress: Not on file   Social Connections: Not on file   Intimate Partner Violence: Not At Risk (1/26/2025)    Humiliation, Afraid, Rape, and Kick questionnaire     Fear of Current or Ex-Partner: No     Emotionally Abused: No     Physically Abused: No     Sexually Abused: No    Depression: At risk (4/28/2025)    PHQ-2     PHQ-2 Score: 6   Housing Stability: Low Risk  (1/27/2025)    Housing Stability Vital Sign     Unable to Pay for Housing in the Last Year: No     Number of Times Moved in the Last Year: 0     Homeless in the Last Year: No   Utilities: Not At Risk (1/26/2025)    [facility] Utilities     Threatened with loss of utilities: No   Digital Equity: Not on file   Health Literacy: Not on file        FH:  Family History[3]         Objective:  Vitals:  Vitals:    06/23/25 1422   BP: 131/68   Pulse: 53        Exam:  Gen: in NAD  GI: Soft, ND/NT  Ext:  BUE and BLE with 5/5 motor str.     Imaging:  Carotid duplex (6/23/2025) independently reviewed.   R ICA with >70% stenosis.   L ICA patent without flow limiting stenosis.     Carotid duplex (12/24/2024) independently reviewed.   R ICA with >70% stenosis.   L ICA patent without flow limiting stenosis.     DAVID (8/21/2024) independently reviewed.   R 1.05, L 1.05      Assessment & Plan:  Lynsey Forman is 79 y.o. female with history of L ICA stenosis (for B high grade ICA stenosis).    - Cont. ASA and statin therapy.    - Cont. BMT for R ICA stenosis (Asx). L ICA widely patent after CEA.     Shaquille Blackman MD, PhD  Clinical   [facility] [employer]  Co-Director, [facility] Center  [facility] [place]             [1]   Current Outpatient Medications on File Prior to Visit   Medication Sig Dispense Refill    acetaminophen (Tylenol) 325 mg tablet Take 2 tablets (650 mg) by mouth every 6 hours if needed for mild pain (1 - 3) or moderate pain (4 - 6).      amLODIPine (Norvasc) 5 mg tablet TAKE 1 TABLET BY MOUTH DAILY 90 tablet 1    aspirin 81 mg EC tablet Take 1 tablet (81 mg) by mouth once daily. 90 tablet 3    atorvastatin (Lipitor) 80 mg tablet Take 1 tablet (80 mg) by mouth once daily. 90 tablet 3    ezetimibe (Zetia) 10 mg tablet Take 1 tablet (10 mg) by mouth once  daily at bedtime. 90 tablet 3    furosemide (Lasix) 20 mg tablet Take 1 tablet (20 mg) by mouth once daily. 90 tablet 3    hydrOXYzine HCL (Atarax) 25 mg tablet Take 1 tablet (25 mg) by mouth 2 times a day as needed for anxiety. 180 tablet 1    lisinopril 20 mg tablet Take 1 tablet (20 mg) by mouth once daily.      pantoprazole (ProtoNix) 40 mg EC tablet TAKE 1 TABLET(40 MG) BY MOUTH DAILY. DO NOT CRUSH, CHEW, OR SPLIT 90 tablet 1    rivaroxaban (Xarelto) 15 mg tablet Take 1 tablet (15 mg) by mouth once daily in the evening. Take with meals. 90 tablet 3    triamcinolone (Kenalog) 0.1 % cream Apply to affected areas of eczema on arms and legs twice daily when active as needed. Use less than 14 days per month. 80 g 3    alum-mag hydroxide-simeth (Maalox MAX) 400-400-40 mg/5 mL suspension Take 10 mL by mouth every 4 hours if needed for indigestion or heartburn. (Patient not taking: Reported on 6/23/2025) 355 mL 0    gabapentin (Neurontin) 300 mg capsule Take 1 capsule (300 mg) by mouth 3 times a day. 270 capsule 1     No current facility-administered medications on file prior to visit.   [2]   Allergies  Allergen Reactions    Codeine Anxiety, Itching, Unknown and Other     Denies itching, hive, shortness of breath   [3]   Family History  Problem Relation Name Age of Onset    Heart failure Mother      Dementia Mother      Hypertension Mother      Dementia Father      No Known Problems Sister      No Known Problems Sister      Heart disease Brother      Other (back pain) Brother      No Known Problems Brother      No Known Problems Brother

## 2025-06-26 ENCOUNTER — APPOINTMENT (OUTPATIENT)
Dept: GASTROENTEROLOGY | Facility: CLINIC | Age: 80
End: 2025-06-26
Payer: MEDICARE

## 2025-06-26 VITALS — HEART RATE: 75 BPM | HEIGHT: 63 IN | WEIGHT: 111 LBS | BODY MASS INDEX: 19.67 KG/M2

## 2025-06-26 DIAGNOSIS — K25.9 GASTRIC ULCER WITHOUT HEMORRHAGE OR PERFORATION, UNSPECIFIED CHRONICITY: ICD-10-CM

## 2025-06-26 DIAGNOSIS — R63.0 LOSS OF APPETITE: ICD-10-CM

## 2025-06-26 DIAGNOSIS — R63.4 WEIGHT LOSS: Primary | ICD-10-CM

## 2025-06-26 PROCEDURE — 1036F TOBACCO NON-USER: CPT | Performed by: INTERNAL MEDICINE

## 2025-06-26 PROCEDURE — 99214 OFFICE O/P EST MOD 30 MIN: CPT | Performed by: INTERNAL MEDICINE

## 2025-06-26 PROCEDURE — 1159F MED LIST DOCD IN RCRD: CPT | Performed by: INTERNAL MEDICINE

## 2025-06-26 PROCEDURE — 1160F RVW MEDS BY RX/DR IN RCRD: CPT | Performed by: INTERNAL MEDICINE

## 2025-06-26 NOTE — PROGRESS NOTES
REASON FOR VISIT: History of ulcer, abdominal pain    HPI:  Lynsey Forman is a 79 y.o. female with a past medical history of A-fib on Xarelto here to discuss weight loss and loss of appetite.  Is lost about 7 to 10 pounds and notes significant loss of appetite recently.  In January of this year was admitted to the hospital.  Found to have gastric antral ulcers.  Biopsies negative for H. pylori and ulcer biopsies benign at the time.  Has not had follow-up EGD.  Has been on pantoprazole initially twice daily for 2 months and now on once a day pantoprazole.  She was using NSAIDs regularly then and off NSAIDs now.  Has not had any melena.  CT imaging otherwise without any evidence of pancreas lesions.      PRIOR ENDOSCOPY    PAST MEDICAL HISTORY  Medical History[1]    PAST SURGICAL HISTORY  Surgical History[2]    FAMILY HISTORY  Family History[3]    SOCIAL HISTORY  Social History     Tobacco Use    Smoking status: Former     Current packs/day: 0.00     Average packs/day: 0.3 packs/day for 30.0 years (7.5 ttl pk-yrs)     Types: Cigarettes     Start date:      Quit date: 2024     Years since quittin.4    Smokeless tobacco: Never   Substance Use Topics    Alcohol use: Yes     Alcohol/week: 4.0 standard drinks of alcohol     Types: 1 Glasses of wine, 3 Shots of liquor per week     Comment: 4 nights a week       REVIEW OF SYSTEMS  CONSTITUTIONAL: negative for fever, chills, fatigue, or unintentional weight loss,   HEENT: negative for icteric sclera, eye pain/redness, or changes in vision/hearing  RESPIRATORY: negative for cough, hemoptysis, wheezing, orthopnea, or dyspnea on exertion  CARDIOVASCULAR: negative for chest pain, palpitations, or syncope   GASTROINTESTINAL: as noted per HPI  GENITOURINARY: negative for dysuria, polyuria, incontinence, or hematuria  MUSCULOSKELETAL: negative for arthralgia, myalgia, or joint swelling/stiffness   INTEGUMENTARY/SKIN: negative jaundice, rash, or skin  "lesion  HEMATOLOGIC/LYMPHATIC: negative for prolonged bleeding, easy bruising, or swollen lymph nodes  ENDOCRINE: negative for cold/heat intolerance, polydipsia, polyuria, or goiter  NEUROLOGIC: negative for headaches, dizziness, tremor, or gait abnormality  PSYCHIATRIC: negative for anxiety, depression, personality changes, or sleep disturbances      A 10 point review of systems was completed and was otherwise negative.    ALLERGIES  Allergies[4]    MEDICATIONS  Current Medications[5]    VITALS  Pulse 75   Ht 1.588 m (5' 2.5\")   Wt 50.3 kg (111 lb)   BMI 19.98 kg/m²      PHYSICAL EXAM  Alert and oriented in no acute distress   ASSESSMENT/ PLAN  Patient with weight loss and loss of appetite.  History of gastric ulcer diagnosed about 6 months ago.  On PPI therapy.  Now off NSAIDs.  Recommended follow-up EGD to confirm healing of her ulcers.  Discussed this and she is in agreement with the plan.  We also reviewed her CT scan from January.  She will hold her Xarelto the 2 days prior to endoscopy.  She will follow-up with me at endoscopy.        Signature: Marvel Song MD    Date: 6/26/2025  Time: 2:47 PM         [1]   Past Medical History:  Diagnosis Date    Abnormal kidney function 01/08/2024    BUN-24, GFR-56 (4/29/24)    Alcohol use, unspecified, uncomplicated 12/20/2022    Anxiety     Atrial fibrillation (Multi)     no reccurance s/p ablation 4/2023 and 7/2023    Atrial flutter (Multi)     Carotid stenosis, asymptomatic, bilateral     on ASA and statin    Cerebral aneurysm (H[facility]-HCC)     Right MCA    Chronic cough 11/24/2009    Formatting of this note might be different from the original. Overview: No change with stopping lisinopril, no change with Advair, consensus of ENT, pulmonary, here is LP reflux, has associated past-nasal drip that does not respond to Mucinex or Flonase, 2012 has large nasal polyp L Formatting of this note might be different from the original. No change with stopping lisinopril, no change " with Adv    Chronic pain disorder     CKD (chronic kidney disease)     stage III    Coronary artery disease     Diverticulosis 12/21/2016    Dysphonia     Easy bruising     Elevated coronary artery calcium score     CT calcium score 1730    GERD (gastroesophageal reflux disease)     Hyperlipidemia     Hypertension     Lumbar disc disease     Moriah's edema of vocal folds     s/p Moriah's edema surgery performed on 08/09/23.    Sinus bradycardia 01/04/2013    Formatting of this note might be different from the original. Overview: Asymptomatic, noticed on exam Formatting of this note might be different from the original. Asymptomatic, noticed on exam    Tachycardia 01/08/2024    Tobacco use disorder, mild, in early remission     Quit 1/1/2024, 1/4 PPD x 20 years    Vocal cord polyp    [2]   Past Surgical History:  Procedure Laterality Date    ANTERIOR CRUCIATE LIGAMENT REPAIR Left     CARDIAC ELECTROPHYSIOLOGY STUDY AND ABLATION      4/2023, 7/2023    CAROTID ENDARTERECTOMY Left 12/19/2024    CERVICAL LAMINECTOMY  2017    Cervical surgery    COLONOSCOPY      COSMETIC SURGERY  2018    face lift    LIPOMA RESECTION  06/18/2024    SKIN, LEFT UPPER ARM, EXCISION: --EPIDERMAL INCLUSION CYST, RUPTURED AND INFLAMED    LUMBAR LAMINECTOMY  05/2024    L4-5    MOUTH SURGERY  2018    Oral surgery-peridontal    TUBAL LIGATION      VOCAL FOLD LESION EXCISION  08/09/2023   [3]   Family History  Problem Relation Name Age of Onset    Heart failure Mother      Dementia Mother      Hypertension Mother      Dementia Father      No Known Problems Sister      No Known Problems Sister      Heart disease Brother      Other (back pain) Brother      No Known Problems Brother      No Known Problems Brother     [4]   Allergies  Allergen Reactions    Codeine Anxiety, Itching, Unknown and Other     Denies itching, hive, shortness of breath   [5]   Current Outpatient Medications   Medication Sig Dispense Refill    acetaminophen (Tylenol) 325 mg  tablet Take 2 tablets (650 mg) by mouth every 6 hours if needed for mild pain (1 - 3) or moderate pain (4 - 6).      amLODIPine (Norvasc) 5 mg tablet TAKE 1 TABLET BY MOUTH DAILY 90 tablet 1    aspirin 81 mg EC tablet Take 1 tablet (81 mg) by mouth once daily. 90 tablet 3    atorvastatin (Lipitor) 80 mg tablet Take 1 tablet (80 mg) by mouth once daily. 90 tablet 3    ezetimibe (Zetia) 10 mg tablet Take 1 tablet (10 mg) by mouth once daily at bedtime. 90 tablet 3    furosemide (Lasix) 20 mg tablet Take 1 tablet (20 mg) by mouth once daily. 90 tablet 3    gabapentin (Neurontin) 300 mg capsule Take 1 capsule (300 mg) by mouth 3 times a day. 270 capsule 1    hydrOXYzine HCL (Atarax) 25 mg tablet Take 1 tablet (25 mg) by mouth 2 times a day as needed for anxiety. 180 tablet 1    lisinopril 20 mg tablet Take 1 tablet (20 mg) by mouth once daily.      pantoprazole (ProtoNix) 40 mg EC tablet TAKE 1 TABLET(40 MG) BY MOUTH DAILY. DO NOT CRUSH, CHEW, OR SPLIT 90 tablet 1    rivaroxaban (Xarelto) 15 mg tablet Take 1 tablet (15 mg) by mouth once daily in the evening. Take with meals. 90 tablet 3    triamcinolone (Kenalog) 0.1 % cream Apply to affected areas of eczema on arms and legs twice daily when active as needed. Use less than 14 days per month. 80 g 3    alum-mag hydroxide-simeth (Maalox MAX) 400-400-40 mg/5 mL suspension Take 10 mL by mouth every 4 hours if needed for indigestion or heartburn. (Patient not taking: Reported on 6/26/2025) 355 mL 0     No current facility-administered medications for this visit.

## 2025-07-16 ENCOUNTER — OFFICE VISIT (OUTPATIENT)
Dept: CARDIOLOGY | Facility: CLINIC | Age: 80
End: 2025-07-16
Payer: MEDICARE

## 2025-07-16 ENCOUNTER — APPOINTMENT (OUTPATIENT)
Dept: CARDIOLOGY | Facility: CLINIC | Age: 80
End: 2025-07-16
Payer: MEDICARE

## 2025-07-16 VITALS
HEIGHT: 63 IN | HEART RATE: 58 BPM | OXYGEN SATURATION: 93 % | BODY MASS INDEX: 19.86 KG/M2 | WEIGHT: 112.1 LBS | DIASTOLIC BLOOD PRESSURE: 71 MMHG | SYSTOLIC BLOOD PRESSURE: 128 MMHG

## 2025-07-16 DIAGNOSIS — I48.19 PERSISTENT ATRIAL FIBRILLATION (MULTI): Primary | ICD-10-CM

## 2025-07-16 DIAGNOSIS — Z00.6 ENCOUNTER FOR EXAMINATION FOR NORMAL COMPARISON AND CONTROL IN CLINICAL RESEARCH PROGRAM: ICD-10-CM

## 2025-07-16 DIAGNOSIS — I48.91 ATRIAL FIBRILLATION, UNSPECIFIED TYPE (MULTI): ICD-10-CM

## 2025-07-16 PROCEDURE — 1126F AMNT PAIN NOTED NONE PRSNT: CPT | Performed by: INTERNAL MEDICINE

## 2025-07-16 PROCEDURE — 3078F DIAST BP <80 MM HG: CPT | Performed by: INTERNAL MEDICINE

## 2025-07-16 PROCEDURE — 99212 OFFICE O/P EST SF 10 MIN: CPT

## 2025-07-16 PROCEDURE — 1159F MED LIST DOCD IN RCRD: CPT | Performed by: INTERNAL MEDICINE

## 2025-07-16 PROCEDURE — 99214 OFFICE O/P EST MOD 30 MIN: CPT | Performed by: INTERNAL MEDICINE

## 2025-07-16 PROCEDURE — 3074F SYST BP LT 130 MM HG: CPT | Performed by: INTERNAL MEDICINE

## 2025-07-16 ASSESSMENT — ENCOUNTER SYMPTOMS
OCCASIONAL FEELINGS OF UNSTEADINESS: 0
DEPRESSION: 0
LOSS OF SENSATION IN FEET: 0

## 2025-07-16 ASSESSMENT — PAIN SCALES - GENERAL: PAINLEVEL_OUTOF10: 0-NO PAIN

## 2025-07-16 NOTE — H&P (VIEW-ONLY)
PCP: Dr. Bartholomew  Cardio: Dr. Mcdonald    I was asked by Dr. Mcdonald to evaluate this patient in consultation for evaluation of left atrial appendage closure.    The patient is a 79 y.o. female with past medical history significant for coronary artery disease with a CT calcium score 1730 with preserved LV function, hypertension, hyperlipidemia, persistent atrial fibrillation status post ablation, stage III chronic kidney disease, carotid artery disease status post left carotid endarterectomy, MCA aneurysm, and prior tobacco use.     In January 2025 she presented to the hospital with abdominal pain nausea and dry evening with elevated troponins  Imaging showed possible duodenitis process peptic ulcer disease.  She underwent an endoscopy which showed a couple of gastric ulcers with clean base these were clipped    She reports a fall 3 weeks ago with major bodily injury at her kitchen which still presents bruises. She reports recurrent bruises at arm and legs for minor trauma.     Given the patient's significant GI bleeding in the past as well as fall with significant injury,  the patient is referred for consideration of left atrial appendage closure for stroke risk reduction.     ROS:  Constitutional: no fatigue, no fevers, no body aches  Eyes: no acute eye problems, no blurred vision, no diplopia, no eye pain  ENT:  no acute hearing loss, no earache, no sore throat  Cardiovascular: no dyspnea on exertion, no chest pain  Respiratory: no chronic cough, not coughing up sputum, no wheezing that is consistent with asthma  Gastrointestinal: no acute bowel complaints  Musculoskeletal: no acute arthralgias, no acute myalgias, no acute joint swelling  Skin: no skin rashes, no change in skin color and pigmentation, no skin lesions and no skin lumps.   Neurological: no headaches, no dizziness, no tingling, no fainting and no limb weakness.   Psychiatric:  no suicidal ideation, no confusion, no personality change and no  "emotional problems.   Hematologic/Lymphatic: no bleeding issues, other then mentioned in HPI  All other systems have been reviewed and are negative for complaint.     Physical Exam:     Visit Vitals  /71 (BP Location: Left arm, Patient Position: Sitting, BP Cuff Size: Adult)   Pulse 58   Ht 1.6 m (5' 3\")   Wt 50.8 kg (112 lb 1.6 oz)   SpO2 93%   BMI 19.86 kg/m²   OB Status Postmenopausal   Smoking Status Former   BSA 1.5 m²        Constitutional: alert and in no acute distress.   Eyes: no erythema, swelling or discharge from the eye .   Ears, Nose, Mouth, and Throat: external inspection of ears and nose is normal , lips, teeth, and gums are normal with good dentition  and oropharynx normal with no erythema, edema, exudate or lesions .   Neck: neck is supple, symmetric, trachea midline, no masses  and no thyromegaly .   Pulmonary: no increased work of breathing or signs of respiratory distress , lungs clear to auscultation. , normal percussion of chest  and chest palpation normal .   Cardiovascular: RRR, no murmur,  no leg edema, non-displaced PMI, no S3 or S4  Abdomen: abdomen non-tender, no masses  and no hepatomegaly .           Skin:  no skin lesions          Neurologic: non-focal neurologic examination.      Psychiatric judgment and insight is normal , oriented to person, place and time , normal mood and affect .       Labs:    Results for orders placed or performed in visit on 05/21/25   Basic metabolic panel    Collection Time: 05/23/25  1:13 PM   Result Value Ref Range    GLUCOSE 92 65 - 99 mg/dL    UREA NITROGEN (BUN) 15 7 - 25 mg/dL    CREATININE 0.94 0.60 - 1.00 mg/dL    EGFR 62 > OR = 60 mL/min/1.73m2    BUN/CREATININE RATIO SEE NOTE: 6 - 22 (calc)    SODIUM 136 135 - 146 mmol/L    POTASSIUM 4.0 3.5 - 5.3 mmol/L    CHLORIDE 95 (L) 98 - 110 mmol/L    CARBON DIOXIDE 29 20 - 32 mmol/L    ELECTROLYTE BALANCE 12 7 - 17 mmol/L (calc)    CALCIUM 9.3 8.6 - 10.4 mg/dL          Medications:    Current " Outpatient Medications   Medication Instructions    acetaminophen (TYLENOL) 650 mg, oral, Every 6 hours PRN    alum-mag hydroxide-simeth (Maalox MAX) 400-400-40 mg/5 mL suspension Take 10 mL by mouth every 4 hours if needed for indigestion or heartburn.    amLODIPine (NORVASC) 5 mg, oral, Daily    aspirin 81 mg, oral, Daily    atorvastatin (LIPITOR) 80 mg, oral, Daily    ezetimibe (ZETIA) 10 mg, oral, Nightly    furosemide (LASIX) 20 mg, oral, Daily    gabapentin (NEURONTIN) 300 mg, oral, 3 times daily    hydrOXYzine HCL (ATARAX) 25 mg, oral, 2 times daily PRN    lisinopril 20 mg, oral, Daily    pantoprazole (ProtoNix) 40 mg EC tablet TAKE 1 TABLET(40 MG) BY MOUTH DAILY. DO NOT CRUSH, CHEW, OR SPLIT    triamcinolone (Kenalog) 0.1 % cream Apply to affected areas of eczema on arms and legs twice daily when active as needed. Use less than 14 days per month.    Xarelto 15 mg, oral, Daily with evening meal          Assessment/Plan:      The patient is a 79 y.o. female with persistent atrial fibrillation status post ablation, stage III chronic kidney disease, duodenitis process peptic ulcer disease and MCA aneurysm.  The patient has had GI bleeding as well as a fall resulting in significant bodily injury.  Given the patient's significant GI bleeding in the past and her recent fall, the patient is referred for consideration of left atrial appendage closure for stroke risk reduction.     The CHADS-VASC score is 5 (age, sex, HTN, CAD) and HAS-BLED score is 3 (bleeding, CKD, HTN). The patient is at increased risk of both bleeding and stroke.  As such the patient is a reasonable candidate for consideration of left atrial appendage occluder placement.    Today we discussed the left atrial appendage closure procedure. The patient was given written educational handout materials and watched an educational video. All risks, benefits and alternative were discussed.     The risks discussed included but were not limited to vascular  complications, sedation related complications, risk of MI, CVA, device embolization, pericardial tamponade and death. The patient verbalized understanding and decided to proceed.         The patient requires CT for planning and to exclude DAGMAR thrombus. In addition, the patient will also need a CT scan 3 months after the procedure, to evaluate the device for position, thrombus and karrie-device leak.. Following device implant, strategy will be for low dose xarelto for 3 months then aspirin for life      Thank you, Dr. Mcdonald, for this consultation and for allowing me to participate in the care of this patient.      Felix Mcgee MD  , Interventional Cardiology Fellowship Program  [facility] [place]   [facility] [employer]  Office Phone Number: [phone]

## 2025-07-16 NOTE — PROGRESS NOTES
PCP: Dr. Bartholomew  Cardio: Dr. Mcdonald    I was asked by Dr. Mcdonald to evaluate this patient in consultation for evaluation of left atrial appendage closure.    The patient is a 79 y.o. female with past medical history significant for coronary artery disease with a CT calcium score 1730 with preserved LV function, hypertension, hyperlipidemia, persistent atrial fibrillation status post ablation, stage III chronic kidney disease, carotid artery disease status post left carotid endarterectomy, MCA aneurysm, and prior tobacco use.     In January 2025 she presented to the hospital with abdominal pain nausea and dry evening with elevated troponins  Imaging showed possible duodenitis process peptic ulcer disease.  She underwent an endoscopy which showed a couple of gastric ulcers with clean base these were clipped    She reports a fall 3 weeks ago with major bodily injury at her kitchen which still presents bruises. She reports recurrent bruises at arm and legs for minor trauma.     Given the patient's significant GI bleeding in the past as well as fall with significant injury,  the patient is referred for consideration of left atrial appendage closure for stroke risk reduction.     ROS:  Constitutional: no fatigue, no fevers, no body aches  Eyes: no acute eye problems, no blurred vision, no diplopia, no eye pain  ENT:  no acute hearing loss, no earache, no sore throat  Cardiovascular: no dyspnea on exertion, no chest pain  Respiratory: no chronic cough, not coughing up sputum, no wheezing that is consistent with asthma  Gastrointestinal: no acute bowel complaints  Musculoskeletal: no acute arthralgias, no acute myalgias, no acute joint swelling  Skin: no skin rashes, no change in skin color and pigmentation, no skin lesions and no skin lumps.   Neurological: no headaches, no dizziness, no tingling, no fainting and no limb weakness.   Psychiatric:  no suicidal ideation, no confusion, no personality change and no  "emotional problems.   Hematologic/Lymphatic: no bleeding issues, other then mentioned in HPI  All other systems have been reviewed and are negative for complaint.     Physical Exam:     Visit Vitals  /71 (BP Location: Left arm, Patient Position: Sitting, BP Cuff Size: Adult)   Pulse 58   Ht 1.6 m (5' 3\")   Wt 50.8 kg (112 lb 1.6 oz)   SpO2 93%   BMI 19.86 kg/m²   OB Status Postmenopausal   Smoking Status Former   BSA 1.5 m²        Constitutional: alert and in no acute distress.   Eyes: no erythema, swelling or discharge from the eye .   Ears, Nose, Mouth, and Throat: external inspection of ears and nose is normal , lips, teeth, and gums are normal with good dentition  and oropharynx normal with no erythema, edema, exudate or lesions .   Neck: neck is supple, symmetric, trachea midline, no masses  and no thyromegaly .   Pulmonary: no increased work of breathing or signs of respiratory distress , lungs clear to auscultation. , normal percussion of chest  and chest palpation normal .   Cardiovascular: RRR, no murmur,  no leg edema, non-displaced PMI, no S3 or S4  Abdomen: abdomen non-tender, no masses  and no hepatomegaly .           Skin:  no skin lesions          Neurologic: non-focal neurologic examination.      Psychiatric judgment and insight is normal , oriented to person, place and time , normal mood and affect .       Labs:    Results for orders placed or performed in visit on 05/21/25   Basic metabolic panel    Collection Time: 05/23/25  1:13 PM   Result Value Ref Range    GLUCOSE 92 65 - 99 mg/dL    UREA NITROGEN (BUN) 15 7 - 25 mg/dL    CREATININE 0.94 0.60 - 1.00 mg/dL    EGFR 62 > OR = 60 mL/min/1.73m2    BUN/CREATININE RATIO SEE NOTE: 6 - 22 (calc)    SODIUM 136 135 - 146 mmol/L    POTASSIUM 4.0 3.5 - 5.3 mmol/L    CHLORIDE 95 (L) 98 - 110 mmol/L    CARBON DIOXIDE 29 20 - 32 mmol/L    ELECTROLYTE BALANCE 12 7 - 17 mmol/L (calc)    CALCIUM 9.3 8.6 - 10.4 mg/dL          Medications:    Current " Outpatient Medications   Medication Instructions    acetaminophen (TYLENOL) 650 mg, oral, Every 6 hours PRN    alum-mag hydroxide-simeth (Maalox MAX) 400-400-40 mg/5 mL suspension Take 10 mL by mouth every 4 hours if needed for indigestion or heartburn.    amLODIPine (NORVASC) 5 mg, oral, Daily    aspirin 81 mg, oral, Daily    atorvastatin (LIPITOR) 80 mg, oral, Daily    ezetimibe (ZETIA) 10 mg, oral, Nightly    furosemide (LASIX) 20 mg, oral, Daily    gabapentin (NEURONTIN) 300 mg, oral, 3 times daily    hydrOXYzine HCL (ATARAX) 25 mg, oral, 2 times daily PRN    lisinopril 20 mg, oral, Daily    pantoprazole (ProtoNix) 40 mg EC tablet TAKE 1 TABLET(40 MG) BY MOUTH DAILY. DO NOT CRUSH, CHEW, OR SPLIT    triamcinolone (Kenalog) 0.1 % cream Apply to affected areas of eczema on arms and legs twice daily when active as needed. Use less than 14 days per month.    Xarelto 15 mg, oral, Daily with evening meal          Assessment/Plan:      The patient is a 79 y.o. female with persistent atrial fibrillation status post ablation, stage III chronic kidney disease, duodenitis process peptic ulcer disease and MCA aneurysm.  The patient has had GI bleeding as well as a fall resulting in significant bodily injury.  Given the patient's significant GI bleeding in the past and her recent fall, the patient is referred for consideration of left atrial appendage closure for stroke risk reduction.     The CHADS-VASC score is 5 (age, sex, HTN, CAD) and HAS-BLED score is 3 (bleeding, CKD, HTN). The patient is at increased risk of both bleeding and stroke.  As such the patient is a reasonable candidate for consideration of left atrial appendage occluder placement.    Today we discussed the left atrial appendage closure procedure. The patient was given written educational handout materials and watched an educational video. All risks, benefits and alternative were discussed.     The risks discussed included but were not limited to vascular  complications, sedation related complications, risk of MI, CVA, device embolization, pericardial tamponade and death. The patient verbalized understanding and decided to proceed.         The patient requires CT for planning and to exclude DAGMAR thrombus. In addition, the patient will also need a CT scan 3 months after the procedure, to evaluate the device for position, thrombus and karrie-device leak.. Following device implant, strategy will be for low dose xarelto for 3 months then aspirin for life      Thank you, Dr. Mcdonald, for this consultation and for allowing me to participate in the care of this patient.      Felix Mcgee MD  , Interventional Cardiology Fellowship Program  [facility]   [employer] [facility] [employer]  Office Phone Number: [phone]

## 2025-07-17 ENCOUNTER — APPOINTMENT (OUTPATIENT)
Dept: CARDIOLOGY | Facility: HOSPITAL | Age: 80
End: 2025-07-17
Payer: MEDICARE

## 2025-07-18 DIAGNOSIS — I48.91 ATRIAL FIBRILLATION, UNSPECIFIED TYPE (MULTI): ICD-10-CM

## 2025-07-21 ENCOUNTER — APPOINTMENT (OUTPATIENT)
Dept: GASTROENTEROLOGY | Facility: EXTERNAL LOCATION | Age: 80
End: 2025-07-21
Payer: MEDICARE

## 2025-07-21 DIAGNOSIS — K25.9 GASTRIC ULCER WITHOUT HEMORRHAGE OR PERFORATION, UNSPECIFIED CHRONICITY: Primary | ICD-10-CM

## 2025-07-21 DIAGNOSIS — K31.89 OTHER DISEASES OF STOMACH AND DUODENUM: ICD-10-CM

## 2025-07-21 DIAGNOSIS — Z87.11 HISTORY OF GASTRIC ULCER: ICD-10-CM

## 2025-07-21 PROCEDURE — 88305 TISSUE EXAM BY PATHOLOGIST: CPT

## 2025-07-21 PROCEDURE — 43239 EGD BIOPSY SINGLE/MULTIPLE: CPT | Performed by: INTERNAL MEDICINE

## 2025-07-21 PROCEDURE — 88305 TISSUE EXAM BY PATHOLOGIST: CPT | Performed by: PATHOLOGY

## 2025-07-22 ENCOUNTER — LAB REQUISITION (OUTPATIENT)
Dept: LAB | Facility: HOSPITAL | Age: 80
End: 2025-07-22
Payer: MEDICARE

## 2025-07-25 LAB
ANION GAP SERPL CALCULATED.4IONS-SCNC: 11 MMOL/L (CALC) (ref 7–17)
BUN SERPL-MCNC: 19 MG/DL (ref 7–25)
BUN/CREAT SERPL: 18 (CALC) (ref 6–22)
CALCIUM SERPL-MCNC: 9.6 MG/DL (ref 8.6–10.4)
CHLORIDE SERPL-SCNC: 97 MMOL/L (ref 98–110)
CO2 SERPL-SCNC: 29 MMOL/L (ref 20–32)
CREAT SERPL-MCNC: 1.05 MG/DL (ref 0.6–1)
EGFRCR SERPLBLD CKD-EPI 2021: 55 ML/MIN/1.73M2
ERYTHROCYTE [DISTWIDTH] IN BLOOD BY AUTOMATED COUNT: 13 % (ref 11–15)
GLUCOSE SERPL-MCNC: 109 MG/DL (ref 65–99)
HCT VFR BLD AUTO: 36.9 % (ref 35–45)
HGB BLD-MCNC: 12.3 G/DL (ref 11.7–15.5)
LABORATORY COMMENT REPORT: NORMAL
MCH RBC QN AUTO: 33.1 PG (ref 27–33)
MCHC RBC AUTO-ENTMCNC: 33.3 G/DL (ref 32–36)
MCV RBC AUTO: 99.2 FL (ref 80–100)
PATH REPORT.FINAL DX SPEC: NORMAL
PATH REPORT.GROSS SPEC: NORMAL
PATH REPORT.RELEVANT HX SPEC: NORMAL
PATH REPORT.TOTAL CANCER: NORMAL
PLATELET # BLD AUTO: 195 THOUSAND/UL (ref 140–400)
PMV BLD REES-ECKER: 10.4 FL (ref 7.5–12.5)
POTASSIUM SERPL-SCNC: 4.7 MMOL/L (ref 3.5–5.3)
RBC # BLD AUTO: 3.72 MILLION/UL (ref 3.8–5.1)
SODIUM SERPL-SCNC: 137 MMOL/L (ref 135–146)
WBC # BLD AUTO: 5.7 THOUSAND/UL (ref 3.8–10.8)

## 2025-07-31 PROBLEM — I48.91 ATRIAL FIBRILLATION, UNSPECIFIED TYPE (MULTI): Status: ACTIVE | Noted: 2025-07-31

## 2025-07-31 NOTE — DISCHARGE INSTRUCTIONS
Watchman Discharge Instructions    Anticoagulation Plan:    You are to take 81mg baby Aspirin daily indefinitely   You will have a 4 month CTA to assess the effectiveness of the Watchman Device.     General Instructions:  DO NOT drink any alcoholic drinks or take any non-prescriptive medications that contain alcohol for the first 24 hours.   DO NOT make any important decisions for the first 24 hours.  Activity:  You are advised to go directly home from the hospital.   DO NOT lift anything heavier than 10 pounds for one week, this allows for proper healing of the groin.   No excessive exercise or treadmill use for one week. You may walk and do stairs, slowly.   No sexual activities for 24 hours after you arrive home.    Wound Care:  If slight bleeding should occur at site, lie down and have someone apply firm pressure just above the puncture site for 5 minutes.  If it continues or is profuse, call 911. Always notify your doctor if bleeding occurs.   Keep site clean and dry. Let air dry or you may use a simple bandaid.   Gently cleanse the puncture site in your groin with soap and water only.   You may experience some tenderness, bruising or minimal inflammation.  If you have any concerns, you may contact the Cath Lab or if any of these symptoms become excessive, contact your cardiologist or go to the emergency room.   No tub baths, soaking, or swimming for one week.   May shower the next day after your procedure.    Diet:  You may resume your normal diet. However it is better to start with liquids such as juices then soup and crackers, and gradually work up to solid foods.    Other Instructions:  If you develop difficulty breathing, rash, hives, severe nausea, vomiting, light-headedness or any signs of infection, immediately contact your doctor and go to the nearest emergency room.   You must take your aspirin, clopidogrel (Plavix), prasugrel (Effient), or ticagrelor (Brilinta) every day without missing a single  dose.  If you are getting low on these medications, contact your physician immediately for a refill.  - CALL 911 IF YOU HAVE ANY OF THE SIGNS AND SYMPTOMS OF HEART FAILURE: 1. Chest pain 2. Significant Shortness of breath 3. Fainting.     Call Provider If (Home-going Patients):  Breathing faster than normal.   Breathing harder than normal or having retractions.   Fever of 100.4 F (38 C) or higher.   Chills.   Drinking less than normal.   Urinating less than normal, over 1 day.   Acting very sleepy and difficult to awaken.   Vomiting (throwing up) and not able to eat or drink for 12 hours.   3 or more loose, watery bowel movements in 24 hours (diarrhea).   Any new concerning symptoms.    Please call the STRUCTURAL HEART TEAM LINE if you have any questions or concerns - [phone]   [facility] nurse coordinator Precious Donnelly's phone is [phone].

## 2025-07-31 NOTE — PROGRESS NOTES
Pharmacy Medication History Review    Lynsey Forman is a 79 y.o. female who is planned to be admitted for No Principal Problem: There is no principal problem currently on the Problem List. Please update the Problem List and refresh.. Pharmacy called the patient prior to their scheduled procedure and reviewed the patient's [id number] medications for accuracy.    Medications ADDED:  none  Medications CHANGED:  none  Medications REMOVED:   none    Please review updated prior to admission medication list and comments regarding how patient may be taking medications differently by going to Admission tab --> Admission Orders --> Admit Orders / Review prior to admission medications.     Preferred pharmacy, last doses of medications, and allergies to be confirmed with patient by nursing the day of procedure.     Sources used to complete the med history include:  [facility]  Pharmacy dispense history  Patient Interview Good historian  Chart Review  Care Everywhere     Below are additional concerns with the patient's PTA list.  none    [facility]  Please reach out via Secure Chat for questions

## 2025-08-01 ENCOUNTER — APPOINTMENT (OUTPATIENT)
Dept: CARDIOLOGY | Facility: HOSPITAL | Age: 80
End: 2025-08-01
Payer: MEDICARE

## 2025-08-01 ENCOUNTER — HOSPITAL ENCOUNTER (OUTPATIENT)
Dept: RADIOLOGY | Facility: HOSPITAL | Age: 80
Setting detail: SURGERY ADMIT
DRG: 274 | End: 2025-08-01
Payer: MEDICARE

## 2025-08-01 ENCOUNTER — APPOINTMENT (OUTPATIENT)
Dept: RADIOLOGY | Facility: HOSPITAL | Age: 80
DRG: 274 | End: 2025-08-01
Payer: MEDICARE

## 2025-08-01 ENCOUNTER — HOSPITAL ENCOUNTER (INPATIENT)
Facility: HOSPITAL | Age: 80
LOS: 1 days | Discharge: HOME | DRG: 274 | End: 2025-08-01
Attending: INTERNAL MEDICINE | Admitting: INTERNAL MEDICINE
Payer: MEDICARE

## 2025-08-01 ENCOUNTER — APPOINTMENT (OUTPATIENT)
Dept: CARDIOLOGY | Facility: HOSPITAL | Age: 80
DRG: 274 | End: 2025-08-01
Payer: MEDICARE

## 2025-08-01 DIAGNOSIS — I48.91 ATRIAL FIBRILLATION, UNSPECIFIED TYPE (MULTI): Primary | ICD-10-CM

## 2025-08-01 DIAGNOSIS — I48.20 CHRONIC ATRIAL FIBRILLATION, UNSPECIFIED (MULTI): ICD-10-CM

## 2025-08-01 DIAGNOSIS — Z95.818 PRESENCE OF WATCHMAN LEFT ATRIAL APPENDAGE CLOSURE DEVICE: ICD-10-CM

## 2025-08-01 DIAGNOSIS — Z01.810 PREOP CARDIOVASCULAR EXAM: ICD-10-CM

## 2025-08-01 DIAGNOSIS — Z09 POSTOP CHECK: ICD-10-CM

## 2025-08-01 DIAGNOSIS — Z00.6 ENCOUNTER FOR EXAMINATION FOR NORMAL COMPARISON AND CONTROL IN CLINICAL RESEARCH PROGRAM: ICD-10-CM

## 2025-08-01 LAB
ABO GROUP (TYPE) IN BLOOD: NORMAL
ANTIBODY SCREEN: NORMAL
EJECTION FRACTION: 63 %
EJECTION FRACTION: 63 %
ERYTHROCYTE [DISTWIDTH] IN BLOOD BY AUTOMATED COUNT: 14.7 % (ref 11.5–14.5)
HCT VFR BLD AUTO: 31.5 % (ref 36–46)
HGB BLD-MCNC: 10.3 G/DL (ref 12–16)
MCH RBC QN AUTO: 32.1 PG (ref 26–34)
MCHC RBC AUTO-ENTMCNC: 32.7 G/DL (ref 32–36)
MCV RBC AUTO: 98 FL (ref 80–100)
NRBC BLD-RTO: 0 /100 WBCS (ref 0–0)
PLATELET # BLD AUTO: 173 X10*3/UL (ref 150–450)
RBC # BLD AUTO: 3.21 X10*6/UL (ref 4–5.2)
RH FACTOR (ANTIGEN D): NORMAL
WBC # BLD AUTO: 5.6 X10*3/UL (ref 4.4–11.3)

## 2025-08-01 PROCEDURE — 99153 MOD SED SAME PHYS/QHP EA: CPT | Performed by: INTERNAL MEDICINE

## 2025-08-01 PROCEDURE — 36415 COLL VENOUS BLD VENIPUNCTURE: CPT | Performed by: NURSE PRACTITIONER

## 2025-08-01 PROCEDURE — 7100000009 HC PHASE TWO TIME - INITIAL BASE CHARGE: Performed by: INTERNAL MEDICINE

## 2025-08-01 PROCEDURE — 85347 COAGULATION TIME ACTIVATED: CPT | Performed by: INTERNAL MEDICINE

## 2025-08-01 PROCEDURE — 1210000006 HC SEMI PRIVATE ROOM - IP ONLY PROCEDURE WITH INTENT

## 2025-08-01 PROCEDURE — 2500000001 HC RX 250 WO HCPCS SELF ADMINISTERED DRUGS (ALT 637 FOR MEDICARE OP): Performed by: NURSE PRACTITIONER

## 2025-08-01 PROCEDURE — 2550000001 HC RX 255 CONTRASTS: Performed by: INTERNAL MEDICINE

## 2025-08-01 PROCEDURE — 2780000003 HC OR 278 NO HCPCS: Performed by: INTERNAL MEDICINE

## 2025-08-01 PROCEDURE — C1889 IMPLANT/INSERT DEVICE, NOC: HCPCS | Performed by: INTERNAL MEDICINE

## 2025-08-01 PROCEDURE — 93308 TTE F-UP OR LMTD: CPT

## 2025-08-01 PROCEDURE — C1760 CLOSURE DEV, VASC: HCPCS | Performed by: INTERNAL MEDICINE

## 2025-08-01 PROCEDURE — 75572 CT HRT W/3D IMAGE: CPT | Performed by: RADIOLOGY

## 2025-08-01 PROCEDURE — 93308 TTE F-UP OR LMTD: CPT | Performed by: INTERNAL MEDICINE

## 2025-08-01 PROCEDURE — 99152 MOD SED SAME PHYS/QHP 5/>YRS: CPT | Performed by: INTERNAL MEDICINE

## 2025-08-01 PROCEDURE — 2500000004 HC RX 250 GENERAL PHARMACY W/ HCPCS (ALT 636 FOR OP/ED): Performed by: INTERNAL MEDICINE

## 2025-08-01 PROCEDURE — 93662 INTRACARDIAC ECG (ICE): CPT | Performed by: INTERNAL MEDICINE

## 2025-08-01 PROCEDURE — C1759 CATH, INTRA ECHOCARDIOGRAPHY: HCPCS | Performed by: INTERNAL MEDICINE

## 2025-08-01 PROCEDURE — 85027 COMPLETE CBC AUTOMATED: CPT | Performed by: INTERNAL MEDICINE

## 2025-08-01 PROCEDURE — 2720000007 HC OR 272 NO HCPCS: Performed by: INTERNAL MEDICINE

## 2025-08-01 PROCEDURE — 1200000002 HC GENERAL ROOM WITH TELEMETRY DAILY

## 2025-08-01 PROCEDURE — 7100000010 HC PHASE TWO TIME - EACH INCREMENTAL 1 MINUTE: Performed by: INTERNAL MEDICINE

## 2025-08-01 PROCEDURE — 86901 BLOOD TYPING SEROLOGIC RH(D): CPT | Performed by: NURSE PRACTITIONER

## 2025-08-01 PROCEDURE — C1894 INTRO/SHEATH, NON-LASER: HCPCS | Performed by: INTERNAL MEDICINE

## 2025-08-01 PROCEDURE — 33340 PERQ CLSR TCAT L ATR APNDGE: CPT | Performed by: INTERNAL MEDICINE

## 2025-08-01 PROCEDURE — G0269 OCCLUSIVE DEVICE IN VEIN ART: HCPCS | Performed by: INTERNAL MEDICINE

## 2025-08-01 PROCEDURE — 85347 COAGULATION TIME ACTIVATED: CPT

## 2025-08-01 PROCEDURE — 75572 CT HRT W/3D IMAGE: CPT

## 2025-08-01 PROCEDURE — 02L73DK OCCLUSION OF LEFT ATRIAL APPENDAGE WITH INTRALUMINAL DEVICE, PERCUTANEOUS APPROACH: ICD-10-PCS | Performed by: INTERNAL MEDICINE

## 2025-08-01 PROCEDURE — 76937 US GUIDE VASCULAR ACCESS: CPT | Performed by: INTERNAL MEDICINE

## 2025-08-01 DEVICE — IMPLANTABLE DEVICE: Type: IMPLANTABLE DEVICE | Site: HEART | Status: FUNCTIONAL

## 2025-08-01 RX ORDER — PANTOPRAZOLE SODIUM 40 MG/10ML
40 INJECTION, POWDER, LYOPHILIZED, FOR SOLUTION INTRAVENOUS
Status: DISCONTINUED | OUTPATIENT
Start: 2025-08-02 | End: 2025-08-01 | Stop reason: HOSPADM

## 2025-08-01 RX ORDER — NAPROXEN SODIUM 220 MG/1
324 TABLET, FILM COATED ORAL ONCE
Status: COMPLETED | OUTPATIENT
Start: 2025-08-01 | End: 2025-08-01

## 2025-08-01 RX ORDER — PROTAMINE SULFATE 10 MG/ML
INJECTION, SOLUTION INTRAVENOUS CONTINUOUS PRN
Status: COMPLETED | OUTPATIENT
Start: 2025-08-01 | End: 2025-08-01

## 2025-08-01 RX ORDER — LIDOCAINE HYDROCHLORIDE AND EPINEPHRINE 10; 20 UG/ML; MG/ML
5 INJECTION, SOLUTION INFILTRATION; PERINEURAL ONCE AS NEEDED
Status: DISCONTINUED | OUTPATIENT
Start: 2025-08-01 | End: 2025-08-01 | Stop reason: HOSPADM

## 2025-08-01 RX ORDER — CLOPIDOGREL BISULFATE 300 MG/1
600 TABLET, FILM COATED ORAL ONCE
Status: DISCONTINUED | OUTPATIENT
Start: 2025-08-01 | End: 2025-08-01 | Stop reason: HOSPADM

## 2025-08-01 RX ORDER — FENTANYL CITRATE 50 UG/ML
INJECTION, SOLUTION INTRAMUSCULAR; INTRAVENOUS AS NEEDED
Status: DISCONTINUED | OUTPATIENT
Start: 2025-08-01 | End: 2025-08-01 | Stop reason: HOSPADM

## 2025-08-01 RX ORDER — ASPIRIN 81 MG/1
81 TABLET ORAL DAILY
Status: DISCONTINUED | OUTPATIENT
Start: 2025-08-01 | End: 2025-08-01 | Stop reason: HOSPADM

## 2025-08-01 RX ORDER — ACETAMINOPHEN 325 MG/1
650 TABLET ORAL EVERY 6 HOURS PRN
Status: DISCONTINUED | OUTPATIENT
Start: 2025-08-01 | End: 2025-08-01 | Stop reason: HOSPADM

## 2025-08-01 RX ORDER — MIDAZOLAM HYDROCHLORIDE 1 MG/ML
INJECTION, SOLUTION INTRAMUSCULAR; INTRAVENOUS AS NEEDED
Status: DISCONTINUED | OUTPATIENT
Start: 2025-08-01 | End: 2025-08-01 | Stop reason: HOSPADM

## 2025-08-01 RX ORDER — CLOPIDOGREL BISULFATE 75 MG/1
75 TABLET ORAL DAILY
Status: DISCONTINUED | OUTPATIENT
Start: 2025-08-01 | End: 2025-08-01 | Stop reason: HOSPADM

## 2025-08-01 RX ORDER — TRAMADOL HYDROCHLORIDE 50 MG/1
50 TABLET, FILM COATED ORAL EVERY 6 HOURS PRN
Status: DISCONTINUED | OUTPATIENT
Start: 2025-08-01 | End: 2025-08-01 | Stop reason: HOSPADM

## 2025-08-01 RX ORDER — PROCHLORPERAZINE MALEATE 10 MG
10 TABLET ORAL EVERY 6 HOURS PRN
Status: DISCONTINUED | OUTPATIENT
Start: 2025-08-01 | End: 2025-08-01 | Stop reason: HOSPADM

## 2025-08-01 RX ORDER — LIDOCAINE HYDROCHLORIDE 10 MG/ML
INJECTION, SOLUTION EPIDURAL; INFILTRATION; INTRACAUDAL; PERINEURAL AS NEEDED
Status: DISCONTINUED | OUTPATIENT
Start: 2025-08-01 | End: 2025-08-01 | Stop reason: HOSPADM

## 2025-08-01 RX ORDER — ACETAMINOPHEN 160 MG/5ML
650 SOLUTION ORAL EVERY 6 HOURS PRN
Status: DISCONTINUED | OUTPATIENT
Start: 2025-08-01 | End: 2025-08-01 | Stop reason: HOSPADM

## 2025-08-01 RX ORDER — ACETAMINOPHEN 650 MG/1
650 SUPPOSITORY RECTAL EVERY 6 HOURS PRN
Status: DISCONTINUED | OUTPATIENT
Start: 2025-08-01 | End: 2025-08-01 | Stop reason: HOSPADM

## 2025-08-01 RX ORDER — PANTOPRAZOLE SODIUM 40 MG/1
40 TABLET, DELAYED RELEASE ORAL
Status: DISCONTINUED | OUTPATIENT
Start: 2025-08-02 | End: 2025-08-01 | Stop reason: HOSPADM

## 2025-08-01 RX ORDER — HEPARIN SODIUM 1000 [USP'U]/ML
INJECTION, SOLUTION INTRAVENOUS; SUBCUTANEOUS AS NEEDED
Status: DISCONTINUED | OUTPATIENT
Start: 2025-08-01 | End: 2025-08-01 | Stop reason: HOSPADM

## 2025-08-01 RX ORDER — DOCUSATE SODIUM 100 MG/1
100 CAPSULE, LIQUID FILLED ORAL 2 TIMES DAILY
Status: DISCONTINUED | OUTPATIENT
Start: 2025-08-01 | End: 2025-08-01 | Stop reason: HOSPADM

## 2025-08-01 RX ORDER — PROCHLORPERAZINE EDISYLATE 5 MG/ML
10 INJECTION INTRAMUSCULAR; INTRAVENOUS EVERY 6 HOURS PRN
Status: DISCONTINUED | OUTPATIENT
Start: 2025-08-01 | End: 2025-08-01 | Stop reason: HOSPADM

## 2025-08-01 RX ORDER — CEFAZOLIN SODIUM 2 G/50ML
SOLUTION INTRAVENOUS CONTINUOUS PRN
Status: COMPLETED | OUTPATIENT
Start: 2025-08-01 | End: 2025-08-01

## 2025-08-01 RX ORDER — PROCHLORPERAZINE 25 MG/1
25 SUPPOSITORY RECTAL EVERY 12 HOURS PRN
Status: DISCONTINUED | OUTPATIENT
Start: 2025-08-01 | End: 2025-08-01 | Stop reason: HOSPADM

## 2025-08-01 RX ADMIN — ASPIRIN 81 MG CHEWABLE TABLET 324 MG: 81 TABLET CHEWABLE at 12:40

## 2025-08-01 RX ADMIN — IOHEXOL 70 ML: 350 INJECTION, SOLUTION INTRAVENOUS at 11:13

## 2025-08-01 NOTE — DISCHARGE SUMMARY
STRUCTURAL HEART INPATIENT DISCHARGE SUMMARY - [facility]     BRIEF OVERVIEW  Admitting Provider: Felix Mcgee MD  Discharge Provider: Felix Mcgee MD  Primary Care Physician at Discharge: Windy Bartholomew [phone]     Admission Date: 8/1/2025     Discharge Date: 8/1/2025    Primary Discharge Diagnosis  Atrial fibrillation, unspecified type (Multi)    Discharge Disposition  Home  Code Status at Discharge: FULL    Active Issues Requiring Follow-up  None    Outpatient Follow-Up  Future Appointments   Date Time Provider Department [place]   8/12/2025 11:40 AM Gerson Martinez, PharmD [id number] [facility]   8/13/2025  1:00 PM Windy Bartholomew MD [id number] [facility]   8/20/2025  4:00 PM SANTY Tan-CNP [id number] [facility]   12/1/2025 10:30 AM AHU CT 1 AHUCT [facility] Rad   12/15/2025  1:30 PM AHU VASC 2 AHUVSC [facility] Rad   12/15/2025  2:20 PM Shaquille Blackman MD PhD [id number] [facility]   4/22/2026  1:45 PM Lula Chacon MD [id number] [facility]       Recent Results (from the past 48 hours)   Transthoracic Echo (TTE) Limited    Collection Time: 08/01/25 12:05 PM   Result Value Ref Range    LV EF 63 %   Type And Screen    Collection Time: 08/01/25 12:18 PM   Result Value Ref Range    ABO TYPE A     Rh TYPE POS     ANTIBODY SCREEN NEG    CBC    Collection Time: 08/01/25 12:18 PM   Result Value Ref Range    WBC 5.6 4.4 - 11.3 x10*3/uL    nRBC 0.0 0.0 - 0.0 /100 WBCs    RBC 3.21 (L) 4.00 - 5.20 x10*6/uL    Hemoglobin 10.3 (L) 12.0 - 16.0 g/dL    Hematocrit 31.5 (L) 36.0 - 46.0 %    MCV 98 80 - 100 fL    MCH 32.1 26.0 - 34.0 pg    MCHC 32.7 32.0 - 36.0 g/dL    RDW 14.7 (H) 11.5 - 14.5 %    Platelets 173 150 - 450 x10*3/uL   Transthoracic Echo (TTE) Limited    Collection Time: 08/01/25  2:32 PM   Result Value Ref Range    LV EF 63 %       Test Results Pending at Discharge  Pending Labs       No current pending labs.                 Your medication list        CONTINUE taking these medications        Instructions Last Dose Given Next  Dose Due   acetaminophen 325 mg tablet  Commonly known as: Tylenol      Take 2 tablets (650 mg) by mouth every 6 hours if needed for mild pain (1 - 3) or moderate pain (4 - 6).       amLODIPine 5 mg tablet  Commonly known as: Norvasc      TAKE 1 TABLET BY MOUTH DAILY       aspirin 81 mg EC tablet      Take 1 tablet (81 mg) by mouth once daily.       atorvastatin 80 mg tablet  Commonly known as: Lipitor      Take 1 tablet (80 mg) by mouth once daily.       ezetimibe 10 mg tablet  Commonly known as: Zetia      Take 1 tablet (10 mg) by mouth once daily at bedtime.       furosemide 20 mg tablet  Commonly known as: Lasix      Take 1 tablet (20 mg) by mouth once daily.       gabapentin 300 mg capsule  Commonly known as: Neurontin      Take 1 capsule (300 mg) by mouth 3 times a day.       hydrOXYzine HCL 25 mg tablet  Commonly known as: Atarax      Take 1 tablet (25 mg) by mouth 2 times a day as needed for anxiety.       lisinopril 20 mg tablet      Take 1 tablet (20 mg) by mouth once daily.       pantoprazole 40 mg EC tablet  Commonly known as: ProtoNix      TAKE 1 TABLET(40 MG) BY MOUTH DAILY. DO NOT CRUSH, CHEW, OR SPLIT       triamcinolone 0.1 % cream  Commonly known as: Kenalog      Apply to affected areas of eczema on arms and legs twice daily when active as needed. Use less than 14 days per month.              STOP taking these medications      Xarelto 15 mg tablet  Generic drug: rivaroxaban               ASK your doctor about these medications        Instructions Last Dose Given Next Dose Due   Shabnam-Lanta 400-400-40 mg/5 mL suspension  Generic drug: alum-mag hydroxide-simeth      Take 10 mL by mouth every 4 hours if needed for indigestion or heartburn.                   DETAILS OF HOSPITAL STAY    Presenting Problem  Patient Active Problem List    Diagnosis Date Noted    Atrial fibrillation, unspecified type (Multi) 07/31/2025    Anxiety 04/28/2025    Duodenal ulcer 04/28/2025    Hyponatremia 02/10/2025    [facility]  discharge follow-up 02/10/2025    Elevated troponin 01/26/2025    Acute gastric ulcer 01/26/2025    Medicare annual wellness visit, subsequent 08/08/2024    Claudication 08/08/2024    Persistent atrial fibrillation (Multi) 06/18/2024    Anticoagulant long-term use 06/18/2024    Pre-op exam 05/08/2024    Edema of larynx 03/25/2024    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    Vocal fatigue 03/25/2024    Aneurysm of middle cerebral artery ([facility]) 02/22/2024    Arteriosclerosis of coronary artery 01/08/2024    Bilateral carotid artery occlusion 01/08/2024    Gastroesophageal reflux disease 01/08/2024    Nonrheumatic aortic valve stenosis 01/08/2024    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    Cyst of skin and subcutaneous tissue 01/08/2024    External hemorrhoid 01/08/2024    Chronic bilateral low back pain with bilateral sciatica 10/11/2023    Eczema 08/30/2023    Hypercholesterolemia 08/30/2023    Hypertension 08/30/2023    Spinal stenosis 08/30/2023    Abnormal weight loss 08/30/2023    Dysphonia 08/30/2023    Degeneration of intervertebral disc of lumbar region 06/27/2023    Atrial flutter (Multi) 04/10/2023    Chronic hoarseness 04/04/2023    Polyp of vocal cord 04/04/2023    Benign essential hypertension 12/21/2016    Gastroesophageal reflux disease with esophagitis 12/21/2016    Mixed anxiety depressive disorder 12/21/2016    Atrial fibrillation (Multi) 07/16/2025    Encounter for examination for normal comparison and control in clinical research program 07/16/2025    Bilateral carotid artery stenosis 11/18/2024    Neoplasm of unspecified behavior of bone, soft tissue, and skin 06/03/2024    Spondylolisthesis at L4-L5 level 12/05/2023    Lumbar stenosis with neurogenic claudication 12/05/2023        LOS: 0 days     Objective     Vital signs in last 24 hours:       Physical Exam at Discharge  Discharge Condition: good     Weight:    AO x 3  CVS- normal s1, s2, no murmurs  Resp -CTAB  Abd- Soft, NT, ND  Neuro  No gross motor.  "Sensory deficits   Groin access sites no hematoma, swelling   No LE edema       History of Present Illness  Lynsey Forman is a 79 y.o. female with a PMH of HTN, HLD, CAD by CT calcium, persistent AF, CKD , and Carotid disease s/p endarterectomy.  Pt presented on 8/1/25 for elective LAAO - Watchman procedure    Hospital Course     Lynsey Forman is now s/p LAAO - Watchman 27mm device via RVF x2 (Perclose device x2) on 8/1/25 with Dr. Mcgee.  Device deployed after position confirmed using fluoroscopy and ICE, anchor with a tug test, compression and seal tests.  Procedure successful and uneventful.    Post-op course:  Vitals, rhythm, and physical assessment remained stable.  Right groin access sites without signs of active bleeding/hematoma.  Pt denies CP, SOB, abd pain, groin pain, new numbness/tingling in the extremities.   Pt ambulated after mandatory bedrest period without issue.  Post-op ECHO shows a stable EF and no significant change of the pericardial space.      Plan:  - \"Ok\" to d/c home today 8/1/2025 per Dr. Mcgee  - Patient was randomized as part of the SIMPLAAFY trial and will be on aspirin 81mg PO daily as her post-watchman antiplatelet regimen  - continue home medications otherwise   - follow up with PCP/Primary Cardiology in 2 weeks  - Repeat Watchman CTA in 4 months to reassess the device  - Watchman RN coordinator to follow-up per protocol      D/w Dr. Arely Heredia MD    "

## 2025-08-01 NOTE — POST-PROCEDURE NOTE
Physician Transition of Care Summary  Invasive Cardiovascular Lab    Procedure Date: 8/1/2025  Attending:    * Felix Mcgee - Primary  Resident/Fellow/Other Assistant: Surgeons and Role:     * Deni Stern MD - Fellow     * Mary Heredia MD - Fellow    Indications:   Pre-op Diagnosis      * Atrial fibrillation, unspecified type (Multi) [I48.91]     * Encounter for examination for normal comparison and control in clinical research program [Z00.6]    Post-procedure diagnosis:   Post-op Diagnosis     * Atrial fibrillation, unspecified type (Multi) [I48.91]     * Encounter for examination for normal comparison and control in clinical research program [Z00.6]    Procedure(s):   LAAO (Left Atrial Appendage Occlusion)  48368 - CA PERQ CLSR TCAT L ATR APNDGE W/ENDOCARDIAL IMPLNT    CA PERQ CLSR TCAT L ATR APNDGE W/ENDOCARDIAL IMPLNT [83183]    Procedure Findings:   Successful LAAO with implantation of 27mm Watchman Flex Pro device    Description of the Procedure:   S/p DAGMAR closure    Access: Dual right femoral vein access s/p 2 proglide closure devices.     Device: After confirmation of the device position on fluoroscopy and ICE, anchor with a tug test, compression and seal a 27mm Watchman was successfully deployed without any immediate complications.     No effusion on ICE was present pre and post watchman deployment.     Complications:   None    Stents/Implants:   Implants          Other Cardiac Implant          Device, Closure, 27mm Watchman Flx Pro Laac - [id number] - Implanted        Inventory item: DEVICE, CLOSURE, 27MM WATCHMAN FLX PRO LAAC Model/Cat number: [id number]    : [employer] Lot number: [id number]    Device identifier: [id number]        GUDID Information       Request status Successful        Brand name: WATCHMAN FLX™ Pro Version/Model: [id number]    Company name: [employer] MRI safety info as of 8/1/25: MR Conditional    Contains dry or latex rubber: No       KRUPA WALTERS name: Cardiac defect occluder                As of 8/1/2025       Status: Implanted                              Anticoagulation/Antiplatelet Plan:   Aspirin 81mg PO indefinitely    Estimated Blood Loss:   10 mL    Anesthesia: Moderate Sedation Anesthesia Staff: No anesthesia staff entered.    Any Specimen(s) Removed:   Order Name Source Comment Collection Info Order Time   TYPE AND SCREEN Blood, Venous  Collected By: Zackary Watters RN 8/1/2025 11:44 AM     Release result to Anpro21hart   Immediate        CBC Blood, Venous  Collected By: Zackary Watters RN 8/1/2025 12:17 PM     Release result to MyChart   Immediate            Disposition:   Recommendations:   Patient was randomized as part of the SIMPLAAFY and will be on aspirin 81mg PO daily indefinitely for her post-Watchman antiplatelet regimen  CT in 3 months  Access site monitoring.   TTE today  Likely discharge home today once recovery and monitoring period is complete.      Electronically signed by: Mary Heredia MD, 8/1/2025 2:05 PM

## 2025-08-11 DIAGNOSIS — I48.91 ATRIAL FIBRILLATION, UNSPECIFIED TYPE (MULTI): Primary | ICD-10-CM

## 2025-08-12 ENCOUNTER — APPOINTMENT (OUTPATIENT)
Dept: PHARMACY | Facility: HOSPITAL | Age: 80
End: 2025-08-12
Payer: MEDICARE

## 2025-08-13 ENCOUNTER — APPOINTMENT (OUTPATIENT)
Dept: PRIMARY CARE | Facility: CLINIC | Age: 80
End: 2025-08-13
Payer: MEDICARE

## 2025-08-13 VITALS
WEIGHT: 111.33 LBS | RESPIRATION RATE: 16 BRPM | BODY MASS INDEX: 19.72 KG/M2 | OXYGEN SATURATION: 97 % | SYSTOLIC BLOOD PRESSURE: 105 MMHG | DIASTOLIC BLOOD PRESSURE: 62 MMHG | HEART RATE: 85 BPM

## 2025-08-13 DIAGNOSIS — I10 PRIMARY HYPERTENSION: ICD-10-CM

## 2025-08-13 DIAGNOSIS — E55.9 VITAMIN D DEFICIENCY: ICD-10-CM

## 2025-08-13 DIAGNOSIS — F17.200 HAS BEEN SMOKING TOBACCO FOR 30 YEARS OR MORE: ICD-10-CM

## 2025-08-13 DIAGNOSIS — Z87.891 PERSONAL HISTORY OF NICOTINE DEPENDENCE: ICD-10-CM

## 2025-08-13 DIAGNOSIS — R26.81 UNSTEADY GAIT WHEN WALKING: ICD-10-CM

## 2025-08-13 DIAGNOSIS — Z12.31 VISIT FOR SCREENING MAMMOGRAM: ICD-10-CM

## 2025-08-13 DIAGNOSIS — M54.42 CHRONIC BILATERAL LOW BACK PAIN WITH BILATERAL SCIATICA: ICD-10-CM

## 2025-08-13 DIAGNOSIS — Z12.31 OTHER SCREENING MAMMOGRAM: ICD-10-CM

## 2025-08-13 DIAGNOSIS — G89.29 CHRONIC BILATERAL LOW BACK PAIN WITH BILATERAL SCIATICA: ICD-10-CM

## 2025-08-13 DIAGNOSIS — M54.41 CHRONIC BILATERAL LOW BACK PAIN WITH BILATERAL SCIATICA: ICD-10-CM

## 2025-08-13 DIAGNOSIS — E78.00 HYPERCHOLESTEROLEMIA: ICD-10-CM

## 2025-08-13 DIAGNOSIS — Z00.00 MEDICARE ANNUAL WELLNESS VISIT, SUBSEQUENT: Primary | ICD-10-CM

## 2025-08-13 LAB — ACT BLD: 293 SEC (ref 89–169)

## 2025-08-13 PROCEDURE — G0439 PPPS, SUBSEQ VISIT: HCPCS | Performed by: INTERNAL MEDICINE

## 2025-08-13 PROCEDURE — 3074F SYST BP LT 130 MM HG: CPT | Performed by: INTERNAL MEDICINE

## 2025-08-13 PROCEDURE — 1170F FXNL STATUS ASSESSED: CPT | Performed by: INTERNAL MEDICINE

## 2025-08-13 PROCEDURE — 3078F DIAST BP <80 MM HG: CPT | Performed by: INTERNAL MEDICINE

## 2025-08-13 PROCEDURE — 1126F AMNT PAIN NOTED NONE PRSNT: CPT | Performed by: INTERNAL MEDICINE

## 2025-08-13 PROCEDURE — 99213 OFFICE O/P EST LOW 20 MIN: CPT | Performed by: INTERNAL MEDICINE

## 2025-08-13 PROCEDURE — 1111F DSCHRG MED/CURRENT MED MERGE: CPT | Performed by: INTERNAL MEDICINE

## 2025-08-13 RX ORDER — GABAPENTIN 300 MG/1
300 CAPSULE ORAL 3 TIMES DAILY
Qty: 270 CAPSULE | Refills: 1 | Status: SHIPPED | OUTPATIENT
Start: 2025-08-13 | End: 2026-02-09

## 2025-08-13 ASSESSMENT — ACTIVITIES OF DAILY LIVING (ADL)
BATHING: INDEPENDENT
TAKING_MEDICATION: INDEPENDENT
DOING_HOUSEWORK: INDEPENDENT
DRESSING: INDEPENDENT
MANAGING_FINANCES: INDEPENDENT
GROCERY_SHOPPING: INDEPENDENT

## 2025-08-13 ASSESSMENT — ENCOUNTER SYMPTOMS
DEPRESSION: 0
OCCASIONAL FEELINGS OF UNSTEADINESS: 1
LOSS OF SENSATION IN FEET: 0

## 2025-08-13 ASSESSMENT — PAIN SCALES - GENERAL: PAINLEVEL_OUTOF10: 0-NO PAIN

## 2025-08-14 ENCOUNTER — LAB (OUTPATIENT)
Dept: LAB | Facility: HOSPITAL | Age: 80
End: 2025-08-14
Payer: MEDICARE

## 2025-08-14 LAB
ALBUMIN SERPL BCP-MCNC: 4.7 G/DL (ref 3.4–5)
ANION GAP SERPL CALC-SCNC: 15 MMOL/L (ref 10–20)
BUN SERPL-MCNC: 26 MG/DL (ref 6–23)
CALCIUM SERPL-MCNC: 9.8 MG/DL (ref 8.6–10.3)
CHLORIDE SERPL-SCNC: 92 MMOL/L (ref 98–107)
CO2 SERPL-SCNC: 31 MMOL/L (ref 21–32)
CREAT SERPL-MCNC: 1.38 MG/DL (ref 0.5–1.05)
EGFRCR SERPLBLD CKD-EPI 2021: 40 ML/MIN/1.73M*2
GLUCOSE SERPL-MCNC: 87 MG/DL (ref 74–99)
PHOSPHATE SERPL-MCNC: 4 MG/DL (ref 2.5–4.9)
POTASSIUM SERPL-SCNC: 4.5 MMOL/L (ref 3.5–5.3)
SODIUM SERPL-SCNC: 133 MMOL/L (ref 136–145)

## 2025-08-14 PROCEDURE — 80069 RENAL FUNCTION PANEL: CPT

## 2025-08-15 LAB
25(OH)D3+25(OH)D2 SERPL-MCNC: 30 NG/ML (ref 30–100)
CHOLEST SERPL-MCNC: 169 MG/DL
CHOLEST/HDLC SERPL: 1.6 (CALC)
HDLC SERPL-MCNC: 106 MG/DL
LDLC SERPL CALC-MCNC: 49 MG/DL (CALC)
NONHDLC SERPL-MCNC: 63 MG/DL (CALC)
TRIGL SERPL-MCNC: 61 MG/DL

## 2025-08-20 ENCOUNTER — OFFICE VISIT (OUTPATIENT)
Dept: CARDIOLOGY | Facility: HOSPITAL | Age: 80
End: 2025-08-20
Payer: MEDICARE

## 2025-08-20 VITALS
WEIGHT: 111 LBS | HEART RATE: 59 BPM | HEIGHT: 63 IN | DIASTOLIC BLOOD PRESSURE: 50 MMHG | SYSTOLIC BLOOD PRESSURE: 110 MMHG | OXYGEN SATURATION: 97 % | BODY MASS INDEX: 19.67 KG/M2

## 2025-08-20 DIAGNOSIS — E78.00 HYPERCHOLESTEROLEMIA: ICD-10-CM

## 2025-08-20 DIAGNOSIS — I10 BENIGN ESSENTIAL HYPERTENSION: ICD-10-CM

## 2025-08-20 DIAGNOSIS — I48.91 ATRIAL FIBRILLATION, UNSPECIFIED TYPE (MULTI): Primary | ICD-10-CM

## 2025-08-20 DIAGNOSIS — I10 PRIMARY HYPERTENSION: ICD-10-CM

## 2025-08-20 PROCEDURE — 99212 OFFICE O/P EST SF 10 MIN: CPT

## 2025-08-20 PROCEDURE — 93005 ELECTROCARDIOGRAM TRACING: CPT | Performed by: NURSE PRACTITIONER

## 2025-08-20 RX ORDER — FUROSEMIDE 20 MG/1
20 TABLET ORAL EVERY OTHER DAY
Start: 2025-08-20 | End: 2026-08-20

## 2025-08-21 LAB
ATRIAL RATE: 59 BPM
P AXIS: -63 DEGREES
P OFFSET: 211 MS
P ONSET: 151 MS
PR INTERVAL: 158 MS
Q ONSET: 230 MS
QRS COUNT: 10 BEATS
QRS DURATION: 74 MS
QT INTERVAL: 424 MS
QTC CALCULATION(BAZETT): 419 MS
QTC FREDERICIA: 421 MS
R AXIS: -25 DEGREES
T AXIS: -24 DEGREES
T OFFSET: 442 MS
VENTRICULAR RATE: 59 BPM

## 2025-08-27 DIAGNOSIS — I10 BENIGN ESSENTIAL HYPERTENSION: ICD-10-CM

## 2026-02-17 ENCOUNTER — APPOINTMENT (OUTPATIENT)
Dept: PRIMARY CARE | Facility: CLINIC | Age: 81
End: 2026-02-17
Payer: MEDICARE

## 2026-04-22 ENCOUNTER — APPOINTMENT (OUTPATIENT)
Dept: DERMATOLOGY | Facility: CLINIC | Age: 81
End: 2026-04-22
Payer: MEDICARE

## 2026-08-13 ENCOUNTER — APPOINTMENT (OUTPATIENT)
Dept: PRIMARY CARE | Facility: CLINIC | Age: 81
End: 2026-08-13
Payer: MEDICARE

## (undated) DEVICE — SUTURE, VICRYL, 3-0, 27 IN, SH

## (undated) DEVICE — SHEATH, PINNACLE, 10 CM,  7FR INTRODUCER, 7FR DIA, 2.5 CM DIALATOR

## (undated) DEVICE — DRAPE, SHEET, ENDOSCOPY, GENERAL, FENESTRATED, ARMBOARD COVER, 98 X 123.5 IN, DISPOSABLE, LF, STERILE

## (undated) DEVICE — ELECTRODE, CORKSCREW NEEDLE 1.5M LENGTH

## (undated) DEVICE — SUTURE, VICRYL, 2-0, 36 IN, CT-1, UNDYED

## (undated) DEVICE — ADHESIVE, SKIN, LIQUIBAND EXCEED

## (undated) DEVICE — CATHETER, DIAGNOSTIC, DXTERITY, 6FR PIG155, 110CM.6 SH

## (undated) DEVICE — CONTAINER, SPECIMEN, 120 ML, STERILE

## (undated) DEVICE — PROTECTOR, NERVE, ULNAR, PINK

## (undated) DEVICE — Device

## (undated) DEVICE — GLOVE, SURGICAL, PROTEXIS PI BLUE W/NEUTHERA, 8.0, PF, LF

## (undated) DEVICE — SHEATH, PINNACLE, 10 CM,  8FR INTRODUCER, 8FR DIA, 2.5 CM DIALATOR

## (undated) DEVICE — PENCIL, SMOKE EVACUATION, VALLEYLAB

## (undated) DEVICE — NEEDLE, ELECTRODE, SUBDERMAL, PAIRED, 2.0 LEAD, DISP

## (undated) DEVICE — SUTURE, PROLENE, 6-0, 30 IN, BV-1 BV-1

## (undated) DEVICE — SUTURE, PROLENE, 7-0, 30 IN, BV1, DA, BLUE

## (undated) DEVICE — COVER, TABLE, UHC

## (undated) DEVICE — SUTURE, MONOCRYL, 4-0, 18 IN, PS2, UNDYED

## (undated) DEVICE — COVER, CART, 45 X 27 X 48 IN, CLEAR

## (undated) DEVICE — SUTURE, PROLENE, 5-0, 36 IN, C-1, CV-11, BLUE

## (undated) DEVICE — SPONGE, HEMOSTATIC, CELLULOSE, SURGICEL, 2 X 14 IN

## (undated) DEVICE — ELECTRODE, QUICK-COMBO, REDI PACK

## (undated) DEVICE — DEVICE, CLOSURE, PERCLOSE, PROSTYLE

## (undated) DEVICE — DRAPE, SHEET, THYROID, W/ARMBOARD COVER, 100 X 121 IN, DISPOSABLE, LF, STERILE

## (undated) DEVICE — SEALANT, HEMOSTATIC, FLOSEAL, 10 ML

## (undated) DEVICE — ELECTRODE, GROUND PLATE

## (undated) DEVICE — COVER, TABLE, 44 X 75 IN, DISPOSABLE, LF, STERILE

## (undated) DEVICE — SYSTEM, ACCESS, FXD DBL CURVE, WATCHMAN

## (undated) DEVICE — MANIFOLD, 4 PORT NEPTUNE STANDARD

## (undated) DEVICE — ADHESIVE, SKIN, DERMABOND ADVANCED, 15CM, PEN-STYLE

## (undated) DEVICE — DRAPE, MAGENTIC INSTRUMENT, 12X16

## (undated) DEVICE — BANDAGE, ELASTIC, SELF-CLOSE, 4 IN, HONEYCOMB, STERILE

## (undated) DEVICE — SHEATH, PINNACLE, W/O GUIDEWIRE, 8FR INTRODUCER,  8FR DIA, 2.5 CM DILATOR

## (undated) DEVICE — DRAPE, INCISE, ANTIMICROBIAL, IOBAN 2, LARGE, 17 X 23 IN, DISPOSABLE, STERILE

## (undated) DEVICE — TOWEL, SURGICAL, NEURO, O/R, 16 X 26, BLUE, STERILE

## (undated) DEVICE — WOUND SYSTEM, DEBRIDEMENT & CLEANING, O.R DUOPAK

## (undated) DEVICE — ACCESS KIT, S-MAK MINI, 4FR 10CM 0.018IN 40CM, NT/PT, ECHO ENHANCE NEEDLE

## (undated) DEVICE — SUTURE, VICRYL PLUS 3-0, SH, 27IN

## (undated) DEVICE — APPLICATOR, PREP, CHLORAPREP, W/ORANGE TINT, 10.5ML

## (undated) DEVICE — GLOVE, SURGICAL, PROTEXIS PI MICRO, 7.0, PF, LF

## (undated) DEVICE — GUIDEWIRE, INQWIRE, 3MM J, .035, 150

## (undated) DEVICE — CATHETER, ACUSON ACUNAV ULTRASOUND, 8FR 90CM

## (undated) DEVICE — SUTURE, SILK, 2-0, 30 IN, SH, BLACK